# Patient Record
Sex: MALE | Race: WHITE | Employment: OTHER | URBAN - METROPOLITAN AREA
[De-identification: names, ages, dates, MRNs, and addresses within clinical notes are randomized per-mention and may not be internally consistent; named-entity substitution may affect disease eponyms.]

---

## 2019-03-29 ENCOUNTER — ANESTHESIA EVENT (OUTPATIENT)
Dept: OPERATING ROOM | Age: 69
DRG: 343 | End: 2019-03-29
Payer: MEDICARE

## 2019-03-29 ENCOUNTER — HOSPITAL ENCOUNTER (INPATIENT)
Age: 69
LOS: 1 days | Discharge: HOME | DRG: 343 | End: 2019-03-29
Attending: EMERGENCY MEDICINE | Admitting: SURGERY
Payer: MEDICARE

## 2019-03-29 ENCOUNTER — ANESTHESIA (OUTPATIENT)
Dept: OPERATING ROOM | Age: 69
DRG: 343 | End: 2019-03-29
Payer: MEDICARE

## 2019-03-29 ENCOUNTER — APPOINTMENT (OUTPATIENT)
Dept: CT IMAGING | Age: 69
DRG: 343 | End: 2019-03-29
Payer: MEDICARE

## 2019-03-29 VITALS — DIASTOLIC BLOOD PRESSURE: 55 MMHG | TEMPERATURE: 98.8 F | SYSTOLIC BLOOD PRESSURE: 70 MMHG | OXYGEN SATURATION: 95 %

## 2019-03-29 VITALS
DIASTOLIC BLOOD PRESSURE: 78 MMHG | WEIGHT: 251.77 LBS | RESPIRATION RATE: 20 BRPM | SYSTOLIC BLOOD PRESSURE: 133 MMHG | OXYGEN SATURATION: 92 % | TEMPERATURE: 96.2 F | HEIGHT: 69 IN | HEART RATE: 91 BPM | BODY MASS INDEX: 37.29 KG/M2

## 2019-03-29 DIAGNOSIS — G89.18 POST-OPERATIVE PAIN: ICD-10-CM

## 2019-03-29 DIAGNOSIS — K35.80 ACUTE APPENDICITIS, UNSPECIFIED ACUTE APPENDICITIS TYPE: ICD-10-CM

## 2019-03-29 DIAGNOSIS — K35.20 ACUTE APPENDICITIS WITH GENERALIZED PERITONITIS, WITHOUT GANGRENE OR ABSCESS, UNSPECIFIED WHETHER PERFORATION PRESENT: Primary | ICD-10-CM

## 2019-03-29 LAB
ABO/RH: NORMAL
ALBUMIN SERPL-MCNC: 4.2 G/DL (ref 3.4–5)
ALP BLD-CCNC: 114 U/L (ref 40–129)
ALT SERPL-CCNC: 26 U/L (ref 10–40)
ANION GAP SERPL CALCULATED.3IONS-SCNC: 13 MMOL/L (ref 3–16)
ANTIBODY SCREEN: NORMAL
AST SERPL-CCNC: 21 U/L (ref 15–37)
BASOPHILS ABSOLUTE: 0.2 K/UL (ref 0–0.2)
BASOPHILS RELATIVE PERCENT: 1.1 %
BILIRUB SERPL-MCNC: 0.9 MG/DL (ref 0–1)
BILIRUBIN DIRECT: <0.2 MG/DL (ref 0–0.3)
BILIRUBIN URINE: NEGATIVE
BILIRUBIN, INDIRECT: NORMAL MG/DL (ref 0–1)
BLOOD, URINE: NEGATIVE
BUN BLDV-MCNC: 13 MG/DL (ref 7–20)
CALCIUM SERPL-MCNC: 9.3 MG/DL (ref 8.3–10.6)
CHLORIDE BLD-SCNC: 102 MMOL/L (ref 99–110)
CLARITY: CLEAR
CO2: 21 MMOL/L (ref 21–32)
COLOR: YELLOW
CREAT SERPL-MCNC: 0.8 MG/DL (ref 0.8–1.3)
EOSINOPHILS ABSOLUTE: 0.1 K/UL (ref 0–0.6)
EOSINOPHILS RELATIVE PERCENT: 0.6 %
GFR AFRICAN AMERICAN: >60
GFR NON-AFRICAN AMERICAN: >60
GLUCOSE BLD-MCNC: 118 MG/DL (ref 70–99)
GLUCOSE URINE: NEGATIVE MG/DL
HCT VFR BLD CALC: 40.1 % (ref 40.5–52.5)
HEMOGLOBIN: 13.1 G/DL (ref 13.5–17.5)
INR BLD: 1 (ref 0.86–1.14)
KETONES, URINE: NEGATIVE MG/DL
LEUKOCYTE ESTERASE, URINE: NEGATIVE
LIPASE: 14 U/L (ref 13–60)
LYMPHOCYTES ABSOLUTE: 1.1 K/UL (ref 1–5.1)
LYMPHOCYTES RELATIVE PERCENT: 7.1 %
MCH RBC QN AUTO: 29.6 PG (ref 26–34)
MCHC RBC AUTO-ENTMCNC: 32.7 G/DL (ref 31–36)
MCV RBC AUTO: 90.5 FL (ref 80–100)
MICROSCOPIC EXAMINATION: NORMAL
MONOCYTES ABSOLUTE: 1.2 K/UL (ref 0–1.3)
MONOCYTES RELATIVE PERCENT: 8.2 %
NEUTROPHILS ABSOLUTE: 12.5 K/UL (ref 1.7–7.7)
NEUTROPHILS RELATIVE PERCENT: 83 %
NITRITE, URINE: NEGATIVE
PDW BLD-RTO: 14 % (ref 12.4–15.4)
PH UA: 6 (ref 5–8)
PLATELET # BLD: 210 K/UL (ref 135–450)
PMV BLD AUTO: 9.6 FL (ref 5–10.5)
POTASSIUM REFLEX MAGNESIUM: 4.2 MMOL/L (ref 3.5–5.1)
PROTEIN UA: NEGATIVE MG/DL
PROTHROMBIN TIME: 11.4 SEC (ref 9.8–13)
RBC # BLD: 4.43 M/UL (ref 4.2–5.9)
SODIUM BLD-SCNC: 136 MMOL/L (ref 136–145)
SPECIFIC GRAVITY UA: 1.02 (ref 1–1.03)
TOTAL PROTEIN: 6.8 G/DL (ref 6.4–8.2)
URINE REFLEX TO CULTURE: NORMAL
URINE TYPE: NORMAL
UROBILINOGEN, URINE: 0.2 E.U./DL
WBC # BLD: 15 K/UL (ref 4–11)

## 2019-03-29 PROCEDURE — 83690 ASSAY OF LIPASE: CPT

## 2019-03-29 PROCEDURE — 3700000001 HC ADD 15 MINUTES (ANESTHESIA): Performed by: SURGERY

## 2019-03-29 PROCEDURE — 2580000003 HC RX 258: Performed by: EMERGENCY MEDICINE

## 2019-03-29 PROCEDURE — 85025 COMPLETE CBC W/AUTO DIFF WBC: CPT

## 2019-03-29 PROCEDURE — 74177 CT ABD & PELVIS W/CONTRAST: CPT

## 2019-03-29 PROCEDURE — 6360000002 HC RX W HCPCS: Performed by: SURGERY

## 2019-03-29 PROCEDURE — 7100000001 HC PACU RECOVERY - ADDTL 15 MIN: Performed by: SURGERY

## 2019-03-29 PROCEDURE — 0DTJ4ZZ RESECTION OF APPENDIX, PERCUTANEOUS ENDOSCOPIC APPROACH: ICD-10-PCS | Performed by: SURGERY

## 2019-03-29 PROCEDURE — 88304 TISSUE EXAM BY PATHOLOGIST: CPT

## 2019-03-29 PROCEDURE — 6360000004 HC RX CONTRAST MEDICATION: Performed by: EMERGENCY MEDICINE

## 2019-03-29 PROCEDURE — 1200000000 HC SEMI PRIVATE

## 2019-03-29 PROCEDURE — 99285 EMERGENCY DEPT VISIT HI MDM: CPT

## 2019-03-29 PROCEDURE — 80048 BASIC METABOLIC PNL TOTAL CA: CPT

## 2019-03-29 PROCEDURE — 3600000014 HC SURGERY LEVEL 4 ADDTL 15MIN: Performed by: SURGERY

## 2019-03-29 PROCEDURE — 86900 BLOOD TYPING SEROLOGIC ABO: CPT

## 2019-03-29 PROCEDURE — 2500000003 HC RX 250 WO HCPCS: Performed by: SURGERY

## 2019-03-29 PROCEDURE — 96375 TX/PRO/DX INJ NEW DRUG ADDON: CPT

## 2019-03-29 PROCEDURE — 81003 URINALYSIS AUTO W/O SCOPE: CPT

## 2019-03-29 PROCEDURE — 99223 1ST HOSP IP/OBS HIGH 75: CPT | Performed by: SURGERY

## 2019-03-29 PROCEDURE — 2709999900 HC NON-CHARGEABLE SUPPLY: Performed by: SURGERY

## 2019-03-29 PROCEDURE — 3700000000 HC ANESTHESIA ATTENDED CARE: Performed by: SURGERY

## 2019-03-29 PROCEDURE — 2720000010 HC SURG SUPPLY STERILE: Performed by: SURGERY

## 2019-03-29 PROCEDURE — 2580000003 HC RX 258: Performed by: SURGERY

## 2019-03-29 PROCEDURE — 2580000003 HC RX 258: Performed by: STUDENT IN AN ORGANIZED HEALTH CARE EDUCATION/TRAINING PROGRAM

## 2019-03-29 PROCEDURE — 80076 HEPATIC FUNCTION PANEL: CPT

## 2019-03-29 PROCEDURE — 96374 THER/PROPH/DIAG INJ IV PUSH: CPT

## 2019-03-29 PROCEDURE — 6360000002 HC RX W HCPCS: Performed by: EMERGENCY MEDICINE

## 2019-03-29 PROCEDURE — 2500000003 HC RX 250 WO HCPCS: Performed by: NURSE ANESTHETIST, CERTIFIED REGISTERED

## 2019-03-29 PROCEDURE — 3600000004 HC SURGERY LEVEL 4 BASE: Performed by: SURGERY

## 2019-03-29 PROCEDURE — 7100000000 HC PACU RECOVERY - FIRST 15 MIN: Performed by: SURGERY

## 2019-03-29 PROCEDURE — 6360000002 HC RX W HCPCS: Performed by: STUDENT IN AN ORGANIZED HEALTH CARE EDUCATION/TRAINING PROGRAM

## 2019-03-29 PROCEDURE — G0378 HOSPITAL OBSERVATION PER HR: HCPCS

## 2019-03-29 PROCEDURE — 44970 LAPAROSCOPY APPENDECTOMY: CPT | Performed by: SURGERY

## 2019-03-29 PROCEDURE — 85610 PROTHROMBIN TIME: CPT

## 2019-03-29 PROCEDURE — 6360000002 HC RX W HCPCS: Performed by: NURSE ANESTHETIST, CERTIFIED REGISTERED

## 2019-03-29 PROCEDURE — 86901 BLOOD TYPING SEROLOGIC RH(D): CPT

## 2019-03-29 PROCEDURE — 6360000002 HC RX W HCPCS: Performed by: PHYSICIAN ASSISTANT

## 2019-03-29 PROCEDURE — 86850 RBC ANTIBODY SCREEN: CPT

## 2019-03-29 RX ORDER — ONDANSETRON 2 MG/ML
4 INJECTION INTRAMUSCULAR; INTRAVENOUS EVERY 6 HOURS PRN
Status: DISCONTINUED | OUTPATIENT
Start: 2019-03-29 | End: 2019-03-29 | Stop reason: HOSPADM

## 2019-03-29 RX ORDER — DEXAMETHASONE SODIUM PHOSPHATE 4 MG/ML
INJECTION, SOLUTION INTRA-ARTICULAR; INTRALESIONAL; INTRAMUSCULAR; INTRAVENOUS; SOFT TISSUE PRN
Status: DISCONTINUED | OUTPATIENT
Start: 2019-03-29 | End: 2019-03-29 | Stop reason: SDUPTHER

## 2019-03-29 RX ORDER — ONDANSETRON 2 MG/ML
4 INJECTION INTRAMUSCULAR; INTRAVENOUS ONCE
Status: COMPLETED | OUTPATIENT
Start: 2019-03-29 | End: 2019-03-29

## 2019-03-29 RX ORDER — OXYCODONE HYDROCHLORIDE AND ACETAMINOPHEN 5; 325 MG/1; MG/1
1 TABLET ORAL ONCE
Status: DISCONTINUED | OUTPATIENT
Start: 2019-03-29 | End: 2019-03-29 | Stop reason: HOSPADM

## 2019-03-29 RX ORDER — FENTANYL CITRATE 50 UG/ML
INJECTION, SOLUTION INTRAMUSCULAR; INTRAVENOUS PRN
Status: DISCONTINUED | OUTPATIENT
Start: 2019-03-29 | End: 2019-03-29 | Stop reason: SDUPTHER

## 2019-03-29 RX ORDER — FENTANYL CITRATE 50 UG/ML
25 INJECTION, SOLUTION INTRAMUSCULAR; INTRAVENOUS EVERY 5 MIN PRN
Status: DISCONTINUED | OUTPATIENT
Start: 2019-03-29 | End: 2019-03-29 | Stop reason: HOSPADM

## 2019-03-29 RX ORDER — HYDROMORPHONE HCL 110MG/55ML
PATIENT CONTROLLED ANALGESIA SYRINGE INTRAVENOUS PRN
Status: DISCONTINUED | OUTPATIENT
Start: 2019-03-29 | End: 2019-03-29 | Stop reason: SDUPTHER

## 2019-03-29 RX ORDER — PROMETHAZINE HYDROCHLORIDE 25 MG/ML
6.25 INJECTION, SOLUTION INTRAMUSCULAR; INTRAVENOUS
Status: DISCONTINUED | OUTPATIENT
Start: 2019-03-29 | End: 2019-03-29 | Stop reason: HOSPADM

## 2019-03-29 RX ORDER — DOCUSATE SODIUM 100 MG/1
100 CAPSULE, LIQUID FILLED ORAL 2 TIMES DAILY
Qty: 30 CAPSULE | Refills: 0 | Status: SHIPPED | OUTPATIENT
Start: 2019-03-29 | End: 2019-04-12

## 2019-03-29 RX ORDER — LABETALOL HYDROCHLORIDE 5 MG/ML
5 INJECTION, SOLUTION INTRAVENOUS EVERY 10 MIN PRN
Status: DISCONTINUED | OUTPATIENT
Start: 2019-03-29 | End: 2019-03-29 | Stop reason: HOSPADM

## 2019-03-29 RX ORDER — MORPHINE SULFATE 4 MG/ML
4 INJECTION, SOLUTION INTRAMUSCULAR; INTRAVENOUS ONCE
Status: COMPLETED | OUTPATIENT
Start: 2019-03-29 | End: 2019-03-29

## 2019-03-29 RX ORDER — PROPOFOL 10 MG/ML
INJECTION, EMULSION INTRAVENOUS PRN
Status: DISCONTINUED | OUTPATIENT
Start: 2019-03-29 | End: 2019-03-29 | Stop reason: SDUPTHER

## 2019-03-29 RX ORDER — ONDANSETRON 2 MG/ML
INJECTION INTRAMUSCULAR; INTRAVENOUS PRN
Status: DISCONTINUED | OUTPATIENT
Start: 2019-03-29 | End: 2019-03-29 | Stop reason: SDUPTHER

## 2019-03-29 RX ORDER — SODIUM CHLORIDE, SODIUM LACTATE, POTASSIUM CHLORIDE, AND CALCIUM CHLORIDE .6; .31; .03; .02 G/100ML; G/100ML; G/100ML; G/100ML
IRRIGANT IRRIGATION PRN
Status: DISCONTINUED | OUTPATIENT
Start: 2019-03-29 | End: 2019-03-29 | Stop reason: ALTCHOICE

## 2019-03-29 RX ORDER — SODIUM CHLORIDE, SODIUM LACTATE, POTASSIUM CHLORIDE, CALCIUM CHLORIDE 600; 310; 30; 20 MG/100ML; MG/100ML; MG/100ML; MG/100ML
INJECTION, SOLUTION INTRAVENOUS CONTINUOUS
Status: DISCONTINUED | OUTPATIENT
Start: 2019-03-29 | End: 2019-03-29 | Stop reason: HOSPADM

## 2019-03-29 RX ORDER — KETOROLAC TROMETHAMINE 15 MG/ML
INJECTION, SOLUTION INTRAMUSCULAR; INTRAVENOUS PRN
Status: DISCONTINUED | OUTPATIENT
Start: 2019-03-29 | End: 2019-03-29 | Stop reason: SDUPTHER

## 2019-03-29 RX ORDER — MAGNESIUM HYDROXIDE 1200 MG/15ML
LIQUID ORAL CONTINUOUS PRN
Status: COMPLETED | OUTPATIENT
Start: 2019-03-29 | End: 2019-03-29

## 2019-03-29 RX ORDER — MIDAZOLAM HYDROCHLORIDE 1 MG/ML
INJECTION INTRAMUSCULAR; INTRAVENOUS PRN
Status: DISCONTINUED | OUTPATIENT
Start: 2019-03-29 | End: 2019-03-29 | Stop reason: SDUPTHER

## 2019-03-29 RX ORDER — DIPHENHYDRAMINE HYDROCHLORIDE 50 MG/ML
12.5 INJECTION INTRAMUSCULAR; INTRAVENOUS
Status: DISCONTINUED | OUTPATIENT
Start: 2019-03-29 | End: 2019-03-29 | Stop reason: HOSPADM

## 2019-03-29 RX ORDER — ONDANSETRON 2 MG/ML
4 INJECTION INTRAMUSCULAR; INTRAVENOUS
Status: DISCONTINUED | OUTPATIENT
Start: 2019-03-29 | End: 2019-03-29 | Stop reason: HOSPADM

## 2019-03-29 RX ORDER — SODIUM CHLORIDE 0.9 % (FLUSH) 0.9 %
10 SYRINGE (ML) INJECTION PRN
Status: DISCONTINUED | OUTPATIENT
Start: 2019-03-29 | End: 2019-03-29 | Stop reason: HOSPADM

## 2019-03-29 RX ORDER — SODIUM CHLORIDE 0.9 % (FLUSH) 0.9 %
10 SYRINGE (ML) INJECTION EVERY 12 HOURS SCHEDULED
Status: DISCONTINUED | OUTPATIENT
Start: 2019-03-29 | End: 2019-03-29 | Stop reason: HOSPADM

## 2019-03-29 RX ORDER — MEPERIDINE HYDROCHLORIDE 25 MG/ML
12.5 INJECTION INTRAMUSCULAR; INTRAVENOUS; SUBCUTANEOUS EVERY 5 MIN PRN
Status: DISCONTINUED | OUTPATIENT
Start: 2019-03-29 | End: 2019-03-29 | Stop reason: HOSPADM

## 2019-03-29 RX ORDER — ROCURONIUM BROMIDE 10 MG/ML
INJECTION, SOLUTION INTRAVENOUS PRN
Status: DISCONTINUED | OUTPATIENT
Start: 2019-03-29 | End: 2019-03-29 | Stop reason: SDUPTHER

## 2019-03-29 RX ORDER — OXYCODONE HYDROCHLORIDE AND ACETAMINOPHEN 5; 325 MG/1; MG/1
1 TABLET ORAL EVERY 6 HOURS PRN
Qty: 20 TABLET | Refills: 0 | Status: SHIPPED | OUTPATIENT
Start: 2019-03-29 | End: 2019-04-05

## 2019-03-29 RX ORDER — HYDRALAZINE HYDROCHLORIDE 20 MG/ML
5 INJECTION INTRAMUSCULAR; INTRAVENOUS EVERY 10 MIN PRN
Status: DISCONTINUED | OUTPATIENT
Start: 2019-03-29 | End: 2019-03-29 | Stop reason: HOSPADM

## 2019-03-29 RX ADMIN — ONDANSETRON 4 MG: 2 INJECTION INTRAMUSCULAR; INTRAVENOUS at 12:27

## 2019-03-29 RX ADMIN — KETOROLAC TROMETHAMINE 15 MG: 15 INJECTION, SOLUTION INTRAMUSCULAR; INTRAVENOUS at 12:38

## 2019-03-29 RX ADMIN — HYDROMORPHONE HYDROCHLORIDE 1 MG: 2 INJECTION, SOLUTION INTRAMUSCULAR; INTRAVENOUS; SUBCUTANEOUS at 12:27

## 2019-03-29 RX ADMIN — ROCURONIUM BROMIDE 50 MG: 10 INJECTION, SOLUTION INTRAVENOUS at 11:45

## 2019-03-29 RX ADMIN — ONDANSETRON 4 MG: 2 INJECTION INTRAMUSCULAR; INTRAVENOUS at 02:13

## 2019-03-29 RX ADMIN — IOPAMIDOL 80 ML: 755 INJECTION, SOLUTION INTRAVENOUS at 02:24

## 2019-03-29 RX ADMIN — DEXTROSE MONOHYDRATE 3.38 G: 5 INJECTION INTRAVENOUS at 04:54

## 2019-03-29 RX ADMIN — HYDROMORPHONE HYDROCHLORIDE 0.5 MG: 1 INJECTION, SOLUTION INTRAMUSCULAR; INTRAVENOUS; SUBCUTANEOUS at 06:44

## 2019-03-29 RX ADMIN — FENTANYL CITRATE 100 MCG: 50 INJECTION INTRAMUSCULAR; INTRAVENOUS at 11:45

## 2019-03-29 RX ADMIN — SODIUM CHLORIDE, SODIUM LACTATE, POTASSIUM CHLORIDE, AND CALCIUM CHLORIDE: 600; 310; 30; 20 INJECTION, SOLUTION INTRAVENOUS at 05:02

## 2019-03-29 RX ADMIN — CEFOXITIN SODIUM 2 G: 2 POWDER, FOR SOLUTION INTRAVENOUS at 11:53

## 2019-03-29 RX ADMIN — SODIUM CHLORIDE, SODIUM LACTATE, POTASSIUM CHLORIDE, AND CALCIUM CHLORIDE: 600; 310; 30; 20 INJECTION, SOLUTION INTRAVENOUS at 11:37

## 2019-03-29 RX ADMIN — HYDROMORPHONE HYDROCHLORIDE 0.5 MG: 1 INJECTION, SOLUTION INTRAMUSCULAR; INTRAVENOUS; SUBCUTANEOUS at 04:58

## 2019-03-29 RX ADMIN — LIDOCAINE HYDROCHLORIDE 100 MG: 20 INJECTION, SOLUTION INTRAVENOUS at 11:45

## 2019-03-29 RX ADMIN — MORPHINE SULFATE 4 MG: 4 INJECTION, SOLUTION INTRAMUSCULAR; INTRAVENOUS at 02:13

## 2019-03-29 RX ADMIN — PROPOFOL 150 MG: 10 INJECTION, EMULSION INTRAVENOUS at 11:45

## 2019-03-29 RX ADMIN — DEXAMETHASONE SODIUM PHOSPHATE 4 MG: 4 INJECTION, SOLUTION INTRAMUSCULAR; INTRAVENOUS at 12:27

## 2019-03-29 RX ADMIN — HYDROMORPHONE HYDROCHLORIDE 0.5 MG: 1 INJECTION, SOLUTION INTRAMUSCULAR; INTRAVENOUS; SUBCUTANEOUS at 09:48

## 2019-03-29 RX ADMIN — ENOXAPARIN SODIUM 40 MG: 40 INJECTION SUBCUTANEOUS at 05:03

## 2019-03-29 RX ADMIN — MIDAZOLAM HYDROCHLORIDE 2 MG: 2 INJECTION, SOLUTION INTRAMUSCULAR; INTRAVENOUS at 11:33

## 2019-03-29 RX ADMIN — HYDROMORPHONE HYDROCHLORIDE 1 MG: 2 INJECTION, SOLUTION INTRAMUSCULAR; INTRAVENOUS; SUBCUTANEOUS at 13:02

## 2019-03-29 ASSESSMENT — PAIN DESCRIPTION - ORIENTATION
ORIENTATION: RIGHT;LEFT;LOWER
ORIENTATION: LOWER
ORIENTATION: LOWER;LEFT;RIGHT
ORIENTATION: RIGHT;LEFT;LOWER
ORIENTATION: RIGHT;LEFT;LOWER

## 2019-03-29 ASSESSMENT — PAIN DESCRIPTION - FREQUENCY
FREQUENCY: CONTINUOUS
FREQUENCY: INTERMITTENT
FREQUENCY: CONTINUOUS

## 2019-03-29 ASSESSMENT — PAIN - FUNCTIONAL ASSESSMENT
PAIN_FUNCTIONAL_ASSESSMENT: PREVENTS OR INTERFERES SOME ACTIVE ACTIVITIES AND ADLS
PAIN_FUNCTIONAL_ASSESSMENT: PREVENTS OR INTERFERES WITH ALL ACTIVE AND SOME PASSIVE ACTIVITIES
PAIN_FUNCTIONAL_ASSESSMENT: PREVENTS OR INTERFERES SOME ACTIVE ACTIVITIES AND ADLS
PAIN_FUNCTIONAL_ASSESSMENT: PREVENTS OR INTERFERES SOME ACTIVE ACTIVITIES AND ADLS

## 2019-03-29 ASSESSMENT — PULMONARY FUNCTION TESTS
PIF_VALUE: 2
PIF_VALUE: 35
PIF_VALUE: 35
PIF_VALUE: 23
PIF_VALUE: 17
PIF_VALUE: 23
PIF_VALUE: 23
PIF_VALUE: 35
PIF_VALUE: 26
PIF_VALUE: 25
PIF_VALUE: 35
PIF_VALUE: 23
PIF_VALUE: 36
PIF_VALUE: 32
PIF_VALUE: 26
PIF_VALUE: 29
PIF_VALUE: 0
PIF_VALUE: 31
PIF_VALUE: 13
PIF_VALUE: 33
PIF_VALUE: 33
PIF_VALUE: 35
PIF_VALUE: 35
PIF_VALUE: 26
PIF_VALUE: 24
PIF_VALUE: 23
PIF_VALUE: 26
PIF_VALUE: 22
PIF_VALUE: 23
PIF_VALUE: 32
PIF_VALUE: 36
PIF_VALUE: 23
PIF_VALUE: 1
PIF_VALUE: 27
PIF_VALUE: 29
PIF_VALUE: 35
PIF_VALUE: 23
PIF_VALUE: 1
PIF_VALUE: 13
PIF_VALUE: 35
PIF_VALUE: 2
PIF_VALUE: 1
PIF_VALUE: 4
PIF_VALUE: 22
PIF_VALUE: 22
PIF_VALUE: 15
PIF_VALUE: 28
PIF_VALUE: 1
PIF_VALUE: 33
PIF_VALUE: 23
PIF_VALUE: 27
PIF_VALUE: 14
PIF_VALUE: 34
PIF_VALUE: 35
PIF_VALUE: 34
PIF_VALUE: 23
PIF_VALUE: 36
PIF_VALUE: 35
PIF_VALUE: 35
PIF_VALUE: 22
PIF_VALUE: 22
PIF_VALUE: 34
PIF_VALUE: 24
PIF_VALUE: 33
PIF_VALUE: 0
PIF_VALUE: 23
PIF_VALUE: 0
PIF_VALUE: 36
PIF_VALUE: 35
PIF_VALUE: 35
PIF_VALUE: 27
PIF_VALUE: 31
PIF_VALUE: 32
PIF_VALUE: 3
PIF_VALUE: 34
PIF_VALUE: 1
PIF_VALUE: 26

## 2019-03-29 ASSESSMENT — PAIN DESCRIPTION - LOCATION
LOCATION: ABDOMEN;BACK;FLANK
LOCATION: ABDOMEN;BACK
LOCATION: ABDOMEN
LOCATION: ABDOMEN;BACK

## 2019-03-29 ASSESSMENT — PAIN SCALES - GENERAL
PAINLEVEL_OUTOF10: 3
PAINLEVEL_OUTOF10: 7
PAINLEVEL_OUTOF10: 5
PAINLEVEL_OUTOF10: 10
PAINLEVEL_OUTOF10: 10
PAINLEVEL_OUTOF10: 0
PAINLEVEL_OUTOF10: 8
PAINLEVEL_OUTOF10: 5
PAINLEVEL_OUTOF10: 10

## 2019-03-29 ASSESSMENT — PAIN DESCRIPTION - ONSET
ONSET: ON-GOING
ONSET: SUDDEN
ONSET: ON-GOING
ONSET: ON-GOING
ONSET: SUDDEN
ONSET: ON-GOING

## 2019-03-29 ASSESSMENT — PAIN DESCRIPTION - DESCRIPTORS
DESCRIPTORS: SHARP
DESCRIPTORS: STABBING

## 2019-03-29 ASSESSMENT — PAIN DESCRIPTION - PROGRESSION
CLINICAL_PROGRESSION: NOT CHANGED

## 2019-03-29 ASSESSMENT — PAIN DESCRIPTION - PAIN TYPE
TYPE: ACUTE PAIN

## 2019-03-29 ASSESSMENT — PAIN SCALES - WONG BAKER
WONGBAKER_NUMERICALRESPONSE: 8
WONGBAKER_NUMERICALRESPONSE: 0

## 2019-03-29 NOTE — H&P
General Surgery   Resident History and Physical       Chief Complaint: abdominal pain     History of Present Illness:    Joleen Gottron is a 76 y.o. male with Hx of HTN, HLD, arthritis, no previous abdominal surgeries, presents to the ED with increasing abdominal pain since 430 pm yesterday evening. At first onset of the pain he localized it to the umbilical region, but now reports that it is suprapubic and wrapping around his flanks. He denies any previous hx of similar pain. He reports that he initially thought he was constipated, but was only able to have a small BM after the onset of the pain with minimal relief. He reports some chills today, but denies fevers, nausea, emesis, decreased appetite or PO intake, denies dysuria, hematuria, hematochezia, melena. He reports urinating appropriately, and last regular BM was wends. He had chicken soup for dinner around 8pm yesterday evening, nothing since then. Past Medical History:        Diagnosis Date    Arthritis     left knee    Hyperlipidemia     Hypertension        Past Surgical History:           Procedure Laterality Date    ANKLE SURGERY Left     COLONOSCOPY      OTHER SURGICAL HISTORY  1/27/15    LEFT TOTAL KNEE ARTHROPLASTY              QUADRACEPS TENDON REPAIR Left 02/20/2015    LEFT KNEE QUADRICEPS TENDON REPAIR     SHOULDER SURGERY Right        Allergies:  Shellfish-derived products    Medications:   Home Meds  No current facility-administered medications on file prior to encounter. Current Outpatient Medications on File Prior to Encounter   Medication Sig Dispense Refill    Calcium Carbonate-Vitamin D (CALCIUM + D PO) Take  by mouth daily. 600 mg/800 IU      vitamin B-12 (CYANOCOBALAMIN) 1000 MCG tablet Take 1,000 mcg by mouth daily.  atorvastatin (LIPITOR) 40 MG tablet Take 40 mg by mouth Daily with supper.  lisinopril (PRINIVIL;ZESTRIL) 10 MG tablet Take 10 mg by mouth daily.       gemfibrozil (LOPID) 600 MG tablet Take 600

## 2019-03-29 NOTE — OP NOTE
confirmed to be given. A mix of 1% lidocaine and 0.5% bupivacaine with epinephrine was used to anesthetize the skin at Sharpe's point in the left upper quadrant, 2 fingerbreadth below the costal margin. An 11-blade scalpel was used to make a 5 mm incision. The 0 degree 5 mm laparoscope was used to enter the abdominal cavity using Optiview technique. No complications with abdominal entry. The abdomen was insufflated to 15 mm Hg. The patient was placed in slight Trendelenburg and right sided up positioning. Other ports were then placed under visualization, including a 12 mm port in the left lower quadrant, and a 5mm port in the suprapubic region. Using atraumatic graspers we did follow the taeniae coli of the cecum proximally to the appendix, which was then grasped and elevated. It appeared to be thickened and inflamed, consistent with acute appendicitis. There was no evidence of rupture. The Ligature device was then used to take down some of the peritoneal attachments of the appendix as well as the cecum. The ligature device was then used to dissect into the mesoappendix. A Irina  grasper was then used to create a window between the mesoappendix and the base  of the appendix, and the ligature was used to incise the remainder of the mesoappendix, which was hemostatic. The [place] stapler, 45mm blue load was then used to staple across the base of the appendix flush with the cecum. We did confirm that there were no other structures near the staple line. A stapler was then fired in the  usual manner. The stapler was then removed and the Endocatch retrieval bag was placed into the abdominal cavity. The appendix was placed into the retrieval bag and brought out through the 12 mm left lower quadrant incision. This was passed off the table and will be sent down to pathology for permanent examination. We then inspected all 4 quadrants. There were no other gross abnormalities noted.  The staple line

## 2019-03-29 NOTE — ED PROVIDER NOTES
ED Attending Attestation Note     Date of evaluation: 3/29/2019    This patient was seen by the advance practice provider. I have seen and examined the patient, agree with the workup, evaluation, management and diagnosis. The care plan has been discussed. My assessment reveals complaints of abdominal pain that started earlier this evening. Patient does have tenderness to palpation throughout the abdomen most prominently in the right lower quadrant. White blood cell count is elevated at 15,000. CT scan of the abdomen and pelvis shows evidence of acute appendicitis. Patient was started on Zosyn and will be admitted to the surgical service with plans for appendectomy.       Melinda Byrd MD  03/29/19 3030

## 2019-03-29 NOTE — ED TRIAGE NOTES
Patient reports lower abdominal pain that radiates around both sides to his lower back. Patient reports that this pain came on suddenly, and this was about 4:30 yesterday afternoon. Denies N,V,D, chest pain, SOB, blurry vision, burning with urination. Denies taking blood thinners. States that he had a new onset headache with this pain.

## 2019-03-29 NOTE — PROGRESS NOTES
Pt arrived form OR, s/p LAPAROSCOPIC APPENDECTOMY (N/A ), report received from CRNA. Pt very sleepy on arrival, opens eyes and goes back to sleep. On 100 % NRB, lips dusky, BS sonorous.   Pt has a history of sleep apnea, unable to tolerate cpap

## 2019-04-01 ENCOUNTER — TELEPHONE (OUTPATIENT)
Dept: SURGERY | Age: 69
End: 2019-04-01

## 2019-04-01 NOTE — TELEPHONE ENCOUNTER
Patient's wife, [facility] [phone]  is requesting a return call to schedule a post op appointment. Patient had an appendectomy 3/29. Please advise. Thank you!

## 2019-04-02 ASSESSMENT — ENCOUNTER SYMPTOMS
VOMITING: 0
NAUSEA: 0
COLOR CHANGE: 0
SORE THROAT: 0
DIARRHEA: 0
EYE PAIN: 0
RHINORRHEA: 0
COUGH: 0
WHEEZING: 0
ABDOMINAL PAIN: 1
ABDOMINAL DISTENTION: 0
SHORTNESS OF BREATH: 0
TROUBLE SWALLOWING: 0
CHEST TIGHTNESS: 0

## 2019-04-03 NOTE — ED PROVIDER NOTES
[address] [facility] ENC[facility]          PHYSICIAN ASSISTANT NOTE       Date of evaluation: 3/29/2019    Chief Complaint     Abdominal Pain      History of Present Illness     Abida Mullen is a 71 y.o. male with a past medical history as noted below who presents to the Emergency Department with a complaint of acute onset abdominal pain. The patient reports an acute onset of bilateral lower quadrant and periumbilical abdominal pain that started about 14:30 hours today. No exertion or trauma noted. He denies any associated symptoms, including no nausea, vomiting, fever or chills. No similar, prior episodes. No urinary symptoms or bowel habit changes. Review of Systems     Review of Systems   Constitutional: Negative for chills, diaphoresis, fatigue and fever. HENT: Negative for congestion, postnasal drip, rhinorrhea, sore throat and trouble swallowing. Eyes: Negative for pain and visual disturbance. Respiratory: Negative for cough, chest tightness, shortness of breath and wheezing. Cardiovascular: Negative for chest pain, palpitations and leg swelling. Gastrointestinal: Positive for abdominal pain. Negative for abdominal distention, diarrhea, nausea and vomiting. Endocrine: Negative for polydipsia and polyuria. Genitourinary: Negative for dysuria. Musculoskeletal: Negative for myalgias, neck pain and neck stiffness. Skin: Negative for color change, pallor and rash. Neurological: Negative for dizziness, weakness, light-headedness and headaches. Hematological: Does not bruise/bleed easily. Psychiatric/Behavioral: Negative for confusion, hallucinations, self-injury and suicidal ideas. All other systems reviewed and are negative. Past Medical, Surgical, Family, and Social History     He has a past medical history of Arthritis, Hyperlipidemia, Hypertension, and Sleep apnea.   He has a past surgical history that includes Ankle surgery (Left); shoulder surgery <0.2 0.0 - 0.3 mg/dL    Bilirubin, Indirect see below 0.0 - 1.0 mg/dL   Lipase   Result Value Ref Range    Lipase 14.0 13.0 - 60.0 U/L   Urinalysis Reflex to Culture   Result Value Ref Range    Color, UA Yellow Straw/Yellow    Clarity, UA Clear Clear    Glucose, Ur Negative Negative mg/dL    Bilirubin Urine Negative Negative    Ketones, Urine Negative Negative mg/dL    Specific Gravity, UA 1.025 1.005 - 1.030    Blood, Urine Negative Negative    pH, UA 6.0 5.0 - 8.0    Protein, UA Negative Negative mg/dL    Urobilinogen, Urine 0.2 <2.0 E.U./dL    Nitrite, Urine Negative Negative    Leukocyte Esterase, Urine Negative Negative    Microscopic Examination Not Indicated     Urine Reflex to Culture Not Indicated     Urine Type Not Specified    Protime-INR   Result Value Ref Range    Protime 11.4 9.8 - 13.0 sec    INR 1.00 0.86 - 1.14   ABO/RH   Result Value Ref Range    ABO/Rh A POS    ANTIBODY SCREEN   Result Value Ref Range    Antibody Screen NEG        ED BEDSIDE ULTRASOUND:  N/A    RECENT VITALS:  BP: 133/78, Temp: 96.2 °F (35.7 °C), Pulse: 91, Resp: 20, SpO2: 92 %     Procedures     N/A    ED Course     Nursing Notes, Past Medical Hx,Past Surgical Hx, Social Hx, Allergies, and Family Hx were reviewed. The patient was given the following medications:  Orders Placed This Encounter   Medications    morphine injection 4 mg    ondansetron (ZOFRAN) injection 4 mg    iopamidol (ISOVUE-370) 76 % injection 80 mL    piperacillin-tazobactam (ZOSYN) 3.375 g in dextrose 5 % 100 mL IVPB (mini-bag)       CONSULTS:  IP CONSULT TO [facility] / ASSESSMENT / Sally Mathew is admitted to the Emergency Department for evaluation of his chief complaint as described in the history of present illness. Complete history and physical was performed by me and my attending.  Nursing notes, past medical history, surgical history, family history and social history were reviewed and addressed in the

## 2019-04-16 ENCOUNTER — OFFICE VISIT (OUTPATIENT)
Dept: SURGERY | Age: 69
End: 2019-04-16

## 2019-04-16 VITALS
BODY MASS INDEX: 36.92 KG/M2 | TEMPERATURE: 97.7 F | WEIGHT: 250 LBS | SYSTOLIC BLOOD PRESSURE: 138 MMHG | DIASTOLIC BLOOD PRESSURE: 80 MMHG

## 2019-04-16 DIAGNOSIS — K42.9 UMBILICAL HERNIA WITHOUT OBSTRUCTION AND WITHOUT GANGRENE: ICD-10-CM

## 2019-04-16 DIAGNOSIS — K35.30 ACUTE APPENDICITIS WITH LOCALIZED PERITONITIS, WITHOUT PERFORATION, ABSCESS, OR GANGRENE: Primary | ICD-10-CM

## 2019-04-16 PROCEDURE — 99024 POSTOP FOLLOW-UP VISIT: CPT | Performed by: SURGERY

## 2019-04-16 NOTE — PROGRESS NOTES
[address] [address] [address] [place] [address] [address] [address]  Dept: [phone]  Dept Fax: [phone]  Loc: [phone]    Visit Date: 4/16/2019    Meena Ventura is a 71 y.o. male who presents today for: Post-Op Check (lap appy 3/29/19)      Subjective:     Meena Ventura is a 71 y.o. male here for postoperative visit after lap appy 2 wks ago. Overall doing very well. Bowels working regularly. Tolerating regular diet. Still a bit sore on left lower quadrant incision    Patient's problem list, medications, past medical, surgical, family, and social histories were reviewed and updated in the chart as indicated today. Objective:     /80 (Site: Left Upper Arm, Position: Sitting, Cuff Size: Large Adult)   Temp 97.7 °F (36.5 °C) (Oral)   Wt 250 lb (113.4 kg)   BMI 36.92 kg/m²     Abdominal/wound: Wounds healing very well. Reducible umbilical hernia noted. Assessment/Plan:       ASSESSMENT/PLAN:    2 weeks status post lap appendectomy. Pathology appendicitis. Does have a colonoscopy scheduled for later this year. I did discuss with him the natural history of umbilical hernias. Currently it is asymptomatic and fairly small. It is up to him if he would like to have this repaired at some point in the future. DISPOSITION:  F/u as needed    Note completed using dictation software, please excuse any errors. Referring/primary care physician updated through [employer] note if PCP was listed.     Electronically signed by Princess Jules MD on 4/16/2019 at 1:41 PM

## 2024-09-18 ENCOUNTER — APPOINTMENT (OUTPATIENT)
Dept: CT IMAGING | Age: 74
DRG: 809 | End: 2024-09-18
Payer: MEDICARE

## 2024-09-18 ENCOUNTER — HOSPITAL ENCOUNTER (INPATIENT)
Age: 74
LOS: 2 days | Discharge: HOME | DRG: 809 | End: 2024-09-20
Attending: EMERGENCY MEDICINE | Admitting: FAMILY MEDICINE
Payer: MEDICARE

## 2024-09-18 DIAGNOSIS — D72.819 LEUKOPENIA, UNSPECIFIED TYPE: ICD-10-CM

## 2024-09-18 DIAGNOSIS — D64.9 ANEMIA, UNSPECIFIED TYPE: Primary | ICD-10-CM

## 2024-09-18 PROBLEM — D61.818 PANCYTOPENIA (HCC): Status: ACTIVE | Noted: 2024-09-18

## 2024-09-18 LAB
ABO + RH BLD: NORMAL
ALBUMIN SERPL-MCNC: 3.7 G/DL (ref 3.4–5)
ALP SERPL-CCNC: 281 U/L (ref 40–129)
ALT SERPL-CCNC: 274 U/L (ref 10–40)
ANION GAP SERPL CALCULATED.3IONS-SCNC: 11 MMOL/L (ref 3–16)
ANISOCYTOSIS BLD QL SMEAR: ABNORMAL
AST SERPL-CCNC: 183 U/L (ref 15–37)
BASOPHILS # BLD: 0 K/UL (ref 0–0.2)
BASOPHILS NFR BLD: 1 %
BILIRUB DIRECT SERPL-MCNC: 0.3 MG/DL (ref 0–0.3)
BILIRUB INDIRECT SERPL-MCNC: 0.5 MG/DL (ref 0–1)
BILIRUB SERPL-MCNC: 0.8 MG/DL (ref 0–1)
BLASTS NFR BLD MANUAL: 3 %
BLD GP AB SCN SERPL QL: NORMAL
BLOOD BANK DISPENSE STATUS: NORMAL
BLOOD BANK PRODUCT CODE: NORMAL
BPU ID: NORMAL
BUN SERPL-MCNC: 30 MG/DL (ref 7–20)
CALCIUM SERPL-MCNC: 9.1 MG/DL (ref 8.3–10.6)
CHLORIDE SERPL-SCNC: 106 MMOL/L (ref 99–110)
CO2 SERPL-SCNC: 24 MMOL/L (ref 21–32)
CREAT SERPL-MCNC: 1.2 MG/DL (ref 0.8–1.3)
DEPRECATED RDW RBC AUTO: 19.4 % (ref 12.4–15.4)
DESCRIPTION BLOOD BANK: NORMAL
DOHLE BOD BLD QL SMEAR: PRESENT
EKG ATRIAL RATE: 81 BPM
EKG DIAGNOSIS: NORMAL
EKG P AXIS: 35 DEGREES
EKG P-R INTERVAL: 230 MS
EKG Q-T INTERVAL: 384 MS
EKG QRS DURATION: 92 MS
EKG QTC CALCULATION (BAZETT): 446 MS
EKG R AXIS: 12 DEGREES
EKG T AXIS: 41 DEGREES
EKG VENTRICULAR RATE: 81 BPM
EOSINOPHIL # BLD: 0 K/UL (ref 0–0.6)
EOSINOPHIL NFR BLD: 1 %
GFR SERPLBLD CREATININE-BSD FMLA CKD-EPI: 63 ML/MIN/{1.73_M2}
GLUCOSE SERPL-MCNC: 127 MG/DL (ref 70–99)
HCT VFR BLD AUTO: 19.2 % (ref 40.5–52.5)
HCT VFR BLD AUTO: 21.7 % (ref 40.5–52.5)
HGB BLD-MCNC: 6.5 G/DL (ref 13.5–17.5)
HGB BLD-MCNC: 7.3 G/DL (ref 13.5–17.5)
LACTATE BLDV-SCNC: 0.8 MMOL/L (ref 0.4–2)
LACTATE BLDV-SCNC: 0.9 MMOL/L (ref 0.4–1.9)
LYMPHOCYTES # BLD: 0.4 K/UL (ref 1–5.1)
LYMPHOCYTES NFR BLD: 50 %
MACROCYTES BLD QL SMEAR: ABNORMAL
MCH RBC QN AUTO: 35.6 PG (ref 26–34)
MCHC RBC AUTO-ENTMCNC: 33.8 G/DL (ref 31–36)
MCV RBC AUTO: 105.6 FL (ref 80–100)
MICROCYTES BLD QL SMEAR: ABNORMAL
MONOCYTES # BLD: 0.1 K/UL (ref 0–1.3)
MONOCYTES NFR BLD: 10 %
NEUTROPHILS # BLD: 0.3 K/UL (ref 1.7–7.7)
NEUTROPHILS NFR BLD: 35 %
PATH INTERP BLD-IMP: YES
PLATELET # BLD AUTO: 187 K/UL (ref 135–450)
PLATELET BLD QL SMEAR: ADEQUATE
PMV BLD AUTO: 6.7 FL (ref 5–10.5)
POTASSIUM SERPL-SCNC: 4.2 MMOL/L (ref 3.5–5.1)
PROT SERPL-MCNC: 6.3 G/DL (ref 6.4–8.2)
RBC # BLD AUTO: 1.82 M/UL (ref 4.2–5.9)
SCHISTOCYTES BLD QL SMEAR: ABNORMAL
SLIDE REVIEW: ABNORMAL
SODIUM SERPL-SCNC: 141 MMOL/L (ref 136–145)
TROPONIN, HIGH SENSITIVITY: 13 NG/L (ref 0–22)
WBC # BLD AUTO: 0.8 K/UL (ref 4–11)

## 2024-09-18 PROCEDURE — 2580000003 HC RX 258: Performed by: FAMILY MEDICINE

## 2024-09-18 PROCEDURE — 85018 HEMOGLOBIN: CPT

## 2024-09-18 PROCEDURE — 85025 COMPLETE CBC W/AUTO DIFF WBC: CPT

## 2024-09-18 PROCEDURE — 74177 CT ABD & PELVIS W/CONTRAST: CPT

## 2024-09-18 PROCEDURE — 70450 CT HEAD/BRAIN W/O DYE: CPT

## 2024-09-18 PROCEDURE — 84145 PROCALCITONIN (PCT): CPT

## 2024-09-18 PROCEDURE — 85014 HEMATOCRIT: CPT

## 2024-09-18 PROCEDURE — 2060000000 HC ICU INTERMEDIATE R&B

## 2024-09-18 PROCEDURE — 6360000002 HC RX W HCPCS: Performed by: FAMILY MEDICINE

## 2024-09-18 PROCEDURE — 87040 BLOOD CULTURE FOR BACTERIA: CPT

## 2024-09-18 PROCEDURE — 86923 COMPATIBILITY TEST ELECTRIC: CPT

## 2024-09-18 PROCEDURE — 83605 ASSAY OF LACTIC ACID: CPT

## 2024-09-18 PROCEDURE — 71250 CT THORAX DX C-: CPT

## 2024-09-18 PROCEDURE — 36415 COLL VENOUS BLD VENIPUNCTURE: CPT

## 2024-09-18 PROCEDURE — 86850 RBC ANTIBODY SCREEN: CPT

## 2024-09-18 PROCEDURE — P9016 RBC LEUKOCYTES REDUCED: HCPCS

## 2024-09-18 PROCEDURE — 80048 BASIC METABOLIC PNL TOTAL CA: CPT

## 2024-09-18 PROCEDURE — 80074 ACUTE HEPATITIS PANEL: CPT

## 2024-09-18 PROCEDURE — 86901 BLOOD TYPING SEROLOGIC RH(D): CPT

## 2024-09-18 PROCEDURE — 99285 EMERGENCY DEPT VISIT HI MDM: CPT

## 2024-09-18 PROCEDURE — 6360000004 HC RX CONTRAST MEDICATION: Performed by: FAMILY MEDICINE

## 2024-09-18 PROCEDURE — 80076 HEPATIC FUNCTION PANEL: CPT

## 2024-09-18 PROCEDURE — 2580000003 HC RX 258: Performed by: PHYSICIAN ASSISTANT

## 2024-09-18 PROCEDURE — 84484 ASSAY OF TROPONIN QUANT: CPT

## 2024-09-18 PROCEDURE — 93005 ELECTROCARDIOGRAM TRACING: CPT | Performed by: PHYSICIAN ASSISTANT

## 2024-09-18 PROCEDURE — 86900 BLOOD TYPING SEROLOGIC ABO: CPT

## 2024-09-18 PROCEDURE — 30233N1 TRANSFUSION OF NONAUTOLOGOUS RED BLOOD CELLS INTO PERIPHERAL VEIN, PERCUTANEOUS APPROACH: ICD-10-PCS | Performed by: FAMILY MEDICINE

## 2024-09-18 RX ORDER — ACETAMINOPHEN 650 MG/1
650 SUPPOSITORY RECTAL EVERY 6 HOURS PRN
Status: DISCONTINUED | OUTPATIENT
Start: 2024-09-18 | End: 2024-09-20 | Stop reason: HOSPADM

## 2024-09-18 RX ORDER — SODIUM CHLORIDE 9 MG/ML
INJECTION, SOLUTION INTRAVENOUS PRN
Status: DISCONTINUED | OUTPATIENT
Start: 2024-09-18 | End: 2024-09-18

## 2024-09-18 RX ORDER — POTASSIUM CHLORIDE 7.45 MG/ML
10 INJECTION INTRAVENOUS PRN
Status: DISCONTINUED | OUTPATIENT
Start: 2024-09-18 | End: 2024-09-20 | Stop reason: HOSPADM

## 2024-09-18 RX ORDER — MAGNESIUM SULFATE IN WATER 40 MG/ML
2000 INJECTION, SOLUTION INTRAVENOUS PRN
Status: DISCONTINUED | OUTPATIENT
Start: 2024-09-18 | End: 2024-09-20 | Stop reason: HOSPADM

## 2024-09-18 RX ORDER — POTASSIUM CHLORIDE 1500 MG/1
40 TABLET, EXTENDED RELEASE ORAL PRN
Status: DISCONTINUED | OUTPATIENT
Start: 2024-09-18 | End: 2024-09-20 | Stop reason: HOSPADM

## 2024-09-18 RX ORDER — IOPAMIDOL 755 MG/ML
75 INJECTION, SOLUTION INTRAVASCULAR
Status: COMPLETED | OUTPATIENT
Start: 2024-09-18 | End: 2024-09-18

## 2024-09-18 RX ORDER — POLYETHYLENE GLYCOL 3350 17 G/17G
17 POWDER, FOR SOLUTION ORAL DAILY PRN
Status: DISCONTINUED | OUTPATIENT
Start: 2024-09-18 | End: 2024-09-20 | Stop reason: HOSPADM

## 2024-09-18 RX ORDER — ACETAMINOPHEN 325 MG/1
650 TABLET ORAL EVERY 6 HOURS PRN
Status: DISCONTINUED | OUTPATIENT
Start: 2024-09-18 | End: 2024-09-20 | Stop reason: HOSPADM

## 2024-09-18 RX ORDER — SODIUM CHLORIDE 9 MG/ML
INJECTION, SOLUTION INTRAVENOUS
Status: COMPLETED | OUTPATIENT
Start: 2024-09-18 | End: 2024-09-18

## 2024-09-18 RX ORDER — SODIUM CHLORIDE 9 MG/ML
INJECTION, SOLUTION INTRAVENOUS CONTINUOUS
Status: DISCONTINUED | OUTPATIENT
Start: 2024-09-18 | End: 2024-09-20 | Stop reason: HOSPADM

## 2024-09-18 RX ADMIN — SODIUM CHLORIDE, PRESERVATIVE FREE 40 MG: 5 INJECTION INTRAVENOUS at 19:43

## 2024-09-18 RX ADMIN — SODIUM CHLORIDE: 9 INJECTION, SOLUTION INTRAVENOUS at 17:09

## 2024-09-18 RX ADMIN — IOPAMIDOL 75 ML: 755 INJECTION, SOLUTION INTRAVENOUS at 21:13

## 2024-09-18 RX ADMIN — CEFEPIME 2000 MG: 2 INJECTION, POWDER, FOR SOLUTION INTRAVENOUS at 22:58

## 2024-09-18 RX ADMIN — SODIUM CHLORIDE: 900 INJECTION, SOLUTION INTRAVENOUS at 19:43

## 2024-09-18 ASSESSMENT — ENCOUNTER SYMPTOMS
VOMITING: 0
NAUSEA: 0
ABDOMINAL PAIN: 0
SHORTNESS OF BREATH: 1
CHEST TIGHTNESS: 0

## 2024-09-18 ASSESSMENT — LIFESTYLE VARIABLES
HOW OFTEN DO YOU HAVE A DRINK CONTAINING ALCOHOL: NEVER
HOW MANY STANDARD DRINKS CONTAINING ALCOHOL DO YOU HAVE ON A TYPICAL DAY: PATIENT DOES NOT DRINK

## 2024-09-18 ASSESSMENT — PAIN - FUNCTIONAL ASSESSMENT: PAIN_FUNCTIONAL_ASSESSMENT: NONE - DENIES PAIN

## 2024-09-19 ENCOUNTER — APPOINTMENT (OUTPATIENT)
Dept: ULTRASOUND IMAGING | Age: 74
DRG: 809 | End: 2024-09-19
Payer: MEDICARE

## 2024-09-19 LAB
ALBUMIN SERPL-MCNC: 3.6 G/DL (ref 3.4–5)
ALBUMIN/GLOB SERPL: 1.4 {RATIO} (ref 1.1–2.2)
ALP SERPL-CCNC: 307 U/L (ref 40–129)
ALT SERPL-CCNC: 333 U/L (ref 10–40)
ANION GAP SERPL CALCULATED.3IONS-SCNC: 12 MMOL/L (ref 3–16)
APTT BLD: 36 SEC (ref 22.1–36.4)
AST SERPL-CCNC: 228 U/L (ref 15–37)
BASOPHILS # BLD: 0 K/UL (ref 0–0.2)
BASOPHILS NFR BLD: 0 %
BILIRUB SERPL-MCNC: 0.8 MG/DL (ref 0–1)
BLOOD BANK DISPENSE STATUS: NORMAL
BLOOD BANK PRODUCT CODE: NORMAL
BPU ID: NORMAL
BUN SERPL-MCNC: 24 MG/DL (ref 7–20)
CALCIUM SERPL-MCNC: 8.9 MG/DL (ref 8.3–10.6)
CHLORIDE SERPL-SCNC: 107 MMOL/L (ref 99–110)
CK SERPL-CCNC: 59 U/L (ref 39–308)
CK SERPL-CCNC: 76 U/L (ref 39–308)
CO2 SERPL-SCNC: 20 MMOL/L (ref 21–32)
CREAT SERPL-MCNC: 0.9 MG/DL (ref 0.8–1.3)
DACRYOCYTES BLD QL SMEAR: ABNORMAL
DEPRECATED RDW RBC AUTO: 20.5 % (ref 12.4–15.4)
DESCRIPTION BLOOD BANK: NORMAL
EOSINOPHIL # BLD: 0 K/UL (ref 0–0.6)
EOSINOPHIL NFR BLD: 0 %
FERRITIN SERPL IA-MCNC: 1131 NG/ML (ref 30–400)
FOLATE SERPL-MCNC: 12.9 NG/ML (ref 4.78–24.2)
GFR SERPLBLD CREATININE-BSD FMLA CKD-EPI: 89 ML/MIN/{1.73_M2}
GLUCOSE SERPL-MCNC: 107 MG/DL (ref 70–99)
HAPTOGLOB SERPL-MCNC: 239 MG/DL (ref 30–200)
HAV IGM SERPL QL IA: NORMAL
HBV CORE IGM SERPL QL IA: NORMAL
HBV SURFACE AG SERPL QL IA: NORMAL
HCT VFR BLD AUTO: 19.8 % (ref 40.5–52.5)
HCT VFR BLD AUTO: 20.5 % (ref 40.5–52.5)
HCT VFR BLD AUTO: 21 % (ref 40.5–52.5)
HCT VFR BLD AUTO: 21.1 % (ref 40.5–52.5)
HCV AB SERPL QL IA: NORMAL
HGB BLD-MCNC: 6.8 G/DL (ref 13.5–17.5)
HGB BLD-MCNC: 6.8 G/DL (ref 13.5–17.5)
HGB BLD-MCNC: 7 G/DL (ref 13.5–17.5)
HGB BLD-MCNC: 7.1 G/DL (ref 13.5–17.5)
IMMATURE RETIC FRACT: 0.54 (ref 0.21–0.37)
INR PPP: 1.18 (ref 0.85–1.15)
IRON SATN MFR SERPL: 50 % (ref 20–50)
IRON SERPL-MCNC: 100 UG/DL (ref 59–158)
LDH SERPL L TO P-CCNC: 270 U/L (ref 100–190)
LYMPHOCYTES # BLD: 0.8 K/UL (ref 1–5.1)
LYMPHOCYTES NFR BLD: 72 %
MACROCYTES BLD QL SMEAR: ABNORMAL
MCH RBC QN AUTO: 35.3 PG (ref 26–34)
MCHC RBC AUTO-ENTMCNC: 33.6 G/DL (ref 31–36)
MCV RBC AUTO: 105 FL (ref 80–100)
MICROCYTES BLD QL SMEAR: ABNORMAL
MONOCYTES # BLD: 0.1 K/UL (ref 0–1.3)
MONOCYTES NFR BLD: 8 %
NEUTROPHILS # BLD: 0.2 K/UL (ref 1.7–7.7)
NEUTROPHILS NFR BLD: 20 %
OVALOCYTES BLD QL SMEAR: ABNORMAL
PATH INTERP BLD-IMP: NORMAL
PATH INTERP BLD-IMP: NORMAL
PLATELET # BLD AUTO: 159 K/UL (ref 135–450)
PMV BLD AUTO: 6.5 FL (ref 5–10.5)
POIKILOCYTOSIS BLD QL SMEAR: ABNORMAL
POTASSIUM SERPL-SCNC: 4.2 MMOL/L (ref 3.5–5.1)
PROCALCITONIN SERPL IA-MCNC: 0.2 NG/ML (ref 0–0.15)
PROT SERPL-MCNC: 6.2 G/DL (ref 6.4–8.2)
PROTHROMBIN TIME: 15.2 SEC (ref 11.9–14.9)
PSA SERPL DL<=0.01 NG/ML-MCNC: 5.47 NG/ML (ref 0–4)
RBC # BLD AUTO: 2 M/UL (ref 4.2–5.9)
RETICS # AUTO: 0.02 M/UL
RETICS/RBC NFR AUTO: 0.82 % (ref 0.5–2.18)
SCHISTOCYTES BLD QL SMEAR: ABNORMAL
SODIUM SERPL-SCNC: 139 MMOL/L (ref 136–145)
TIBC SERPL-MCNC: 201 UG/DL (ref 260–445)
TSH SERPL DL<=0.005 MIU/L-ACNC: 0.66 UIU/ML (ref 0.27–4.2)
VIT B12 SERPL-MCNC: 824 PG/ML (ref 211–911)
WBC # BLD AUTO: 1.1 K/UL (ref 4–11)

## 2024-09-19 PROCEDURE — 2500000003 HC RX 250 WO HCPCS: Performed by: SURGERY

## 2024-09-19 PROCEDURE — 36430 TRANSFUSION BLD/BLD COMPNT: CPT

## 2024-09-19 PROCEDURE — 85610 PROTHROMBIN TIME: CPT

## 2024-09-19 PROCEDURE — 82525 ASSAY OF COPPER: CPT

## 2024-09-19 PROCEDURE — 86038 ANTINUCLEAR ANTIBODIES: CPT

## 2024-09-19 PROCEDURE — 76705 ECHO EXAM OF ABDOMEN: CPT

## 2024-09-19 PROCEDURE — 0J9C0ZZ DRAINAGE OF PELVIC REGION SUBCUTANEOUS TISSUE AND FASCIA, OPEN APPROACH: ICD-10-PCS | Performed by: SURGERY

## 2024-09-19 PROCEDURE — 85014 HEMATOCRIT: CPT

## 2024-09-19 PROCEDURE — 82607 VITAMIN B-12: CPT

## 2024-09-19 PROCEDURE — 84153 ASSAY OF PSA TOTAL: CPT

## 2024-09-19 PROCEDURE — 85018 HEMOGLOBIN: CPT

## 2024-09-19 PROCEDURE — 86701 HIV-1ANTIBODY: CPT

## 2024-09-19 PROCEDURE — 86757 RICKETTSIA ANTIBODY: CPT

## 2024-09-19 PROCEDURE — 6360000002 HC RX W HCPCS: Performed by: FAMILY MEDICINE

## 2024-09-19 PROCEDURE — 85045 AUTOMATED RETICULOCYTE COUNT: CPT

## 2024-09-19 PROCEDURE — 99221 1ST HOSP IP/OBS SF/LOW 40: CPT | Performed by: SURGERY

## 2024-09-19 PROCEDURE — 2060000000 HC ICU INTERMEDIATE R&B

## 2024-09-19 PROCEDURE — 86702 HIV-2 ANTIBODY: CPT

## 2024-09-19 PROCEDURE — 86015 ACTIN ANTIBODY EACH: CPT

## 2024-09-19 PROCEDURE — 83540 ASSAY OF IRON: CPT

## 2024-09-19 PROCEDURE — 87205 SMEAR GRAM STAIN: CPT

## 2024-09-19 PROCEDURE — 82550 ASSAY OF CK (CPK): CPT

## 2024-09-19 PROCEDURE — 83010 ASSAY OF HAPTOGLOBIN QUANT: CPT

## 2024-09-19 PROCEDURE — 84443 ASSAY THYROID STIM HORMONE: CPT

## 2024-09-19 PROCEDURE — 82728 ASSAY OF FERRITIN: CPT

## 2024-09-19 PROCEDURE — 99223 1ST HOSP IP/OBS HIGH 75: CPT | Performed by: INTERNAL MEDICINE

## 2024-09-19 PROCEDURE — 83615 LACTATE (LD) (LDH) ENZYME: CPT

## 2024-09-19 PROCEDURE — 82668 ASSAY OF ERYTHROPOIETIN: CPT

## 2024-09-19 PROCEDURE — 87075 CULTR BACTERIA EXCEPT BLOOD: CPT

## 2024-09-19 PROCEDURE — 36415 COLL VENOUS BLD VENIPUNCTURE: CPT

## 2024-09-19 PROCEDURE — 80053 COMPREHEN METABOLIC PANEL: CPT

## 2024-09-19 PROCEDURE — 82746 ASSAY OF FOLIC ACID SERUM: CPT

## 2024-09-19 PROCEDURE — 87077 CULTURE AEROBIC IDENTIFY: CPT

## 2024-09-19 PROCEDURE — 85025 COMPLETE CBC W/AUTO DIFF WBC: CPT

## 2024-09-19 PROCEDURE — 87070 CULTURE OTHR SPECIMN AEROBIC: CPT

## 2024-09-19 PROCEDURE — 83550 IRON BINDING TEST: CPT

## 2024-09-19 PROCEDURE — 85730 THROMBOPLASTIN TIME PARTIAL: CPT

## 2024-09-19 PROCEDURE — 86747 PARVOVIRUS ANTIBODY: CPT

## 2024-09-19 PROCEDURE — 87390 HIV-1 AG IA: CPT

## 2024-09-19 PROCEDURE — 2580000003 HC RX 258: Performed by: FAMILY MEDICINE

## 2024-09-19 PROCEDURE — 87186 SC STD MICRODIL/AGAR DIL: CPT

## 2024-09-19 RX ORDER — OMEGA-3-ACID ETHYL ESTERS 1 G/1
1 CAPSULE, LIQUID FILLED ORAL 2 TIMES DAILY WITH MEALS
Status: ON HOLD | COMMUNITY
Start: 2024-08-03

## 2024-09-19 RX ORDER — LISINOPRIL 30 MG/1
30 TABLET ORAL DAILY
Status: ON HOLD | COMMUNITY
Start: 2024-07-15

## 2024-09-19 RX ORDER — ATORVASTATIN CALCIUM 80 MG/1
80 TABLET, FILM COATED ORAL DAILY
Status: ON HOLD | COMMUNITY
Start: 2024-09-03 | End: 2024-09-20 | Stop reason: HOSPADM

## 2024-09-19 RX ORDER — LIDOCAINE HYDROCHLORIDE 10 MG/ML
5 INJECTION, SOLUTION EPIDURAL; INFILTRATION; INTRACAUDAL; PERINEURAL ONCE
Status: COMPLETED | OUTPATIENT
Start: 2024-09-19 | End: 2024-09-19

## 2024-09-19 RX ORDER — FLUTICASONE PROPIONATE 50 MCG
1 SPRAY, SUSPENSION (ML) NASAL DAILY
Status: DISCONTINUED | OUTPATIENT
Start: 2024-09-19 | End: 2024-09-20 | Stop reason: HOSPADM

## 2024-09-19 RX ORDER — SODIUM CHLORIDE 9 MG/ML
INJECTION, SOLUTION INTRAVENOUS PRN
Status: DISCONTINUED | OUTPATIENT
Start: 2024-09-19 | End: 2024-09-20 | Stop reason: HOSPADM

## 2024-09-19 RX ADMIN — SODIUM CHLORIDE 2500 MG: 9 INJECTION, SOLUTION INTRAVENOUS at 00:02

## 2024-09-19 RX ADMIN — CEFEPIME 2000 MG: 2 INJECTION, POWDER, FOR SOLUTION INTRAVENOUS at 12:49

## 2024-09-19 RX ADMIN — SODIUM CHLORIDE, PRESERVATIVE FREE 40 MG: 5 INJECTION INTRAVENOUS at 06:20

## 2024-09-19 RX ADMIN — CEFEPIME 2000 MG: 2 INJECTION, POWDER, FOR SOLUTION INTRAVENOUS at 21:07

## 2024-09-19 RX ADMIN — SODIUM CHLORIDE: 900 INJECTION, SOLUTION INTRAVENOUS at 10:36

## 2024-09-19 RX ADMIN — VANCOMYCIN HYDROCHLORIDE 1000 MG: 1 INJECTION, POWDER, LYOPHILIZED, FOR SOLUTION INTRAVENOUS at 12:49

## 2024-09-19 RX ADMIN — CEFEPIME 2000 MG: 2 INJECTION, POWDER, FOR SOLUTION INTRAVENOUS at 03:56

## 2024-09-19 RX ADMIN — SODIUM CHLORIDE, PRESERVATIVE FREE 40 MG: 5 INJECTION INTRAVENOUS at 21:02

## 2024-09-19 RX ADMIN — LIDOCAINE HYDROCHLORIDE 5 ML: 10 INJECTION, SOLUTION EPIDURAL; INFILTRATION; INTRACAUDAL; PERINEURAL at 17:10

## 2024-09-20 ENCOUNTER — APPOINTMENT (OUTPATIENT)
Dept: CT IMAGING | Age: 74
DRG: 809 | End: 2024-09-20
Payer: MEDICARE

## 2024-09-20 VITALS
HEART RATE: 74 BPM | TEMPERATURE: 98.7 F | WEIGHT: 238.76 LBS | RESPIRATION RATE: 20 BRPM | HEIGHT: 69 IN | OXYGEN SATURATION: 98 % | SYSTOLIC BLOOD PRESSURE: 122 MMHG | DIASTOLIC BLOOD PRESSURE: 74 MMHG | BODY MASS INDEX: 35.36 KG/M2

## 2024-09-20 PROBLEM — R74.01 TRANSAMINITIS: Status: ACTIVE | Noted: 2024-09-20

## 2024-09-20 PROBLEM — L02.214 ABSCESS OF LEFT GROIN: Status: ACTIVE | Noted: 2024-09-20

## 2024-09-20 PROBLEM — D72.819 LEUKOPENIA: Status: ACTIVE | Noted: 2024-09-18

## 2024-09-20 LAB
ALBUMIN SERPL-MCNC: 3.4 G/DL (ref 3.4–5)
ALBUMIN/GLOB SERPL: 1.3 {RATIO} (ref 1.1–2.2)
ALP SERPL-CCNC: 307 U/L (ref 40–129)
ALT SERPL-CCNC: 377 U/L (ref 10–40)
ANA SER QL IA: NEGATIVE
ANION GAP SERPL CALCULATED.3IONS-SCNC: 12 MMOL/L (ref 3–16)
AST SERPL-CCNC: 227 U/L (ref 15–37)
BASOPHILS # BLD: 0 K/UL (ref 0–0.2)
BASOPHILS NFR BLD: 1.2 %
BILIRUB SERPL-MCNC: 1.1 MG/DL (ref 0–1)
BUN SERPL-MCNC: 19 MG/DL (ref 7–20)
CALCIUM SERPL-MCNC: 8.8 MG/DL (ref 8.3–10.6)
CHLORIDE SERPL-SCNC: 110 MMOL/L (ref 99–110)
CO2 SERPL-SCNC: 19 MMOL/L (ref 21–32)
CREAT SERPL-MCNC: 0.9 MG/DL (ref 0.8–1.3)
DEPRECATED RDW RBC AUTO: 20.5 % (ref 12.4–15.4)
EOSINOPHIL # BLD: 0 K/UL (ref 0–0.6)
EOSINOPHIL NFR BLD: 0.5 %
GFR SERPLBLD CREATININE-BSD FMLA CKD-EPI: 89 ML/MIN/{1.73_M2}
GLUCOSE SERPL-MCNC: 104 MG/DL (ref 70–99)
HCT VFR BLD AUTO: 21.3 % (ref 40.5–52.5)
HCT VFR BLD AUTO: 21.5 % (ref 40.5–52.5)
HCT VFR BLD AUTO: 22.2 % (ref 40.5–52.5)
HGB BLD-MCNC: 7.3 G/DL (ref 13.5–17.5)
HGB BLD-MCNC: 7.3 G/DL (ref 13.5–17.5)
HGB BLD-MCNC: 7.6 G/DL (ref 13.5–17.5)
HIV 1+2 AB+HIV1 P24 AG SERPL QL IA: NORMAL
HIV 2 AB SERPL QL IA: NORMAL
HIV1 AB SERPL QL IA: NORMAL
HIV1 P24 AG SERPL QL IA: NORMAL
LYMPHOCYTES # BLD: 0.6 K/UL (ref 1–5.1)
LYMPHOCYTES NFR BLD: 61.1 %
MCH RBC QN AUTO: 34.8 PG (ref 26–34)
MCHC RBC AUTO-ENTMCNC: 34.1 G/DL (ref 31–36)
MCV RBC AUTO: 102 FL (ref 80–100)
MONOCYTES # BLD: 0.1 K/UL (ref 0–1.3)
MONOCYTES NFR BLD: 10.5 %
NEUTROPHILS # BLD: 0.3 K/UL (ref 1.7–7.7)
NEUTROPHILS NFR BLD: 26.7 %
PLATELET # BLD AUTO: 156 K/UL (ref 135–450)
PMV BLD AUTO: 7.1 FL (ref 5–10.5)
POTASSIUM SERPL-SCNC: 4.1 MMOL/L (ref 3.5–5.1)
PROT SERPL-MCNC: 6 G/DL (ref 6.4–8.2)
RBC # BLD AUTO: 2.18 M/UL (ref 4.2–5.9)
SODIUM SERPL-SCNC: 141 MMOL/L (ref 136–145)
VANCOMYCIN SERPL-MCNC: 13.7 UG/ML
WBC # BLD AUTO: 1 K/UL (ref 4–11)

## 2024-09-20 PROCEDURE — 88305 TISSUE EXAM BY PATHOLOGIST: CPT

## 2024-09-20 PROCEDURE — 85014 HEMATOCRIT: CPT

## 2024-09-20 PROCEDURE — 2580000003 HC RX 258: Performed by: FAMILY MEDICINE

## 2024-09-20 PROCEDURE — 2709999900 CT BONE MARROW BIOPSY AND ASPIRATION

## 2024-09-20 PROCEDURE — 6370000000 HC RX 637 (ALT 250 FOR IP): Performed by: STUDENT IN AN ORGANIZED HEALTH CARE EDUCATION/TRAINING PROGRAM

## 2024-09-20 PROCEDURE — 99233 SBSQ HOSP IP/OBS HIGH 50: CPT | Performed by: INTERNAL MEDICINE

## 2024-09-20 PROCEDURE — 88185 FLOWCYTOMETRY/TC ADD-ON: CPT

## 2024-09-20 PROCEDURE — 80202 ASSAY OF VANCOMYCIN: CPT

## 2024-09-20 PROCEDURE — 88313 SPECIAL STAINS GROUP 2: CPT

## 2024-09-20 PROCEDURE — 07DR3ZX EXTRACTION OF ILIAC BONE MARROW, PERCUTANEOUS APPROACH, DIAGNOSTIC: ICD-10-PCS | Performed by: RADIOLOGY

## 2024-09-20 PROCEDURE — 88311 DECALCIFY TISSUE: CPT

## 2024-09-20 PROCEDURE — 85025 COMPLETE CBC W/AUTO DIFF WBC: CPT

## 2024-09-20 PROCEDURE — 88184 FLOWCYTOMETRY/ TC 1 MARKER: CPT

## 2024-09-20 PROCEDURE — 6360000002 HC RX W HCPCS: Performed by: RADIOLOGY

## 2024-09-20 PROCEDURE — 2500000003 HC RX 250 WO HCPCS: Performed by: RADIOLOGY

## 2024-09-20 PROCEDURE — 6370000000 HC RX 637 (ALT 250 FOR IP): Performed by: NURSE PRACTITIONER

## 2024-09-20 PROCEDURE — 85018 HEMOGLOBIN: CPT

## 2024-09-20 PROCEDURE — 6360000002 HC RX W HCPCS: Performed by: FAMILY MEDICINE

## 2024-09-20 PROCEDURE — 99231 SBSQ HOSP IP/OBS SF/LOW 25: CPT | Performed by: SURGERY

## 2024-09-20 PROCEDURE — 80053 COMPREHEN METABOLIC PANEL: CPT

## 2024-09-20 PROCEDURE — 36415 COLL VENOUS BLD VENIPUNCTURE: CPT

## 2024-09-20 RX ORDER — FENTANYL CITRATE 50 UG/ML
INJECTION, SOLUTION INTRAMUSCULAR; INTRAVENOUS PRN
Status: COMPLETED | OUTPATIENT
Start: 2024-09-20 | End: 2024-09-20

## 2024-09-20 RX ORDER — LEVOFLOXACIN 750 MG/1
750 TABLET, FILM COATED ORAL DAILY
Qty: 7 TABLET | Refills: 0 | Status: ON HOLD | OUTPATIENT
Start: 2024-09-20 | End: 2024-09-27

## 2024-09-20 RX ORDER — BUPIVACAINE HYDROCHLORIDE 5 MG/ML
INJECTION, SOLUTION EPIDURAL; INTRACAUDAL PRN
Status: COMPLETED | OUTPATIENT
Start: 2024-09-20 | End: 2024-09-20

## 2024-09-20 RX ORDER — HEPARIN 100 UNIT/ML
SYRINGE INTRAVENOUS PRN
Status: COMPLETED | OUTPATIENT
Start: 2024-09-20 | End: 2024-09-20

## 2024-09-20 RX ORDER — MIDAZOLAM HYDROCHLORIDE 1 MG/ML
INJECTION INTRAMUSCULAR; INTRAVENOUS PRN
Status: COMPLETED | OUTPATIENT
Start: 2024-09-20 | End: 2024-09-20

## 2024-09-20 RX ORDER — LIDOCAINE HYDROCHLORIDE 10 MG/ML
INJECTION, SOLUTION EPIDURAL; INFILTRATION; INTRACAUDAL; PERINEURAL PRN
Status: COMPLETED | OUTPATIENT
Start: 2024-09-20 | End: 2024-09-20

## 2024-09-20 RX ORDER — DOXYCYCLINE HYCLATE 100 MG
100 TABLET ORAL 2 TIMES DAILY
Qty: 28 TABLET | Refills: 0 | Status: ON HOLD | OUTPATIENT
Start: 2024-09-20 | End: 2024-10-04

## 2024-09-20 RX ORDER — GINSENG 100 MG
CAPSULE ORAL DAILY
Status: DISCONTINUED | OUTPATIENT
Start: 2024-09-20 | End: 2024-09-20 | Stop reason: HOSPADM

## 2024-09-20 RX ADMIN — SODIUM CHLORIDE 1250 MG: 9 INJECTION, SOLUTION INTRAVENOUS at 12:56

## 2024-09-20 RX ADMIN — SODIUM CHLORIDE, PRESERVATIVE FREE 40 MG: 5 INJECTION INTRAVENOUS at 06:28

## 2024-09-20 RX ADMIN — BUPIVACAINE HYDROCHLORIDE 15 ML: 5 INJECTION, SOLUTION EPIDURAL; INTRACAUDAL at 11:31

## 2024-09-20 RX ADMIN — SODIUM CHLORIDE: 900 INJECTION, SOLUTION INTRAVENOUS at 07:03

## 2024-09-20 RX ADMIN — FENTANYL CITRATE 50 MCG: 50 INJECTION, SOLUTION INTRAMUSCULAR; INTRAVENOUS at 11:26

## 2024-09-20 RX ADMIN — CEFEPIME 2000 MG: 2 INJECTION, POWDER, FOR SOLUTION INTRAVENOUS at 04:33

## 2024-09-20 RX ADMIN — VANCOMYCIN HYDROCHLORIDE 1000 MG: 1 INJECTION, POWDER, LYOPHILIZED, FOR SOLUTION INTRAVENOUS at 00:15

## 2024-09-20 RX ADMIN — FLUTICASONE PROPIONATE 1 SPRAY: 50 SPRAY, METERED NASAL at 09:20

## 2024-09-20 RX ADMIN — CEFEPIME 2000 MG: 2 INJECTION, POWDER, FOR SOLUTION INTRAVENOUS at 12:56

## 2024-09-20 RX ADMIN — MIDAZOLAM HYDROCHLORIDE 1 MG: 2 INJECTION, SOLUTION INTRAMUSCULAR; INTRAVENOUS at 11:26

## 2024-09-20 RX ADMIN — BACITRACIN: 500 OINTMENT TOPICAL at 12:58

## 2024-09-20 RX ADMIN — Medication 100 UNITS: at 11:34

## 2024-09-20 RX ADMIN — LIDOCAINE HYDROCHLORIDE 15 ML: 10 INJECTION, SOLUTION EPIDURAL; INFILTRATION; INTRACAUDAL; PERINEURAL at 11:30

## 2024-09-21 LAB — EPO SERPL-ACNC: 286 MU/ML (ref 4–27)

## 2024-09-22 ENCOUNTER — HOSPITAL ENCOUNTER (INPATIENT)
Age: 74
LOS: 8 days | Discharge: HOME | DRG: 837 | End: 2024-09-30
Attending: INTERNAL MEDICINE | Admitting: INTERNAL MEDICINE
Payer: MEDICARE

## 2024-09-22 ENCOUNTER — APPOINTMENT (OUTPATIENT)
Dept: GENERAL RADIOLOGY | Age: 74
DRG: 837 | End: 2024-09-22
Attending: INTERNAL MEDICINE
Payer: MEDICARE

## 2024-09-22 DIAGNOSIS — C95.00 ACUTE LEUKEMIA OF UNSPECIFIED CELL TYPE NOT HAVING ACHIEVED REMISSION (HCC): Primary | ICD-10-CM

## 2024-09-22 LAB
ABO + RH BLD: NORMAL
ALBUMIN SERPL-MCNC: 3.7 G/DL (ref 3.4–5)
ALP SERPL-CCNC: 404 U/L (ref 40–129)
ALT SERPL-CCNC: 421 U/L (ref 10–40)
ANION GAP SERPL CALCULATED.3IONS-SCNC: 11 MMOL/L (ref 3–16)
ANISOCYTOSIS BLD QL SMEAR: ABNORMAL
APTT BLD: 29.5 SEC (ref 22.1–36.4)
AST SERPL-CCNC: 227 U/L (ref 15–37)
BACTERIA BLD CULT ORG #2: NORMAL
BACTERIA BLD CULT: NORMAL
BACTERIA SPEC AEROBE CULT: ABNORMAL
BACTERIA SPEC AEROBE CULT: ABNORMAL
BACTERIA SPEC ANAEROBE CULT: ABNORMAL
BASOPHILS # BLD: 0 K/UL (ref 0–0.2)
BASOPHILS NFR BLD: 0 %
BILIRUB DIRECT SERPL-MCNC: 0.4 MG/DL (ref 0–0.3)
BILIRUB INDIRECT SERPL-MCNC: 0.6 MG/DL (ref 0–1)
BILIRUB SERPL-MCNC: 1 MG/DL (ref 0–1)
BILIRUB UR QL STRIP.AUTO: NEGATIVE
BLD GP AB SCN SERPL QL: NORMAL
BUN SERPL-MCNC: 18 MG/DL (ref 7–20)
CALCIUM SERPL-MCNC: 8.5 MG/DL (ref 8.3–10.6)
CHLORIDE SERPL-SCNC: 107 MMOL/L (ref 99–110)
CLARITY UR: CLEAR
CO2 SERPL-SCNC: 22 MMOL/L (ref 21–32)
COLOR UR: YELLOW
COPPER SERPL-MCNC: 169.1 UG/DL (ref 70–140)
CREAT SERPL-MCNC: 0.7 MG/DL (ref 0.8–1.3)
D-DIMER QUANTITATIVE: 0.87 UG/ML FEU (ref 0–0.6)
DEPRECATED RDW RBC AUTO: 20.1 % (ref 12.4–15.4)
EOSINOPHIL # BLD: 0 K/UL (ref 0–0.6)
EOSINOPHIL NFR BLD: 2 %
GFR SERPLBLD CREATININE-BSD FMLA CKD-EPI: >90 ML/MIN/{1.73_M2}
GLUCOSE SERPL-MCNC: 122 MG/DL (ref 70–99)
GLUCOSE UR STRIP.AUTO-MCNC: NEGATIVE MG/DL
GRAM STN SPEC: ABNORMAL
HCT VFR BLD AUTO: 22.2 % (ref 40.5–52.5)
HGB BLD-MCNC: 7.6 G/DL (ref 13.5–17.5)
HGB UR QL STRIP.AUTO: NEGATIVE
INR PPP: 1.26 (ref 0.85–1.15)
KETONES UR STRIP.AUTO-MCNC: NEGATIVE MG/DL
LDH SERPL L TO P-CCNC: 281 U/L (ref 100–190)
LEUKOCYTE ESTERASE UR QL STRIP.AUTO: NEGATIVE
LYMPHOCYTES # BLD: 0.6 K/UL (ref 1–5.1)
LYMPHOCYTES NFR BLD: 57 %
MAGNESIUM SERPL-MCNC: 2 MG/DL (ref 1.8–2.4)
MCH RBC QN AUTO: 35 PG (ref 26–34)
MCHC RBC AUTO-ENTMCNC: 34.1 G/DL (ref 31–36)
MCV RBC AUTO: 102.7 FL (ref 80–100)
MONOCYTES # BLD: 0.2 K/UL (ref 0–1.3)
MONOCYTES NFR BLD: 15 %
NEUTROPHILS # BLD: 0.3 K/UL (ref 1.7–7.7)
NEUTROPHILS NFR BLD: 26 %
NITRITE UR QL STRIP.AUTO: NEGATIVE
ORGANISM: ABNORMAL
ORGANISM: ABNORMAL
OVALOCYTES BLD QL SMEAR: ABNORMAL
PH UR STRIP.AUTO: 6 [PH] (ref 5–8)
PHOSPHATE SERPL-MCNC: 3 MG/DL (ref 2.5–4.9)
PLATELET # BLD AUTO: 145 K/UL (ref 135–450)
PLATELET BLD QL SMEAR: ADEQUATE
PMV BLD AUTO: 6.8 FL (ref 5–10.5)
POTASSIUM SERPL-SCNC: 4 MMOL/L (ref 3.5–5.1)
PROT SERPL-MCNC: 6.4 G/DL (ref 6.4–8.2)
PROT UR STRIP.AUTO-MCNC: NEGATIVE MG/DL
PROTHROMBIN TIME: 15.9 SEC (ref 11.9–14.9)
RBC # BLD AUTO: 2.16 M/UL (ref 4.2–5.9)
SCHISTOCYTES BLD QL SMEAR: ABNORMAL
SMA IGG SER-ACNC: 6 UNITS (ref 0–19)
SODIUM SERPL-SCNC: 140 MMOL/L (ref 136–145)
SP GR UR STRIP.AUTO: >=1.03 (ref 1–1.03)
UA DIPSTICK W REFLEX MICRO PNL UR: NORMAL
URATE SERPL-MCNC: 4.3 MG/DL (ref 3.5–7.2)
URN SPEC COLLECT METH UR: NORMAL
UROBILINOGEN UR STRIP-ACNC: 0.2 E.U./DL
WBC # BLD AUTO: 1 K/UL (ref 4–11)

## 2024-09-22 PROCEDURE — 83615 LACTATE (LD) (LDH) ENZYME: CPT

## 2024-09-22 PROCEDURE — 83735 ASSAY OF MAGNESIUM: CPT

## 2024-09-22 PROCEDURE — 86900 BLOOD TYPING SEROLOGIC ABO: CPT

## 2024-09-22 PROCEDURE — 30233N1 TRANSFUSION OF NONAUTOLOGOUS RED BLOOD CELLS INTO PERIPHERAL VEIN, PERCUTANEOUS APPROACH: ICD-10-PCS | Performed by: INTERNAL MEDICINE

## 2024-09-22 PROCEDURE — 71046 X-RAY EXAM CHEST 2 VIEWS: CPT

## 2024-09-22 PROCEDURE — 36415 COLL VENOUS BLD VENIPUNCTURE: CPT

## 2024-09-22 PROCEDURE — 80048 BASIC METABOLIC PNL TOTAL CA: CPT

## 2024-09-22 PROCEDURE — 80076 HEPATIC FUNCTION PANEL: CPT

## 2024-09-22 PROCEDURE — 81003 URINALYSIS AUTO W/O SCOPE: CPT

## 2024-09-22 PROCEDURE — 2060000000 HC ICU INTERMEDIATE R&B

## 2024-09-22 PROCEDURE — 2580000003 HC RX 258

## 2024-09-22 PROCEDURE — 85025 COMPLETE CBC W/AUTO DIFF WBC: CPT

## 2024-09-22 PROCEDURE — 84550 ASSAY OF BLOOD/URIC ACID: CPT

## 2024-09-22 PROCEDURE — 84100 ASSAY OF PHOSPHORUS: CPT

## 2024-09-22 PROCEDURE — 85379 FIBRIN DEGRADATION QUANT: CPT

## 2024-09-22 PROCEDURE — P9040 RBC LEUKOREDUCED IRRADIATED: HCPCS

## 2024-09-22 PROCEDURE — 86850 RBC ANTIBODY SCREEN: CPT

## 2024-09-22 PROCEDURE — 86901 BLOOD TYPING SEROLOGIC RH(D): CPT

## 2024-09-22 PROCEDURE — 86923 COMPATIBILITY TEST ELECTRIC: CPT

## 2024-09-22 PROCEDURE — 85730 THROMBOPLASTIN TIME PARTIAL: CPT

## 2024-09-22 PROCEDURE — 85610 PROTHROMBIN TIME: CPT

## 2024-09-22 PROCEDURE — 6370000000 HC RX 637 (ALT 250 FOR IP)

## 2024-09-22 RX ORDER — FLUCONAZOLE 200 MG/1
200 TABLET ORAL DAILY
Status: DISCONTINUED | OUTPATIENT
Start: 2024-09-22 | End: 2024-09-23

## 2024-09-22 RX ORDER — LIDOCAINE HYDROCHLORIDE 10 MG/ML
50 INJECTION, SOLUTION EPIDURAL; INFILTRATION; INTRACAUDAL; PERINEURAL ONCE
Status: COMPLETED | OUTPATIENT
Start: 2024-09-22 | End: 2024-09-23

## 2024-09-22 RX ORDER — SODIUM CHLORIDE 9 MG/ML
INJECTION, SOLUTION INTRAVENOUS PRN
Status: DISCONTINUED | OUTPATIENT
Start: 2024-09-22 | End: 2024-09-30 | Stop reason: HOSPADM

## 2024-09-22 RX ORDER — ALLOPURINOL 300 MG/1
300 TABLET ORAL DAILY
Status: DISCONTINUED | OUTPATIENT
Start: 2024-09-22 | End: 2024-09-30 | Stop reason: HOSPADM

## 2024-09-22 RX ORDER — LEVOFLOXACIN 500 MG/1
500 TABLET, FILM COATED ORAL DAILY
Status: DISCONTINUED | OUTPATIENT
Start: 2024-09-22 | End: 2024-09-23

## 2024-09-22 RX ORDER — SODIUM CHLORIDE 0.9 % (FLUSH) 0.9 %
5-40 SYRINGE (ML) INJECTION EVERY 12 HOURS SCHEDULED
Status: DISCONTINUED | OUTPATIENT
Start: 2024-09-22 | End: 2024-09-30 | Stop reason: HOSPADM

## 2024-09-22 RX ORDER — LANOLIN ALCOHOL/MO/W.PET/CERES
1000 CREAM (GRAM) TOPICAL DAILY
Status: DISCONTINUED | OUTPATIENT
Start: 2024-09-23 | End: 2024-09-30 | Stop reason: HOSPADM

## 2024-09-22 RX ORDER — SODIUM CHLORIDE 9 MG/ML
INJECTION, SOLUTION INTRAVENOUS CONTINUOUS PRN
Status: DISCONTINUED | OUTPATIENT
Start: 2024-09-22 | End: 2024-09-30 | Stop reason: HOSPADM

## 2024-09-22 RX ORDER — SODIUM CHLORIDE 9 MG/ML
INJECTION, SOLUTION INTRAVENOUS CONTINUOUS
Status: DISCONTINUED | OUTPATIENT
Start: 2024-09-22 | End: 2024-09-24

## 2024-09-22 RX ORDER — MAGNESIUM SULFATE IN WATER 40 MG/ML
4000 INJECTION, SOLUTION INTRAVENOUS PRN
Status: DISCONTINUED | OUTPATIENT
Start: 2024-09-22 | End: 2024-09-30 | Stop reason: HOSPADM

## 2024-09-22 RX ORDER — DIPHENHYDRAMINE HCL 25 MG
25 TABLET ORAL DAILY PRN
Status: DISCONTINUED | OUTPATIENT
Start: 2024-09-22 | End: 2024-09-27

## 2024-09-22 RX ORDER — SODIUM CHLORIDE 0.9 % (FLUSH) 0.9 %
5-40 SYRINGE (ML) INJECTION PRN
Status: DISCONTINUED | OUTPATIENT
Start: 2024-09-22 | End: 2024-09-30 | Stop reason: HOSPADM

## 2024-09-22 RX ORDER — VALACYCLOVIR HYDROCHLORIDE 500 MG/1
500 TABLET, FILM COATED ORAL 2 TIMES DAILY
Status: DISCONTINUED | OUTPATIENT
Start: 2024-09-22 | End: 2024-09-30 | Stop reason: HOSPADM

## 2024-09-22 RX ADMIN — VALACYCLOVIR HYDROCHLORIDE 500 MG: 500 TABLET, FILM COATED ORAL at 20:21

## 2024-09-22 RX ADMIN — FLUCONAZOLE 200 MG: 200 TABLET ORAL at 11:12

## 2024-09-22 RX ADMIN — SODIUM CHLORIDE: 9 INJECTION, SOLUTION INTRAVENOUS at 12:31

## 2024-09-22 RX ADMIN — SODIUM CHLORIDE, PRESERVATIVE FREE 10 ML: 5 INJECTION INTRAVENOUS at 12:32

## 2024-09-22 RX ADMIN — SODIUM CHLORIDE: 9 INJECTION, SOLUTION INTRAVENOUS at 21:58

## 2024-09-22 RX ADMIN — LEVOFLOXACIN 500 MG: 500 TABLET, FILM COATED ORAL at 11:13

## 2024-09-22 RX ADMIN — DIPHENHYDRAMINE HYDROCHLORIDE 25 MG: 25 TABLET ORAL at 21:57

## 2024-09-22 RX ADMIN — SODIUM CHLORIDE, PRESERVATIVE FREE 10 ML: 5 INJECTION INTRAVENOUS at 20:22

## 2024-09-22 RX ADMIN — VALACYCLOVIR HYDROCHLORIDE 500 MG: 500 TABLET, FILM COATED ORAL at 11:12

## 2024-09-22 NOTE — CONSENT
Informed Consent for Blood Component Transfusion Note    I have discussed with the patient the rationale for blood component transfusion; its benefits in treating or preventing fatigue, organ damage, or death; and its risk which includes mild transfusion reactions, rare risk of blood borne infection, or more serious but rare reactions. I have discussed the alternatives to transfusion, including the risk and consequences of not receiving transfusion. The patient had an opportunity to ask questions and had agreed to proceed with transfusion of blood components.    Electronically signed by JAY Kumar CNP on 9/22/24 at 9:18 AM EDT

## 2024-09-22 NOTE — H&P
[facility] History and Physical       Attending Physician: Audi Robin MD    Primary Care: Magaly Womack MD       Referring MD: Audi Robin MD  [address] [place] [address] [place] [address]    Name: Myles Meehan :  1950  MRN:  [id number]    Admission: 2024      Date: 2024    Reason for Admission: Newly diagnosed acute leukemia    History of Present Illness: Myles Meehan is a 74 year old male with a past medical history significant for HTN. He was recently admitted 24 to [facility] with pancytopenia. He had a bone marrow biopsy on 24 and was discharged to home while awaiting results. Unfortunately, flow cytometry came back with 59.3% blasts in the bone marrow. Given this new diagnosis of acute myeloid leukemia, he was directly admitted from [facility] today for initiation of treatment with Dacogen and Venetoclax.       Past Surgical History:   Procedure Laterality Date    ANKLE SURGERY Left     COLONOSCOPY      LAPAROSCOPIC APPENDECTOMY N/A 3/29/2019    LAPAROSCOPIC APPENDECTOMY performed by Scout Zuñiga MD at [facility] OR    OTHER SURGICAL HISTORY  1/27/15    LEFT TOTAL KNEE ARTHROPLASTY              QUADRACEPS TENDON REPAIR Left 2015    LEFT KNEE QUADRICEPS TENDON REPAIR     SHOULDER SURGERY Right        Past Medical History:   Diagnosis Date    Arthritis     left knee    Hyperlipidemia     Hypertension     Sleep apnea     was unable to wear cpap       Prior to Admission medications    Medication Sig Start Date End Date Taking? Authorizing Provider   levoFLOXacin (LEVAQUIN) 750 MG tablet Take 1 tablet by mouth daily for 7 days Daily for 7 days to start within 1 hour if he develops fever. 24  Declan Manley,    doxycycline hyclate (VIBRA-TABS) 100 MG tablet Take 1 tablet by mouth 2 times daily for 14 days 9/20/24 10/4/24  Declan Manley DO   omega-3 acid ethyl esters (LOVAZA) 1 g capsule Take 1 capsule by mouth 2 times daily (with meals) 8/3/24

## 2024-09-23 ENCOUNTER — APPOINTMENT (OUTPATIENT)
Age: 74
DRG: 837 | End: 2024-09-23
Attending: INTERNAL MEDICINE
Payer: MEDICARE

## 2024-09-23 ENCOUNTER — APPOINTMENT (OUTPATIENT)
Dept: GENERAL RADIOLOGY | Age: 74
DRG: 837 | End: 2024-09-23
Attending: INTERNAL MEDICINE
Payer: MEDICARE

## 2024-09-23 LAB
ALBUMIN SERPL-MCNC: 3.2 G/DL (ref 3.4–5)
ALP SERPL-CCNC: 360 U/L (ref 40–129)
ALT SERPL-CCNC: 412 U/L (ref 10–40)
ANION GAP SERPL CALCULATED.3IONS-SCNC: 12 MMOL/L (ref 3–16)
ANISOCYTOSIS BLD QL SMEAR: ABNORMAL
APTT BLD: 32 SEC (ref 22.1–36.4)
AST SERPL-CCNC: 241 U/L (ref 15–37)
B19V IGG SER IA-ACNC: 0.51 IV
B19V IGM SER IA-ACNC: 0.23 IV
BASOPHILS # BLD: 0 K/UL (ref 0–0.2)
BASOPHILS NFR BLD: 0 %
BILIRUB DIRECT SERPL-MCNC: 0.4 MG/DL (ref 0–0.3)
BILIRUB INDIRECT SERPL-MCNC: 0.6 MG/DL (ref 0–1)
BILIRUB SERPL-MCNC: 1 MG/DL (ref 0–1)
BILIRUB UR QL STRIP.AUTO: NEGATIVE
BUN SERPL-MCNC: 14 MG/DL (ref 7–20)
CALCIUM SERPL-MCNC: 8.6 MG/DL (ref 8.3–10.6)
CHLORIDE SERPL-SCNC: 104 MMOL/L (ref 99–110)
CLARITY UR: CLEAR
CO2 SERPL-SCNC: 20 MMOL/L (ref 21–32)
COLOR UR: YELLOW
CREAT SERPL-MCNC: 0.7 MG/DL (ref 0.8–1.3)
DEPRECATED RDW RBC AUTO: 20.2 % (ref 12.4–15.4)
EOSINOPHIL # BLD: 0 K/UL (ref 0–0.6)
EOSINOPHIL NFR BLD: 0 %
FIBRINOGEN PPP-MCNC: 526 MG/DL (ref 227–534)
GFR SERPLBLD CREATININE-BSD FMLA CKD-EPI: >90 ML/MIN/{1.73_M2}
GLUCOSE SERPL-MCNC: 105 MG/DL (ref 70–99)
GLUCOSE UR STRIP.AUTO-MCNC: NEGATIVE MG/DL
HCT VFR BLD AUTO: 19.6 % (ref 40.5–52.5)
HGB BLD-MCNC: 6.7 G/DL (ref 13.5–17.5)
HGB UR QL STRIP.AUTO: NEGATIVE
INR PPP: 1.28 (ref 0.85–1.15)
KETONES UR STRIP.AUTO-MCNC: NEGATIVE MG/DL
LDH SERPL L TO P-CCNC: 299 U/L (ref 100–190)
LEUKOCYTE ESTERASE UR QL STRIP.AUTO: NEGATIVE
LYMPHOCYTES # BLD: 0.7 K/UL (ref 1–5.1)
LYMPHOCYTES NFR BLD: 70 %
MACROCYTES BLD QL SMEAR: ABNORMAL
MAGNESIUM SERPL-MCNC: 2 MG/DL (ref 1.8–2.4)
MCH RBC QN AUTO: 34.8 PG (ref 26–34)
MCHC RBC AUTO-ENTMCNC: 33.9 G/DL (ref 31–36)
MCV RBC AUTO: 102.4 FL (ref 80–100)
MICROCYTES BLD QL SMEAR: ABNORMAL
MONOCYTES # BLD: 0.1 K/UL (ref 0–1.3)
MONOCYTES NFR BLD: 10 %
NEUTROPHILS # BLD: 0.2 K/UL (ref 1.7–7.7)
NEUTROPHILS NFR BLD: 20 %
NITRITE UR QL STRIP.AUTO: NEGATIVE
PH UR STRIP.AUTO: 6 [PH] (ref 5–8)
PHOSPHATE SERPL-MCNC: 3.1 MG/DL (ref 2.5–4.9)
PLATELET # BLD AUTO: 135 K/UL (ref 135–450)
PLATELET BLD QL SMEAR: ADEQUATE
PMV BLD AUTO: 7.2 FL (ref 5–10.5)
POTASSIUM SERPL-SCNC: 3.5 MMOL/L (ref 3.5–5.1)
PROT SERPL-MCNC: 5.7 G/DL (ref 6.4–8.2)
PROT UR STRIP.AUTO-MCNC: NEGATIVE MG/DL
PROTHROMBIN TIME: 16.1 SEC (ref 11.9–14.9)
RBC # BLD AUTO: 1.92 M/UL (ref 4.2–5.9)
SCHISTOCYTES BLD QL SMEAR: ABNORMAL
SLIDE REVIEW: ABNORMAL
SODIUM SERPL-SCNC: 136 MMOL/L (ref 136–145)
SP GR UR STRIP.AUTO: >=1.03 (ref 1–1.03)
UA DIPSTICK W REFLEX MICRO PNL UR: NORMAL
URATE SERPL-MCNC: 4 MG/DL (ref 3.5–7.2)
URN SPEC COLLECT METH UR: NORMAL
UROBILINOGEN UR STRIP-ACNC: 1 E.U./DL
WBC # BLD AUTO: 1 K/UL (ref 4–11)

## 2024-09-23 PROCEDURE — 71045 X-RAY EXAM CHEST 1 VIEW: CPT

## 2024-09-23 PROCEDURE — 81003 URINALYSIS AUTO W/O SCOPE: CPT

## 2024-09-23 PROCEDURE — 6370000000 HC RX 637 (ALT 250 FOR IP)

## 2024-09-23 PROCEDURE — 84100 ASSAY OF PHOSPHORUS: CPT

## 2024-09-23 PROCEDURE — 84550 ASSAY OF BLOOD/URIC ACID: CPT

## 2024-09-23 PROCEDURE — 85384 FIBRINOGEN ACTIVITY: CPT

## 2024-09-23 PROCEDURE — 3E03305 INTRODUCTION OF OTHER ANTINEOPLASTIC INTO PERIPHERAL VEIN, PERCUTANEOUS APPROACH: ICD-10-PCS | Performed by: INTERNAL MEDICINE

## 2024-09-23 PROCEDURE — 02HV33Z INSERTION OF INFUSION DEVICE INTO SUPERIOR VENA CAVA, PERCUTANEOUS APPROACH: ICD-10-PCS | Performed by: INTERNAL MEDICINE

## 2024-09-23 PROCEDURE — 2060000000 HC ICU INTERMEDIATE R&B

## 2024-09-23 PROCEDURE — 2500000003 HC RX 250 WO HCPCS

## 2024-09-23 PROCEDURE — 83735 ASSAY OF MAGNESIUM: CPT

## 2024-09-23 PROCEDURE — 36430 TRANSFUSION BLD/BLD COMPNT: CPT

## 2024-09-23 PROCEDURE — 6370000000 HC RX 637 (ALT 250 FOR IP): Performed by: INTERNAL MEDICINE

## 2024-09-23 PROCEDURE — 6360000002 HC RX W HCPCS: Performed by: INTERNAL MEDICINE

## 2024-09-23 PROCEDURE — A4217 STERILE WATER/SALINE, 500 ML: HCPCS

## 2024-09-23 PROCEDURE — 85610 PROTHROMBIN TIME: CPT

## 2024-09-23 PROCEDURE — 6360000002 HC RX W HCPCS: Performed by: NURSE PRACTITIONER

## 2024-09-23 PROCEDURE — 93306 TTE W/DOPPLER COMPLETE: CPT

## 2024-09-23 PROCEDURE — 36569 INSJ PICC 5 YR+ W/O IMAGING: CPT

## 2024-09-23 PROCEDURE — C1751 CATH, INF, PER/CENT/MIDLINE: HCPCS

## 2024-09-23 PROCEDURE — XW0DXR5 INTRODUCTION OF VENETOCLAX ANTINEOPLASTIC INTO MOUTH AND PHARYNX, EXTERNAL APPROACH, NEW TECHNOLOGY GROUP 5: ICD-10-PCS | Performed by: INTERNAL MEDICINE

## 2024-09-23 PROCEDURE — 6370000000 HC RX 637 (ALT 250 FOR IP): Performed by: NURSE PRACTITIONER

## 2024-09-23 PROCEDURE — 85730 THROMBOPLASTIN TIME PARTIAL: CPT

## 2024-09-23 PROCEDURE — 36415 COLL VENOUS BLD VENIPUNCTURE: CPT

## 2024-09-23 PROCEDURE — 2580000003 HC RX 258: Performed by: INTERNAL MEDICINE

## 2024-09-23 PROCEDURE — 94150 VITAL CAPACITY TEST: CPT

## 2024-09-23 PROCEDURE — 2580000003 HC RX 258

## 2024-09-23 PROCEDURE — 80048 BASIC METABOLIC PNL TOTAL CA: CPT

## 2024-09-23 PROCEDURE — 83615 LACTATE (LD) (LDH) ENZYME: CPT

## 2024-09-23 PROCEDURE — 80076 HEPATIC FUNCTION PANEL: CPT

## 2024-09-23 PROCEDURE — 85025 COMPLETE CBC W/AUTO DIFF WBC: CPT

## 2024-09-23 RX ORDER — TRIAMCINOLONE ACETONIDE 1 MG/G
CREAM TOPICAL 2 TIMES DAILY PRN
Status: DISCONTINUED | OUTPATIENT
Start: 2024-09-23 | End: 2024-09-30 | Stop reason: HOSPADM

## 2024-09-23 RX ORDER — ENOXAPARIN SODIUM 100 MG/ML
30 INJECTION SUBCUTANEOUS 2 TIMES DAILY
Status: DISCONTINUED | OUTPATIENT
Start: 2024-09-23 | End: 2024-09-30 | Stop reason: HOSPADM

## 2024-09-23 RX ORDER — FUROSEMIDE 10 MG/ML
40 INJECTION INTRAMUSCULAR; INTRAVENOUS EVERY 12 HOURS
Status: DISCONTINUED | OUTPATIENT
Start: 2024-09-24 | End: 2024-09-30

## 2024-09-23 RX ORDER — LORAZEPAM 0.5 MG/1
0.5 TABLET ORAL EVERY 4 HOURS PRN
Status: DISCONTINUED | OUTPATIENT
Start: 2024-09-23 | End: 2024-09-30 | Stop reason: HOSPADM

## 2024-09-23 RX ORDER — PROCHLORPERAZINE MALEATE 10 MG
5 TABLET ORAL EVERY 4 HOURS PRN
Status: DISCONTINUED | OUTPATIENT
Start: 2024-09-23 | End: 2024-09-30 | Stop reason: HOSPADM

## 2024-09-23 RX ORDER — PROCHLORPERAZINE EDISYLATE 5 MG/ML
5 INJECTION INTRAMUSCULAR; INTRAVENOUS EVERY 4 HOURS PRN
Status: DISCONTINUED | OUTPATIENT
Start: 2024-09-23 | End: 2024-09-30 | Stop reason: HOSPADM

## 2024-09-23 RX ORDER — DEXAMETHASONE SODIUM PHOSPHATE 4 MG/ML
4 INJECTION, SOLUTION INTRA-ARTICULAR; INTRALESIONAL; INTRAMUSCULAR; INTRAVENOUS; SOFT TISSUE ONCE
Status: COMPLETED | OUTPATIENT
Start: 2024-09-23 | End: 2024-09-23

## 2024-09-23 RX ORDER — ONDANSETRON 8 MG/1
8 TABLET, FILM COATED ORAL EVERY 24 HOURS
Status: COMPLETED | OUTPATIENT
Start: 2024-09-23 | End: 2024-09-27

## 2024-09-23 RX ORDER — LEVOFLOXACIN 750 MG/1
750 TABLET, FILM COATED ORAL DAILY
Status: COMPLETED | OUTPATIENT
Start: 2024-09-24 | End: 2024-09-30

## 2024-09-23 RX ORDER — LORAZEPAM 2 MG/ML
0.5 INJECTION INTRAMUSCULAR EVERY 4 HOURS PRN
Status: DISCONTINUED | OUTPATIENT
Start: 2024-09-23 | End: 2024-09-30 | Stop reason: HOSPADM

## 2024-09-23 RX ADMIN — SODIUM CHLORIDE, PRESERVATIVE FREE 10 ML: 5 INJECTION INTRAVENOUS at 20:12

## 2024-09-23 RX ADMIN — DIPHENHYDRAMINE HYDROCHLORIDE 25 MG: 25 TABLET ORAL at 21:28

## 2024-09-23 RX ADMIN — ENOXAPARIN SODIUM 30 MG: 100 INJECTION SUBCUTANEOUS at 20:12

## 2024-09-23 RX ADMIN — SODIUM CHLORIDE 15 ML: 900 IRRIGANT IRRIGATION at 20:14

## 2024-09-23 RX ADMIN — SODIUM CHLORIDE, PRESERVATIVE FREE 10 ML: 5 INJECTION INTRAVENOUS at 20:18

## 2024-09-23 RX ADMIN — CYANOCOBALAMIN TAB 1000 MCG 1000 MCG: 1000 TAB at 08:12

## 2024-09-23 RX ADMIN — FLUCONAZOLE 200 MG: 200 TABLET ORAL at 08:12

## 2024-09-23 RX ADMIN — ENOXAPARIN SODIUM 30 MG: 100 INJECTION SUBCUTANEOUS at 11:21

## 2024-09-23 RX ADMIN — LEVOFLOXACIN 500 MG: 500 TABLET, FILM COATED ORAL at 08:12

## 2024-09-23 RX ADMIN — ALLOPURINOL 300 MG: 300 TABLET ORAL at 08:12

## 2024-09-23 RX ADMIN — TRIAMCINOLONE ACETONIDE: 1 CREAM TOPICAL at 20:16

## 2024-09-23 RX ADMIN — DECITABINE 45 MG: 50 INJECTION, POWDER, LYOPHILIZED, FOR SOLUTION INTRAVENOUS at 16:35

## 2024-09-23 RX ADMIN — SODIUM CHLORIDE, PRESERVATIVE FREE 10 ML: 5 INJECTION INTRAVENOUS at 08:12

## 2024-09-23 RX ADMIN — ONDANSETRON HYDROCHLORIDE 8 MG: 8 TABLET, FILM COATED ORAL at 15:30

## 2024-09-23 RX ADMIN — VALACYCLOVIR HYDROCHLORIDE 500 MG: 500 TABLET, FILM COATED ORAL at 20:12

## 2024-09-23 RX ADMIN — SODIUM CHLORIDE 15 ML: 900 IRRIGANT IRRIGATION at 11:21

## 2024-09-23 RX ADMIN — VALACYCLOVIR HYDROCHLORIDE 500 MG: 500 TABLET, FILM COATED ORAL at 08:12

## 2024-09-23 RX ADMIN — DEXAMETHASONE SODIUM PHOSPHATE 4 MG: 4 INJECTION INTRA-ARTICULAR; INTRALESIONAL; INTRAMUSCULAR; INTRAVENOUS; SOFT TISSUE at 22:27

## 2024-09-23 RX ADMIN — LIDOCAINE HYDROCHLORIDE ANHYDROUS 50 MG: 10 INJECTION, SOLUTION INFILTRATION at 09:49

## 2024-09-23 RX ADMIN — LISINOPRIL 30 MG: 20 TABLET ORAL at 08:12

## 2024-09-23 NOTE — PROCEDURES
PROCEDURE NOTE  Date: 2024   Name: Myles Meehan  YOB: 1950    Procedures                                                                         DOUBLE PICC PROCEDURE NOTE  Chart reviewed for allergies, diagnosis, labs, known contraindications, reason for line placement and planned length of treatment.  Informed consent noted to be signed and on chart.  Insertion procedure discussed with patient/family member.  Three patient identifiers - Patient name,   and MRN -  completed &  confirmed verbally.         Time out performed.  Hat, mask and eye shield donned.  PICC site cleaned with chlorhexidine wipes then scrubbed with Chloraprep one-step applicator for 30 seconds x 1.   Hand Hygiene  performed with 3% Chlorhexidine surgical scrub x1 min prior to  sterile gloves, sterile gown being donned.  Patient draped using maximal sterile barrier technique ( head to toe ).  PICC site scrubbed a 2nd time with Chloraprep One-Step applicator x 30 sec. Modified Seldinger technique/ultrasound assisted and tip locating system utilized for insertion and 1% Lidocaine 5 ml injected intradermal pre-insertion.  PICC tip location in the SVC confirmed by ECG technology.   Positive brisk blood return obtained from all lumens.  Valves placed to all lumens and  flushed with 10 mls 0.9% Sterile Sodium Chloride.  All lumens flush easily with no resistance.  Skin prep applied to site.   Catheter secured with non-sutured locking device per hospital protocol. Bio-patch/CHG impregnated sterile tegaderm dressing applied.  Alcohol Swab Caps applied to each valve.  Sterile field maintained during procedure.  PICC insertion, rhythm and positioning wire (utilized prn) accounted  for post procedure and disposed of in sharps.  Appearance of site is Clean dry and intact without bleeding or edema. All edges of Tegaderm occlusive.   Site marked with date and initials of RN placing line. Teaching performed to pt/family and noted in

## 2024-09-23 NOTE — ACP (ADVANCE CARE PLANNING)
Advance Care Planning     General Advance Care Planning (ACP) Conversation    Date of Conversation: 9/23/2024  Conducted with: Patient with Decision Making Capacity  Other persons present: None    Healthcare Decision Maker:   Primary Decision Maker: Chani Meehan - Spouse - [phone]     Today we documented Decision Maker(s) consistent with Legal Next of Kin hierarchy.    Length of Voluntary ACP Conversation in minutes:  <16 minutes (Non-Billable)    DARSHAN Delgadillo   for [facility] [place] ([facility])  [place] Mobile: [phone]

## 2024-09-24 LAB
ALBUMIN SERPL-MCNC: 3 G/DL (ref 3.4–5)
ALP SERPL-CCNC: 356 U/L (ref 40–129)
ALT SERPL-CCNC: 468 U/L (ref 10–40)
ANION GAP SERPL CALCULATED.3IONS-SCNC: 9 MMOL/L (ref 3–16)
AST SERPL-CCNC: 284 U/L (ref 15–37)
BACTERIA SPEC AEROBE CULT: ABNORMAL
BACTERIA SPEC AEROBE CULT: ABNORMAL
BACTERIA SPEC ANAEROBE CULT: ABNORMAL
BILIRUB DIRECT SERPL-MCNC: 0.6 MG/DL (ref 0–0.3)
BILIRUB INDIRECT SERPL-MCNC: 0.5 MG/DL (ref 0–1)
BILIRUB SERPL-MCNC: 1.1 MG/DL (ref 0–1)
BLOOD BANK DISPENSE STATUS: NORMAL
BLOOD BANK DISPENSE STATUS: NORMAL
BLOOD BANK PRODUCT CODE: NORMAL
BLOOD BANK PRODUCT CODE: NORMAL
BPU ID: NORMAL
BPU ID: NORMAL
BUN SERPL-MCNC: 13 MG/DL (ref 7–20)
CALCIUM SERPL-MCNC: 7.9 MG/DL (ref 8.3–10.6)
CHLORIDE SERPL-SCNC: 112 MMOL/L (ref 99–110)
CO2 SERPL-SCNC: 20 MMOL/L (ref 21–32)
CREAT SERPL-MCNC: 0.6 MG/DL (ref 0.8–1.3)
DEPRECATED RDW RBC AUTO: 20.7 % (ref 12.4–15.4)
DESCRIPTION BLOOD BANK: NORMAL
DESCRIPTION BLOOD BANK: NORMAL
ECHO AO ROOT DIAM: 2.9 CM
ECHO AO ROOT INDEX: 1.3 CM/M2
ECHO AV AREA PEAK VELOCITY: 2.3 CM2
ECHO AV AREA VTI: 2.4 CM2
ECHO AV AREA/BSA PEAK VELOCITY: 1 CM2/M2
ECHO AV AREA/BSA VTI: 1.1 CM2/M2
ECHO AV MEAN GRADIENT: 8 MMHG
ECHO AV MEAN VELOCITY: 1.3 M/S
ECHO AV PEAK GRADIENT: 14 MMHG
ECHO AV PEAK VELOCITY: 1.9 M/S
ECHO AV VELOCITY RATIO: 0.63
ECHO AV VTI: 38.9 CM
ECHO BSA: 2.3 M2
ECHO IVC EXP: 2.4 CM
ECHO IVC INSP: 0.9 CM
ECHO LA AREA 2C: 22.5 CM2
ECHO LA AREA 4C: 21.8 CM2
ECHO LA MAJOR AXIS: 5.6 CM
ECHO LA MINOR AXIS: 5.9 CM
ECHO LA VOL BP: 72 ML (ref 18–58)
ECHO LA VOL MOD A2C: 70 ML (ref 18–58)
ECHO LA VOL MOD A4C: 71 ML (ref 18–58)
ECHO LA VOL/BSA BIPLANE: 32 ML/M2 (ref 16–34)
ECHO LA VOLUME INDEX MOD A2C: 31 ML/M2 (ref 16–34)
ECHO LA VOLUME INDEX MOD A4C: 32 ML/M2 (ref 16–34)
ECHO LV E' LATERAL VELOCITY: 7.8 CM/S
ECHO LV E' SEPTAL VELOCITY: 6.9 CM/S
ECHO LV EDV 3D: 189 ML
ECHO LV EDV INDEX 3D: 85 ML/M2
ECHO LV EJECTION FRACTION 3D: 60 %
ECHO LV ESV 3D: 75 ML
ECHO LV ESV INDEX 3D: 34 ML/M2
ECHO LV FRACTIONAL SHORTENING: 21 % (ref 28–44)
ECHO LV GLOBAL LONGITUDINAL STRAIN (GLS): -19 %
ECHO LV INTERNAL DIMENSION DIASTOLE INDEX: 2.11 CM/M2
ECHO LV INTERNAL DIMENSION DIASTOLIC: 4.7 CM (ref 4.2–5.9)
ECHO LV INTERNAL DIMENSION SYSTOLIC INDEX: 1.66 CM/M2
ECHO LV INTERNAL DIMENSION SYSTOLIC: 3.7 CM
ECHO LV IVSD: 1 CM (ref 0.6–1)
ECHO LV MASS 2D: 187.5 G (ref 88–224)
ECHO LV MASS 3D INDEX: 97.8 G/M2
ECHO LV MASS 3D: 218 G
ECHO LV MASS INDEX 2D: 84.1 G/M2 (ref 49–115)
ECHO LV POSTERIOR WALL DIASTOLIC: 1.2 CM (ref 0.6–1)
ECHO LV RELATIVE WALL THICKNESS RATIO: 0.51
ECHO LVOT AREA: 3.5 CM2
ECHO LVOT AV VTI INDEX: 0.68
ECHO LVOT DIAM: 2.1 CM
ECHO LVOT MEAN GRADIENT: 3 MMHG
ECHO LVOT PEAK GRADIENT: 6 MMHG
ECHO LVOT PEAK VELOCITY: 1.2 M/S
ECHO LVOT STROKE VOLUME INDEX: 41 ML/M2
ECHO LVOT SV: 91.4 ML
ECHO LVOT VTI: 26.4 CM
ECHO MV A VELOCITY: 1.17 M/S
ECHO MV AREA VTI: 2.3 CM2
ECHO MV E DECELERATION TIME (DT): 214 MS
ECHO MV E VELOCITY: 0.96 M/S
ECHO MV E/A RATIO: 0.82
ECHO MV E/E' LATERAL: 12.31
ECHO MV E/E' RATIO (AVERAGED): 13.11
ECHO MV E/E' SEPTAL: 13.91
ECHO MV LVOT VTI INDEX: 1.49
ECHO MV MAX VELOCITY: 1.2 M/S
ECHO MV MEAN GRADIENT: 2 MMHG
ECHO MV MEAN VELOCITY: 0.6 M/S
ECHO MV PEAK GRADIENT: 5 MMHG
ECHO MV VTI: 39.4 CM
ECHO PV MAX VELOCITY: 1.1 M/S
ECHO PV PEAK GRADIENT: 5 MMHG
ECHO RV FREE WALL PEAK S': 15.7 CM/S
ECHO RV TAPSE: 3 CM (ref 1.7–?)
ECHO TV REGURGITANT MAX VELOCITY: 1.1 M/S
ECHO TV REGURGITANT PEAK GRADIENT: 5 MMHG
FIBRINOGEN PPP-MCNC: 448 MG/DL (ref 227–534)
GFR SERPLBLD CREATININE-BSD FMLA CKD-EPI: >90 ML/MIN/{1.73_M2}
GLUCOSE SERPL-MCNC: 122 MG/DL (ref 70–99)
GRAM STN SPEC: ABNORMAL
HCT VFR BLD AUTO: 19.9 % (ref 40.5–52.5)
HGB BLD-MCNC: 6.8 G/DL (ref 13.5–17.5)
LDH SERPL L TO P-CCNC: 262 U/L (ref 100–190)
MAGNESIUM SERPL-MCNC: 1.9 MG/DL (ref 1.8–2.4)
MCH RBC QN AUTO: 33.8 PG (ref 26–34)
MCHC RBC AUTO-ENTMCNC: 34.2 G/DL (ref 31–36)
MCV RBC AUTO: 98.8 FL (ref 80–100)
ORGANISM: ABNORMAL
ORGANISM: ABNORMAL
PHOSPHATE SERPL-MCNC: 3.4 MG/DL (ref 2.5–4.9)
PLATELET # BLD AUTO: 112 K/UL (ref 135–450)
PMV BLD AUTO: 7.5 FL (ref 5–10.5)
POTASSIUM SERPL-SCNC: 3.8 MMOL/L (ref 3.5–5.1)
PROT SERPL-MCNC: 5.4 G/DL (ref 6.4–8.2)
RBC # BLD AUTO: 2.01 M/UL (ref 4.2–5.9)
SODIUM SERPL-SCNC: 141 MMOL/L (ref 136–145)
URATE SERPL-MCNC: 3.3 MG/DL (ref 3.5–7.2)
WBC # BLD AUTO: 0.5 K/UL (ref 4–11)

## 2024-09-24 PROCEDURE — 6360000002 HC RX W HCPCS: Performed by: INTERNAL MEDICINE

## 2024-09-24 PROCEDURE — 6370000000 HC RX 637 (ALT 250 FOR IP): Performed by: NURSE PRACTITIONER

## 2024-09-24 PROCEDURE — 76376 3D RENDER W/INTRP POSTPROCES: CPT | Performed by: INTERNAL MEDICINE

## 2024-09-24 PROCEDURE — 84100 ASSAY OF PHOSPHORUS: CPT

## 2024-09-24 PROCEDURE — 2580000003 HC RX 258: Performed by: INTERNAL MEDICINE

## 2024-09-24 PROCEDURE — 84550 ASSAY OF BLOOD/URIC ACID: CPT

## 2024-09-24 PROCEDURE — 6360000002 HC RX W HCPCS: Performed by: NURSE PRACTITIONER

## 2024-09-24 PROCEDURE — 6370000000 HC RX 637 (ALT 250 FOR IP): Performed by: INTERNAL MEDICINE

## 2024-09-24 PROCEDURE — 93356 MYOCRD STRAIN IMG SPCKL TRCK: CPT | Performed by: INTERNAL MEDICINE

## 2024-09-24 PROCEDURE — 85384 FIBRINOGEN ACTIVITY: CPT

## 2024-09-24 PROCEDURE — 2060000000 HC ICU INTERMEDIATE R&B

## 2024-09-24 PROCEDURE — 2580000003 HC RX 258

## 2024-09-24 PROCEDURE — 80048 BASIC METABOLIC PNL TOTAL CA: CPT

## 2024-09-24 PROCEDURE — 6370000000 HC RX 637 (ALT 250 FOR IP)

## 2024-09-24 PROCEDURE — 93306 TTE W/DOPPLER COMPLETE: CPT | Performed by: INTERNAL MEDICINE

## 2024-09-24 PROCEDURE — 83735 ASSAY OF MAGNESIUM: CPT

## 2024-09-24 PROCEDURE — 83615 LACTATE (LD) (LDH) ENZYME: CPT

## 2024-09-24 PROCEDURE — 85027 COMPLETE CBC AUTOMATED: CPT

## 2024-09-24 PROCEDURE — 36592 COLLECT BLOOD FROM PICC: CPT

## 2024-09-24 PROCEDURE — 80076 HEPATIC FUNCTION PANEL: CPT

## 2024-09-24 PROCEDURE — 36430 TRANSFUSION BLD/BLD COMPNT: CPT

## 2024-09-24 RX ORDER — URSODIOL 250 MG/1
250 TABLET, FILM COATED ORAL 2 TIMES DAILY
Status: DISCONTINUED | OUTPATIENT
Start: 2024-09-24 | End: 2024-09-30 | Stop reason: HOSPADM

## 2024-09-24 RX ADMIN — SODIUM CHLORIDE 15 ML: 900 IRRIGANT IRRIGATION at 20:40

## 2024-09-24 RX ADMIN — DECITABINE 45 MG: 50 INJECTION, POWDER, LYOPHILIZED, FOR SOLUTION INTRAVENOUS at 16:47

## 2024-09-24 RX ADMIN — VALACYCLOVIR HYDROCHLORIDE 500 MG: 500 TABLET, FILM COATED ORAL at 08:34

## 2024-09-24 RX ADMIN — FUROSEMIDE 40 MG: 10 INJECTION, SOLUTION INTRAMUSCULAR; INTRAVENOUS at 06:55

## 2024-09-24 RX ADMIN — URSODIOL 250 MG: 250 TABLET ORAL at 11:23

## 2024-09-24 RX ADMIN — ENOXAPARIN SODIUM 30 MG: 100 INJECTION SUBCUTANEOUS at 08:35

## 2024-09-24 RX ADMIN — LEVOFLOXACIN 750 MG: 750 TABLET, FILM COATED ORAL at 08:34

## 2024-09-24 RX ADMIN — CYANOCOBALAMIN TAB 1000 MCG 1000 MCG: 1000 TAB at 08:34

## 2024-09-24 RX ADMIN — ENOXAPARIN SODIUM 30 MG: 100 INJECTION SUBCUTANEOUS at 20:37

## 2024-09-24 RX ADMIN — SODIUM CHLORIDE, PRESERVATIVE FREE 10 ML: 5 INJECTION INTRAVENOUS at 08:35

## 2024-09-24 RX ADMIN — ONDANSETRON HYDROCHLORIDE 8 MG: 8 TABLET, FILM COATED ORAL at 16:06

## 2024-09-24 RX ADMIN — LISINOPRIL 30 MG: 20 TABLET ORAL at 08:34

## 2024-09-24 RX ADMIN — MICAFUNGIN SODIUM 50 MG: 100 INJECTION, POWDER, LYOPHILIZED, FOR SOLUTION INTRAVENOUS at 08:42

## 2024-09-24 RX ADMIN — SODIUM CHLORIDE, PRESERVATIVE FREE 10 ML: 5 INJECTION INTRAVENOUS at 08:42

## 2024-09-24 RX ADMIN — VALACYCLOVIR HYDROCHLORIDE 500 MG: 500 TABLET, FILM COATED ORAL at 20:37

## 2024-09-24 RX ADMIN — SODIUM CHLORIDE, PRESERVATIVE FREE 10 ML: 5 INJECTION INTRAVENOUS at 20:39

## 2024-09-24 RX ADMIN — ALLOPURINOL 300 MG: 300 TABLET ORAL at 08:34

## 2024-09-24 RX ADMIN — URSODIOL 250 MG: 250 TABLET ORAL at 20:37

## 2024-09-25 LAB
ALBUMIN SERPL-MCNC: 3.2 G/DL (ref 3.4–5)
ALP SERPL-CCNC: 377 U/L (ref 40–129)
ALT SERPL-CCNC: 509 U/L (ref 10–40)
ANION GAP SERPL CALCULATED.3IONS-SCNC: 11 MMOL/L (ref 3–16)
ANISOCYTOSIS BLD QL SMEAR: ABNORMAL
AST SERPL-CCNC: 264 U/L (ref 15–37)
BASOPHILS # BLD: 0 K/UL (ref 0–0.2)
BASOPHILS NFR BLD: 0 %
BILIRUB DIRECT SERPL-MCNC: 0.5 MG/DL (ref 0–0.3)
BILIRUB INDIRECT SERPL-MCNC: 0.5 MG/DL (ref 0–1)
BILIRUB SERPL-MCNC: 1 MG/DL (ref 0–1)
BLASTS NFR BLD MANUAL: 2 %
BUN SERPL-MCNC: 19 MG/DL (ref 7–20)
BURR CELLS BLD QL SMEAR: ABNORMAL
CALCIUM SERPL-MCNC: 8.8 MG/DL (ref 8.3–10.6)
CHLORIDE SERPL-SCNC: 103 MMOL/L (ref 99–110)
CO2 SERPL-SCNC: 23 MMOL/L (ref 21–32)
CREAT SERPL-MCNC: 0.8 MG/DL (ref 0.8–1.3)
DACRYOCYTES BLD QL SMEAR: ABNORMAL
DEPRECATED RDW RBC AUTO: 23.3 % (ref 12.4–15.4)
EOSINOPHIL # BLD: 0 K/UL (ref 0–0.6)
EOSINOPHIL NFR BLD: 0 %
FIBRINOGEN PPP-MCNC: 424 MG/DL (ref 227–534)
GFR SERPLBLD CREATININE-BSD FMLA CKD-EPI: >90 ML/MIN/{1.73_M2}
GLUCOSE SERPL-MCNC: 112 MG/DL (ref 70–99)
HCT VFR BLD AUTO: 22.5 % (ref 40.5–52.5)
HGB BLD-MCNC: 7.7 G/DL (ref 13.5–17.5)
LDH SERPL L TO P-CCNC: 266 U/L (ref 100–190)
LYMPHOCYTES # BLD: 0.9 K/UL (ref 1–5.1)
LYMPHOCYTES NFR BLD: 72 %
MACROCYTES BLD QL SMEAR: ABNORMAL
MAGNESIUM SERPL-MCNC: 2.1 MG/DL (ref 1.8–2.4)
MCH RBC QN AUTO: 32.5 PG (ref 26–34)
MCHC RBC AUTO-ENTMCNC: 34.3 G/DL (ref 31–36)
MCV RBC AUTO: 94.8 FL (ref 80–100)
MICROCYTES BLD QL SMEAR: ABNORMAL
MONOCYTES # BLD: 0.1 K/UL (ref 0–1.3)
MONOCYTES NFR BLD: 4 %
NEUTROPHILS # BLD: 0.3 K/UL (ref 1.7–7.7)
NEUTROPHILS NFR BLD: 20 %
OVALOCYTES BLD QL SMEAR: ABNORMAL
PATH INTERP BLD-IMP: NO
PHOSPHATE SERPL-MCNC: 4.1 MG/DL (ref 2.5–4.9)
PLASMA CELLS NFR BLD MANUAL: 2 %
PLATELET # BLD AUTO: 120 K/UL (ref 135–450)
PMV BLD AUTO: 7 FL (ref 5–10.5)
POIKILOCYTOSIS BLD QL SMEAR: ABNORMAL
POTASSIUM SERPL-SCNC: 3.9 MMOL/L (ref 3.5–5.1)
PROT SERPL-MCNC: 5.6 G/DL (ref 6.4–8.2)
RBC # BLD AUTO: 2.38 M/UL (ref 4.2–5.9)
SCHISTOCYTES BLD QL SMEAR: ABNORMAL
SODIUM SERPL-SCNC: 137 MMOL/L (ref 136–145)
URATE SERPL-MCNC: 4.6 MG/DL (ref 3.5–7.2)
WBC # BLD AUTO: 1.3 K/UL (ref 4–11)

## 2024-09-25 PROCEDURE — 6360000002 HC RX W HCPCS: Performed by: NURSE PRACTITIONER

## 2024-09-25 PROCEDURE — 36592 COLLECT BLOOD FROM PICC: CPT

## 2024-09-25 PROCEDURE — 2580000003 HC RX 258: Performed by: INTERNAL MEDICINE

## 2024-09-25 PROCEDURE — 2060000000 HC ICU INTERMEDIATE R&B

## 2024-09-25 PROCEDURE — 6370000000 HC RX 637 (ALT 250 FOR IP): Performed by: NURSE PRACTITIONER

## 2024-09-25 PROCEDURE — 85384 FIBRINOGEN ACTIVITY: CPT

## 2024-09-25 PROCEDURE — 6360000002 HC RX W HCPCS: Performed by: INTERNAL MEDICINE

## 2024-09-25 PROCEDURE — 85025 COMPLETE CBC W/AUTO DIFF WBC: CPT

## 2024-09-25 PROCEDURE — 2580000003 HC RX 258

## 2024-09-25 PROCEDURE — 6370000000 HC RX 637 (ALT 250 FOR IP)

## 2024-09-25 PROCEDURE — 6370000000 HC RX 637 (ALT 250 FOR IP): Performed by: INTERNAL MEDICINE

## 2024-09-25 PROCEDURE — 83735 ASSAY OF MAGNESIUM: CPT

## 2024-09-25 PROCEDURE — 80048 BASIC METABOLIC PNL TOTAL CA: CPT

## 2024-09-25 PROCEDURE — 84100 ASSAY OF PHOSPHORUS: CPT

## 2024-09-25 PROCEDURE — 83615 LACTATE (LD) (LDH) ENZYME: CPT

## 2024-09-25 PROCEDURE — 80076 HEPATIC FUNCTION PANEL: CPT

## 2024-09-25 PROCEDURE — 84550 ASSAY OF BLOOD/URIC ACID: CPT

## 2024-09-25 RX ORDER — POTASSIUM CHLORIDE 29.8 MG/ML
20 INJECTION INTRAVENOUS PRN
Status: DISCONTINUED | OUTPATIENT
Start: 2024-09-25 | End: 2024-09-30 | Stop reason: HOSPADM

## 2024-09-25 RX ADMIN — ENOXAPARIN SODIUM 30 MG: 100 INJECTION SUBCUTANEOUS at 09:29

## 2024-09-25 RX ADMIN — ENOXAPARIN SODIUM 30 MG: 100 INJECTION SUBCUTANEOUS at 20:38

## 2024-09-25 RX ADMIN — VALACYCLOVIR HYDROCHLORIDE 500 MG: 500 TABLET, FILM COATED ORAL at 20:38

## 2024-09-25 RX ADMIN — TRIAMCINOLONE ACETONIDE: 1 CREAM TOPICAL at 09:24

## 2024-09-25 RX ADMIN — LEVOFLOXACIN 750 MG: 750 TABLET, FILM COATED ORAL at 09:20

## 2024-09-25 RX ADMIN — DECITABINE 45 MG: 50 INJECTION, POWDER, LYOPHILIZED, FOR SOLUTION INTRAVENOUS at 16:17

## 2024-09-25 RX ADMIN — ALLOPURINOL 300 MG: 300 TABLET ORAL at 09:23

## 2024-09-25 RX ADMIN — LISINOPRIL 30 MG: 20 TABLET ORAL at 09:23

## 2024-09-25 RX ADMIN — SODIUM CHLORIDE 15 ML: 900 IRRIGANT IRRIGATION at 09:30

## 2024-09-25 RX ADMIN — FUROSEMIDE 40 MG: 10 INJECTION, SOLUTION INTRAMUSCULAR; INTRAVENOUS at 17:36

## 2024-09-25 RX ADMIN — URSODIOL 250 MG: 250 TABLET ORAL at 09:20

## 2024-09-25 RX ADMIN — CYANOCOBALAMIN TAB 1000 MCG 1000 MCG: 1000 TAB at 09:23

## 2024-09-25 RX ADMIN — SODIUM CHLORIDE 15 ML: 900 IRRIGANT IRRIGATION at 11:32

## 2024-09-25 RX ADMIN — DIPHENHYDRAMINE HYDROCHLORIDE 25 MG: 25 TABLET ORAL at 09:08

## 2024-09-25 RX ADMIN — MICAFUNGIN SODIUM 50 MG: 100 INJECTION, POWDER, LYOPHILIZED, FOR SOLUTION INTRAVENOUS at 09:29

## 2024-09-25 RX ADMIN — SODIUM CHLORIDE, PRESERVATIVE FREE 10 ML: 5 INJECTION INTRAVENOUS at 09:31

## 2024-09-25 RX ADMIN — URSODIOL 250 MG: 250 TABLET ORAL at 20:38

## 2024-09-25 RX ADMIN — SODIUM CHLORIDE 15 ML: 900 IRRIGANT IRRIGATION at 20:38

## 2024-09-25 RX ADMIN — SODIUM CHLORIDE 15 ML: 900 IRRIGANT IRRIGATION at 17:36

## 2024-09-25 RX ADMIN — SODIUM CHLORIDE, PRESERVATIVE FREE 10 ML: 5 INJECTION INTRAVENOUS at 20:38

## 2024-09-25 RX ADMIN — VALACYCLOVIR HYDROCHLORIDE 500 MG: 500 TABLET, FILM COATED ORAL at 09:23

## 2024-09-25 RX ADMIN — ONDANSETRON HYDROCHLORIDE 8 MG: 8 TABLET, FILM COATED ORAL at 15:34

## 2024-09-25 ASSESSMENT — PAIN DESCRIPTION - ORIENTATION: ORIENTATION: ANTERIOR

## 2024-09-25 ASSESSMENT — PAIN DESCRIPTION - DESCRIPTORS: DESCRIPTORS: ACHING

## 2024-09-25 ASSESSMENT — PAIN DESCRIPTION - FREQUENCY: FREQUENCY: CONTINUOUS

## 2024-09-25 ASSESSMENT — PAIN - FUNCTIONAL ASSESSMENT: PAIN_FUNCTIONAL_ASSESSMENT: ACTIVITIES ARE NOT PREVENTED

## 2024-09-25 ASSESSMENT — PAIN SCALES - GENERAL: PAINLEVEL_OUTOF10: 4

## 2024-09-25 ASSESSMENT — PAIN DESCRIPTION - ONSET: ONSET: GRADUAL

## 2024-09-25 ASSESSMENT — PAIN DESCRIPTION - LOCATION: LOCATION: HEAD

## 2024-09-25 ASSESSMENT — PAIN DESCRIPTION - PAIN TYPE: TYPE: ACUTE PAIN

## 2024-09-25 NOTE — PSYCHOTHERAPY
apnea     was unable to wear cpap     Allergies   Allergen Reactions    Shellfish-Derived Products Itching and Rash     Current Medications Ordered:  Medications    Scheduled Meds:   ursodiol  250 mg Oral BID    Venetoclax  200 mg Oral Once    [START ON 9/26/2024] Venetoclax  400 mg Oral Q24H    micafungin (MYCAMINE) 50 mg in sodium chloride 0.9 % 100 mL IVPB  50 mg IntraVENous Daily    levoFLOXacin  750 mg Oral Daily    enoxaparin  30 mg SubCUTAneous BID    ondansetron  8 mg Oral Q24H    decitabine (DACOGEN) 45 mg in sodium chloride 0.9 % 100 mL chemo IVPB  45 mg IntraVENous Q24H    furosemide  40 mg IntraVENous Q12H    sodium chloride flush  5-40 mL IntraVENous 2 times per day    Saline Mouthwash  15 mL Swish & Spit 4x Daily AC & HS    valACYclovir  500 mg Oral BID    allopurinol  300 mg Oral Daily    sodium chloride flush  5-40 mL IntraVENous 2 times per day    lisinopril  30 mg Oral Daily    vitamin B-12  1,000 mcg Oral Daily     Continuous Infusions:   sodium chloride      sodium chloride      sodium chloride       PRN Meds:.potassium chloride, prochlorperazine **OR** prochlorperazine, LORazepam **OR** LORazepam, triamcinolone, sodium chloride, sodium chloride flush, sodium chloride, magnesium sulfate, Saline Mouthwash, ALTEplase (CATHFLO) 2 mg in sterile water 2 mL injection, sodium chloride flush, sodium chloride, diphenhydrAMINE    PE:    /66   Pulse 73   Temp 97.8 °F (36.6 °C) (Oral)   Resp 16   Ht 1.753 m (5' 9\")   Wt 111.3 kg (245 lb 6.4 oz)   SpO2 95%   BMI 36.24 kg/m²       Mental status: Appearance: good hygiene , Attitude: cooperative and friendly, Consciousness: alert, Orientation: oriented to person, place, time, general circumstance, Memory: recent and remote memory intact, Attention/Concentration: intact during session, Psychomotor Activity:normal, Eye Contact: normal, Speech: normal rate and volume, well-articulated, Mood: good, Affect: full range, Perception: within normal limits,

## 2024-09-26 LAB
ALBUMIN SERPL-MCNC: 3.4 G/DL (ref 3.4–5)
ALP SERPL-CCNC: 384 U/L (ref 40–129)
ALT SERPL-CCNC: 604 U/L (ref 10–40)
ANION GAP SERPL CALCULATED.3IONS-SCNC: 12 MMOL/L (ref 3–16)
APTT BLD: 31.4 SEC (ref 22.1–36.4)
AST SERPL-CCNC: 329 U/L (ref 15–37)
BASOPHILS # BLD: 0 K/UL (ref 0–0.2)
BASOPHILS NFR BLD: 0.4 %
BILIRUB DIRECT SERPL-MCNC: 0.8 MG/DL (ref 0–0.3)
BILIRUB INDIRECT SERPL-MCNC: 0.7 MG/DL (ref 0–1)
BILIRUB SERPL-MCNC: 1.5 MG/DL (ref 0–1)
BUN SERPL-MCNC: 20 MG/DL (ref 7–20)
CALCIUM SERPL-MCNC: 8.8 MG/DL (ref 8.3–10.6)
CHLORIDE SERPL-SCNC: 103 MMOL/L (ref 99–110)
CO2 SERPL-SCNC: 25 MMOL/L (ref 21–32)
CREAT SERPL-MCNC: 0.9 MG/DL (ref 0.8–1.3)
DEPRECATED RDW RBC AUTO: 23.1 % (ref 12.4–15.4)
EOSINOPHIL # BLD: 0 K/UL (ref 0–0.6)
EOSINOPHIL NFR BLD: 0.6 %
FIBRINOGEN PPP-MCNC: 412 MG/DL (ref 227–534)
GFR SERPLBLD CREATININE-BSD FMLA CKD-EPI: 89 ML/MIN/{1.73_M2}
GGT SERPL-CCNC: 227 U/L (ref 8–61)
GLUCOSE SERPL-MCNC: 98 MG/DL (ref 70–99)
HCT VFR BLD AUTO: 22.9 % (ref 40.5–52.5)
HGB BLD-MCNC: 7.9 G/DL (ref 13.5–17.5)
INR PPP: 1.14 (ref 0.85–1.15)
LDH SERPL L TO P-CCNC: 278 U/L (ref 100–190)
LYMPHOCYTES # BLD: 0.5 K/UL (ref 1–5.1)
LYMPHOCYTES NFR BLD: 57.3 %
MAGNESIUM SERPL-MCNC: 2 MG/DL (ref 1.8–2.4)
MCH RBC QN AUTO: 32.5 PG (ref 26–34)
MCHC RBC AUTO-ENTMCNC: 34.3 G/DL (ref 31–36)
MCV RBC AUTO: 94.8 FL (ref 80–100)
MONOCYTES # BLD: 0.1 K/UL (ref 0–1.3)
MONOCYTES NFR BLD: 14.1 %
NEUTROPHILS # BLD: 0.3 K/UL (ref 1.7–7.7)
NEUTROPHILS NFR BLD: 27.6 %
PHOSPHATE SERPL-MCNC: 4.5 MG/DL (ref 2.5–4.9)
PLATELET # BLD AUTO: 105 K/UL (ref 135–450)
PMV BLD AUTO: 6.9 FL (ref 5–10.5)
POTASSIUM SERPL-SCNC: 3.6 MMOL/L (ref 3.5–5.1)
PROT SERPL-MCNC: 5.8 G/DL (ref 6.4–8.2)
PROTHROMBIN TIME: 14.8 SEC (ref 11.9–14.9)
RBC # BLD AUTO: 2.42 M/UL (ref 4.2–5.9)
SODIUM SERPL-SCNC: 140 MMOL/L (ref 136–145)
URATE SERPL-MCNC: 5.5 MG/DL (ref 3.5–7.2)
WBC # BLD AUTO: 0.9 K/UL (ref 4–11)

## 2024-09-26 PROCEDURE — 6360000002 HC RX W HCPCS: Performed by: INTERNAL MEDICINE

## 2024-09-26 PROCEDURE — 84550 ASSAY OF BLOOD/URIC ACID: CPT

## 2024-09-26 PROCEDURE — 82977 ASSAY OF GGT: CPT

## 2024-09-26 PROCEDURE — 83615 LACTATE (LD) (LDH) ENZYME: CPT

## 2024-09-26 PROCEDURE — 85730 THROMBOPLASTIN TIME PARTIAL: CPT

## 2024-09-26 PROCEDURE — 85610 PROTHROMBIN TIME: CPT

## 2024-09-26 PROCEDURE — 84075 ASSAY ALKALINE PHOSPHATASE: CPT

## 2024-09-26 PROCEDURE — 80076 HEPATIC FUNCTION PANEL: CPT

## 2024-09-26 PROCEDURE — 83735 ASSAY OF MAGNESIUM: CPT

## 2024-09-26 PROCEDURE — 2060000000 HC ICU INTERMEDIATE R&B

## 2024-09-26 PROCEDURE — 84100 ASSAY OF PHOSPHORUS: CPT

## 2024-09-26 PROCEDURE — 6370000000 HC RX 637 (ALT 250 FOR IP)

## 2024-09-26 PROCEDURE — 2580000003 HC RX 258: Performed by: INTERNAL MEDICINE

## 2024-09-26 PROCEDURE — 36592 COLLECT BLOOD FROM PICC: CPT

## 2024-09-26 PROCEDURE — 6370000000 HC RX 637 (ALT 250 FOR IP): Performed by: NURSE PRACTITIONER

## 2024-09-26 PROCEDURE — 80048 BASIC METABOLIC PNL TOTAL CA: CPT

## 2024-09-26 PROCEDURE — 6370000000 HC RX 637 (ALT 250 FOR IP): Performed by: INTERNAL MEDICINE

## 2024-09-26 PROCEDURE — 85384 FIBRINOGEN ACTIVITY: CPT

## 2024-09-26 PROCEDURE — 84080 ASSAY ALKALINE PHOSPHATASES: CPT

## 2024-09-26 PROCEDURE — 2580000003 HC RX 258

## 2024-09-26 PROCEDURE — 85025 COMPLETE CBC W/AUTO DIFF WBC: CPT

## 2024-09-26 PROCEDURE — 6360000002 HC RX W HCPCS: Performed by: NURSE PRACTITIONER

## 2024-09-26 RX ADMIN — URSODIOL 250 MG: 250 TABLET ORAL at 08:50

## 2024-09-26 RX ADMIN — TRIAMCINOLONE ACETONIDE: 1 CREAM TOPICAL at 11:40

## 2024-09-26 RX ADMIN — SODIUM CHLORIDE 15 ML: 900 IRRIGANT IRRIGATION at 20:07

## 2024-09-26 RX ADMIN — DECITABINE 45 MG: 50 INJECTION, POWDER, LYOPHILIZED, FOR SOLUTION INTRAVENOUS at 16:34

## 2024-09-26 RX ADMIN — LISINOPRIL 30 MG: 20 TABLET ORAL at 08:50

## 2024-09-26 RX ADMIN — VALACYCLOVIR HYDROCHLORIDE 500 MG: 500 TABLET, FILM COATED ORAL at 08:50

## 2024-09-26 RX ADMIN — SODIUM CHLORIDE, PRESERVATIVE FREE 10 ML: 5 INJECTION INTRAVENOUS at 20:07

## 2024-09-26 RX ADMIN — MICAFUNGIN SODIUM 50 MG: 100 INJECTION, POWDER, LYOPHILIZED, FOR SOLUTION INTRAVENOUS at 08:54

## 2024-09-26 RX ADMIN — VALACYCLOVIR HYDROCHLORIDE 500 MG: 500 TABLET, FILM COATED ORAL at 20:05

## 2024-09-26 RX ADMIN — LEVOFLOXACIN 750 MG: 750 TABLET, FILM COATED ORAL at 08:50

## 2024-09-26 RX ADMIN — SODIUM CHLORIDE, PRESERVATIVE FREE 10 ML: 5 INJECTION INTRAVENOUS at 08:50

## 2024-09-26 RX ADMIN — ALLOPURINOL 300 MG: 300 TABLET ORAL at 08:50

## 2024-09-26 RX ADMIN — CYANOCOBALAMIN TAB 1000 MCG 1000 MCG: 1000 TAB at 08:50

## 2024-09-26 RX ADMIN — ENOXAPARIN SODIUM 30 MG: 100 INJECTION SUBCUTANEOUS at 08:51

## 2024-09-26 RX ADMIN — URSODIOL 250 MG: 250 TABLET ORAL at 20:05

## 2024-09-26 RX ADMIN — SODIUM CHLORIDE, PRESERVATIVE FREE 10 ML: 5 INJECTION INTRAVENOUS at 08:51

## 2024-09-26 RX ADMIN — ONDANSETRON HYDROCHLORIDE 8 MG: 8 TABLET, FILM COATED ORAL at 15:49

## 2024-09-26 RX ADMIN — ENOXAPARIN SODIUM 30 MG: 100 INJECTION SUBCUTANEOUS at 20:08

## 2024-09-26 RX ADMIN — DIPHENHYDRAMINE HYDROCHLORIDE 25 MG: 25 TABLET ORAL at 15:49

## 2024-09-26 NOTE — CONSULTS
Consultation Note    Patient Name: Myles Meehan  : 1950  Age: 74 y.o.     Admitting Physician: Audi Robin MD   Date of Admission: 2024  9:09 AM   Primary Care Physician: Magaly Womack MD        Myles Meehan is being seen at the request of Audi Robin MD for elevated liver enzymes.    History of Present Illness:  Mr. Myles Meehan is a 76 geeta old male with history of alcohol use who came to the hospital due to concerning labs for pancytopenia. He has had unintentional weight loss and fatigue. Was found to have a new diagosis of acute myeloid leukemia was admitted to the [facility] and started on Decitabien/Dacogen and venetoclax day 3.    He was noted on admission to have elevated liver enzymes with Alk phos 281, alt 274,  our service was consulted on initial hospital admission before diagnosis of AML for elevated enzymes. Ultrasound of the liver at that time showed fatty liver. HIV negative and hepatitis panel was negative. As ell f-actin was 6 and ck 76. His enzymes have continued to rise during his new hospital stay at the [facility] with recent being alk phos 384, .and .  Denies any abdominal pain, N/V or change in bowel habits.    Around 35 drinks a week of combination beer and whiskey      GI History:  Patient's last colonoscopy was last year 23, hyperplastic polyp,Negative for dysplasia and malignancy.     Past Medical History:  Past Medical History:   Diagnosis Date    Arthritis     left knee    Hyperlipidemia     Hypertension     Sleep apnea     was unable to wear cpap        Past Surgical History:  Past Surgical History:   Procedure Laterality Date    ANKLE SURGERY Left     COLONOSCOPY      LAPAROSCOPIC APPENDECTOMY N/A 3/29/2019    LAPAROSCOPIC APPENDECTOMY performed by Scout Zuñiga MD at [facility] OR    OTHER SURGICAL HISTORY  1/27/15    LEFT TOTAL KNEE ARTHROPLASTY              QUADRACEPS TENDON REPAIR Left 2015    LEFT KNEE QUADRICEPS TENDON REPAIR

## 2024-09-27 ENCOUNTER — APPOINTMENT (OUTPATIENT)
Dept: ULTRASOUND IMAGING | Age: 74
DRG: 837 | End: 2024-09-27
Attending: INTERNAL MEDICINE
Payer: MEDICARE

## 2024-09-27 LAB
ABO + RH BLD: NORMAL
ALBUMIN SERPL-MCNC: 3.4 G/DL (ref 3.4–5)
ALP SERPL-CCNC: 381 U/L (ref 40–129)
ALT SERPL-CCNC: 526 U/L (ref 10–40)
ANION GAP SERPL CALCULATED.3IONS-SCNC: 8 MMOL/L (ref 3–16)
AST SERPL-CCNC: 246 U/L (ref 15–37)
BASOPHILS # BLD: 0 K/UL (ref 0–0.2)
BASOPHILS NFR BLD: 0.2 %
BILIRUB DIRECT SERPL-MCNC: 0.7 MG/DL (ref 0–0.3)
BILIRUB INDIRECT SERPL-MCNC: 0.6 MG/DL (ref 0–1)
BILIRUB SERPL-MCNC: 1.3 MG/DL (ref 0–1)
BLD GP AB SCN SERPL QL: NORMAL
BUN SERPL-MCNC: 18 MG/DL (ref 7–20)
C DIFF TOX A+B STL QL IA: NORMAL
CALCIUM SERPL-MCNC: 8.8 MG/DL (ref 8.3–10.6)
CHLORIDE SERPL-SCNC: 104 MMOL/L (ref 99–110)
CO2 SERPL-SCNC: 27 MMOL/L (ref 21–32)
CREAT SERPL-MCNC: 0.8 MG/DL (ref 0.8–1.3)
DEPRECATED RDW RBC AUTO: 22.6 % (ref 12.4–15.4)
EOSINOPHIL # BLD: 0 K/UL (ref 0–0.6)
EOSINOPHIL NFR BLD: 0.4 %
FIBRINOGEN PPP-MCNC: 406 MG/DL (ref 227–534)
GFR SERPLBLD CREATININE-BSD FMLA CKD-EPI: >90 ML/MIN/{1.73_M2}
GLUCOSE SERPL-MCNC: 108 MG/DL (ref 70–99)
HCT VFR BLD AUTO: 22 % (ref 40.5–52.5)
HGB BLD-MCNC: 7.5 G/DL (ref 13.5–17.5)
LDH SERPL L TO P-CCNC: 249 U/L (ref 100–190)
LYMPHOCYTES # BLD: 0.6 K/UL (ref 1–5.1)
LYMPHOCYTES NFR BLD: 61.5 %
MAGNESIUM SERPL-MCNC: 2.3 MG/DL (ref 1.8–2.4)
MCH RBC QN AUTO: 32.2 PG (ref 26–34)
MCHC RBC AUTO-ENTMCNC: 34 G/DL (ref 31–36)
MCV RBC AUTO: 94.6 FL (ref 80–100)
MONOCYTES # BLD: 0.1 K/UL (ref 0–1.3)
MONOCYTES NFR BLD: 15.6 %
NEUTROPHILS # BLD: 0.2 K/UL (ref 1.7–7.7)
NEUTROPHILS NFR BLD: 22.3 %
PHOSPHATE SERPL-MCNC: 4 MG/DL (ref 2.5–4.9)
PLATELET # BLD AUTO: 95 K/UL (ref 135–450)
PMV BLD AUTO: 7 FL (ref 5–10.5)
POTASSIUM SERPL-SCNC: 3.7 MMOL/L (ref 3.5–5.1)
PROT SERPL-MCNC: 5.8 G/DL (ref 6.4–8.2)
RBC # BLD AUTO: 2.32 M/UL (ref 4.2–5.9)
SODIUM SERPL-SCNC: 139 MMOL/L (ref 136–145)
URATE SERPL-MCNC: 4.7 MG/DL (ref 3.5–7.2)
WBC # BLD AUTO: 1 K/UL (ref 4–11)

## 2024-09-27 PROCEDURE — P9040 RBC LEUKOREDUCED IRRADIATED: HCPCS

## 2024-09-27 PROCEDURE — 83615 LACTATE (LD) (LDH) ENZYME: CPT

## 2024-09-27 PROCEDURE — 93975 VASCULAR STUDY: CPT

## 2024-09-27 PROCEDURE — 2060000000 HC ICU INTERMEDIATE R&B

## 2024-09-27 PROCEDURE — 6370000000 HC RX 637 (ALT 250 FOR IP): Performed by: INTERNAL MEDICINE

## 2024-09-27 PROCEDURE — 6360000002 HC RX W HCPCS: Performed by: INTERNAL MEDICINE

## 2024-09-27 PROCEDURE — 84100 ASSAY OF PHOSPHORUS: CPT

## 2024-09-27 PROCEDURE — 86850 RBC ANTIBODY SCREEN: CPT

## 2024-09-27 PROCEDURE — 76705 ECHO EXAM OF ABDOMEN: CPT

## 2024-09-27 PROCEDURE — 6370000000 HC RX 637 (ALT 250 FOR IP): Performed by: NURSE PRACTITIONER

## 2024-09-27 PROCEDURE — 2580000003 HC RX 258: Performed by: INTERNAL MEDICINE

## 2024-09-27 PROCEDURE — 85025 COMPLETE CBC W/AUTO DIFF WBC: CPT

## 2024-09-27 PROCEDURE — 86901 BLOOD TYPING SEROLOGIC RH(D): CPT

## 2024-09-27 PROCEDURE — 86900 BLOOD TYPING SEROLOGIC ABO: CPT

## 2024-09-27 PROCEDURE — 86923 COMPATIBILITY TEST ELECTRIC: CPT

## 2024-09-27 PROCEDURE — 6370000000 HC RX 637 (ALT 250 FOR IP)

## 2024-09-27 PROCEDURE — 85384 FIBRINOGEN ACTIVITY: CPT

## 2024-09-27 PROCEDURE — 80048 BASIC METABOLIC PNL TOTAL CA: CPT

## 2024-09-27 PROCEDURE — 84550 ASSAY OF BLOOD/URIC ACID: CPT

## 2024-09-27 PROCEDURE — 87324 CLOSTRIDIUM AG IA: CPT

## 2024-09-27 PROCEDURE — 87449 NOS EACH ORGANISM AG IA: CPT

## 2024-09-27 PROCEDURE — 80076 HEPATIC FUNCTION PANEL: CPT

## 2024-09-27 PROCEDURE — 36592 COLLECT BLOOD FROM PICC: CPT

## 2024-09-27 PROCEDURE — 2580000003 HC RX 258

## 2024-09-27 PROCEDURE — 6360000002 HC RX W HCPCS: Performed by: NURSE PRACTITIONER

## 2024-09-27 PROCEDURE — 83735 ASSAY OF MAGNESIUM: CPT

## 2024-09-27 RX ORDER — DIPHENHYDRAMINE HCL 25 MG
25 TABLET ORAL EVERY 4 HOURS PRN
Status: DISCONTINUED | OUTPATIENT
Start: 2024-09-27 | End: 2024-09-30 | Stop reason: HOSPADM

## 2024-09-27 RX ORDER — LOPERAMIDE HCL 2 MG
2 CAPSULE ORAL PRN
Status: DISCONTINUED | OUTPATIENT
Start: 2024-09-27 | End: 2024-09-30 | Stop reason: HOSPADM

## 2024-09-27 RX ORDER — LEVOFLOXACIN 500 MG/1
500 TABLET, FILM COATED ORAL DAILY
Status: DISCONTINUED | OUTPATIENT
Start: 2024-10-01 | End: 2024-09-30 | Stop reason: HOSPADM

## 2024-09-27 RX ORDER — LOPERAMIDE HCL 2 MG
4 CAPSULE ORAL ONCE
Status: COMPLETED | OUTPATIENT
Start: 2024-09-27 | End: 2024-09-27

## 2024-09-27 RX ADMIN — LISINOPRIL 30 MG: 20 TABLET ORAL at 08:36

## 2024-09-27 RX ADMIN — DIPHENHYDRAMINE HYDROCHLORIDE 25 MG: 25 TABLET ORAL at 15:44

## 2024-09-27 RX ADMIN — URSODIOL 250 MG: 250 TABLET ORAL at 20:43

## 2024-09-27 RX ADMIN — ALLOPURINOL 300 MG: 300 TABLET ORAL at 08:36

## 2024-09-27 RX ADMIN — MICAFUNGIN SODIUM 50 MG: 100 INJECTION, POWDER, LYOPHILIZED, FOR SOLUTION INTRAVENOUS at 08:40

## 2024-09-27 RX ADMIN — ONDANSETRON HYDROCHLORIDE 8 MG: 8 TABLET, FILM COATED ORAL at 15:42

## 2024-09-27 RX ADMIN — DIPHENHYDRAMINE HYDROCHLORIDE 25 MG: 25 TABLET ORAL at 11:26

## 2024-09-27 RX ADMIN — ENOXAPARIN SODIUM 30 MG: 100 INJECTION SUBCUTANEOUS at 21:43

## 2024-09-27 RX ADMIN — LEVOFLOXACIN 750 MG: 750 TABLET, FILM COATED ORAL at 08:36

## 2024-09-27 RX ADMIN — SODIUM CHLORIDE, PRESERVATIVE FREE 10 ML: 5 INJECTION INTRAVENOUS at 08:37

## 2024-09-27 RX ADMIN — DIPHENHYDRAMINE HYDROCHLORIDE 25 MG: 25 TABLET ORAL at 00:54

## 2024-09-27 RX ADMIN — URSODIOL 250 MG: 250 TABLET ORAL at 08:36

## 2024-09-27 RX ADMIN — VALACYCLOVIR HYDROCHLORIDE 500 MG: 500 TABLET, FILM COATED ORAL at 08:36

## 2024-09-27 RX ADMIN — DIPHENHYDRAMINE HYDROCHLORIDE 25 MG: 25 TABLET ORAL at 20:44

## 2024-09-27 RX ADMIN — SODIUM CHLORIDE, PRESERVATIVE FREE 10 ML: 5 INJECTION INTRAVENOUS at 08:41

## 2024-09-27 RX ADMIN — LOPERAMIDE HYDROCHLORIDE 4 MG: 2 CAPSULE ORAL at 15:42

## 2024-09-27 RX ADMIN — VALACYCLOVIR HYDROCHLORIDE 500 MG: 500 TABLET, FILM COATED ORAL at 20:43

## 2024-09-27 RX ADMIN — DECITABINE 45 MG: 50 INJECTION, POWDER, LYOPHILIZED, FOR SOLUTION INTRAVENOUS at 16:20

## 2024-09-27 RX ADMIN — ENOXAPARIN SODIUM 30 MG: 100 INJECTION SUBCUTANEOUS at 08:36

## 2024-09-27 RX ADMIN — CYANOCOBALAMIN TAB 1000 MCG 1000 MCG: 1000 TAB at 08:36

## 2024-09-27 ASSESSMENT — PAIN SCALES - GENERAL: PAINLEVEL_OUTOF10: 0

## 2024-09-28 LAB
ALBUMIN SERPL-MCNC: 3.2 G/DL (ref 3.4–5)
ALP SERPL-CCNC: 349 U/L (ref 40–129)
ALT SERPL-CCNC: 424 U/L (ref 10–40)
ANION GAP SERPL CALCULATED.3IONS-SCNC: 11 MMOL/L (ref 3–16)
ANISOCYTOSIS BLD QL SMEAR: ABNORMAL
AST SERPL-CCNC: 160 U/L (ref 15–37)
BASOPHILS # BLD: 0 K/UL (ref 0–0.2)
BASOPHILS NFR BLD: 0 %
BILIRUB DIRECT SERPL-MCNC: 0.5 MG/DL (ref 0–0.3)
BILIRUB INDIRECT SERPL-MCNC: 0.7 MG/DL (ref 0–1)
BILIRUB SERPL-MCNC: 1.2 MG/DL (ref 0–1)
BUN SERPL-MCNC: 16 MG/DL (ref 7–20)
CALCIUM SERPL-MCNC: 8.7 MG/DL (ref 8.3–10.6)
CHLORIDE SERPL-SCNC: 104 MMOL/L (ref 99–110)
CO2 SERPL-SCNC: 25 MMOL/L (ref 21–32)
CREAT SERPL-MCNC: 0.7 MG/DL (ref 0.8–1.3)
DEPRECATED RDW RBC AUTO: 22.5 % (ref 12.4–15.4)
EOSINOPHIL # BLD: 0 K/UL (ref 0–0.6)
EOSINOPHIL NFR BLD: 0 %
FIBRINOGEN PPP-MCNC: 413 MG/DL (ref 227–534)
GFR SERPLBLD CREATININE-BSD FMLA CKD-EPI: >90 ML/MIN/{1.73_M2}
GLUCOSE SERPL-MCNC: 104 MG/DL (ref 70–99)
HCT VFR BLD AUTO: 21.7 % (ref 40.5–52.5)
HGB BLD-MCNC: 7.3 G/DL (ref 13.5–17.5)
LDH SERPL L TO P-CCNC: 204 U/L (ref 100–190)
LYMPHOCYTES # BLD: 0.6 K/UL (ref 1–5.1)
LYMPHOCYTES NFR BLD: 58 %
MAGNESIUM SERPL-MCNC: 2.3 MG/DL (ref 1.8–2.4)
MCH RBC QN AUTO: 32.1 PG (ref 26–34)
MCHC RBC AUTO-ENTMCNC: 33.8 G/DL (ref 31–36)
MCV RBC AUTO: 95.1 FL (ref 80–100)
MICROCYTES BLD QL SMEAR: ABNORMAL
MONOCYTES # BLD: 0.2 K/UL (ref 0–1.3)
MONOCYTES NFR BLD: 15 %
MYELOCYTES NFR BLD MANUAL: 2 %
NEUTROPHILS # BLD: 0.3 K/UL (ref 1.7–7.7)
NEUTROPHILS NFR BLD: 25 %
PATH INTERP BLD-IMP: NO
PHOSPHATE SERPL-MCNC: 3.8 MG/DL (ref 2.5–4.9)
PLATELET # BLD AUTO: 85 K/UL (ref 135–450)
PLATELET BLD QL SMEAR: ABNORMAL
PMV BLD AUTO: 7.1 FL (ref 5–10.5)
POTASSIUM SERPL-SCNC: 3.8 MMOL/L (ref 3.5–5.1)
PROT SERPL-MCNC: 5.6 G/DL (ref 6.4–8.2)
RBC # BLD AUTO: 2.28 M/UL (ref 4.2–5.9)
SLIDE REVIEW: ABNORMAL
SODIUM SERPL-SCNC: 140 MMOL/L (ref 136–145)
URATE SERPL-MCNC: 4.7 MG/DL (ref 3.5–7.2)
WBC # BLD AUTO: 1 K/UL (ref 4–11)

## 2024-09-28 PROCEDURE — 6360000002 HC RX W HCPCS: Performed by: NURSE PRACTITIONER

## 2024-09-28 PROCEDURE — 84550 ASSAY OF BLOOD/URIC ACID: CPT

## 2024-09-28 PROCEDURE — 6360000002 HC RX W HCPCS: Performed by: INTERNAL MEDICINE

## 2024-09-28 PROCEDURE — 83615 LACTATE (LD) (LDH) ENZYME: CPT

## 2024-09-28 PROCEDURE — 6370000000 HC RX 637 (ALT 250 FOR IP)

## 2024-09-28 PROCEDURE — 80048 BASIC METABOLIC PNL TOTAL CA: CPT

## 2024-09-28 PROCEDURE — 83735 ASSAY OF MAGNESIUM: CPT

## 2024-09-28 PROCEDURE — 2580000003 HC RX 258

## 2024-09-28 PROCEDURE — 2060000000 HC ICU INTERMEDIATE R&B

## 2024-09-28 PROCEDURE — 6370000000 HC RX 637 (ALT 250 FOR IP): Performed by: NURSE PRACTITIONER

## 2024-09-28 PROCEDURE — 2580000003 HC RX 258: Performed by: INTERNAL MEDICINE

## 2024-09-28 PROCEDURE — 80076 HEPATIC FUNCTION PANEL: CPT

## 2024-09-28 PROCEDURE — 85384 FIBRINOGEN ACTIVITY: CPT

## 2024-09-28 PROCEDURE — 85025 COMPLETE CBC W/AUTO DIFF WBC: CPT

## 2024-09-28 PROCEDURE — 84100 ASSAY OF PHOSPHORUS: CPT

## 2024-09-28 RX ADMIN — CYANOCOBALAMIN TAB 1000 MCG 1000 MCG: 1000 TAB at 10:03

## 2024-09-28 RX ADMIN — ENOXAPARIN SODIUM 30 MG: 100 INJECTION SUBCUTANEOUS at 20:26

## 2024-09-28 RX ADMIN — SODIUM CHLORIDE 15 ML: 900 IRRIGANT IRRIGATION at 11:49

## 2024-09-28 RX ADMIN — MICAFUNGIN SODIUM 50 MG: 100 INJECTION, POWDER, LYOPHILIZED, FOR SOLUTION INTRAVENOUS at 10:02

## 2024-09-28 RX ADMIN — FUROSEMIDE 40 MG: 10 INJECTION, SOLUTION INTRAMUSCULAR; INTRAVENOUS at 17:33

## 2024-09-28 RX ADMIN — ENOXAPARIN SODIUM 30 MG: 100 INJECTION SUBCUTANEOUS at 10:05

## 2024-09-28 RX ADMIN — SODIUM CHLORIDE 15 ML: 900 IRRIGANT IRRIGATION at 17:42

## 2024-09-28 RX ADMIN — SODIUM CHLORIDE, PRESERVATIVE FREE 10 ML: 5 INJECTION INTRAVENOUS at 10:53

## 2024-09-28 RX ADMIN — VALACYCLOVIR HYDROCHLORIDE 500 MG: 500 TABLET, FILM COATED ORAL at 20:26

## 2024-09-28 RX ADMIN — ALLOPURINOL 300 MG: 300 TABLET ORAL at 10:03

## 2024-09-28 RX ADMIN — LEVOFLOXACIN 750 MG: 750 TABLET, FILM COATED ORAL at 10:03

## 2024-09-28 RX ADMIN — DIPHENHYDRAMINE HYDROCHLORIDE 25 MG: 25 TABLET ORAL at 10:50

## 2024-09-28 RX ADMIN — LISINOPRIL 30 MG: 20 TABLET ORAL at 10:02

## 2024-09-28 RX ADMIN — VALACYCLOVIR HYDROCHLORIDE 500 MG: 500 TABLET, FILM COATED ORAL at 10:03

## 2024-09-28 RX ADMIN — SODIUM CHLORIDE, PRESERVATIVE FREE 10 ML: 5 INJECTION INTRAVENOUS at 10:50

## 2024-09-28 RX ADMIN — DIPHENHYDRAMINE HYDROCHLORIDE 25 MG: 25 TABLET ORAL at 20:26

## 2024-09-28 RX ADMIN — URSODIOL 250 MG: 250 TABLET ORAL at 20:26

## 2024-09-28 RX ADMIN — DIPHENHYDRAMINE HYDROCHLORIDE 25 MG: 25 TABLET ORAL at 06:43

## 2024-09-28 RX ADMIN — URSODIOL 250 MG: 250 TABLET ORAL at 10:03

## 2024-09-29 LAB
ALBUMIN SERPL-MCNC: 3.2 G/DL (ref 3.4–5)
ALP SERPL-CCNC: 337 U/L (ref 40–129)
ALT SERPL-CCNC: 392 U/L (ref 10–40)
ANION GAP SERPL CALCULATED.3IONS-SCNC: 11 MMOL/L (ref 3–16)
ANISOCYTOSIS BLD QL SMEAR: ABNORMAL
AST SERPL-CCNC: 145 U/L (ref 15–37)
BASOPHILS # BLD: 0 K/UL (ref 0–0.2)
BASOPHILS NFR BLD: 0 %
BILIRUB DIRECT SERPL-MCNC: 0.4 MG/DL (ref 0–0.3)
BILIRUB INDIRECT SERPL-MCNC: 0.9 MG/DL (ref 0–1)
BILIRUB SERPL-MCNC: 1.3 MG/DL (ref 0–1)
BUN SERPL-MCNC: 21 MG/DL (ref 7–20)
CALCIUM SERPL-MCNC: 8.7 MG/DL (ref 8.3–10.6)
CHLORIDE SERPL-SCNC: 102 MMOL/L (ref 99–110)
CO2 SERPL-SCNC: 26 MMOL/L (ref 21–32)
CREAT SERPL-MCNC: 0.7 MG/DL (ref 0.8–1.3)
DACRYOCYTES BLD QL SMEAR: ABNORMAL
DEPRECATED RDW RBC AUTO: 22.4 % (ref 12.4–15.4)
EOSINOPHIL # BLD: 0 K/UL (ref 0–0.6)
EOSINOPHIL NFR BLD: 0 %
FIBRINOGEN PPP-MCNC: 435 MG/DL (ref 227–534)
GFR SERPLBLD CREATININE-BSD FMLA CKD-EPI: >90 ML/MIN/{1.73_M2}
GLUCOSE SERPL-MCNC: 99 MG/DL (ref 70–99)
HCT VFR BLD AUTO: 21.5 % (ref 40.5–52.5)
HGB BLD-MCNC: 7.3 G/DL (ref 13.5–17.5)
LDH SERPL L TO P-CCNC: 210 U/L (ref 100–190)
LYMPHOCYTES # BLD: 0.8 K/UL (ref 1–5.1)
LYMPHOCYTES NFR BLD: 76 %
MACROCYTES BLD QL SMEAR: ABNORMAL
MAGNESIUM SERPL-MCNC: 2.3 MG/DL (ref 1.8–2.4)
MCH RBC QN AUTO: 32.4 PG (ref 26–34)
MCHC RBC AUTO-ENTMCNC: 34.1 G/DL (ref 31–36)
MCV RBC AUTO: 95 FL (ref 80–100)
MICROCYTES BLD QL SMEAR: ABNORMAL
MONOCYTES # BLD: 0.1 K/UL (ref 0–1.3)
MONOCYTES NFR BLD: 8 %
NEUTROPHILS # BLD: 0.2 K/UL (ref 1.7–7.7)
NEUTROPHILS NFR BLD: 16 %
OVALOCYTES BLD QL SMEAR: ABNORMAL
PHOSPHATE SERPL-MCNC: 3.9 MG/DL (ref 2.5–4.9)
PLATELET # BLD AUTO: 77 K/UL (ref 135–450)
PMV BLD AUTO: 7.5 FL (ref 5–10.5)
POIKILOCYTOSIS BLD QL SMEAR: ABNORMAL
POTASSIUM SERPL-SCNC: 3.6 MMOL/L (ref 3.5–5.1)
PROT SERPL-MCNC: 5.8 G/DL (ref 6.4–8.2)
RBC # BLD AUTO: 2.26 M/UL (ref 4.2–5.9)
SODIUM SERPL-SCNC: 139 MMOL/L (ref 136–145)
URATE SERPL-MCNC: 5 MG/DL (ref 3.5–7.2)
WBC # BLD AUTO: 1 K/UL (ref 4–11)

## 2024-09-29 PROCEDURE — 6360000002 HC RX W HCPCS: Performed by: NURSE PRACTITIONER

## 2024-09-29 PROCEDURE — 80048 BASIC METABOLIC PNL TOTAL CA: CPT

## 2024-09-29 PROCEDURE — 2060000000 HC ICU INTERMEDIATE R&B

## 2024-09-29 PROCEDURE — 85384 FIBRINOGEN ACTIVITY: CPT

## 2024-09-29 PROCEDURE — 6370000000 HC RX 637 (ALT 250 FOR IP)

## 2024-09-29 PROCEDURE — 85025 COMPLETE CBC W/AUTO DIFF WBC: CPT

## 2024-09-29 PROCEDURE — 6370000000 HC RX 637 (ALT 250 FOR IP): Performed by: NURSE PRACTITIONER

## 2024-09-29 PROCEDURE — 84550 ASSAY OF BLOOD/URIC ACID: CPT

## 2024-09-29 PROCEDURE — 6360000002 HC RX W HCPCS: Performed by: INTERNAL MEDICINE

## 2024-09-29 PROCEDURE — 2580000003 HC RX 258

## 2024-09-29 PROCEDURE — 84100 ASSAY OF PHOSPHORUS: CPT

## 2024-09-29 PROCEDURE — 2580000003 HC RX 258: Performed by: INTERNAL MEDICINE

## 2024-09-29 PROCEDURE — 83615 LACTATE (LD) (LDH) ENZYME: CPT

## 2024-09-29 PROCEDURE — 80076 HEPATIC FUNCTION PANEL: CPT

## 2024-09-29 PROCEDURE — 83735 ASSAY OF MAGNESIUM: CPT

## 2024-09-29 RX ADMIN — ENOXAPARIN SODIUM 30 MG: 100 INJECTION SUBCUTANEOUS at 08:33

## 2024-09-29 RX ADMIN — SODIUM CHLORIDE, PRESERVATIVE FREE 10 ML: 5 INJECTION INTRAVENOUS at 20:39

## 2024-09-29 RX ADMIN — SODIUM CHLORIDE 15 ML: 900 IRRIGANT IRRIGATION at 11:38

## 2024-09-29 RX ADMIN — DIPHENHYDRAMINE HYDROCHLORIDE 25 MG: 25 TABLET ORAL at 13:54

## 2024-09-29 RX ADMIN — VALACYCLOVIR HYDROCHLORIDE 500 MG: 500 TABLET, FILM COATED ORAL at 20:34

## 2024-09-29 RX ADMIN — ALLOPURINOL 300 MG: 300 TABLET ORAL at 08:23

## 2024-09-29 RX ADMIN — LEVOFLOXACIN 750 MG: 750 TABLET, FILM COATED ORAL at 08:33

## 2024-09-29 RX ADMIN — VALACYCLOVIR HYDROCHLORIDE 500 MG: 500 TABLET, FILM COATED ORAL at 08:34

## 2024-09-29 RX ADMIN — SODIUM CHLORIDE 15 ML: 900 IRRIGANT IRRIGATION at 20:39

## 2024-09-29 RX ADMIN — DIPHENHYDRAMINE HYDROCHLORIDE 25 MG: 25 TABLET ORAL at 08:33

## 2024-09-29 RX ADMIN — SODIUM CHLORIDE, PRESERVATIVE FREE 10 ML: 5 INJECTION INTRAVENOUS at 20:35

## 2024-09-29 RX ADMIN — URSODIOL 250 MG: 250 TABLET ORAL at 08:22

## 2024-09-29 RX ADMIN — SODIUM CHLORIDE 15 ML: 900 IRRIGANT IRRIGATION at 17:33

## 2024-09-29 RX ADMIN — DIPHENHYDRAMINE HYDROCHLORIDE 25 MG: 25 TABLET ORAL at 16:34

## 2024-09-29 RX ADMIN — CYANOCOBALAMIN TAB 1000 MCG 1000 MCG: 1000 TAB at 08:23

## 2024-09-29 RX ADMIN — DIPHENHYDRAMINE HYDROCHLORIDE 25 MG: 25 TABLET ORAL at 23:59

## 2024-09-29 RX ADMIN — LISINOPRIL 30 MG: 20 TABLET ORAL at 08:22

## 2024-09-29 RX ADMIN — SODIUM CHLORIDE, PRESERVATIVE FREE 10 ML: 5 INJECTION INTRAVENOUS at 08:23

## 2024-09-29 RX ADMIN — ENOXAPARIN SODIUM 30 MG: 100 INJECTION SUBCUTANEOUS at 20:34

## 2024-09-29 RX ADMIN — DIPHENHYDRAMINE HYDROCHLORIDE 25 MG: 25 TABLET ORAL at 20:34

## 2024-09-29 RX ADMIN — URSODIOL 250 MG: 250 TABLET ORAL at 20:34

## 2024-09-29 RX ADMIN — MICAFUNGIN SODIUM 50 MG: 100 INJECTION, POWDER, LYOPHILIZED, FOR SOLUTION INTRAVENOUS at 08:33

## 2024-09-30 ENCOUNTER — APPOINTMENT (OUTPATIENT)
Dept: GENERAL RADIOLOGY | Age: 74
DRG: 837 | End: 2024-09-30
Attending: INTERNAL MEDICINE
Payer: MEDICARE

## 2024-09-30 VITALS
SYSTOLIC BLOOD PRESSURE: 135 MMHG | HEIGHT: 69 IN | OXYGEN SATURATION: 98 % | DIASTOLIC BLOOD PRESSURE: 61 MMHG | RESPIRATION RATE: 16 BRPM | BODY MASS INDEX: 34.33 KG/M2 | WEIGHT: 231.8 LBS | TEMPERATURE: 97.7 F | HEART RATE: 71 BPM

## 2024-09-30 LAB
ALBUMIN SERPL-MCNC: 3.2 G/DL (ref 3.4–5)
ALP BONE SERPL-CCNC: 33 U/L (ref 0–55)
ALP ISOS SERPL HS-CCNC: 0 U/L
ALP LIVER SERPL-CCNC: 378 U/L (ref 0–94)
ALP SERPL-CCNC: 304 U/L (ref 40–129)
ALP SERPL-CCNC: 411 U/L (ref 40–120)
ALT SERPL-CCNC: 315 U/L (ref 10–40)
ANION GAP SERPL CALCULATED.3IONS-SCNC: 10 MMOL/L (ref 3–16)
APTT BLD: 30.9 SEC (ref 22.1–36.4)
AST SERPL-CCNC: 109 U/L (ref 15–37)
BASOPHILS # BLD: 0 K/UL (ref 0–0.2)
BASOPHILS NFR BLD: 0 %
BILIRUB DIRECT SERPL-MCNC: 0.3 MG/DL (ref 0–0.3)
BILIRUB INDIRECT SERPL-MCNC: 0.7 MG/DL (ref 0–1)
BILIRUB SERPL-MCNC: 1 MG/DL (ref 0–1)
BILIRUB UR QL STRIP.AUTO: NEGATIVE
BLOOD BANK DISPENSE STATUS: NORMAL
BLOOD BANK PRODUCT CODE: NORMAL
BPU ID: NORMAL
BUN SERPL-MCNC: 21 MG/DL (ref 7–20)
BURR CELLS BLD QL SMEAR: ABNORMAL
CALCIUM SERPL-MCNC: 9 MG/DL (ref 8.3–10.6)
CHLORIDE SERPL-SCNC: 106 MMOL/L (ref 99–110)
CLARITY UR: CLEAR
CO2 SERPL-SCNC: 25 MMOL/L (ref 21–32)
COLOR UR: YELLOW
CREAT SERPL-MCNC: 0.7 MG/DL (ref 0.8–1.3)
DEPRECATED RDW RBC AUTO: 22.1 % (ref 12.4–15.4)
DESCRIPTION BLOOD BANK: NORMAL
EOSINOPHIL # BLD: 0 K/UL (ref 0–0.6)
EOSINOPHIL NFR BLD: 0 %
FIBRINOGEN PPP-MCNC: 410 MG/DL (ref 227–534)
GFR SERPLBLD CREATININE-BSD FMLA CKD-EPI: >90 ML/MIN/{1.73_M2}
GLUCOSE SERPL-MCNC: 103 MG/DL (ref 70–99)
GLUCOSE UR STRIP.AUTO-MCNC: NEGATIVE MG/DL
HCT VFR BLD AUTO: 20.7 % (ref 40.5–52.5)
HGB BLD-MCNC: 7 G/DL (ref 13.5–17.5)
HGB UR QL STRIP.AUTO: NEGATIVE
INR PPP: 1.18 (ref 0.85–1.15)
KETONES UR STRIP.AUTO-MCNC: NEGATIVE MG/DL
LDH SERPL L TO P-CCNC: 193 U/L (ref 100–190)
LEUKOCYTE ESTERASE UR QL STRIP.AUTO: NEGATIVE
LYMPHOCYTES # BLD: 0.7 K/UL (ref 1–5.1)
LYMPHOCYTES NFR BLD: 64 %
MAGNESIUM SERPL-MCNC: 2.3 MG/DL (ref 1.8–2.4)
MCH RBC QN AUTO: 32 PG (ref 26–34)
MCHC RBC AUTO-ENTMCNC: 33.6 G/DL (ref 31–36)
MCV RBC AUTO: 95.1 FL (ref 80–100)
MONOCYTES # BLD: 0.1 K/UL (ref 0–1.3)
MONOCYTES NFR BLD: 8 %
NEUTROPHILS # BLD: 0.3 K/UL (ref 1.7–7.7)
NEUTROPHILS NFR BLD: 28 %
NITRITE UR QL STRIP.AUTO: NEGATIVE
OVALOCYTES BLD QL SMEAR: ABNORMAL
PH UR STRIP.AUTO: 6 [PH] (ref 5–8)
PHOSPHATE SERPL-MCNC: 3.9 MG/DL (ref 2.5–4.9)
PLATELET # BLD AUTO: 70 K/UL (ref 135–450)
PMV BLD AUTO: 7.6 FL (ref 5–10.5)
POIKILOCYTOSIS BLD QL SMEAR: ABNORMAL
POTASSIUM SERPL-SCNC: 3.8 MMOL/L (ref 3.5–5.1)
PROT SERPL-MCNC: 5.6 G/DL (ref 6.4–8.2)
PROT UR STRIP.AUTO-MCNC: NEGATIVE MG/DL
PROTHROMBIN TIME: 15.2 SEC (ref 11.9–14.9)
RBC # BLD AUTO: 2.18 M/UL (ref 4.2–5.9)
SCHISTOCYTES BLD QL SMEAR: ABNORMAL
SODIUM SERPL-SCNC: 141 MMOL/L (ref 136–145)
SP GR UR STRIP.AUTO: >=1.03 (ref 1–1.03)
UA DIPSTICK W REFLEX MICRO PNL UR: ABNORMAL
URATE SERPL-MCNC: 4.6 MG/DL (ref 3.5–7.2)
URN SPEC COLLECT METH UR: ABNORMAL
UROBILINOGEN UR STRIP-ACNC: 2 E.U./DL
WBC # BLD AUTO: 1.1 K/UL (ref 4–11)

## 2024-09-30 PROCEDURE — 81003 URINALYSIS AUTO W/O SCOPE: CPT

## 2024-09-30 PROCEDURE — 6360000002 HC RX W HCPCS: Performed by: NURSE PRACTITIONER

## 2024-09-30 PROCEDURE — 85384 FIBRINOGEN ACTIVITY: CPT

## 2024-09-30 PROCEDURE — 80076 HEPATIC FUNCTION PANEL: CPT

## 2024-09-30 PROCEDURE — 85730 THROMBOPLASTIN TIME PARTIAL: CPT

## 2024-09-30 PROCEDURE — 83615 LACTATE (LD) (LDH) ENZYME: CPT

## 2024-09-30 PROCEDURE — 84550 ASSAY OF BLOOD/URIC ACID: CPT

## 2024-09-30 PROCEDURE — 36430 TRANSFUSION BLD/BLD COMPNT: CPT

## 2024-09-30 PROCEDURE — 36592 COLLECT BLOOD FROM PICC: CPT

## 2024-09-30 PROCEDURE — 6370000000 HC RX 637 (ALT 250 FOR IP): Performed by: NURSE PRACTITIONER

## 2024-09-30 PROCEDURE — 6370000000 HC RX 637 (ALT 250 FOR IP)

## 2024-09-30 PROCEDURE — 2580000003 HC RX 258: Performed by: INTERNAL MEDICINE

## 2024-09-30 PROCEDURE — 84100 ASSAY OF PHOSPHORUS: CPT

## 2024-09-30 PROCEDURE — 80048 BASIC METABOLIC PNL TOTAL CA: CPT

## 2024-09-30 PROCEDURE — 6360000002 HC RX W HCPCS: Performed by: INTERNAL MEDICINE

## 2024-09-30 PROCEDURE — 85025 COMPLETE CBC W/AUTO DIFF WBC: CPT

## 2024-09-30 PROCEDURE — 2580000003 HC RX 258

## 2024-09-30 PROCEDURE — 83735 ASSAY OF MAGNESIUM: CPT

## 2024-09-30 PROCEDURE — 85610 PROTHROMBIN TIME: CPT

## 2024-09-30 RX ORDER — VALACYCLOVIR HYDROCHLORIDE 500 MG/1
500 TABLET, FILM COATED ORAL 2 TIMES DAILY
Qty: 60 TABLET | Refills: 0 | Status: SHIPPED | OUTPATIENT
Start: 2024-09-30 | End: 2024-10-30

## 2024-09-30 RX ORDER — LEVOFLOXACIN 500 MG/1
500 TABLET, FILM COATED ORAL DAILY
Qty: 30 TABLET | Refills: 2 | Status: SHIPPED | OUTPATIENT
Start: 2024-10-01 | End: 2024-09-30

## 2024-09-30 RX ORDER — URSODIOL 250 MG/1
250 TABLET, FILM COATED ORAL 2 TIMES DAILY
Qty: 90 TABLET | Refills: 3 | Status: SHIPPED | OUTPATIENT
Start: 2024-09-30

## 2024-09-30 RX ORDER — LEVOFLOXACIN 500 MG/1
500 TABLET, FILM COATED ORAL DAILY
Qty: 30 TABLET | Refills: 2 | Status: SHIPPED | OUTPATIENT
Start: 2024-10-01 | End: 2024-12-30

## 2024-09-30 RX ORDER — LEVOFLOXACIN 750 MG/1
750 TABLET, FILM COATED ORAL DAILY
Qty: 2 TABLET | Refills: 0 | Status: SHIPPED | OUTPATIENT
Start: 2024-09-30 | End: 2024-09-30 | Stop reason: HOSPADM

## 2024-09-30 RX ADMIN — CYANOCOBALAMIN TAB 1000 MCG 1000 MCG: 1000 TAB at 09:07

## 2024-09-30 RX ADMIN — VALACYCLOVIR HYDROCHLORIDE 500 MG: 500 TABLET, FILM COATED ORAL at 09:07

## 2024-09-30 RX ADMIN — ENOXAPARIN SODIUM 30 MG: 100 INJECTION SUBCUTANEOUS at 09:08

## 2024-09-30 RX ADMIN — DIPHENHYDRAMINE HYDROCHLORIDE 25 MG: 25 TABLET ORAL at 10:40

## 2024-09-30 RX ADMIN — LISINOPRIL 30 MG: 20 TABLET ORAL at 09:07

## 2024-09-30 RX ADMIN — MICAFUNGIN SODIUM 50 MG: 100 INJECTION, POWDER, LYOPHILIZED, FOR SOLUTION INTRAVENOUS at 09:10

## 2024-09-30 RX ADMIN — LEVOFLOXACIN 750 MG: 750 TABLET, FILM COATED ORAL at 09:06

## 2024-09-30 RX ADMIN — ALLOPURINOL 300 MG: 300 TABLET ORAL at 09:07

## 2024-09-30 RX ADMIN — URSODIOL 250 MG: 250 TABLET ORAL at 09:07

## 2024-09-30 RX ADMIN — DIPHENHYDRAMINE HYDROCHLORIDE 25 MG: 25 TABLET ORAL at 05:48

## 2024-09-30 RX ADMIN — SODIUM CHLORIDE, PRESERVATIVE FREE 10 ML: 5 INJECTION INTRAVENOUS at 09:13

## 2024-09-30 NOTE — PROGRESS NOTES
BCC Progress Note    2024     Myles Meehan    MRN: [id number]    : 1950    Referring MD: Audi Robin MD  [facility] [place] [address]      SUBJECTIVE:  Patient is doing welI.  Bonilla chemotx well.  PICC placed RUE.  He is without complaints.        ECOG PS:  (1) Restricted in physically strenuous activity, ambulatory and able to do work of light nature    KPS: 90% Able to carry on normal activity; minor signs or symptoms of disease    Isolation: None    Medications    Scheduled Meds:   ursodiol  250 mg Oral BID    Venetoclax  200 mg Oral Once    [START ON 2024] Venetoclax  400 mg Oral Q24H    micafungin (MYCAMINE) 50 mg in sodium chloride 0.9 % 100 mL IVPB  50 mg IntraVENous Daily    levoFLOXacin  750 mg Oral Daily    enoxaparin  30 mg SubCUTAneous BID    ondansetron  8 mg Oral Q24H    decitabine (DACOGEN) 45 mg in sodium chloride 0.9 % 100 mL chemo IVPB  45 mg IntraVENous Q24H    furosemide  40 mg IntraVENous Q12H    sodium chloride flush  5-40 mL IntraVENous 2 times per day    Saline Mouthwash  15 mL Swish & Spit 4x Daily AC & HS    valACYclovir  500 mg Oral BID    allopurinol  300 mg Oral Daily    sodium chloride flush  5-40 mL IntraVENous 2 times per day    lisinopril  30 mg Oral Daily    vitamin B-12  1,000 mcg Oral Daily     Continuous Infusions:   sodium chloride      sodium chloride      sodium chloride       PRN Meds:.prochlorperazine **OR** prochlorperazine, LORazepam **OR** LORazepam, triamcinolone, sodium chloride, sodium chloride flush, sodium chloride, magnesium sulfate, Saline Mouthwash, ALTEplase (CATHFLO) 2 mg in sterile water 2 mL injection, sodium chloride flush, sodium chloride, diphenhydrAMINE    ROS:  As noted above, otherwise remainder of 10-point ROS negative    Physical Exam:     I&O:    Intake/Output Summary (Last 24 hours) at 2024 0726  Last data filed at 2024 0620  Gross per 24 hour   Intake 2153 ml   Output 3225 ml   Net -1072 
    [facility] Progress Note    2024     Myles Meehan    MRN: [id number]    : 1950    Referring MD: Audi Robin MD  [address] [place] [address]      SUBJECTIVE:  Patient is doing welI.  Bonilla chemotx well.  PICC placed RUE.  He is without complaints.  Received blood this morning without problem.    ECOG PS:  (1) Restricted in physically strenuous activity, ambulatory and able to do work of light nature    KPS: 90% Able to carry on normal activity; minor signs or symptoms of disease    Isolation: None    Medications    Scheduled Meds:   micafungin (MYCAMINE) 50 mg in sodium chloride 0.9 % 100 mL IVPB  50 mg IntraVENous Daily    levoFLOXacin  750 mg Oral Daily    enoxaparin  30 mg SubCUTAneous BID    ondansetron  8 mg Oral Q24H    decitabine (DACOGEN) 45 mg in sodium chloride 0.9 % 100 mL chemo IVPB  45 mg IntraVENous Q24H    furosemide  40 mg IntraVENous Q12H    sodium chloride flush  5-40 mL IntraVENous 2 times per day    Saline Mouthwash  15 mL Swish & Spit 4x Daily AC & HS    valACYclovir  500 mg Oral BID    allopurinol  300 mg Oral Daily    sodium chloride flush  5-40 mL IntraVENous 2 times per day    lisinopril  30 mg Oral Daily    vitamin B-12  1,000 mcg Oral Daily     Continuous Infusions:   sodium chloride      sodium chloride      sodium chloride 100 mL/hr at 24 2158    sodium chloride       PRN Meds:.prochlorperazine **OR** prochlorperazine, LORazepam **OR** LORazepam, triamcinolone, sodium chloride, sodium chloride flush, sodium chloride, magnesium sulfate, Saline Mouthwash, ALTEplase (CATHFLO) 2 mg in sterile water 2 mL injection, sodium chloride flush, sodium chloride, diphenhydrAMINE    ROS:  As noted above, otherwise remainder of 10-point ROS negative    Physical Exam:     I&O:    Intake/Output Summary (Last 24 hours) at 2024 0848  Last data filed at 2024 0842  Gross per 24 hour   Intake 3190 ml   Output 2475 ml   Net 715 ml       Vital Signs:  BP 
    [facility] Progress Note    2024     Myles Meehan    MRN: [id number]    : 1950    Referring MD: Audi Robin MD  [facility] [place] [address]      SUBJECTIVE:  Patient is doing welI.  Bonilla chemotx well.  PICC placed RUE.  He is without complaints.  Liver enzymes rising, repeat liver U/S with dopplers today per GI.  Can likely go home over weekend.       ECOG PS:  (1) Restricted in physically strenuous activity, ambulatory and able to do work of light nature    KPS: 90% Able to carry on normal activity; minor signs or symptoms of disease    Isolation: None    Medications    Scheduled Meds:   ursodiol  250 mg Oral BID    Venetoclax  400 mg Oral Q24H    micafungin (MYCAMINE) 50 mg in sodium chloride 0.9 % 100 mL IVPB  50 mg IntraVENous Daily    levoFLOXacin  750 mg Oral Daily    enoxaparin  30 mg SubCUTAneous BID    ondansetron  8 mg Oral Q24H    decitabine (DACOGEN) 45 mg in sodium chloride 0.9 % 100 mL chemo IVPB  45 mg IntraVENous Q24H    furosemide  40 mg IntraVENous Q12H    sodium chloride flush  5-40 mL IntraVENous 2 times per day    Saline Mouthwash  15 mL Swish & Spit 4x Daily AC & HS    valACYclovir  500 mg Oral BID    allopurinol  300 mg Oral Daily    sodium chloride flush  5-40 mL IntraVENous 2 times per day    lisinopril  30 mg Oral Daily    vitamin B-12  1,000 mcg Oral Daily     Continuous Infusions:   sodium chloride      sodium chloride      sodium chloride       PRN Meds:.potassium chloride, prochlorperazine **OR** prochlorperazine, LORazepam **OR** LORazepam, triamcinolone, sodium chloride, sodium chloride flush, sodium chloride, magnesium sulfate, Saline Mouthwash, ALTEplase (CATHFLO) 2 mg in sterile water 2 mL injection, sodium chloride flush, sodium chloride, diphenhydrAMINE    ROS:  As noted above, otherwise remainder of 10-point ROS negative    Physical Exam:     I&O:    Intake/Output Summary (Last 24 hours) at 2024 0906  Last data filed at 2024 
    [facility] Progress Note    2024     Myles Meehan    MRN: [id number]    : 1950    Referring MD: Audi Robin MD  [facility] [place] [address]      SUBJECTIVE:  he is doing well. He is asking to go home. Discussed plan for cresemba and changed dose of venetoclax.       ECOG PS:  (1) Restricted in physically strenuous activity, ambulatory and able to do work of light nature    KPS: 90% Able to carry on normal activity; minor signs or symptoms of disease    Isolation: None    Medications    Scheduled Meds:   [START ON 10/1/2024] levoFLOXacin  500 mg Oral Daily    ursodiol  250 mg Oral BID    Venetoclax  400 mg Oral Q24H    micafungin (MYCAMINE) 50 mg in sodium chloride 0.9 % 100 mL IVPB  50 mg IntraVENous Daily    levoFLOXacin  750 mg Oral Daily    enoxaparin  30 mg SubCUTAneous BID    furosemide  40 mg IntraVENous Q12H    sodium chloride flush  5-40 mL IntraVENous 2 times per day    Saline Mouthwash  15 mL Swish & Spit 4x Daily AC & HS    valACYclovir  500 mg Oral BID    allopurinol  300 mg Oral Daily    sodium chloride flush  5-40 mL IntraVENous 2 times per day    lisinopril  30 mg Oral Daily    vitamin B-12  1,000 mcg Oral Daily     Continuous Infusions:   sodium chloride      sodium chloride      sodium chloride       PRN Meds:.diphenhydrAMINE, [COMPLETED] loperamide **FOLLOWED BY** loperamide, potassium chloride, prochlorperazine **OR** prochlorperazine, LORazepam **OR** LORazepam, triamcinolone, sodium chloride, sodium chloride flush, sodium chloride, magnesium sulfate, Saline Mouthwash, ALTEplase (CATHFLO) 2 mg in sterile water 2 mL injection, sodium chloride flush, sodium chloride    ROS:  As noted above, otherwise remainder of 10-point ROS negative    Physical Exam:     I&O:    Intake/Output Summary (Last 24 hours) at 2024 0913  Last data filed at 2024 0840  Gross per 24 hour   Intake 2282 ml   Output 2925 ml   Net -643 ml       Vital Signs:  /65   
  Physician Progress Note      PATIENT:               HERBERTH GARCIA  CSN #:                  [id number]  :                       1950  ADMIT DATE:       2024 9:09 AM  DISCH DATE:  RESPONDING  PROVIDER #:        JAYNE THOMPSON - CNP          QUERY TEXT:    Patient admitted for chemotherapy for AML and  noted to have a white count of   0.5, hgb 6.8 and plates of 112 . If possible, please document in progress   notes and discharge summary if you are evaluating and/or treating any of the   following:  The medical record reflects the following:    Risk Factors: newly diagnosis of AML, chemotherapy  Clinical Indicators: wbc 0.5, hgb-6.8, plates 112.  H&P--\" Given this new diagnosis of acute myeloid leukemia, he was directly   admitted from [facility] today for initiation of treatment with Dacogen and   Venetoclax.\"  Treatment: Dacogen, venetoclax, labs, essential home meds, blood transfusion,   supportive care    Thank you,  Linden Burris RN,BSB  Options provided:  -- Antineoplastic (chemotherapy) induced pancytopenia  -- Pancytopenia due to AML  -- Pancytopenia due to both chemotherapy and AML  -- Other - I will add my own diagnosis  -- Disagree - Not applicable / Not valid  -- Disagree - Clinically unable to determine / Unknown  -- Refer to Clinical Documentation Reviewer    PROVIDER RESPONSE TEXT:    Pancytopenia due to both chemotherapy and AML    Query created by: Linden Burris on 2024 10:25 AM      Electronically signed by:  JANYE THOMPSON - CNP 2024 11:58 AM          
4 Eyes Skin Assessment     NAME:  Myles Meehan  YOB: 1950  MEDICAL RECORD NUMBER:  [id number]    The patient is being assessed for  Admission    I agree that at least one RN has performed a thorough Head to Toe Skin Assessment on the patient. ALL assessment sites listed below have been assessed.      Areas assessed by both nurses:    Head, Face, Ears, Shoulders, Back, Chest, Arms, Elbows, Hands, Sacrum. Buttock, Coccyx, Ischium, and Legs. Feet and Heels        Does the Patient have a Wound? No noted wound(s)       Tommy Prevention initiated by RN: No  Wound Care Orders initiated by RN: No    Pressure Injury (Stage 3,4, Unstageable, DTI, NWPT, and Complex wounds) if present, place Wound referral order by RN under : No    New Ostomies, if present place, Ostomy referral order under : No     Nurse 1 eSignature: Electronically signed by Loren Trivedi RN on 9/22/24 at 10:55 AM EDT    **SHARE this note so that the co-signing nurse can place an eSignature**    Nurse 2 eSignature: Electronically signed by Emmy Mclaughlin RN on 9/22/24 at 11:10 AM EDT  
Administration: Chemotherapy drug Dacogen independently verified with Selvin Taylor RN prior to administration.  Original order, appropriateness of regimen, drug supplied, height, weight, BSA, dose calculations, expiration dates/times, drug appearance, and two patient identifiers were verified by both RNs.  Drug checked for vesicant/irritant status and for risk of hypersensitivity.  Most recent laboratory values and allergies, were reviewed.  Appropriate IV access available: Positive, brisk blood return via CVC was confirmed prior to administration. Chest x-ray for correct line placement reviewed  Rosetta Seals RN and Selvin Taylor RN verified correct rate of chemotherapy and maintenance IV fluids.  Patient was educated on chemotherapy regimen prior to administration including indication for treatment related to disease & side effects of chemotherapy drug.  Patient verbalizes understanding of all instructions.    Completion of Chemotherapy: Monitoring during infusion done per policy, see Flowsheets.  Blood return verified before, during, and after infusion per policy; no signs of extravasation.  Pt tolerating chemotherapy well and without incident.  Chemotherapy infusion end time on the MAR.  Will continue to monitor.   
Administration: Chemotherapy drug dacogen independently verified with Angeles Terrell RN prior to administration.  Acknowledgement of informed consent for chemotherapy administration verified.  Original order, appropriateness of regimen, drug supplied, height, weight, BSA, dose calculations, expiration dates/times, drug appearance, and two patient identifiers were verified by both RNs.  Drug checked for vesicant/irritant status and for risk of hypersensitivity.  Most recent laboratory values and allergies, were reviewed.  Positive, brisk blood return via CVC was confirmed prior to administration. Chest x-ray for correct line placement reviewed. Elizabeth Martines RN and Angeles Terrell RN verified correct rate of chemotherapy and maintenance IV fluids.  Patient was educated on chemotherapy regimen prior to administration including indication for treatment related to disease & side effects of chemotherapy drug.  Patient verbalizes understanding of all instructions.    Completion of Chemotherapy: Monitoring during infusion done per policy, see Flowsheets.  Blood return verified before, during, and after infusion per policy; no signs of extravasation.  Pt tolerating chemotherapy well and without incident.  Chemotherapy infusion end time on the MAR.  Will continue to monitor.    
Administration: Chemotherapy drug dacogen independently verified with Selvni Taylor RN prior to administration.  Acknowledgement of informed consent for chemotherapy administration verified.  Original order, appropriateness of regimen, drug supplied, height, weight, BSA, dose calculations, expiration dates/times, drug appearance, and two patient identifiers were verified by both RNs.  Drug checked for vesicant/irritant status and for risk of hypersensitivity.  Most recent laboratory values and allergies, were reviewed.  Positive, brisk blood return via CVC was confirmed prior to administration. Chest x-ray for correct line placement reviewed. Elizabeth Martines RN and Selvin Taylor RN verified correct rate of chemotherapy and maintenance IV fluids.  Patient was educated on chemotherapy regimen prior to administration including indication for treatment related to disease & side effects of chemotherapy drug.  Patient verbalizes understanding of all instructions.    Completion of Chemotherapy: Monitoring during infusion done per policy, see Flowsheets.  Blood return verified before, during, and after infusion per policy; no signs of extravasation.  Pt tolerating chemotherapy well and without incident.  Chemotherapy infusion end time on the MAR.  Will continue to monitor.    
Central line dressing changed using sterile technique following hospital policy. (Candy Bashir, RN, observed with procedure to ensure proper technique.   
Central line dressing changed using sterile technique following hospital policy. (Flor Frederick), PHOEBE, observed with procedure to ensure proper technique.  
Completion of Chemotherapy: Monitoring during infusion done per policy, see Flowsheets.  Blood return verified before, during, and after infusion per policy; no signs of extravasation.  Pt tolerated chemotherapy well and without incident.  Chemotherapy infusion end time on the MAR.  Will continue to monitor.   
Completion of Chemotherapy: Monitoring during infusion done per policy, see Flowsheets.  Blood return verified before, during, and after infusion per policy; no signs of extravasation.  Pt tolerated chemotherapy well and without incident.  Chemotherapy infusion end time on the MAR.  Will continue to monitor.   
LFTs again decreased today.  Will follow peripherally only.  Please call with new issues or questions.  
Notified [employer] BLAIR that pts venetoclax had been picked up and brought to unit for pt and that medication just needs to be ordered. See new orders.   
Occupational Therapy/Physical Therapy  Orders received, chart reviewed. Per RN report, pt is up ad kishan. Spoke briefly with pt and wife and they deny any concerns or equipment needs. Pt has no acute skilled OT needs, will sign off. Please reorder if pt has a change in status.    Britni David, OTR/L  Alexandria Johnson, PT, DPT  [phone]   
Original chemotherapy orders reviewed and acknowledged. Appropriateness of chemotherapy treatment regimen Dacogen and Venetoclax for diagnosis of AML was verified.  Patient educated on chemotherapy regimen.  Acknowledgement of informed consent for chemotherapy obtained.  Pt height, weight, and BSA verified.  Appropriate dosing calculations of chemotherapy based on height, weight, and BSA verified.      Administration: Chemotherapy drug Dacogen independently verified with Angeles Terrell RN prior to administration.  Original order, appropriateness of regimen, drug supplied, height, weight, BSA, dose calculations, expiration dates/times, drug appearance, and two patient identifiers were verified by both RNs.  Drug checked for vesicant/irritant status and for risk of hypersensitivity.  Most recent laboratory values and allergies, were reviewed.  Appropriate IV access available: Positive, brisk blood return via CVC was confirmed prior to administration. Chest x-ray for correct line placement reviewed  Rosetta Seals RN and Angeles Terrell RN verified correct rate of chemotherapy and maintenance IV fluids.  Patient was educated on chemotherapy regimen prior to administration including indication for treatment related to disease & side effects of chemotherapy drug.  Patient verbalizes understanding of all instructions.    Completion of Chemotherapy: Monitoring during infusion done per policy, see Flowsheets.  Blood return verified before, during, and after infusion per policy; no signs of extravasation.  Pt tolerating chemotherapy well and without incident.  Chemotherapy infusion end time on the MAR.  Will continue to monitor.   
Original chemotherapy orders reviewed and acknowledged. Appropriateness of chemotherapy treatment regimen dacogen and venetoclax for diagnosis of AML was verified.  Patient educated on chemotherapy regimen.  Acknowledgement of informed consent for chemotherapy verified.  Pt height, weight, and BSA verified.  Appropriate dosing calculations of chemotherapy based on height, weight, and BSA verified.      Administration: Chemotherapy drug dacogen independently verified with PHOEBE Reynoso prior to administration.  Original order, appropriateness of regimen, drug supplied, height, weight, BSA, dose calculations, expiration dates/times, drug appearance, and two patient identifiers were verified by both RNs.  Drug checked for vesicant/irritant status and for risk of hypersensitivity.  Most recent laboratory values and allergies, were reviewed.  Appropriate IV access available: Positive, brisk blood return via CVC was confirmed prior to administration. Chest x-ray for correct line placement reviewed  Leola Wyman RN and Angeles Terrell RN verified correct rate of chemotherapy and maintenance IV fluids.  Patient was educated on chemotherapy regimen prior to administration including indication for treatment related to disease & side effects of chemotherapy drug.  Patient verbalizes understanding of all instructions.    Completion of Chemotherapy: Monitoring during infusion done per policy, see Flowsheets.  Blood return verified before, during, and after infusion per policy; no signs of extravasation.  Pt tolerated chemotherapy well and without incident.  Chemotherapy infusion end time on the MAR.  Will continue to monitor.   
Patient currently on admission day  6  and presenting with  3  loose stools in the past 24 hours.  Currently  no  laxatives or stool softeners used in the last 24 hours.  Cdiff sample collected and walked to lab at this time.     
Pt's hands and feet itchy. Given PRN benedryl, lotion, kenalog cream, and cold packs to soothe. Resolved by vitals at 1130.       
RN notified Dr. Barbosa of BLE edema. Since pt is not on continuous IV fluids, no new orders given at this time. Pt educated to elevate feet when sitting and ambulate as much as able.   
Spiritual Health History and Assessment/Progress Note  [facility]    (P) Attempted Encounter,  ,  ,      Name: Myles Meehan MRN: [id number]    Age: 74 y.o.     Sex: male   Language: English   Rastafarian: None   Acute leukemia of unspecified cell type not having achieved remission (HCC)     Date: 9/30/2024            Total Time Calculated: (P) 2 min              Spiritual Assessment began in [address]            Encounter Overview/Reason: (P) Attempted Encounter  Service Provided For: (P) Patient, Family    Avril, Belief, Meaning:   Patient unable to assess at this time  Family/Friends       Importance and Influence:  Patient unable to assess at this time  Family/Friends     Community:  Patient   Family/Friends     Assessment and Plan of Care:     Patient Interventions include:   Family/Friends Interventions include:     Patient Plan of Care: Spiritual Care available upon further referral  Family/Friends Plan of Care: Spiritual Care available upon further referral    Electronically signed by Chaplain Trudy on 9/30/2024 at 10:34 AM    
Yary León reviewed discharge instructions, future appointments, medications, and AVS with patient. Pt was discharged with R double lumen PICC line to home with spouse. Ambulatory and stable at discharge. Medications sent home from lockbox. All questions answered at time of discharge.     
      Vital Signs:  /71   Pulse 66   Temp 97.9 °F (36.6 °C) (Oral)   Resp 18   Ht 1.753 m (5' 9\")   Wt 111.3 kg (245 lb 6.4 oz)   SpO2 93%   BMI 36.24 kg/m²     Weight:    Wt Readings from Last 3 Encounters:   09/25/24 111.3 kg (245 lb 6.4 oz)   09/18/24 108.3 kg (238 lb 12.1 oz)   04/16/19 113.4 kg (250 lb)         General: Awake, alert and oriented.  HEENT: normocephalic, PERRL, no scleral erythema or icterus, Oral mucosa moist and intact, throat clear  NECK: supple  BACK: Straight   SKIN: warm dry and intact without lesions rashes or masses  CHEST: CTA bilaterally without use of accessory muscles  CV: Normal S1 S2, RRR, no MRG  ABD: NT ND normoactive BS  EXTREMITIES: without edema, denies calf tenderness  NEURO: CN II - XII grossly intact  CATHETER: PICC Upper RUE    Data    CBC:   Recent Labs     09/24/24  0410 09/25/24  0350 09/26/24  0406   WBC 0.5* 1.3* 0.9*   HGB 6.8* 7.7* 7.9*   HCT 19.9* 22.5* 22.9*   MCV 98.8 94.8 94.8   * 120* 105*     BMP/Mag:  Recent Labs     09/24/24  0410 09/25/24  0350 09/26/24  0406    137 140   K 3.8 3.9 3.6   * 103 103   CO2 20* 23 25   PHOS 3.4 4.1 4.5   BUN 13 19 20   CREATININE 0.6* 0.8 0.9   MG 1.90 2.10 2.00     LIVP:   Recent Labs     09/24/24  0410 09/25/24  0350 09/26/24  0406   * 264* 329*   * 509* 604*   BILIDIR 0.6* 0.5* 0.8*   BILITOT 1.1* 1.0 1.5*   ALKPHOS 356* 377* 384*     Coags:   Recent Labs     09/26/24  0406   PROTIME 14.8   INR 1.14   APTT 31.4     Uric Acid No results for input(s): \"LABURIC\" in the last 72 hours.    PROBLEM LIST:            Acute Myeloid Leukemia (Dx 9/20/24)  HTN  HLD      TREATMENT:              AML:  Dacogen and Venetoclax (C1D1 9/23/24)      ASSESSMENT AND PLAN:           1. Acute Myeloid Leukemia:   Bmbx and Aspiration 9/20/24: 59.3% blast on flow cytometry with immunophenotyping positive for CD 34, HLA-DR, , CD11, CD13, CD 14.   FISH: Pending  CG: Pending  NGS: Pending      PLAN:  
%   PO Supplement Intake:None Ordered  Additional Sources of Calories/IVF:NA     COMPARATIVE STANDARDS  Energy (kcal):  0599-4656 (16-18 kcal/kg CBW)     Protein (g):   (1.2-1.4 g/kg CBW)       Fluid (ml/day):  1 ml/kcal    ANTHROPOMETRICS  Current Height: 175.3 cm (5' 9\")  Current Weight - Scale: 111.3 kg (245 lb 6 oz)    Admission weight: 108 kg (238 lb)    The patient will be monitored per nutrition standards of care. Consult dietitian if additional nutrition interventions are needed prior to RD reassessment.     Eunice Penny RD  [place]:  [phone]      
(250 lb)         General: Awake, alert and oriented.  HEENT: normocephalic, PERRL, no scleral erythema or icterus, Oral mucosa moist and intact, throat clear  NECK: supple  BACK: Straight   SKIN: warm dry and intact without lesions rashes or masses  CHEST: CTA bilaterally without use of accessory muscles  CV: Normal S1 S2, RRR, no MRG  ABD: NT ND normoactive BS  EXTREMITIES: without edema, denies calf tenderness  NEURO: CN II - XII grossly intact  CATHETER: PIV    Data    CBC:   Recent Labs     09/20/24  1222 09/22/24  1605 09/23/24  0334   WBC  --  1.0* 1.0*   HGB 7.3* 7.6* 6.7*   HCT 21.3* 22.2* 19.6*   MCV  --  102.7* 102.4*   PLT  --  145 135     BMP/Mag:  Recent Labs     09/22/24  1605 09/23/24  0334    136   K 4.0 3.5    104   CO2 22 20*   PHOS 3.0 3.1   BUN 18 14   CREATININE 0.7* 0.7*   MG 2.00 2.00     LIVP:   Recent Labs     09/22/24  1605 09/23/24  0334   * 241*   * 412*   BILIDIR 0.4* 0.4*   BILITOT 1.0 1.0   ALKPHOS 404* 360*     Coags:   Recent Labs     09/22/24  1605 09/23/24  0334   PROTIME 15.9* 16.1*   INR 1.26* 1.28*   APTT 29.5 32.0     Uric Acid No results for input(s): \"LABURIC\" in the last 72 hours.    PROBLEM LIST:            Acute Myeloid Leukemia (Dx 9/20/24)  HTN  HLD      TREATMENT:              AML:  Dacogen and Venetoclax (C1D1 9/23/24)      ASSESSMENT AND PLAN:           1. Acute Myeloid Leukemia:   Bmbx and Aspiration 9/20/24: 59.3% blast on flow cytometry with immunophenotyping positive for CD 34, HLA-DR, , CD11, CD13, CD 14.   FISH: Pending  CG: Pending  NGS: Pending      PLAN:       Dacogen 20 mg/m2 IV for 5 days and Venetoclax for 21 days with post induction BMBx around D24-D28. Plan to start induction therapy on 9/23/24. Risks and benefits of treatment were explained to pt and he agreed to proceed with it.   (Prescription for Venetoclax sent 9/22/24 to [facility] pharmacy)     2. ID:   Patient is currently afebrile without evidence of infection.   -  
(9/23/24) DAY 6  Dacogen 20 mg/m2 IV for 5 days and Venetoclax for 21 days with post induction BMBx around D24-D28. Started induction therapy on 9/23/24.   -Risks and benefits of treatment were explained to pt and he agreed to proceed with it.   (Prescription for Venetoclax sent 9/22/24 to [facility] pharmacy - started 9/24/24)     2. ID:   - Continue Levaquin 750 mgx 7 days for wound cx on leg + Pseudomonas  -  Diflucan, Shelly ppx      3. Heme: Pancytopenia r/t AML  - Transfuse for Hgb < 7 and Platelets < 10 K.   - No transfusion today      4. Metabolic:  Electrolytes are WNL and renal fxn stable.    TLS: at risk  - allopurinol ppx    - Follow TLS labs daily   - Replace K & Mg per PRN orders     5. GI / Nutrition:    Elevated ALT/AST  - Liver US 9/19/24 and 9/27/24:  Cholelithiasis without evidence for cholecystitis  - Cont ursodiol  -H/O ETOH  - Normal LFTs in May 2024 per GI   -Re-consulted GI 9/26/24:   Suspect drug induced injury on top of previous ETOH use   Nutrition:    - Start low microbial diet  - Dietician to follow closely      6. Cardiac:   -ECHO 9/23/24:  EF 55-60%  HTN:  - Continue lisinopril 30 mg daily     7. Pulmonary: No active issues  - Encourage IS & ambulation  - Cont supplemental O2 to maintain SpO2 >92% - currently on room air     8. MSK: Acute debilitation 2/2 acute leukemia  - Consult PT/OT  - OOB for meals     9. IV Access:  - PICC placed 9/23/24        - DVT Prophylaxis: Platelets >50,000 cells/dL - OK for prophylactic lovenox SCDs while in bed in place.  Contraindications to pharmacologic prophylaxis: none   Contraindications to mechanical prophylaxis: None        - Disposition: Upon completion of chemotherapy.      The patient was seen and examined by Dr. Jonathon Del Angel, APRN - CNP           
some errors in translation may have occurred.

## 2024-09-30 NOTE — DISCHARGE INSTRUCTIONS
[facility] Discharge Instructions    Call for Questions/Concerns:  [phone] [facility] office  The phone number listed above is available 24 hrs/7 days per week  [facility] is open M-F 8am-4:30pm; Sat-Sun/Holidays 8am-1pm    Symptoms to Report Immediately:    Fever of 100.5 or greater  Vomiting without relief after use of anti-nausea medication  Severe abdominal cramping  Diarrhea: More than 3 loose, watery bowel movements in a 24 hour period  Unusual or excessive bleeding from your mouth, nose, rectum, bladder or vagina  Sudden onset of shortness of breath or chest pain  Signs/symptoms of infection: redness, warmth, swelling-particularly to central line site    Report to Physician's office within 24 hours:    Pain not relieved by pain medication  Change in urination-odor, cloudiness, frequency, or pain with urination  Flu-like symptoms  Skin changes-rash, hives, redness or peeling of skin    Additional Instructions:    Avoid people with colds, flu-like symptoms, or any sign of infection  Drink plenty of fluids-attempt to consume 2-3 liters ( ounces) of fluids/24 hour period  Continue low microbial diet until instructed by physician to resume normal diet  Bring all of your medications with you to your doctor's appointments  Bring your current medicine list to each hospital and office visit    You are being discharged with IV access due to need for ongoing therapy.  Below is pertinent information regarding your IV that your next provider may need to know:  Type:  Right PICC                           Plan:continue   Next dressing change due on: 10/7  Cap changes due on: 10/4  CVC care and maintenance was reviewed with patient and spouse.  Pt verbalizes understanding of line care and maintenance.      9/30/2024 10:16 AM  Yary León            My Discharge Checklist    Here at the [facility], we want to make sure you have the help you will need once you leave the hospital.

## 2024-09-30 NOTE — DISCHARGE SUMMARY
[facility] Discharge Summary             Attending Physician: No att. providers found    Referring MD: Audi Robin MD  [address] [place] [address]    Name: Myles Meehan :  1950  MRN:  [id number]    Admission: 2024   Discharge: 2024      Date: 2024    Diagnosis on admit: AML    Consultations:   GI    Medications:      Medication List        START taking these medications      isavuconazonium Sulfate 186 MG capsule  Commonly known as: Cresemba  Take 2 capsules by mouth daily  Notes to patient: Helps prevent fungal infections.     ursodiol 250 MG tablet  Commonly known as: ACTIGALL  Take 1 tablet by mouth 2 times daily  Notes to patient: Bile Agent:  This medication is used during treatment to help the liver.     valACYclovir 500 MG tablet  Commonly known as: VALTREX  Take 1 tablet by mouth 2 times daily  Notes to patient: This medication is an anti-viral drug.  Used to help prevent viral infections when your going through treatment or a stem cell transplant    Side Effects;  headache, nausea, vomiting, dizziness             CHANGE how you take these medications      levoFLOXacin 500 MG tablet  Commonly known as: LEVAQUIN  Take 1 tablet by mouth daily  Start taking on: 2024  What changed:   medication strength  how much to take  additional instructions  Notes to patient: Levofloxacin  (Levaquin)  USE--  Treat bacterial infection  SIDE EFFECTS--  Upset stomach, diarrhea     Venetoclax 100 MG Tabs  Take 4 tablets (400 mg) by mouth daily until fungal coverage (Cresemba) is started, then decrease dose to 2 tablets (200 mg) by mouth daily  What changed:   how much to take  how to take this  when to take this  additional instructions  Another medication with the same name was removed. Continue taking this medication, and follow the directions you see here.  Notes to patient: Part of your chemotherapy regimen for AML.  Take this medication as directed by your

## 2024-09-30 NOTE — CARE COORDINATION
Case Management Assessment            Discharge Note                    Date / Time of Note: 9/30/2024 12:12 PM                  Discharge Note Completed by: MARC Saldaña, LSW    Patient Name: Myles Meehan   YOB: 1950  Diagnosis: Acute leukemia of unspecified cell type not having achieved remission (HCC) [C95.00]  Acute leukemia not having achieved remission (HCC) [C95.00]   Date / Time: 9/22/2024  9:09 AM    Current PCP: Magaly Womack MD  Clinic patient: No    Hospitalization in the last 30 days: Yes  Readmission Assessment  Number of Days since last admission?: 1-7 days  Previous Disposition: Home with Family  Who is being Interviewed: Patient  What was the patient's/caregiver's perception as to why they think they needed to return back to the hospital?: Other (Comment) ([facility] directly admitted him for new cancer diagnosis)  Did you visit your Primary Care Physician after you left the hospital, before you returned this time?: Yes  Did you see a specialist, such as Cardiac, Pulmonary, Orthopedic Physician, etc. after you left the hospital?: Yes  Who advised the patient to return to the hospital?: Physician  Does the patient report anything that got in the way of taking their medications?: No  In our efforts to provide the best possible care to you and others like you, can you think of anything that we could have done to help you after you left the hospital the first time, so that you might not have needed to return so soon?: Other (Comment) (nothing additional was needed per pt)    Advance Directives:  Code Status: Full Code  [place] DNR form completed and on chart: No    Financial:  Payor: [employer]VANTAGE / Plan: [employer] PREFERRED PPO / Product Type: *No Product type* /      Pharmacy:    [id number] - [place], [place] - [address] - P [phone] - F [phone]  [address]  [facility] [address]  Phone: 
Attended MD rounds.     Pt will return home with his wife when able and no anticipated SW needs.     SW will follow    DARSHAN Delgadillo   for [facility] ([facility])  [employer]: [phone]      
Reviewed discharge instructions with patient and  spouse.  Provided to pateint in writing. Reviewed discharge medications including dosing, schedule, indication, and adverse reactions.  Reviewed which medications were already taken today and next dosage due for each medication.      Reviewed signs and symptoms that prompt a call to the physician and appropriate phone numbers. Purple ER card given to the patient with explanations of its use.  Reviewed follow up appointments that have been made in [facility] and Outpatient Oncology.  Low microbial diet, activity restrictions, and increased risk of infection were reviewed.     Patient is being discharged with IV access d/t need for ongoing therapy:      Type:  Right PICC                          Plan:continue   Next dressing change due on: 10/7  Cap changes due on: 10/4  CVC care and maintenance was reviewed with patient and spouse.  Pt verbalizes understanding of line care and maintenance.      Patient verbalized understanding of all instructions and questions were answered to his. satisfaction.  Signed discharge instructions were given to the patient and a copy placed in the paper-lite chart.  Patient discharged to home per self with spouse.    Venetoclax returned to patient upon discharge.     Zachary HUFFMAN in Process, this has been denied will file and appeal.  This has a high copay as well $350 provider will be update.         Yary León  
Spoke with pt and his wife at bedside this AM. They stated he may leave this weekend. Both denied needs upon discharge.     IMM was given.     Pt will return home with his wife when able with no anticipated SW needs.     SW will follow    DARSHAN Delgadillo   for [facility] ([facility])  [email or web address]: [phone]      
[C95.00]    Tessy TRAN, DARSHAN   for [facility] ([facility])  [employer]: [phone]

## 2024-09-30 NOTE — PLAN OF CARE
Problem: ABCDS Injury Assessment  Goal: Absence of physical injury  9/23/2024 0856 by Rosetat Seals RN  Outcome: Progressing     Problem: Infection - Adult  Goal: Absence of infection at discharge  9/23/2024 0856 by Rosetta Seals RN  Outcome: Progressing     Problem: Infection - Adult  Goal: Absence of infection during hospitalization  9/23/2024 0856 by Rosetta Seals RN  Outcome: Progressing     Problem: Infection - Adult  Goal: Absence of fever/infection during anticipated neutropenic period  9/23/2024 0856 by Rosetat Seals RN  Outcome: Progressing     
  Problem: ABCDS Injury Assessment  Goal: Absence of physical injury  9/24/2024 1947 by Elizabeth Martines RN  Outcome: Progressing  Flowsheets (Taken 9/24/2024 1947)  Absence of Physical Injury: Implement safety measures based on patient assessment     Problem: Infection - Adult  Goal: Absence of infection at discharge  9/24/2024 1947 by Eilzabeth Martines RN  Outcome: Progressing  Flowsheets (Taken 9/24/2024 1947)  Absence of infection at discharge: Assess and monitor for signs and symptoms of infection     Problem: Metabolic/Fluid and Electrolytes - Adult  Goal: Electrolytes maintained within normal limits  9/24/2024 1947 by Elizabeth Martines RN  Outcome: Progressing  Flowsheets (Taken 9/24/2024 1947)  Electrolytes maintained within normal limits: Monitor labs and assess patient for signs and symptoms of electrolyte imbalances     Problem: Hematologic - Adult  Goal: Maintains hematologic stability  9/24/2024 1947 by Elizabeth Martines RN  Outcome: Progressing  Flowsheets (Taken 9/24/2024 1947)  Maintains hematologic stability: Assess for signs and symptoms of bleeding or hemorrhage     
  Problem: ABCDS Injury Assessment  Goal: Absence of physical injury  9/26/2024 2020 by Elizabeth Martines RN  Outcome: Progressing  Flowsheets (Taken 9/26/2024 2020)  Absence of Physical Injury: Implement safety measures based on patient assessment     Problem: Infection - Adult  Goal: Absence of infection at discharge  9/26/2024 2020 by Elizabeth Martines RN  Outcome: Progressing  Flowsheets (Taken 9/26/2024 2020)  Absence of infection at discharge: Assess and monitor for signs and symptoms of infection     Problem: Infection - Adult  Goal: Absence of infection during hospitalization  Outcome: Progressing     Problem: Infection - Adult  Goal: Absence of fever/infection during anticipated neutropenic period  Outcome: Progressing     Problem: Metabolic/Fluid and Electrolytes - Adult  Goal: Electrolytes maintained within normal limits  9/26/2024 2020 by Elizabeth Martines RN  Outcome: Progressing     Problem: Hematologic - Adult  Goal: Maintains hematologic stability  9/26/2024 2020 by Elizabeth Martines RN  Outcome: Progressing     Problem: Nutrition Deficit:  Goal: Optimize nutritional status  Outcome: Progressing  Flowsheets (Taken 9/26/2024 2020)  Nutrient intake appropriate for improving, restoring, or maintaining nutritional needs: Assess nutritional status and recommend course of action     Problem: Pain  Goal: Verbalizes/displays adequate comfort level or baseline comfort level  9/26/2024 2020 by Elizabeth Martines RN  Outcome: Progressing  Flowsheets (Taken 9/26/2024 2020)  Verbalizes/displays adequate comfort level or baseline comfort level: Encourage patient to monitor pain and request assistance     
  Problem: ABCDS Injury Assessment  Goal: Absence of physical injury  9/27/2024 0903 by Elizabeth Martines RN  Outcome: Progressing  Flowsheets (Taken 9/27/2024 0903)  Absence of Physical Injury: Implement safety measures based on patient assessment     Problem: Infection - Adult  Goal: Absence of infection at discharge  9/27/2024 0903 by Elizabeth Martines RN  Outcome: Progressing  Flowsheets (Taken 9/27/2024 0903)  Absence of infection at discharge: Assess and monitor for signs and symptoms of infection     Problem: Infection - Adult  Goal: Absence of infection during hospitalization  9/27/2024 0903 by Elizabeth Martines RN  Outcome: Progressing     Problem: Infection - Adult  Goal: Absence of fever/infection during anticipated neutropenic period  9/27/2024 0903 by Elizabeth Martines RN  Outcome: Progressing     Problem: Metabolic/Fluid and Electrolytes - Adult  Goal: Electrolytes maintained within normal limits  9/27/2024 0903 by Elizabeth Martines RN  Outcome: Progressing  Flowsheets (Taken 9/27/2024 0903)  Electrolytes maintained within normal limits: Monitor labs and assess patient for signs and symptoms of electrolyte imbalances     Problem: Hematologic - Adult  Goal: Maintains hematologic stability  9/27/2024 0903 by Elizabeth Martines RN  Outcome: Progressing  Flowsheets (Taken 9/27/2024 0903)  Maintains hematologic stability: Assess for signs and symptoms of bleeding or hemorrhage     Problem: Nutrition Deficit:  Goal: Optimize nutritional status  9/27/2024 0903 by Elizabeth Martines RN  Outcome: Progressing  Flowsheets (Taken 9/27/2024 0903)  Nutrient intake appropriate for improving, restoring, or maintaining nutritional needs: Assess nutritional status and recommend course of action     Problem: Pain  Goal: Verbalizes/displays adequate comfort level or baseline comfort level  9/27/2024 0903 by Elizabeth Martines RN  Outcome: Progressing  Flowsheets (Taken 9/27/2024 0903)  Verbalizes/displays adequate comfort level or 
  Problem: ABCDS Injury Assessment  Goal: Absence of physical injury  9/27/2024 1626 by Rosetta Seals RN  Outcome: Progressing     Problem: Infection - Adult  Goal: Absence of infection at discharge  9/27/2024 1626 by Rosetta Seals RN  Outcome: Progressing     Problem: Infection - Adult  Goal: Absence of infection during hospitalization  9/27/2024 1626 by Rosetta Seals RN  Outcome: Progressing     Problem: Metabolic/Fluid and Electrolytes - Adult  Goal: Electrolytes maintained within normal limits  9/27/2024 1626 by Rosetta Seals RN  Outcome: Progressing     
  Problem: ABCDS Injury Assessment  Goal: Absence of physical injury  9/29/2024 0338 by Jaqueline Arreguin, RN  Outcome: Progressing  Flowsheets (Taken 9/29/2024 0338)  Absence of Physical Injury: Implement safety measures based on patient assessment  Note: Orthostatic vital signs obtained at start of shift - see flowsheet for details.  Pt does not meet criteria for orthostasis.  Pt is a Low fall risk. See Baker Fall Score and ABCDS Injury Risk assessments.   Pt bed is in low position, side rails up, call light and belongings are in reach.  Fall risk light is on outside pts room.  Pt encouraged to call for assistance as needed. Will continue with hourly rounds for PO intake, pain needs, toileting and repositioning as needed.        Problem: Nutrition Deficit:  Goal: Optimize nutritional status  Flowsheets (Taken 9/29/2024 0338)  Nutrient intake appropriate for improving, restoring, or maintaining nutritional needs:   Assess nutritional status and recommend course of action   Monitor oral intake, labs, and treatment plans   Recommend appropriate diets, oral nutritional supplements, and vitamin/mineral supplements   Order, calculate, and assess calorie counts as needed   Recommend, monitor, and adjust tube feedings and TPN/PPN based on assessed needs   Provide specific nutrition education to patient or family as appropriate  Note: Patient Myles will maintain or improve their current nutritional habits as evidenced by maintaining intake within their regular pattern during the inpatient stay.     
  Problem: ABCDS Injury Assessment  Goal: Absence of physical injury  9/29/2024 1455 by Leola Wyman, RN  Outcome: Progressing  Note: No new observed or reported physical injuries this shift.      Problem: Infection - Adult  Goal: Absence of infection during hospitalization  Outcome: Progressing  Note: CVC site remains free of signs/symptoms of infection. No drainage, edema, erythema, pain, or warmth noted at site. Dressing changes continue per protocol and on an as needed basis - see flowsheet.     Compliant with [facility] Bath Protocol:  Performed CHG bath today per [facility] protocol utilizing CHG solution in the shower.  CVC site cleansed with CHG wipe over dressing, skin surrounding dressing, and first 6\" of IV tubing.  Pt tolerated well.  Continued to encourage daily CHG bathing per [facility] protocol.     Problem: Metabolic/Fluid and Electrolytes - Adult  Goal: Electrolytes maintained within normal limits  Outcome: Progressing  Note: No electrolyte replacements needed this shift.      Problem: Hematologic - Adult  Goal: Maintains hematologic stability  Outcome: Progressing  Note: Patient's hemoglobin this AM:   Recent Labs     09/29/24  0402   HGB 7.3*     Patient's platelet count this AM:   Recent Labs     09/29/24  0402   PLT 77*    Thrombocytopenia Precautions in place.  Patient showing no signs or symptoms of active bleeding.  Transfusion not indicated at this time.  Patient verbalizes understanding of all instructions. Will continue to assess and implement POC. Call light within reach and hourly rounding in place.      
  Problem: ABCDS Injury Assessment  Goal: Absence of physical injury  Outcome: Progressing  Flowsheets (Taken 9/24/2024 0616)  Absence of Physical Injury: Implement safety measures based on patient assessment     Problem: Infection - Adult  Goal: Absence of infection at discharge  Outcome: Progressing  Flowsheets (Taken 9/24/2024 0616)  Absence of infection at discharge:   Assess and monitor for signs and symptoms of infection   Monitor lab/diagnostic results   Monitor all insertion sites i.e., indwelling lines, tubes and drains   Administer medications as ordered   Instruct and encourage patient and family to use good hand hygiene technique     Problem: Infection - Adult  Goal: Absence of infection during hospitalization  Outcome: Progressing  Flowsheets (Taken 9/24/2024 0616)  Absence of infection during hospitalization:   Assess and monitor for signs and symptoms of infection   Monitor lab/diagnostic results   Monitor all insertion sites i.e., indwelling lines, tubes and drains   Administer medications as ordered   Instruct and encourage patient and family to use good hand hygiene technique     Problem: Infection - Adult  Goal: Absence of fever/infection during anticipated neutropenic period  Outcome: Progressing  Flowsheets (Taken 9/24/2024 0616)  Absence of fever/infection during anticipated neutropenic period:   Monitor white blood cell count   Administer growth factors as ordered   Implement neutropenic guidelines     Problem: Metabolic/Fluid and Electrolytes - Adult  Goal: Electrolytes maintained within normal limits  Outcome: Progressing  Flowsheets (Taken 9/24/2024 0616)  Electrolytes maintained within normal limits:   Monitor labs and assess patient for signs and symptoms of electrolyte imbalances   Administer electrolyte replacement as ordered   Monitor response to electrolyte replacements, including repeat lab results as appropriate     Problem: Hematologic - Adult  Goal: Maintains hematologic 
  Problem: ABCDS Injury Assessment  Goal: Absence of physical injury  Outcome: Progressing  Note: No new observed or reported physical injuries this shift.      Problem: Infection - Adult  Goal: Absence of infection during hospitalization  Outcome: Progressing  Note: CVC site remains free of signs/symptoms of infection. No drainage, edema, erythema, pain, or warmth noted at site. Dressing changes continue per protocol and on an as needed basis - see flowsheet.     Compliant with [facility] Bath Protocol:  Performed CHG bath today per [facility] protocol utilizing CHG solution in the shower.  CVC site cleansed with CHG wipe over dressing, skin surrounding dressing, and first 6\" of IV tubing.  Pt tolerated well.  Continued to encourage daily CHG bathing per [facility] protocol.    Problem: Hematologic - Adult  Goal: Maintains hematologic stability  Outcome: Progressing  Note: Patient's hemoglobin this AM:   Recent Labs     09/28/24  0338   HGB 7.3*     Patient's platelet count this AM:   Recent Labs     09/28/24  0338   PLT 85*    Thrombocytopenia Precautions in place.  Patient showing no signs or symptoms of active bleeding.  Transfusion not indicated at this time.  Patient verbalizes understanding of all instructions. Will continue to assess and implement POC. Call light within reach and hourly rounding in place.      Problem: Pain  Goal: Verbalizes/displays adequate comfort level or baseline comfort level  Outcome: Progressing  Note: Pt did not report pain this shift. Pt educated on importance of calling for pain meds when in pain. Pt verbalized understanding.     
  Problem: ABCDS Injury Assessment  Goal: Absence of physical injury  Outcome: Progressing  Note: No new or observed physical injuries this shift.      Problem: Metabolic/Fluid and Electrolytes - Adult  Goal: Electrolytes maintained within normal limits  Outcome: Progressing  Note: No electrolyte replacements necessary this shift.      Problem: Hematologic - Adult  Goal: Maintains hematologic stability  Outcome: Progressing  Note: Patient's hemoglobin this AM:   Recent Labs     09/25/24  0350   HGB 7.7*     Patient's platelet count this AM:   Recent Labs     09/25/24  0350   *    Thrombocytopenia Precautions in place.  Patient showing no signs or symptoms of active bleeding.  Transfusion not indicated at this time.  Patient verbalizes understanding of all instructions. Will continue to assess and implement POC. Call light within reach and hourly rounding in place.      Problem: Nutrition Deficit:  Goal: Optimize nutritional status  Outcome: Progressing  Note: Pt eating  percent of meals.      Problem: Pain  Goal: Verbalizes/displays adequate comfort level or baseline comfort level  Outcome: Progressing  Note: Pt reported headache of 4/10 this morning, resolved on own by 1130.      
  Problem: Infection - Adult  Goal: Absence of infection during hospitalization  9/26/2024 2130 by Pat Pfeiffer, RN  Outcome: Progressing  Pt afebrile throughout night      Problem: Hematologic - Adult  Goal: Maintains hematologic stability  9/26/2024 2130 by Pat Pfeiffer, RN  Outcome: Progressing  Patient's hemoglobin this AM:   Recent Labs     09/27/24  0331   HGB 7.5*     Patient's platelet count this AM:   Recent Labs     09/27/24  0331   PLT 95*    Thrombocytopenia Precautions in place.  Patient showing no signs or symptoms of active bleeding.  Transfusion not indicated at this time.  Patient verbalizes understanding of all instructions. Will continue to assess and implement POC. Call light within reach and hourly rounding in place.      Problem: Pain  Goal: Verbalizes/displays adequate comfort level or baseline comfort level  9/26/2024 2130 by Pat Pfeiffer, RN  Outcome: Progressing  Pt had no complaints of pain throughout the shift      
Problem: ABCDS Injury Assessment  Goal: Absence of physical injury  Outcome: Progressing  Absence of Physical Injury: Implement safety measures based on patient assessment  Note: - Screening for Orthostasis AND not a [place] Risk per ESTEVES/ABCDS: Pt bed is in low position, side rails up, call light and belongings are in reach.  Fall risk light is on outside pts room.  Pt encouraged to call for assistance as needed. Will continue with hourly rounds for PO intake, pain needs, toileting and repositioning as needed.        Problem: Infection - Adult  Goal: Absence of infection at discharge  Outcome: Progressing  Absence of infection at discharge:   Assess and monitor for signs and symptoms of infection   Monitor all insertion sites i.e., indwelling lines, tubes and drains   [place] appropriate cooling/warming therapies per order   Instruct and encourage patient and family to use good hand hygiene technique   Monitor lab/diagnostic results   Monitor endotracheal (as able) and nasal secretions for changes in amount and color   Administer medications as ordered   Identify and instruct in appropriate isolation precautions for identified infection/condition      Problem: Metabolic/Fluid and Electrolytes - Adult  Goal: Electrolytes maintained within normal limits  Outcome: Progressing  Electrolytes maintained within normal limits:   Monitor labs and assess patient for signs and symptoms of electrolyte imbalances   Monitor response to electrolyte replacements, including repeat lab results as appropriate   Instruct patient on fluid and nutrition restrictions as appropriate   Administer electrolyte replacement as ordered   Fluid restriction as ordered      Problem: Hematologic - Adult  Goal: Maintains hematologic stability  Outcome: Progressing  Maintains hematologic stability:   Assess for signs and symptoms of bleeding or hemorrhage   Administer blood products/factors as ordered   Monitor labs for bleeding or clotting 
Problem: ABCDS Injury Assessment  Goal: Absence of physical injury  Outcome: Progressing  Pt up ad kishan, ortho negative, call light within reach     Problem: Infection - Adult  Goal: Absence of infection during hospitalization  Outcome: Progressing  Pt remained afebrile this shift.     Problem: Metabolic/Fluid and Electrolytes - Adult  Goal: Electrolytes maintained within normal limits  Outcome: Progressing  No replacements needed this shift.     Problem: Hematologic - Adult  Goal: Maintains hematologic stability  Outcome: Progressing  Patient's hemoglobin this AM:   Recent Labs     09/22/24  1605   HGB 7.6*     Patient's platelet count this AM:   Recent Labs     09/22/24  1605       Thrombocytopenia Precautions in place.  Patient showing no signs or symptoms of active bleeding.  Transfusion not indicated at this time.  Patient verbalizes understanding of all instructions. Will continue to assess and implement POC. Call light within reach and hourly rounding in place.   
improving, restoring, or maintaining nutritional needs:   Assess nutritional status and recommend course of action   Monitor oral intake, labs, and treatment plans   Recommend appropriate diets, oral nutritional supplements, and vitamin/mineral supplements   Order, calculate, and assess calorie counts as needed   Recommend, monitor, and adjust tube feedings and TPN/PPN based on assessed needs   Provide specific nutrition education to patient or family as appropriate  Note:   Encouraging PO intake at each encounter, continue with toxicity assessments, fluids provided as bedside within reach.       Problem: Pain  Goal: Verbalizes/displays adequate comfort level or baseline comfort level  9/30/2024 0549 by Lynn Cartwright, RN  Outcome: Progressing  Flowsheets (Taken 9/27/2024 0903 by Elizabeth Martines, RN)  Verbalizes/displays adequate comfort level or baseline comfort level: Encourage patient to monitor pain and request assistance  Note:   Pt not complaining of any pain this PM. Pt instructed to call for new/increasing pain levels. Pt verbalized understanding. Will continue to monitor.        
replacement as ordered     Problem: Hematologic - Adult  Goal: Maintains hematologic stability  9/23/2024 0304 by Kaiden Hays, RN  Outcome: Progressing  Flowsheets (Taken 9/23/2024 0304)  Maintains hematologic stability:   Assess for signs and symptoms of bleeding or hemorrhage   Administer blood products/factors as ordered   Monitor labs for bleeding or clotting disorders

## 2024-10-04 LAB
Lab: NORMAL
REPORT: NORMAL
THIS TEST SENT TO: NORMAL

## 2024-10-07 ENCOUNTER — HOSPITAL ENCOUNTER (OUTPATIENT)
Dept: VASCULAR LAB | Age: 74
Discharge: HOME | End: 2024-10-09
Payer: MEDICARE

## 2024-10-07 ENCOUNTER — HOSPITAL ENCOUNTER (OUTPATIENT)
Dept: ONCOLOGY | Age: 74
Setting detail: INFUSION SERIES
Discharge: HOME | End: 2024-10-07
Payer: MEDICARE

## 2024-10-07 VITALS
DIASTOLIC BLOOD PRESSURE: 74 MMHG | OXYGEN SATURATION: 100 % | HEART RATE: 68 BPM | RESPIRATION RATE: 16 BRPM | SYSTOLIC BLOOD PRESSURE: 126 MMHG | TEMPERATURE: 97.5 F

## 2024-10-07 DIAGNOSIS — R60.0 EDEMA OF RIGHT UPPER EXTREMITY: ICD-10-CM

## 2024-10-07 DIAGNOSIS — C95.00 ACUTE LEUKEMIA NOT HAVING ACHIEVED REMISSION (HCC): Primary | ICD-10-CM

## 2024-10-07 LAB
ABO + RH BLD: NORMAL
BLD GP AB SCN SERPL QL: NORMAL
BLOOD BANK DISPENSE STATUS: NORMAL
BLOOD BANK PRODUCT CODE: NORMAL
BPU ID: NORMAL
DESCRIPTION BLOOD BANK: NORMAL

## 2024-10-07 PROCEDURE — 93971 EXTREMITY STUDY: CPT

## 2024-10-07 PROCEDURE — 86923 COMPATIBILITY TEST ELECTRIC: CPT

## 2024-10-07 PROCEDURE — 86900 BLOOD TYPING SEROLOGIC ABO: CPT

## 2024-10-07 PROCEDURE — 36430 TRANSFUSION BLD/BLD COMPNT: CPT

## 2024-10-07 PROCEDURE — 86901 BLOOD TYPING SEROLOGIC RH(D): CPT

## 2024-10-07 PROCEDURE — P9040 RBC LEUKOREDUCED IRRADIATED: HCPCS

## 2024-10-07 PROCEDURE — 86850 RBC ANTIBODY SCREEN: CPT

## 2024-10-07 RX ORDER — ONDANSETRON 2 MG/ML
8 INJECTION INTRAMUSCULAR; INTRAVENOUS
OUTPATIENT
Start: 2024-10-07

## 2024-10-07 RX ORDER — SODIUM CHLORIDE 9 MG/ML
25 INJECTION, SOLUTION INTRAVENOUS PRN
OUTPATIENT
Start: 2024-10-07

## 2024-10-07 RX ORDER — SODIUM CHLORIDE 0.9 % (FLUSH) 0.9 %
5-40 SYRINGE (ML) INJECTION PRN
Status: CANCELLED | OUTPATIENT
Start: 2024-10-07

## 2024-10-07 RX ORDER — SODIUM CHLORIDE 9 MG/ML
INJECTION, SOLUTION INTRAVENOUS CONTINUOUS
OUTPATIENT
Start: 2024-10-07

## 2024-10-07 RX ORDER — ALBUTEROL SULFATE 90 UG/1
4 INHALANT RESPIRATORY (INHALATION) PRN
OUTPATIENT
Start: 2024-10-07

## 2024-10-07 RX ORDER — DIPHENHYDRAMINE HYDROCHLORIDE 50 MG/ML
50 INJECTION INTRAMUSCULAR; INTRAVENOUS
OUTPATIENT
Start: 2024-10-07

## 2024-10-07 RX ORDER — ACETAMINOPHEN 325 MG/1
650 TABLET ORAL
Status: CANCELLED | OUTPATIENT
Start: 2024-10-07

## 2024-10-07 RX ORDER — SODIUM CHLORIDE 9 MG/ML
20 INJECTION, SOLUTION INTRAVENOUS CONTINUOUS
Status: CANCELLED | OUTPATIENT
Start: 2024-10-07

## 2024-10-07 RX ORDER — EPINEPHRINE 1 MG/ML
0.3 INJECTION, SOLUTION INTRAMUSCULAR; SUBCUTANEOUS PRN
Status: CANCELLED | OUTPATIENT
Start: 2024-10-07

## 2024-10-07 RX ORDER — ONDANSETRON 2 MG/ML
8 INJECTION INTRAMUSCULAR; INTRAVENOUS
Status: CANCELLED | OUTPATIENT
Start: 2024-10-07

## 2024-10-07 RX ORDER — SODIUM CHLORIDE 9 MG/ML
INJECTION, SOLUTION INTRAVENOUS CONTINUOUS
Status: CANCELLED | OUTPATIENT
Start: 2024-10-07

## 2024-10-07 RX ORDER — EPINEPHRINE 1 MG/ML
0.3 INJECTION, SOLUTION INTRAMUSCULAR; SUBCUTANEOUS PRN
OUTPATIENT
Start: 2024-10-07

## 2024-10-07 RX ORDER — ACETAMINOPHEN 325 MG/1
650 TABLET ORAL
OUTPATIENT
Start: 2024-10-07

## 2024-10-07 RX ORDER — SODIUM CHLORIDE 9 MG/ML
20 INJECTION, SOLUTION INTRAVENOUS CONTINUOUS
OUTPATIENT
Start: 2024-10-07

## 2024-10-07 RX ORDER — SODIUM CHLORIDE 9 MG/ML
25 INJECTION, SOLUTION INTRAVENOUS PRN
Status: CANCELLED | OUTPATIENT
Start: 2024-10-07

## 2024-10-07 RX ORDER — ALBUTEROL SULFATE 90 UG/1
4 INHALANT RESPIRATORY (INHALATION) PRN
Status: CANCELLED | OUTPATIENT
Start: 2024-10-07

## 2024-10-07 RX ORDER — DIPHENHYDRAMINE HYDROCHLORIDE 50 MG/ML
50 INJECTION INTRAMUSCULAR; INTRAVENOUS
Status: CANCELLED | OUTPATIENT
Start: 2024-10-07

## 2024-10-07 RX ORDER — SODIUM CHLORIDE 0.9 % (FLUSH) 0.9 %
5-40 SYRINGE (ML) INJECTION PRN
OUTPATIENT
Start: 2024-10-07

## 2024-10-07 NOTE — PROGRESS NOTES
Patient's hemoglobin this AM: 7.0  Patient's platelet count this AM: 27.0  Pt seen and assessed at [facility].  Seen at [facility] OPO today for PRBCs per standing orders for above lab values.  Blood products transfused per [facility] policy.  Pt tolerated transfusion well and without incident.  Pt verbalizes understanding of discharge instructions.  Discharged ambulatory to [facility] with wife.

## 2024-10-08 PROCEDURE — 93971 EXTREMITY STUDY: CPT | Performed by: SURGERY

## 2024-10-11 ENCOUNTER — HOSPITAL ENCOUNTER (OUTPATIENT)
Dept: ONCOLOGY | Age: 74
Setting detail: INFUSION SERIES
Discharge: HOME | End: 2024-10-11
Payer: MEDICARE

## 2024-10-11 VITALS
HEART RATE: 71 BPM | DIASTOLIC BLOOD PRESSURE: 75 MMHG | TEMPERATURE: 97.9 F | SYSTOLIC BLOOD PRESSURE: 125 MMHG | OXYGEN SATURATION: 100 % | RESPIRATION RATE: 16 BRPM

## 2024-10-11 DIAGNOSIS — C95.00 ACUTE LEUKEMIA NOT HAVING ACHIEVED REMISSION (HCC): Primary | ICD-10-CM

## 2024-10-11 PROCEDURE — 86850 RBC ANTIBODY SCREEN: CPT

## 2024-10-11 PROCEDURE — 36592 COLLECT BLOOD FROM PICC: CPT

## 2024-10-11 PROCEDURE — P9040 RBC LEUKOREDUCED IRRADIATED: HCPCS

## 2024-10-11 PROCEDURE — 86901 BLOOD TYPING SEROLOGIC RH(D): CPT

## 2024-10-11 PROCEDURE — 86900 BLOOD TYPING SEROLOGIC ABO: CPT

## 2024-10-11 PROCEDURE — 36430 TRANSFUSION BLD/BLD COMPNT: CPT

## 2024-10-11 PROCEDURE — 86923 COMPATIBILITY TEST ELECTRIC: CPT

## 2024-10-11 RX ORDER — SODIUM CHLORIDE 0.9 % (FLUSH) 0.9 %
5-40 SYRINGE (ML) INJECTION PRN
OUTPATIENT
Start: 2024-10-11

## 2024-10-11 RX ORDER — DIPHENHYDRAMINE HYDROCHLORIDE 50 MG/ML
50 INJECTION INTRAMUSCULAR; INTRAVENOUS
OUTPATIENT
Start: 2024-10-11

## 2024-10-11 RX ORDER — SODIUM CHLORIDE 9 MG/ML
20 INJECTION, SOLUTION INTRAVENOUS CONTINUOUS
OUTPATIENT
Start: 2024-10-11

## 2024-10-11 RX ORDER — SODIUM CHLORIDE 9 MG/ML
INJECTION, SOLUTION INTRAVENOUS CONTINUOUS
OUTPATIENT
Start: 2024-10-11

## 2024-10-11 RX ORDER — ACETAMINOPHEN 325 MG/1
650 TABLET ORAL
OUTPATIENT
Start: 2024-10-11

## 2024-10-11 RX ORDER — EPINEPHRINE 1 MG/ML
0.3 INJECTION, SOLUTION INTRAMUSCULAR; SUBCUTANEOUS PRN
OUTPATIENT
Start: 2024-10-11

## 2024-10-11 RX ORDER — ONDANSETRON 2 MG/ML
8 INJECTION INTRAMUSCULAR; INTRAVENOUS
OUTPATIENT
Start: 2024-10-11

## 2024-10-11 RX ORDER — SODIUM CHLORIDE 9 MG/ML
25 INJECTION, SOLUTION INTRAVENOUS PRN
OUTPATIENT
Start: 2024-10-11

## 2024-10-11 RX ORDER — ALBUTEROL SULFATE 90 UG/1
4 INHALANT RESPIRATORY (INHALATION) PRN
OUTPATIENT
Start: 2024-10-11

## 2024-10-11 NOTE — PROGRESS NOTES
Patient's hemoglobin this AM: 7.1  Patient's platelet count this AM: 163  Pt seen and assessed at [facility].  Seen at [facility] today for PRBCs per standing orders for above lab values.  Blood products transfused per [facility] policy. No premedications ordered. Pt tolerated transfusion well and without incident. Pt verbalizes understanding of discharge instructions.  Discharged ambulatory to [facility] with wife.

## 2024-10-14 ENCOUNTER — TRANSCRIBE ORDERS (OUTPATIENT)
Dept: ADMINISTRATIVE | Age: 74
End: 2024-10-14

## 2024-10-14 DIAGNOSIS — C92.00 ACUTE MYELOID LEUKEMIA NOT HAVING ACHIEVED REMISSION (HCC): Primary | ICD-10-CM

## 2024-10-17 ENCOUNTER — HOSPITAL ENCOUNTER (OUTPATIENT)
Dept: CT IMAGING | Age: 74
Discharge: HOME | End: 2024-10-17
Attending: INTERNAL MEDICINE
Payer: MEDICARE

## 2024-10-17 VITALS
DIASTOLIC BLOOD PRESSURE: 53 MMHG | OXYGEN SATURATION: 97 % | SYSTOLIC BLOOD PRESSURE: 99 MMHG | RESPIRATION RATE: 16 BRPM | TEMPERATURE: 98.9 F | HEART RATE: 70 BPM

## 2024-10-17 DIAGNOSIS — C92.00 ACUTE MYELOID LEUKEMIA NOT HAVING ACHIEVED REMISSION (HCC): ICD-10-CM

## 2024-10-17 LAB
ANISOCYTOSIS BLD QL SMEAR: ABNORMAL
APTT BLD: 40.9 SEC (ref 22.1–36.4)
DEPRECATED RDW RBC AUTO: 19.7 % (ref 12.4–15.4)
HCT VFR BLD AUTO: 18.9 % (ref 40.5–52.5)
HGB BLD-MCNC: 6.5 G/DL (ref 13.5–17.5)
INR PPP: 1.49 (ref 0.85–1.15)
MACROCYTES BLD QL SMEAR: ABNORMAL
MCH RBC QN AUTO: 32 PG (ref 26–34)
MCHC RBC AUTO-ENTMCNC: 34.5 G/DL (ref 31–36)
MCV RBC AUTO: 92.8 FL (ref 80–100)
MICROCYTES BLD QL SMEAR: ABNORMAL
PATH INTERP BLD-IMP: NO
PLATELET # BLD AUTO: 389 K/UL (ref 135–450)
PLATELET BLD QL SMEAR: ADEQUATE
PMV BLD AUTO: 6.9 FL (ref 5–10.5)
PROTHROMBIN TIME: 18.2 SEC (ref 11.9–14.9)
RBC # BLD AUTO: 2.03 M/UL (ref 4.2–5.9)
SLIDE REVIEW: ABNORMAL
WBC # BLD AUTO: 0.3 K/UL (ref 4–11)

## 2024-10-17 PROCEDURE — 85027 COMPLETE CBC AUTOMATED: CPT

## 2024-10-17 PROCEDURE — 85610 PROTHROMBIN TIME: CPT

## 2024-10-17 PROCEDURE — 2500000003 HC RX 250 WO HCPCS: Performed by: RADIOLOGY

## 2024-10-17 PROCEDURE — 88305 TISSUE EXAM BY PATHOLOGIST: CPT

## 2024-10-17 PROCEDURE — 6360000002 HC RX W HCPCS: Performed by: RADIOLOGY

## 2024-10-17 PROCEDURE — 88313 SPECIAL STAINS GROUP 2: CPT

## 2024-10-17 PROCEDURE — 88342 IMHCHEM/IMCYTCHM 1ST ANTB: CPT

## 2024-10-17 PROCEDURE — 88311 DECALCIFY TISSUE: CPT

## 2024-10-17 PROCEDURE — 85730 THROMBOPLASTIN TIME PARTIAL: CPT

## 2024-10-17 PROCEDURE — C1830 POWER BONE MARROW BX NEEDLE: HCPCS

## 2024-10-17 RX ORDER — LIDOCAINE HYDROCHLORIDE 10 MG/ML
INJECTION, SOLUTION EPIDURAL; INFILTRATION; INTRACAUDAL; PERINEURAL PRN
Status: COMPLETED | OUTPATIENT
Start: 2024-10-17 | End: 2024-10-17

## 2024-10-17 RX ORDER — BUPIVACAINE HYDROCHLORIDE 5 MG/ML
INJECTION, SOLUTION EPIDURAL; INTRACAUDAL PRN
Status: COMPLETED | OUTPATIENT
Start: 2024-10-17 | End: 2024-10-17

## 2024-10-17 RX ORDER — FENTANYL CITRATE 50 UG/ML
INJECTION, SOLUTION INTRAMUSCULAR; INTRAVENOUS PRN
Status: COMPLETED | OUTPATIENT
Start: 2024-10-17 | End: 2024-10-17

## 2024-10-17 RX ORDER — HEPARIN 100 UNIT/ML
SYRINGE INTRAVENOUS PRN
Status: COMPLETED | OUTPATIENT
Start: 2024-10-17 | End: 2024-10-17

## 2024-10-17 RX ORDER — MIDAZOLAM HYDROCHLORIDE 1 MG/ML
INJECTION INTRAMUSCULAR; INTRAVENOUS PRN
Status: COMPLETED | OUTPATIENT
Start: 2024-10-17 | End: 2024-10-17

## 2024-10-17 RX ADMIN — FENTANYL CITRATE 50 MCG: 50 INJECTION, SOLUTION INTRAMUSCULAR; INTRAVENOUS at 11:08

## 2024-10-17 RX ADMIN — LIDOCAINE HYDROCHLORIDE 15 ML: 10 INJECTION, SOLUTION EPIDURAL; INFILTRATION; INTRACAUDAL; PERINEURAL at 11:14

## 2024-10-17 RX ADMIN — BUPIVACAINE HYDROCHLORIDE 15 ML: 5 INJECTION, SOLUTION EPIDURAL; INTRACAUDAL at 11:14

## 2024-10-17 RX ADMIN — MIDAZOLAM HYDROCHLORIDE 1 MG: 2 INJECTION, SOLUTION INTRAMUSCULAR; INTRAVENOUS at 11:08

## 2024-10-17 RX ADMIN — Medication 100 UNITS: at 11:16

## 2024-10-17 ASSESSMENT — PAIN - FUNCTIONAL ASSESSMENT
PAIN_FUNCTIONAL_ASSESSMENT: NONE - DENIES PAIN
PAIN_FUNCTIONAL_ASSESSMENT: NONE - DENIES PAIN

## 2024-10-17 NOTE — PROGRESS NOTES
IMAGING SERVICES NURSING PROGRESS NOTE    Procedure:  BMBX  October 17, 2024  Myles Meehan      Allergies:    Allergies   Allergen Reactions    Shellfish-Derived Products Itching and Rash       Vitals:    10/17/24 0935   BP: (!) 110/53   Pulse:    Resp:    Temp:    SpO2:        Recent lab work reviewed with MD: yes    Procedure explained to patient by MD: yes   Informed consent obtained:yes  Family with patient:yes    Mental Status:  Normal  Readiness to learn:  Yes  Barriers to learning: No    Pain Assessment Pre-Procedure:  Pain Present:  no  Pain Score:  0  Pain Quality/Description:  na    Time out Procedure Verification with:  [x] RN  [x] Physician  [x] Patient  [x] Other: CT Technologist  Procedure site marked, if applicable:  Yes    Note: Patient arrived A & O x 4, breathing easily on room air, Spoke Peg Reynolds NP prior to procedure.  Procedural sedation:  Fentanyl:  50mcg  Versed:    1mg  Post Procedureal Note:  Patient tolerated procedure well.  Breathing easily on room air.  Report given to [facility] RN.  Patient transported in stable conditon to room 17.    Pain Assessment Post-Procedure:  Pain Present:  no  Pain Score:  0  Pain Quality/Description:  na    Plan of Care Goals:  Safety measures met:  Yes  Patient understands explanation of procedure:  Yes    Time in:  1100  Time out:  1120  Selma De La Torre RN.  R.N. 10/17/2024

## 2024-10-17 NOTE — DISCHARGE INSTRUCTIONS
was reviewed with the patient/significant other:       I have read and understand the instructions given to me:         ____________________________________________   (Patient/S.O. Signature)           Nurse Partica Gonzalez RN ,R.N.   Date/time 10/17/2024 11:48 AM         Radiology Department  [phone]           [facility]:  [phone] M-F 7AM-6PM     If you smoke STOP. We care about your health!

## 2024-10-17 NOTE — BRIEF OP NOTE
Brief Postoperative Note    Myles Meehan  YOB: 1950  [id number]    Pre-operative Diagnosis: leukemia     Post-operative Diagnosis: Same    Procedure: CT guided bone marrow biopsy     Anesthesia: moderate sedation     Surgeons/Assistants: Peg Reynolds CNP; Jerald Herring MD    Estimated Blood Loss: Minimal    Complications: none    Specimens: were obtained      JAY Ibarra CNP   10/17/2024

## 2024-10-17 NOTE — PROGRESS NOTES
Ambulatory Surgery/Procedure Discharge Note    Vitals:    10/17/24 1139   BP: (!) 95/57   Pulse: 80   Resp: 16   Temp:    SpO2: 93%     Pt meets discharge criteria per Karen score.   No intake/output data recorded.    Restroom use offered before discharge.  Yes    Pain assessment:  none  Pain Level: 0    Pt and S.O./family states \"ready to go home\". Pt alert and oriented x4. IV removed. Denies N/V or pain.  Voided prior to discharge. Pt tolerating po intake. Discharge instructions given to pt and wife with pt permission. Pt and wife verbalized understanding of all instructions. Left with all belongings and discharge instructions.         Patient discharged to home/self care. Patient discharged via wheel chair by transporter to waiting family/S.O.       10/17/2024 11:41 AM

## 2024-10-17 NOTE — PRE SEDATION
times daily (with meals)      lisinopril (PRINIVIL;ZESTRIL) 30 MG tablet Take 1 tablet by mouth daily      Bioflavonoid Products (BIOFLEX PO) Take 2 tablets by mouth daily      allopurinol (ZYLOPRIM) 300 MG tablet Take 1 tablet by mouth daily      vitamin B-12 (CYANOCOBALAMIN) 1000 MCG tablet Take 1 tablet by mouth daily      diphenhydrAMINE (BENADRYL) 25 MG tablet Take 1 tablet by mouth daily as needed for Allergies       No current facility-administered medications on file prior to encounter.       Current Meds  No current facility-administered medications for this encounter.        ASA 2 - Patient with mild systemic disease with no functional limitations    II (soft palate, uvula, fauces visible)    Activity:  2 - Able to move 4 extremities voluntarily on command  Respiration:  2 - Able to breathe deeply and cough freely  Circulation:  2 - BP+/- 20mmHg of normal  Consciousness:  2 - Fully awake  Oxygen Saturation (color):  2 - Able to maintain oxygen saturation >92% on room air    Sedation : Moderate sedation planned    HPI / Treatment plan : CT guided bone marrow biopsy       Electronically signed by JAY Ibarra CNP on 10/17/24 at 10:59 AM EDT

## 2024-10-18 ENCOUNTER — HOSPITAL ENCOUNTER (OUTPATIENT)
Dept: ONCOLOGY | Age: 74
Setting detail: INFUSION SERIES
Discharge: HOME | End: 2024-10-18
Payer: MEDICARE

## 2024-10-18 VITALS
SYSTOLIC BLOOD PRESSURE: 115 MMHG | RESPIRATION RATE: 18 BRPM | HEART RATE: 81 BPM | TEMPERATURE: 99.1 F | OXYGEN SATURATION: 98 % | DIASTOLIC BLOOD PRESSURE: 70 MMHG

## 2024-10-18 DIAGNOSIS — C95.00 ACUTE LEUKEMIA NOT HAVING ACHIEVED REMISSION (HCC): Primary | ICD-10-CM

## 2024-10-18 PROCEDURE — 86850 RBC ANTIBODY SCREEN: CPT

## 2024-10-18 PROCEDURE — 36430 TRANSFUSION BLD/BLD COMPNT: CPT

## 2024-10-18 PROCEDURE — P9040 RBC LEUKOREDUCED IRRADIATED: HCPCS

## 2024-10-18 PROCEDURE — 86900 BLOOD TYPING SEROLOGIC ABO: CPT

## 2024-10-18 PROCEDURE — 86923 COMPATIBILITY TEST ELECTRIC: CPT

## 2024-10-18 PROCEDURE — 86901 BLOOD TYPING SEROLOGIC RH(D): CPT

## 2024-10-18 RX ORDER — EPINEPHRINE 1 MG/ML
0.3 INJECTION, SOLUTION INTRAMUSCULAR; SUBCUTANEOUS PRN
OUTPATIENT
Start: 2024-10-18

## 2024-10-18 RX ORDER — SODIUM CHLORIDE 9 MG/ML
INJECTION, SOLUTION INTRAVENOUS CONTINUOUS
OUTPATIENT
Start: 2024-10-18

## 2024-10-18 RX ORDER — SODIUM CHLORIDE 0.9 % (FLUSH) 0.9 %
5-40 SYRINGE (ML) INJECTION PRN
OUTPATIENT
Start: 2024-10-18

## 2024-10-18 RX ORDER — ONDANSETRON 2 MG/ML
8 INJECTION INTRAMUSCULAR; INTRAVENOUS
OUTPATIENT
Start: 2024-10-18

## 2024-10-18 RX ORDER — ACETAMINOPHEN 325 MG/1
650 TABLET ORAL
OUTPATIENT
Start: 2024-10-18

## 2024-10-18 RX ORDER — SODIUM CHLORIDE 9 MG/ML
20 INJECTION, SOLUTION INTRAVENOUS CONTINUOUS
OUTPATIENT
Start: 2024-10-18

## 2024-10-18 RX ORDER — SODIUM CHLORIDE 9 MG/ML
25 INJECTION, SOLUTION INTRAVENOUS PRN
OUTPATIENT
Start: 2024-10-18

## 2024-10-18 RX ORDER — ALBUTEROL SULFATE 90 UG/1
4 INHALANT RESPIRATORY (INHALATION) PRN
OUTPATIENT
Start: 2024-10-18

## 2024-10-18 RX ORDER — DIPHENHYDRAMINE HYDROCHLORIDE 50 MG/ML
50 INJECTION INTRAMUSCULAR; INTRAVENOUS
OUTPATIENT
Start: 2024-10-18

## 2024-10-18 NOTE — PROGRESS NOTES
Patient's hemoglobin this AM: 6.0  Patient's platelet count this AM: 326  Pt seen and assessed at [facility].  Seen at [facility] today for PRBCs per standing orders for above lab values.  Blood products transfused per [facility] policy.  Pt tolerated transfusion well and without incident.  Pt verbalizes understanding of discharge instructions.  Discharged ambulatory to home with wife.

## 2024-10-21 ENCOUNTER — HOSPITAL ENCOUNTER (OUTPATIENT)
Dept: ONCOLOGY | Age: 74
Setting detail: INFUSION SERIES
Discharge: HOME | End: 2024-10-21
Payer: MEDICARE

## 2024-10-21 VITALS
HEART RATE: 72 BPM | RESPIRATION RATE: 18 BRPM | DIASTOLIC BLOOD PRESSURE: 56 MMHG | TEMPERATURE: 98.2 F | SYSTOLIC BLOOD PRESSURE: 110 MMHG | OXYGEN SATURATION: 98 %

## 2024-10-21 DIAGNOSIS — C95.00 ACUTE LEUKEMIA NOT HAVING ACHIEVED REMISSION (HCC): Primary | ICD-10-CM

## 2024-10-21 LAB
ABO + RH BLD: NORMAL
BLD GP AB SCN SERPL QL: NORMAL
BLOOD BANK DISPENSE STATUS: NORMAL
BLOOD BANK DISPENSE STATUS: NORMAL
BLOOD BANK PRODUCT CODE: NORMAL
BLOOD BANK PRODUCT CODE: NORMAL
BPU ID: NORMAL
BPU ID: NORMAL
DESCRIPTION BLOOD BANK: NORMAL
DESCRIPTION BLOOD BANK: NORMAL

## 2024-10-21 PROCEDURE — 36430 TRANSFUSION BLD/BLD COMPNT: CPT

## 2024-10-21 PROCEDURE — 86900 BLOOD TYPING SEROLOGIC ABO: CPT

## 2024-10-21 PROCEDURE — P9040 RBC LEUKOREDUCED IRRADIATED: HCPCS

## 2024-10-21 PROCEDURE — 86901 BLOOD TYPING SEROLOGIC RH(D): CPT

## 2024-10-21 PROCEDURE — 86850 RBC ANTIBODY SCREEN: CPT

## 2024-10-21 PROCEDURE — 86923 COMPATIBILITY TEST ELECTRIC: CPT

## 2024-10-21 RX ORDER — SODIUM CHLORIDE 9 MG/ML
20 INJECTION, SOLUTION INTRAVENOUS CONTINUOUS
OUTPATIENT
Start: 2024-10-21

## 2024-10-21 RX ORDER — DIPHENHYDRAMINE HYDROCHLORIDE 50 MG/ML
50 INJECTION INTRAMUSCULAR; INTRAVENOUS
OUTPATIENT
Start: 2024-10-21

## 2024-10-21 RX ORDER — ACETAMINOPHEN 325 MG/1
650 TABLET ORAL
OUTPATIENT
Start: 2024-10-21

## 2024-10-21 RX ORDER — ALBUTEROL SULFATE 90 UG/1
4 INHALANT RESPIRATORY (INHALATION) PRN
OUTPATIENT
Start: 2024-10-21

## 2024-10-21 RX ORDER — SODIUM CHLORIDE 0.9 % (FLUSH) 0.9 %
5-40 SYRINGE (ML) INJECTION PRN
OUTPATIENT
Start: 2024-10-21

## 2024-10-21 RX ORDER — SODIUM CHLORIDE 9 MG/ML
INJECTION, SOLUTION INTRAVENOUS CONTINUOUS
OUTPATIENT
Start: 2024-10-21

## 2024-10-21 RX ORDER — SODIUM CHLORIDE 9 MG/ML
25 INJECTION, SOLUTION INTRAVENOUS PRN
OUTPATIENT
Start: 2024-10-21

## 2024-10-21 RX ORDER — EPINEPHRINE 1 MG/ML
0.3 INJECTION, SOLUTION INTRAMUSCULAR; SUBCUTANEOUS PRN
OUTPATIENT
Start: 2024-10-21

## 2024-10-21 RX ORDER — ONDANSETRON 2 MG/ML
8 INJECTION INTRAMUSCULAR; INTRAVENOUS
OUTPATIENT
Start: 2024-10-21

## 2024-10-21 NOTE — PROGRESS NOTES
Patient's hemoglobin this AM: 6.3  Patient's platelet count this AM: 240    Pt seen and assessed at [facility].  Seen at [facility] today for 1 unit PRBCs per standing orders for above lab values from Jaqueline Doran NP.  Blood products transfused per [facility] policy. No premedications ordered. Pt tolerated transfusion well and without incident. Pt verbalizes understanding of discharge instructions.  Discharged ambulatory to home with wife.    Loren Barry RN

## 2024-10-23 ENCOUNTER — TELEPHONE (OUTPATIENT)
Dept: SURGERY | Age: 74
End: 2024-10-23

## 2024-10-25 ENCOUNTER — HOSPITAL ENCOUNTER (OUTPATIENT)
Age: 74
Setting detail: SPECIMEN
Discharge: HOME | End: 2024-10-25

## 2024-10-25 LAB
Lab: NORMAL
REPORT: NORMAL
THIS TEST SENT TO: NORMAL

## 2024-10-28 ENCOUNTER — TRANSCRIBE ORDERS (OUTPATIENT)
Dept: ADMINISTRATIVE | Age: 74
End: 2024-10-28

## 2024-10-28 ENCOUNTER — HOSPITAL ENCOUNTER (OUTPATIENT)
Dept: VASCULAR LAB | Age: 74
Discharge: HOME | End: 2024-10-30
Payer: COMMERCIAL

## 2024-10-28 DIAGNOSIS — R60.0 ARM EDEMA: Primary | ICD-10-CM

## 2024-10-28 DIAGNOSIS — R60.0 ARM EDEMA: ICD-10-CM

## 2024-10-28 PROCEDURE — 93971 EXTREMITY STUDY: CPT

## 2024-10-29 LAB
Lab: NORMAL
REPORT: NORMAL
THIS TEST SENT TO: NORMAL

## 2024-11-04 NOTE — PROGRESS NOTES
Called patient about procedure.Told to be here at 0730 for procedure at 0900. NPO after midnight, but can take morning medication with sips of water, patient stated they stopped blood thinners. To have a responsible adult be with patient take them home and stay with them afterwards, if they do not get admitted to hosptial. And if available bring current list of medications. No other questions or concerns.

## 2024-11-05 ENCOUNTER — HOSPITAL ENCOUNTER (OUTPATIENT)
Dept: INTERVENTIONAL RADIOLOGY/VASCULAR | Age: 74
Discharge: HOME | End: 2024-11-07
Payer: COMMERCIAL

## 2024-11-05 VITALS
TEMPERATURE: 98.7 F | SYSTOLIC BLOOD PRESSURE: 118 MMHG | RESPIRATION RATE: 16 BRPM | OXYGEN SATURATION: 95 % | BODY MASS INDEX: 32.5 KG/M2 | HEART RATE: 74 BPM | DIASTOLIC BLOOD PRESSURE: 69 MMHG | WEIGHT: 219.4 LBS | HEIGHT: 69 IN

## 2024-11-05 DIAGNOSIS — C92.00 AML (ACUTE MYELOID LEUKEMIA) WITH FAILED REMISSION (HCC): ICD-10-CM

## 2024-11-05 LAB
DEPRECATED RDW RBC AUTO: 20.8 % (ref 12.4–15.4)
ECHO BSA: 2.2 M2
HCT VFR BLD AUTO: 25.7 % (ref 40.5–52.5)
HGB BLD-MCNC: 8.6 G/DL (ref 13.5–17.5)
INR PPP: 1.19 (ref 0.85–1.15)
MCH RBC QN AUTO: 31.3 PG (ref 26–34)
MCHC RBC AUTO-ENTMCNC: 33.4 G/DL (ref 31–36)
MCV RBC AUTO: 93.5 FL (ref 80–100)
PLATELET # BLD AUTO: 620 K/UL (ref 135–450)
PMV BLD AUTO: 7.2 FL (ref 5–10.5)
PROTHROMBIN TIME: 15.3 SEC (ref 11.9–14.9)
RBC # BLD AUTO: 2.75 M/UL (ref 4.2–5.9)
WBC # BLD AUTO: 3.8 K/UL (ref 4–11)

## 2024-11-05 PROCEDURE — 77001 FLUOROGUIDE FOR VEIN DEVICE: CPT

## 2024-11-05 PROCEDURE — 2580000003 HC RX 258: Performed by: RADIOLOGY

## 2024-11-05 PROCEDURE — C1769 GUIDE WIRE: HCPCS

## 2024-11-05 PROCEDURE — 99152 MOD SED SAME PHYS/QHP 5/>YRS: CPT

## 2024-11-05 PROCEDURE — 99153 MOD SED SAME PHYS/QHP EA: CPT

## 2024-11-05 PROCEDURE — 85027 COMPLETE CBC AUTOMATED: CPT

## 2024-11-05 PROCEDURE — 7100000011 HC PHASE II RECOVERY - ADDTL 15 MIN

## 2024-11-05 PROCEDURE — 36561 INSERT TUNNELED CV CATH: CPT

## 2024-11-05 PROCEDURE — 7100000010 HC PHASE II RECOVERY - FIRST 15 MIN

## 2024-11-05 PROCEDURE — 76937 US GUIDE VASCULAR ACCESS: CPT

## 2024-11-05 PROCEDURE — 85610 PROTHROMBIN TIME: CPT

## 2024-11-05 PROCEDURE — 6360000002 HC RX W HCPCS: Performed by: RADIOLOGY

## 2024-11-05 PROCEDURE — C1788 PORT, INDWELLING, IMP: HCPCS

## 2024-11-05 RX ORDER — MIDAZOLAM HYDROCHLORIDE 1 MG/ML
INJECTION, SOLUTION INTRAMUSCULAR; INTRAVENOUS PRN
Status: COMPLETED | OUTPATIENT
Start: 2024-11-05 | End: 2024-11-05

## 2024-11-05 RX ORDER — DIPHENHYDRAMINE HYDROCHLORIDE 50 MG/ML
INJECTION INTRAMUSCULAR; INTRAVENOUS PRN
Status: COMPLETED | OUTPATIENT
Start: 2024-11-05 | End: 2024-11-05

## 2024-11-05 RX ADMIN — FENTANYL CITRATE 25 MCG: 50 INJECTION, SOLUTION INTRAMUSCULAR; INTRAVENOUS at 09:30

## 2024-11-05 RX ADMIN — MIDAZOLAM HYDROCHLORIDE 0.5 MG: 2 INJECTION, SOLUTION INTRAMUSCULAR; INTRAVENOUS at 09:30

## 2024-11-05 RX ADMIN — FENTANYL CITRATE 50 MCG: 50 INJECTION, SOLUTION INTRAMUSCULAR; INTRAVENOUS at 09:24

## 2024-11-05 RX ADMIN — MIDAZOLAM HYDROCHLORIDE 1 MG: 2 INJECTION, SOLUTION INTRAMUSCULAR; INTRAVENOUS at 09:24

## 2024-11-05 RX ADMIN — DIPHENHYDRAMINE HYDROCHLORIDE 12.5 MG: 50 INJECTION, SOLUTION INTRAMUSCULAR; INTRAVENOUS at 09:24

## 2024-11-05 RX ADMIN — CEFAZOLIN SODIUM 1000 MG: 1 POWDER, FOR SOLUTION INTRAMUSCULAR; INTRAVENOUS at 09:19

## 2024-11-05 NOTE — PRE SEDATION
tablet Take 1 tablet by mouth daily      vitamin B-12 (CYANOCOBALAMIN) 1000 MCG tablet Take 1 tablet by mouth daily      diphenhydrAMINE (BENADRYL) 25 MG tablet Take 1 tablet by mouth daily as needed for Allergies      ursodiol (ACTIGALL) 250 MG tablet Take 1 tablet by mouth 2 times daily (Patient not taking: Reported on 11/5/2024) 90 tablet 3    isavuconazonium Sulfate (CRESEMBA) 186 MG capsule Take 2 capsules by mouth daily (Patient not taking: Reported on 11/5/2024) 60 capsule 2     No current facility-administered medications on file prior to encounter.       Current Meds  ceFAZolin (ANCEF) 1,000 mg in sodium chloride 0.9 % 50 mL IVPB (mini-bag), Once          ASA 2 - Patient with mild systemic disease with no functional limitations    III (soft palate, base of uvula visible)    Activity:  2 - Able to move 4 extremities voluntarily on command  Respiration:  2 - Able to breathe deeply and cough freely  Circulation:  2 - BP+/- 20mmHg of normal  Consciousness:  2 - Fully awake  Oxygen Saturation (color):  2 - Able to maintain oxygen saturation >92% on room air    Sedation : Moderate sedation planned    HPI / Treatment plan : Chest port placement

## 2024-11-05 NOTE — PROGRESS NOTES
Patient/family given discharge instructions. Patient/family verbalize understanding of discharge instructions, all questions addressed, copy given to patient/family. Pt transferred to [facility] via wheelchair with family.

## 2024-11-05 NOTE — DISCHARGE INSTRUCTIONS
during the hours of 9am-4pm Monday-Friday, or the hospital  after hours at [phone], to have the interventional radiologist on call paged.      [facility]  Cardiovascular Special Procedures  General Discharge Instructions    _x___ You may be drowsy or lightheaded after receiving sedation. DO NOT operate a vehicle (automobile, bicycle, motorcycle, machinery, or power tools), make any important decisions or sign any important/legal documents, or drink alcoholic beverages for the next 24 hours  _x___ We strongly suggest that a responsible adult be with you for the next 24 hours for your protection and safety  _x___ If the intravenous catheter site is painful, apply warm wet compresses on the site until the soreness is relieved and elevate the arm above the heart.  Call your physician if no improvement in 2 to 3 days    DIETARY INSTRUCTIONS:    ____ Drink extra fluids over the next 24 hours (If not contraindicated by illness or by physician order)  ____ Start with clear liquids and progress to normal diet as you feel like eating.  If you experience nausea or repeated episodes of vomiting, which persist beyond 12-24 hours, notify your doctor        _x___ Resume your previous diet    ACTIVITY INSTRUCTIONS:    ____ See other instructions  ____ No special instructions  ____ Rest for 24 hours    ____ Up as tolerated  ____ Increase activity as tolerated    Wound/Dressing Instructions:  __x__ See other instructions  ____ May shower, tomorrow  ____ Remove bandage within 24 hours    MEDICATION INSTRUCTIONS:    ____ See Medication Reconciliation Sheet      FOLLOW-UP APPOINTMENT    Follow up with MD as directed.    Belongings returned to patient and/or family: Yes.    The Discharge Instructions have been explained to me.  I understand and can verbalize these instructions.

## 2024-12-23 DIAGNOSIS — C95.00 ACUTE LEUKEMIA NOT HAVING ACHIEVED REMISSION (HCC): ICD-10-CM

## 2024-12-23 DIAGNOSIS — D84.822 IMMUNOCOMPROMISED STATE ASSOCIATED WITH STEM CELL TRANSPLANT (HCC): ICD-10-CM

## 2024-12-23 DIAGNOSIS — Z01.818 PRE-TRANSPLANT EVALUATION FOR STEM CELL TRANSPLANT: Primary | ICD-10-CM

## 2024-12-23 DIAGNOSIS — Z94.84 IMMUNOCOMPROMISED STATE ASSOCIATED WITH STEM CELL TRANSPLANT (HCC): ICD-10-CM

## 2025-01-03 ENCOUNTER — HOSPITAL ENCOUNTER (OUTPATIENT)
Dept: CT IMAGING | Age: 75
Discharge: HOME | End: 2025-01-03
Attending: INTERNAL MEDICINE
Payer: MEDICARE

## 2025-01-03 ENCOUNTER — HOSPITAL ENCOUNTER (OUTPATIENT)
Dept: ONCOLOGY | Age: 75
Setting detail: INFUSION SERIES
Discharge: HOME | End: 2025-01-03
Payer: MEDICARE

## 2025-01-03 ENCOUNTER — HOSPITAL ENCOUNTER (OUTPATIENT)
Dept: PULMONOLOGY | Age: 75
Discharge: HOME | End: 2025-01-03
Attending: INTERNAL MEDICINE
Payer: MEDICARE

## 2025-01-03 VITALS
DIASTOLIC BLOOD PRESSURE: 78 MMHG | BODY MASS INDEX: 32.33 KG/M2 | SYSTOLIC BLOOD PRESSURE: 131 MMHG | RESPIRATION RATE: 18 BRPM | HEIGHT: 69 IN | HEART RATE: 83 BPM | OXYGEN SATURATION: 98 % | TEMPERATURE: 97.8 F | WEIGHT: 218.26 LBS

## 2025-01-03 DIAGNOSIS — Z94.84 IMMUNOCOMPROMISED STATE ASSOCIATED WITH STEM CELL TRANSPLANT (HCC): ICD-10-CM

## 2025-01-03 DIAGNOSIS — C95.00 ACUTE LEUKEMIA NOT HAVING ACHIEVED REMISSION (HCC): ICD-10-CM

## 2025-01-03 DIAGNOSIS — Z01.818 PRE-TRANSPLANT EVALUATION FOR STEM CELL TRANSPLANT: ICD-10-CM

## 2025-01-03 DIAGNOSIS — D84.822 IMMUNOCOMPROMISED STATE ASSOCIATED WITH STEM CELL TRANSPLANT (HCC): ICD-10-CM

## 2025-01-03 LAB
ABO + RH BLD: NORMAL
ALBUMIN SERPL-MCNC: 3.9 G/DL (ref 3.4–5)
ALBUMIN/GLOB SERPL: 1.8 {RATIO} (ref 1.1–2.2)
ALP SERPL-CCNC: 73 U/L (ref 40–129)
ALT SERPL-CCNC: 12 U/L (ref 10–40)
AMPHETAMINES UR QL SCN>1000 NG/ML: NORMAL
ANION GAP SERPL CALCULATED.3IONS-SCNC: 11 MMOL/L (ref 3–16)
ANISOCYTOSIS BLD QL SMEAR: ABNORMAL
APTT BLD: 34.1 SEC (ref 22.1–36.4)
AST SERPL-CCNC: 9 U/L (ref 15–37)
BARBITURATES UR QL SCN>200 NG/ML: NORMAL
BASOPHILS # BLD: 0 K/UL (ref 0–0.2)
BASOPHILS NFR BLD: 1 %
BENZODIAZ UR QL SCN>200 NG/ML: NORMAL
BILIRUB DIRECT SERPL-MCNC: 0.2 MG/DL (ref 0–0.3)
BILIRUB INDIRECT SERPL-MCNC: 0.4 MG/DL (ref 0–1)
BILIRUB SERPL-MCNC: 0.6 MG/DL (ref 0–1)
BILIRUB UR QL STRIP.AUTO: NEGATIVE
BLD GP AB SCN SERPL QL: NORMAL
BUN SERPL-MCNC: 12 MG/DL (ref 7–20)
CALCIUM SERPL-MCNC: 9.3 MG/DL (ref 8.3–10.6)
CANNABINOIDS UR QL SCN>50 NG/ML: NORMAL
CHLORIDE SERPL-SCNC: 105 MMOL/L (ref 99–110)
CLARITY UR: CLEAR
CO2 SERPL-SCNC: 23 MMOL/L (ref 21–32)
COCAINE UR QL SCN: NORMAL
COLOR UR: YELLOW
CREAT SERPL-MCNC: 0.8 MG/DL (ref 0.8–1.3)
DEPRECATED RDW RBC AUTO: 20.4 % (ref 12.4–15.4)
DRUG SCREEN COMMENT UR-IMP: NORMAL
EOSINOPHIL # BLD: 0 K/UL (ref 0–0.6)
EOSINOPHIL NFR BLD: 0 %
FENTANYL SCREEN, URINE: NORMAL
FERRITIN SERPL IA-MCNC: 467 NG/ML (ref 30–400)
GFR SERPLBLD CREATININE-BSD FMLA CKD-EPI: >90 ML/MIN/{1.73_M2}
GLUCOSE SERPL-MCNC: 125 MG/DL (ref 70–99)
GLUCOSE UR STRIP.AUTO-MCNC: NEGATIVE MG/DL
HCT VFR BLD AUTO: 30.8 % (ref 40.5–52.5)
HGB BLD-MCNC: 10.3 G/DL (ref 13.5–17.5)
HGB UR QL STRIP.AUTO: NEGATIVE
INR PPP: 1.2 (ref 0.85–1.15)
KETONES UR STRIP.AUTO-MCNC: NEGATIVE MG/DL
LDH SERPL L TO P-CCNC: 117 U/L (ref 100–190)
LEUKOCYTE ESTERASE UR QL STRIP.AUTO: NEGATIVE
LYMPHOCYTES # BLD: 0.7 K/UL (ref 1–5.1)
LYMPHOCYTES NFR BLD: 68 %
MAGNESIUM SERPL-MCNC: 1.86 MG/DL (ref 1.8–2.4)
MCH RBC QN AUTO: 33.4 PG (ref 26–34)
MCHC RBC AUTO-ENTMCNC: 33.3 G/DL (ref 31–36)
MCV RBC AUTO: 100.3 FL (ref 80–100)
METHADONE UR QL SCN>300 NG/ML: NORMAL
MONOCYTES # BLD: 0 K/UL (ref 0–1.3)
MONOCYTES NFR BLD: 4 %
NEUTROPHILS # BLD: 0.3 K/UL (ref 1.7–7.7)
NEUTROPHILS NFR BLD: 27 %
NITRITE UR QL STRIP.AUTO: NEGATIVE
OPIATES UR QL SCN>300 NG/ML: NORMAL
OXYCODONE UR QL SCN: NORMAL
PCP UR QL SCN>25 NG/ML: NORMAL
PH UR STRIP.AUTO: 6 [PH] (ref 5–8)
PH UR STRIP: 6 [PH]
PHOSPHATE SERPL-MCNC: 3.8 MG/DL (ref 2.5–4.9)
PLATELET # BLD AUTO: 214 K/UL (ref 135–450)
PMV BLD AUTO: 8.3 FL (ref 5–10.5)
POTASSIUM SERPL-SCNC: 3.6 MMOL/L (ref 3.5–5.1)
PROT SERPL-MCNC: 6.1 G/DL (ref 6.4–8.2)
PROT UR STRIP.AUTO-MCNC: NEGATIVE MG/DL
PROTHROMBIN TIME: 15.4 SEC (ref 11.9–14.9)
RBC # BLD AUTO: 3.08 M/UL (ref 4.2–5.9)
SODIUM SERPL-SCNC: 139 MMOL/L (ref 136–145)
SP GR UR STRIP.AUTO: 1.02 (ref 1–1.03)
UA COMPLETE W REFLEX CULTURE PNL UR: NORMAL
UA DIPSTICK W REFLEX MICRO PNL UR: NORMAL
URATE SERPL-MCNC: 5 MG/DL (ref 3.5–7.2)
URN SPEC COLLECT METH UR: NORMAL
UROBILINOGEN UR STRIP-ACNC: 0.2 E.U./DL
WBC # BLD AUTO: 1 K/UL (ref 4–11)

## 2025-01-03 PROCEDURE — 86787 VARICELLA-ZOSTER ANTIBODY: CPT

## 2025-01-03 PROCEDURE — 83615 LACTATE (LD) (LDH) ENZYME: CPT

## 2025-01-03 PROCEDURE — 80307 DRUG TEST PRSMV CHEM ANLYZR: CPT

## 2025-01-03 PROCEDURE — 94729 DIFFUSING CAPACITY: CPT

## 2025-01-03 PROCEDURE — 84550 ASSAY OF BLOOD/URIC ACID: CPT

## 2025-01-03 PROCEDURE — 82248 BILIRUBIN DIRECT: CPT

## 2025-01-03 PROCEDURE — 86665 EPSTEIN-BARR CAPSID VCA: CPT

## 2025-01-03 PROCEDURE — 86850 RBC ANTIBODY SCREEN: CPT

## 2025-01-03 PROCEDURE — 86695 HERPES SIMPLEX TYPE 1 TEST: CPT

## 2025-01-03 PROCEDURE — 86901 BLOOD TYPING SEROLOGIC RH(D): CPT

## 2025-01-03 PROCEDURE — 82728 ASSAY OF FERRITIN: CPT

## 2025-01-03 PROCEDURE — 80053 COMPREHEN METABOLIC PANEL: CPT

## 2025-01-03 PROCEDURE — G0480 DRUG TEST DEF 1-7 CLASSES: HCPCS

## 2025-01-03 PROCEDURE — 86778 TOXOPLASMA ANTIBODY IGM: CPT

## 2025-01-03 PROCEDURE — 86900 BLOOD TYPING SEROLOGIC ABO: CPT

## 2025-01-03 PROCEDURE — 82955 ASSAY OF G6PD ENZYME: CPT

## 2025-01-03 PROCEDURE — 94664 DEMO&/EVAL PT USE INHALER: CPT

## 2025-01-03 PROCEDURE — 86707 HEPATITIS BE ANTIBODY: CPT

## 2025-01-03 PROCEDURE — 86777 TOXOPLASMA ANTIBODY: CPT

## 2025-01-03 PROCEDURE — 85730 THROMBOPLASTIN TIME PARTIAL: CPT

## 2025-01-03 PROCEDURE — 94010 BREATHING CAPACITY TEST: CPT

## 2025-01-03 PROCEDURE — 84100 ASSAY OF PHOSPHORUS: CPT

## 2025-01-03 PROCEDURE — 86706 HEP B SURFACE ANTIBODY: CPT

## 2025-01-03 PROCEDURE — 94726 PLETHYSMOGRAPHY LUNG VOLUMES: CPT

## 2025-01-03 PROCEDURE — 81003 URINALYSIS AUTO W/O SCOPE: CPT

## 2025-01-03 PROCEDURE — 86709 HEPATITIS A IGM ANTIBODY: CPT

## 2025-01-03 PROCEDURE — 85610 PROTHROMBIN TIME: CPT

## 2025-01-03 PROCEDURE — 94760 N-INVAS EAR/PLS OXIMETRY 1: CPT

## 2025-01-03 PROCEDURE — 36591 DRAW BLOOD OFF VENOUS DEVICE: CPT

## 2025-01-03 PROCEDURE — 85025 COMPLETE CBC W/AUTO DIFF WBC: CPT

## 2025-01-03 PROCEDURE — 87529 HSV DNA AMP PROBE: CPT

## 2025-01-03 PROCEDURE — 83735 ASSAY OF MAGNESIUM: CPT

## 2025-01-03 PROCEDURE — 86645 CMV ANTIBODY IGM: CPT

## 2025-01-03 PROCEDURE — 86696 HERPES SIMPLEX TYPE 2 TEST: CPT

## 2025-01-03 PROCEDURE — 70486 CT MAXILLOFACIAL W/O DYE: CPT

## 2025-01-03 NOTE — CARE COORDINATION
I met with patient and wife to provide education regarding potential allo stem cell transplant. Discussed evaluation process, donor search, chemo regimen, cell infusion, and recovery along with side effects and complications. Risk for infection was emphasized with strategies to prevent infection. Also discussed risk for GVH and failure to engraft along with treatment options. Patient was informed that continuous caregiver will be required with no driving until cleared by physician. Provided patient with tentative calendar and discussed timeline of how care may be provided through outpatient [facility]fusion [facility], inpatient [facility], and scheduled [facility] appointments. Also provided with our Transplant Patient handbook and business card.  Answered questions appropriately and instructed to call with further questions. Will follow up during next appointment.

## 2025-01-03 NOTE — PROGRESS NOTES
Patient seen today in [facility] to draw labs for transplant workup from [facility].

## 2025-01-04 LAB
HAV IGM SERPL QL IA: NORMAL
HBV SURFACE AB SERPL IA-ACNC: 6.53 MIU/ML
HERPES SIMPLEX VIRUS 1 IGG: POSITIVE
HERPES SIMPLEX VIRUS 2 IGG: NEGATIVE
SPECIMEN SOURCE: NORMAL

## 2025-01-05 LAB
CMV IGM SERPL IA-ACNC: <8 AU/ML
G6PD RBC-CCNC: 15.8 U/G HB (ref 9.9–16.6)
HBV E AB SERPL QL IA: NEGATIVE

## 2025-01-06 LAB
EBV VCA IGG SER IA-ACNC: 532 U/ML (ref 0–21.9)
EBV VCA IGM SER IA-ACNC: <10 U/ML (ref 0–43.9)
HSV1 DNA CSF QL NAA+PROBE: NOT DETECTED
HSV2 DNA CSF QL NAA+PROBE: NOT DETECTED
SPECIMEN SOURCE: NORMAL
T GONDII IGG SER-ACNC: 64.5 IU/ML
T GONDII IGM SER-ACNC: 5.3 AU/ML

## 2025-01-07 LAB
ANABASINE UR-MCNC: <5 NG/ML
COTININE UR-MCNC: <15 NG/ML
NICOTINE UR-MCNC: <15 NG/ML
TRANS-3-OH-COTININE UR-MCNC: <50 NG/ML
VZV IGG SER IA-ACNC: 22.8 S/CO
VZV IGM SER IA-ACNC: 0.15 ISR

## 2025-01-09 NOTE — PSYCHOTHERAPY
compliant and is an effective partner with medical staff.  Lifestyle Factors: 0) Able to modify & sustain needed changes- self initiated.  Patient's Readiness Level Total Score: 6    Social Support System  Availability of Social Support System: 2) Good: Various individuals (e.g., minimum of two people) have been identified and are actively engaged in the patient's care. A back-up system, albeit limited, seems feasible.  Functionality of Social Support System: 2) Good: A limited support system has already committed to and has had effective engagement in the patient's care. Or, they are not involved yet, but appear ready to help.  Appropriateness of Physical Living Space and Environment: 0) Excellent: Patient has excellent, long-term, permanent and adequate housing.   Social Support System Total Score: 4    Psychological Stability and Psychopathology   Presence of Psychopathology: 0) None: No history of psychiatric problems.  Assessment of Depression: 0) No Clinical Depression  Assessment of Anxiety: 0) No Clinical Anxiety  History of Organic Psychopathology or Neurocognitive Impairment: 0) None: No history of disease or treatment induced psychiatric problem.  Assessment of Current Cognitive Functionin) Borderline Level of Cognitive Functioning; or MoCA / MMSE = 22 - 25.  Influence of Personality Traits vs. Disorder: 0) None: No history of significant personality disorder or psychopathology/traits.  Effect of Truthfulness vs. Deceptive Behavior in Presentation: 0) No evidence of deceptive behavior in history or at present.  Overall Risk for Psychopathology: 0) None: No history of personal or familial psychiatric problems; no psychiatric complications in response to illness, medical treatment or psychosocial stressors.   Psychological Stability and Psychopathology Total Score: 1    Lifestyle and Effect of Substance Use  Alcohol Use/Abuse/Dependence: 4) MODERATE ALCOHOL ABUSE: History of moderate alcohol abuse  normal

## 2025-01-13 ENCOUNTER — HOSPITAL ENCOUNTER (OUTPATIENT)
Dept: ONCOLOGY | Age: 75
Setting detail: INFUSION SERIES
Discharge: HOME | End: 2025-01-13

## 2025-01-13 VITALS — BODY MASS INDEX: 32.7 KG/M2 | HEIGHT: 69 IN

## 2025-01-13 NOTE — CONSULTS
Oncology Nutrition Note    RECOMMENDATIONS:   PO Diet: Focus on 3 meals daily w/ 1-2 snacks; Include protein source w/ all meals/snacks  Emphasis on Food Safety for Immunocompromised pt when ANC < 1.5  Current ANC: 0.5  Fluid: Continue 64-100oz daily  Nutrition Supplemental Drink: Trial low kcal/high pro supplemental drink  Start ONS once daily upon adm for BMT  Nutrition Education:   Food Safety booklet w/verbal review provided.   Nutrition follow-up with RD in D+30/60/90, 6 months, 1 year    MALNUTRITION ASSESSMENT  Context of Malnutrition: Chronic Illness   Malnutrition Status: At risk for malnutrition  Findings of the 6 clinical characteristics of malnutrition (Minimum of 2 out of 6 clinical characteristics is required to make the diagnosis of moderate or severe Protein Calorie Malnutrition based on AND/ASPEN Guidelines):  Energy Intake:  No decrease in energy intake  Weight Loss:  Greater than 7.5% over 3 months (-9% x 4 months)     Body Fat Loss:  No body fat loss     Muscle Mass Loss:  No muscle mass loss      NUTRITION ASSESSMENT:   Nutritional summary & status: Consult for pre-evaluation for upcoming ALLO w/ tee/flu/cytoxan pre-regimen.  RD discussed increased kcal/pro needs through transplant.  Pt likely meeting current kcal/pro needs per diet recall.  RD encouraged increase in current kcal/pro leading up to and through transplant.  Pt and wife expressed understanding.  RD encouraged focus on protein intake w/ breakfast as current recall shows pt typically having simple CHO only.  Pt endorses no current c/o physical barriers to nutrition.  Pt states following inital dx in Sept 2024, pt experience unintentional wt loss and loss of appetite following induction ctx.  Pt w/ significant wt loss (-9% x4m).  Pt has since been wt stable x2 months.  No muscle/fat wasting evident upon NFPE.  Pt at risk for malnutrition based on previous wt loss.   PG-SGA: Score 0-1; No intervention required at this time. 
  Patient does not have dry mouth/thick saliva/mouth-sores/mucositis/esophagitis (dry mouth- 1, mouth sores- 2)  Patient does not wear full/partial dentures.   Dentition: no issues  Patient does not have nausea/vomiting/abdominal cramping/pain before, during or after eating (nausea- 1, abd pain- 3, vomiting- 3)  Patient does not have irregular bowel regimen (constipation/diarrhea) (diarrhea- 3, constipation- 1,)  Patient does not have early satiety or fullness after consuming < 50% of adequate portioned foods. (1)  Patient does not have a loss of appetite. (3)    Activities and Function:  Over the past month, I would generally rate my activity as:  normal with no limitations    Access:  Patient does not have food insecurity?    Is patient concerned about possible food insecurity following transplant? no  which is defined as “physical and economic access to sufficient, safe, and nutritious food that meets dietary needs and food preferences for an active and healthy life.”  This could relate to the access or availability to food,   High Food Security: No problems or limitations related to food access or availability  Marginal Food Security: Concern that food will run out before being able to afford more.   Low Food Security: Reduced quality, variety, or desirability of food intake. A household at this level is also classified as “food insecure.\"  Very Low Food Security Disrupted eating patterns resulting in reduced food intake.  A household at this level is also classified as “food insecure.\"  Patient does not currently receive food assistance/ SNAP Benefits   Patient does have adequate access to safe food storage and preparation   Patient does not have cistern/well-water supply as primary source of water at primary residence  Patient does not require discharge to local lodging/accommodations per BMT requirements prior to returning home.     \"Is there anything else I should know about your eating and drinking?\" no.

## 2025-01-14 ENCOUNTER — HOSPITAL ENCOUNTER (OUTPATIENT)
Dept: PHYSICAL THERAPY | Age: 75
Setting detail: THERAPIES SERIES
Discharge: HOME | End: 2025-01-14
Attending: INTERNAL MEDICINE
Payer: MEDICARE

## 2025-01-14 PROCEDURE — 97530 THERAPEUTIC ACTIVITIES: CPT

## 2025-01-14 PROCEDURE — 97161 PT EVAL LOW COMPLEX 20 MIN: CPT

## 2025-01-14 ASSESSMENT — PATIENT HEALTH QUESTIONNAIRE - PHQ9
3. TROUBLE FALLING OR STAYING ASLEEP: NOT AT ALL
5. POOR APPETITE OR OVEREATING: NOT AT ALL
SUM OF ALL RESPONSES TO PHQ QUESTIONS 1-9: 0
SUM OF ALL RESPONSES TO PHQ9 QUESTIONS 1 & 2: 0
SUM OF ALL RESPONSES TO PHQ QUESTIONS 1-9: 0
10. IF YOU CHECKED OFF ANY PROBLEMS, HOW DIFFICULT HAVE THESE PROBLEMS MADE IT FOR YOU TO DO YOUR WORK, TAKE CARE OF THINGS AT HOME, OR GET ALONG WITH OTHER PEOPLE: NOT DIFFICULT AT ALL
SUM OF ALL RESPONSES TO PHQ QUESTIONS 1-9: 0
4. FEELING TIRED OR HAVING LITTLE ENERGY: NOT AT ALL
6. FEELING BAD ABOUT YOURSELF - OR THAT YOU ARE A FAILURE OR HAVE LET YOURSELF OR YOUR FAMILY DOWN: NOT AT ALL
2. FEELING DOWN, DEPRESSED OR HOPELESS: NOT AT ALL
9. THOUGHTS THAT YOU WOULD BE BETTER OFF DEAD, OR OF HURTING YOURSELF: NOT AT ALL
1. LITTLE INTEREST OR PLEASURE IN DOING THINGS: NOT AT ALL
SUM OF ALL RESPONSES TO PHQ QUESTIONS 1-9: 0
8. MOVING OR SPEAKING SO SLOWLY THAT OTHER PEOPLE COULD HAVE NOTICED. OR THE OPPOSITE, BEING SO FIGETY OR RESTLESS THAT YOU HAVE BEEN MOVING AROUND A LOT MORE THAN USUAL: NOT AT ALL
7. TROUBLE CONCENTRATING ON THINGS, SUCH AS READING THE NEWSPAPER OR WATCHING TELEVISION: NOT AT ALL

## 2025-01-14 ASSESSMENT — ANXIETY QUESTIONNAIRES
1. FEELING NERVOUS, ANXIOUS, OR ON EDGE: NOT AT ALL
2. NOT BEING ABLE TO STOP OR CONTROL WORRYING: NOT AT ALL
7. FEELING AFRAID AS IF SOMETHING AWFUL MIGHT HAPPEN: NOT AT ALL
GAD7 TOTAL SCORE: 0
6. BECOMING EASILY ANNOYED OR IRRITABLE: NOT AT ALL
IF YOU CHECKED OFF ANY PROBLEMS ON THIS QUESTIONNAIRE, HOW DIFFICULT HAVE THESE PROBLEMS MADE IT FOR YOU TO DO YOUR WORK, TAKE CARE OF THINGS AT HOME, OR GET ALONG WITH OTHER PEOPLE: NOT DIFFICULT AT ALL
5. BEING SO RESTLESS THAT IT IS HARD TO SIT STILL: NOT AT ALL
4. TROUBLE RELAXING: NOT AT ALL
3. WORRYING TOO MUCH ABOUT DIFFERENT THINGS: NOT AT ALL

## 2025-01-14 NOTE — PLAN OF CARE
(Yes to any = 1 point)       Weight Loss > 10 lbs in past year  Yes  Unintentional Loss >5% body weight Yes  BMI < 18.5     No    Physical Endurance/Energy    []        (\"NO\" & \"Every Day\" or \"Every Week = 1 point)   Do you feel full of energy?  Yes  How often resting in bed during          the day last 4 weeks?   Never    Physical Activity     []        (\"Hardly Ever/Never\" for both Moderate and Very Energetic = 1 point)  Mildly Energetic Activity   > 3 times per week  Moderately Energetic Activity  > 3 times per week  Very Energetic Activity   Hardly ever/never    UE Weakness     []        (Score in bottom 20% for Gender & BMI = 1 point)  Avg 3  Strength   32 kg on left hand  Goal based on Gender & BMI  Male, BMI > 28 = 32 kg or higher    26, 25, 29 on R  30, 32, 32, 34 on L    Slow Walking Speed    []        (TUG >19 sec, or bottom 20% walking speed for Gender & Height = 1 point)  Timed Up & Go  12 sec    Score:  1/5, placing the patient in the intermediate/pre-frail category.    Falls Risk Assessment (30 days):   History of Falls?  No  Falls Risk assessed and no intervention required.  Time Up and Go (TUG): Not Assessed  12 seconds or faster (0% functional limitation)     Functional Tests:  TU sec    Balance:  Static Sitting: Normal   Dynamic Sitting: Normal    Static Stance: Good      ROM/Strength: (Blank cells denote NT)   WNL    Exercises/Interventions     Observation: see above    Therapeutic Ex (69985)  Resistance Sets/time Reps Notes/Cues/Progressions   See HEP                                   NMR re-education (75025)                            Therapeutic Activity (99908)                            Manual Intervention (35273) Time:               Modalities:    No modalities applied this session    Education/Home Exercise Program: Patient HEP program created electronically.  Refer to Dining Secretary access code: Access Code: [email or web address]    Access Code: [email or web address]  URL: [email or web address]  Date:

## 2025-01-20 ENCOUNTER — TELEPHONE (OUTPATIENT)
Dept: SURGERY | Age: 75
End: 2025-01-20

## 2025-01-20 ENCOUNTER — PREP FOR PROCEDURE (OUTPATIENT)
Dept: SURGERY | Age: 75
End: 2025-01-20

## 2025-01-20 PROBLEM — C92.00 ACUTE MYELOID LEUKEMIA (HCC): Status: ACTIVE | Noted: 2025-01-20

## 2025-01-20 NOTE — TELEPHONE ENCOUNTER
Patient has been scheduled for:    Procedure: TL Plascencia Central Line Insertion  Date: 2/14/25  Time: 8:00  Arrival: 6:30  Hospital:  [facility]    ASA?:  Prep?    Pre-op?    Post-op Appt?     Patient advised they will need a .    Case request sent and prep for proc orders done     Medication sent to Pharmacy:     Stents or ostomy marking?    Instructions have been mailed/emailed to:   My Chart    Added to outlook calendar    Admit to [facility]

## 2025-01-22 ENCOUNTER — TRANSCRIBE ORDERS (OUTPATIENT)
Dept: ADMINISTRATIVE | Age: 75
End: 2025-01-22

## 2025-01-22 DIAGNOSIS — C92.00 ACUTE MYELOID LEUKEMIA NOT HAVING ACHIEVED REMISSION (HCC): Primary | ICD-10-CM

## 2025-02-03 ENCOUNTER — HOSPITAL ENCOUNTER (OUTPATIENT)
Dept: CT IMAGING | Age: 75
Discharge: HOME | End: 2025-02-03
Attending: INTERNAL MEDICINE
Payer: COMMERCIAL

## 2025-02-03 VITALS
SYSTOLIC BLOOD PRESSURE: 137 MMHG | TEMPERATURE: 98.2 F | WEIGHT: 217 LBS | BODY MASS INDEX: 32.14 KG/M2 | OXYGEN SATURATION: 98 % | HEIGHT: 69 IN | HEART RATE: 72 BPM | DIASTOLIC BLOOD PRESSURE: 70 MMHG | RESPIRATION RATE: 15 BRPM

## 2025-02-03 DIAGNOSIS — C92.00 ACUTE MYELOID LEUKEMIA NOT HAVING ACHIEVED REMISSION (HCC): ICD-10-CM

## 2025-02-03 LAB
ANISOCYTOSIS BLD QL SMEAR: ABNORMAL
APTT BLD: 27.3 SEC (ref 22.1–36.4)
BASOPHILS # BLD: 0 K/UL (ref 0–0.2)
BASOPHILS NFR BLD: 0 %
DEPRECATED RDW RBC AUTO: 16.8 % (ref 12.4–15.4)
EOSINOPHIL # BLD: 0 K/UL (ref 0–0.6)
EOSINOPHIL NFR BLD: 1 %
HCT VFR BLD AUTO: 30.9 % (ref 40.5–52.5)
HGB BLD-MCNC: 10.6 G/DL (ref 13.5–17.5)
INR PPP: 1.06 (ref 0.85–1.15)
LYMPHOCYTES # BLD: 0.8 K/UL (ref 1–5.1)
LYMPHOCYTES NFR BLD: 78 %
MCH RBC QN AUTO: 33.8 PG (ref 26–34)
MCHC RBC AUTO-ENTMCNC: 34.4 G/DL (ref 31–36)
MCV RBC AUTO: 98.3 FL (ref 80–100)
MONOCYTES # BLD: 0 K/UL (ref 0–1.3)
MONOCYTES NFR BLD: 4 %
NEUTROPHILS # BLD: 0.2 K/UL (ref 1.7–7.7)
NEUTROPHILS NFR BLD: 15 %
OVALOCYTES BLD QL SMEAR: ABNORMAL
PLATELET # BLD AUTO: 110 K/UL (ref 135–450)
PMV BLD AUTO: 8 FL (ref 5–10.5)
PROTHROMBIN TIME: 14.1 SEC (ref 11.9–14.9)
RBC # BLD AUTO: 3.14 M/UL (ref 4.2–5.9)
VARIANT LYMPHS NFR BLD MANUAL: 2 % (ref 0–6)
WBC # BLD AUTO: 1 K/UL (ref 4–11)

## 2025-02-03 PROCEDURE — 88342 IMHCHEM/IMCYTCHM 1ST ANTB: CPT

## 2025-02-03 PROCEDURE — 85025 COMPLETE CBC W/AUTO DIFF WBC: CPT

## 2025-02-03 PROCEDURE — 88313 SPECIAL STAINS GROUP 2: CPT

## 2025-02-03 PROCEDURE — 88360 TUMOR IMMUNOHISTOCHEM/MANUAL: CPT

## 2025-02-03 PROCEDURE — 88305 TISSUE EXAM BY PATHOLOGIST: CPT

## 2025-02-03 PROCEDURE — 88185 FLOWCYTOMETRY/TC ADD-ON: CPT

## 2025-02-03 PROCEDURE — 85730 THROMBOPLASTIN TIME PARTIAL: CPT

## 2025-02-03 PROCEDURE — 36415 COLL VENOUS BLD VENIPUNCTURE: CPT

## 2025-02-03 PROCEDURE — 88184 FLOWCYTOMETRY/ TC 1 MARKER: CPT

## 2025-02-03 PROCEDURE — 85610 PROTHROMBIN TIME: CPT

## 2025-02-03 PROCEDURE — 6360000002 HC RX W HCPCS: Performed by: REGISTERED NURSE

## 2025-02-03 PROCEDURE — 6360000002 HC RX W HCPCS: Performed by: RADIOLOGY

## 2025-02-03 PROCEDURE — 88311 DECALCIFY TISSUE: CPT

## 2025-02-03 PROCEDURE — 77012 CT SCAN FOR NEEDLE BIOPSY: CPT

## 2025-02-03 RX ORDER — HEPARIN 100 UNIT/ML
SYRINGE INTRAVENOUS PRN
Status: COMPLETED | OUTPATIENT
Start: 2025-02-03 | End: 2025-02-03

## 2025-02-03 RX ORDER — MIDAZOLAM HYDROCHLORIDE 1 MG/ML
INJECTION, SOLUTION INTRAMUSCULAR; INTRAVENOUS PRN
Status: COMPLETED | OUTPATIENT
Start: 2025-02-03 | End: 2025-02-03

## 2025-02-03 RX ORDER — BUPIVACAINE HYDROCHLORIDE 2.5 MG/ML
INJECTION, SOLUTION INFILTRATION; PERINEURAL PRN
Status: COMPLETED | OUTPATIENT
Start: 2025-02-03 | End: 2025-02-03

## 2025-02-03 RX ORDER — LIDOCAINE HYDROCHLORIDE 10 MG/ML
INJECTION, SOLUTION EPIDURAL; INFILTRATION; INTRACAUDAL; PERINEURAL PRN
Status: COMPLETED | OUTPATIENT
Start: 2025-02-03 | End: 2025-02-03

## 2025-02-03 RX ORDER — FENTANYL CITRATE 50 UG/ML
INJECTION, SOLUTION INTRAMUSCULAR; INTRAVENOUS PRN
Status: COMPLETED | OUTPATIENT
Start: 2025-02-03 | End: 2025-02-03

## 2025-02-03 RX ADMIN — FENTANYL CITRATE 25 MCG: 50 INJECTION, SOLUTION INTRAMUSCULAR; INTRAVENOUS at 11:43

## 2025-02-03 RX ADMIN — MIDAZOLAM HYDROCHLORIDE 1 MG: 1 INJECTION, SOLUTION INTRAMUSCULAR; INTRAVENOUS at 11:35

## 2025-02-03 RX ADMIN — Medication 100 UNITS: at 11:44

## 2025-02-03 RX ADMIN — FENTANYL CITRATE 50 MCG: 50 INJECTION, SOLUTION INTRAMUSCULAR; INTRAVENOUS at 11:35

## 2025-02-03 RX ADMIN — LIDOCAINE HYDROCHLORIDE 10 ML: 10 INJECTION, SOLUTION EPIDURAL; INFILTRATION; INTRACAUDAL; PERINEURAL at 11:38

## 2025-02-03 RX ADMIN — BUPIVACAINE HYDROCHLORIDE 10 ML: 2.5 INJECTION, SOLUTION INFILTRATION; PERINEURAL at 11:40

## 2025-02-03 RX ADMIN — MIDAZOLAM HYDROCHLORIDE 0.5 MG: 1 INJECTION, SOLUTION INTRAMUSCULAR; INTRAVENOUS at 11:43

## 2025-02-03 ASSESSMENT — PAIN - FUNCTIONAL ASSESSMENT
PAIN_FUNCTIONAL_ASSESSMENT: NONE - DENIES PAIN
PAIN_FUNCTIONAL_ASSESSMENT: NONE - DENIES PAIN

## 2025-02-03 NOTE — PROGRESS NOTES
Ambulatory Surgery/Procedure Discharge Note    Vitals:    02/03/25 1301   BP: 137/70   Pulse: 72   Resp: 15   Temp:    SpO2: 98%       No intake/output data recorded.    Restroom use offered before discharge.  Yes    Pain assessment:  none       Pt and wife states \"ready to go home\". Pt alert and oriented x4. Port de-accessed- Flushed with 10ml of NS prior. Denies N/V or pain. Lower back dressing-C,D,I.  Pt tolerating po intake. Discharge instructions given to pt and wife with pt permission. Pt and wife verbalized understanding of all instructions. Left with all belongings and discharge instructions. No new prescriptions.     Patient discharged to home/self care. Patient discharged via wheel chair by transporter to waiting family.

## 2025-02-03 NOTE — SEDATION DOCUMENTATION
IMAGING SERVICES NURSING PROGRESS NOTE    Procedure:  L BM Bx  February 3, 2025  Myles Meehan      Allergies:    Allergies   Allergen Reactions    Shellfish-Derived Products Itching and Rash       Vitals:    02/03/25 1136   BP: 115/69   Pulse:    Resp:    Temp:    SpO2:        Recent lab work reviewed with MD: yes   Procedure explained to patient by MD: yes   Informed consent obtained:yes  Family with patient: Yes    Mental Status:  Normal  Readiness to learn:  Yes  Barriers to learning: No    Pain Assessment Pre-Procedure:  Pain Present:  no  Pain Score:  0  Pain Quality/Description:  none    Time out Procedure Verification with:  [x] RN  [x] Physician  [x] Patient  [x] Other: CT Technologist  Procedure site marked, if applicable:  Yes    Note: Patient arrived A & O x 4, denies pain, breathing easily on room air, Spoke to Dr. Jerald Herring & Patrica Pereyra NP prior to procedure.  Procedural sedation:  Fentanyl:  75 mcg  Versed:    1.5 mg  Post Procedureal Note:  Patient tolerated procedure well.  Breathing easily on room air.  Report given to [facility] RN.  Patient transported in stable conditon to room 4.    Pain Assessment Post-Procedure:  Pain Present:  no  Pain Score:  0  Pain Quality/Description:  none    Plan of Care Goals:  Safety measures met:  Yes  Patient understands explanation of procedure:  Yes    Time in:  1136  Time out:  1151  Mony Gil RN.  R.N. 2/3/2025

## 2025-02-03 NOTE — BRIEF OP NOTE
IR Brief Postprocedure/Postoperative Note    Myles Meehan  YOB: 1950  [id number]    Pre-operative Diagnosis: Acute Myeloid Leukemia    Post-operative Diagnosis: Same    Procedure: CT Guided Bone Marrow Biopsy    Anesthesia: Moderate sedation    Surgeons/Assistants: Patrica Pereyra CNP / Jerald Herring MD    Estimated Blood Loss: Minimal    Complications: none    Specimens: were obtained      JAY Nguyen CNP   2/3/2025

## 2025-02-03 NOTE — DISCHARGE INSTRUCTIONS
Discharge Instructions for Biopsy of  [facility] CT BONE MARROW BIOPSY AND ASPIRATION    Interventional Radiologist performing procedure: Jerald Herring MD  Home Care     Remove the bandage after a day or two.  May apply band-aid if needed.   Diet     Resume your normal diet.    Physical Activity     Rest for the first 24-48 hours.      No driving for 24 hours if you have received analgesia/sedation   May shower.  Do not submerge biopsy site in water (ie:bath, hot tub, swimming pool) for one week to prevent infection    Avoid strenuous activities for one week (excercise, heavy lifting etc.)       Call Your Doctor If Any of the Following Occurs   Signs of infection, including fever and chills     Redness, swelling, increasing pain, excessive bleeding, or any discharge from the incision site     Pain that you can't control with over the counter medications   Cough, shortness of breath, or chest pain     Pain when taking a deep breath     You feel your heart rate is fast   In case of an emergency, call 911 immediately.       [facility] AMBULATORY PROCEDURE DISCHARGE INSTRUCTIONS  A responsible person should be with you for the next 24 hours.       MEDICATION INSTRUCTIONS:  [x]Resume your prescribed medications  [x]You may take a non-prescription “headache remedy”, preferably one that does not contain aspirin.  [x] Keep your medication list updated and carry it with you in case of emergencies.    IF YOU TAKE ANTI-COAGULANTS:  You may typically re-start your blood thinning drugs the day after your procedure UNLESS directed otherwise by the doctor who prescribes the drug for you. Some common blood thinning drugs include  Anti-inflammatory drugs (motrin, aleve)     Aspirin  Anti-coagulants such as plavix (clopidogrel) or coumadin (warfarin)      FOLLOW-UP CARE:  Follow up with your doctor for results and appointment            The above instructions were reviewed with patient/significant other.  The following additional

## 2025-02-03 NOTE — PRE SEDATION
Patient:  Myles Meehan   :   1950      Relevant clinical history, particularly as it involves the pending procedure, was reviewed and discussed.    The procedure including risks, benefits, and alternatives was discussed at length with the patient (or designated family member) and all questions were answered.  Informed consent to proceed with the procedure was given.    Vital signs were monitored and documented by the Radiology nurse.    Targeted physical examination  Heart : regular rate and rhythm  Lungs : clear, breathing easily  Condition : stable    [facility] nurses notes reviewed and agreed.    Past Medical History:        Diagnosis Date    Arthritis     left knee    Hyperlipidemia     Hypertension     Sleep apnea     was unable to wear cpap       Past Surgical History:           Procedure Laterality Date    ANKLE SURGERY Left     COLONOSCOPY      IR PORT PLACEMENT > 5 YEARS  2024    IR PORT PLACEMENT EQUAL OR GREATER THAN 5 YEARS 2024 [facility] CARDIAC CATH/IR/NEURO LAB    LAPAROSCOPIC APPENDECTOMY N/A 3/29/2019    LAPAROSCOPIC APPENDECTOMY performed by Scout Zuñiga MD at [facility] OR    OTHER SURGICAL HISTORY  1/27/15    LEFT TOTAL KNEE ARTHROPLASTY              QUADRACEPS TENDON REPAIR Left 2015    LEFT KNEE QUADRICEPS TENDON REPAIR     SHOULDER SURGERY Right        Allergies:  Shellfish-derived products    Medications:   Home Meds  Current Outpatient Medications on File Prior to Encounter   Medication Sig Dispense Refill    apixaban (ELIQUIS) 5 MG TABS tablet Take by mouth 2 times daily      ursodiol (ACTIGALL) 250 MG tablet Take 1 tablet by mouth 2 times daily (Patient not taking: Reported on 2024) 90 tablet 3    Venetoclax 100 MG TABS Take 4 tablets (400 mg) by mouth daily until fungal coverage (Cresemba) is started, then decrease dose to 2 tablets (200 mg) by mouth daily 60 tablet 2    omega-3 acid ethyl esters (LOVAZA) 1 g capsule Take 1 capsule by mouth 2 times daily (with meals)

## 2025-02-05 ENCOUNTER — HOSPITAL ENCOUNTER (OUTPATIENT)
Age: 75
Setting detail: SPECIMEN
Discharge: HOME | End: 2025-02-05
Payer: COMMERCIAL

## 2025-02-05 ENCOUNTER — HOSPITAL ENCOUNTER (OUTPATIENT)
Dept: ONCOLOGY | Age: 75
Setting detail: INFUSION SERIES
Discharge: HOME | End: 2025-02-05

## 2025-02-05 PROCEDURE — 86901 BLOOD TYPING SEROLOGIC RH(D): CPT | Performed by: INTERNAL MEDICINE

## 2025-02-05 PROCEDURE — 86780 TREPONEMA PALLIDUM: CPT | Performed by: INTERNAL MEDICINE

## 2025-02-05 PROCEDURE — 87340 HEPATITIS B SURFACE AG IA: CPT | Performed by: INTERNAL MEDICINE

## 2025-02-05 PROCEDURE — 86900 BLOOD TYPING SEROLOGIC ABO: CPT | Performed by: INTERNAL MEDICINE

## 2025-02-05 PROCEDURE — 87516 HEPATITIS B DNA AMP PROBE: CPT | Performed by: INTERNAL MEDICINE

## 2025-02-05 PROCEDURE — 87798 DETECT AGENT NOS DNA AMP: CPT | Performed by: INTERNAL MEDICINE

## 2025-02-05 PROCEDURE — 86790 VIRUS ANTIBODY NOS: CPT | Performed by: INTERNAL MEDICINE

## 2025-02-05 PROCEDURE — 86803 HEPATITIS C AB TEST: CPT | Performed by: INTERNAL MEDICINE

## 2025-02-05 PROCEDURE — 86703 HIV-1/HIV-2 1 RESULT ANTBDY: CPT | Performed by: INTERNAL MEDICINE

## 2025-02-05 PROCEDURE — 86644 CMV ANTIBODY: CPT | Performed by: INTERNAL MEDICINE

## 2025-02-05 PROCEDURE — 86704 HEP B CORE ANTIBODY TOTAL: CPT | Performed by: INTERNAL MEDICINE

## 2025-02-05 PROCEDURE — 86753 PROTOZOA ANTIBODY NOS: CPT | Performed by: INTERNAL MEDICINE

## 2025-02-05 NOTE — CARE COORDINATION
Patient and caregiver have been through the educational process for allogeneic bone marrow transplantation including the allogeneic family meeting, preadmission teaching and preadmission physician office visit.      Patient and caregiver verbalize understanding of treatment regimen, possible adverse events, length of stay, and risk and benefits of transplantation and are agreeable to proceed.    Patient and caregiver have been given an opportunity to ask, and to have their questions answered to their satisfaction.    Advanced directives completed and provided to [facility].     Caregivers:      Patient spouse Chani  Patient son: Silver    Patient reports that grandson lives with patient and he has 2 snakes and a tarantula.  I will follow up with coordinator.

## 2025-02-12 ENCOUNTER — APPOINTMENT (OUTPATIENT)
Dept: CT IMAGING | Age: 75
DRG: 157 | End: 2025-02-12
Attending: INTERNAL MEDICINE
Payer: COMMERCIAL

## 2025-02-12 ENCOUNTER — APPOINTMENT (OUTPATIENT)
Dept: GENERAL RADIOLOGY | Age: 75
DRG: 157 | End: 2025-02-12
Attending: INTERNAL MEDICINE
Payer: COMMERCIAL

## 2025-02-12 ENCOUNTER — HOSPITAL ENCOUNTER (INPATIENT)
Age: 75
LOS: 5 days | Discharge: HOME | DRG: 157 | End: 2025-02-17
Attending: INTERNAL MEDICINE | Admitting: INTERNAL MEDICINE
Payer: COMMERCIAL

## 2025-02-12 PROBLEM — C92.01 AML (ACUTE MYELOID LEUKEMIA) IN REMISSION (HCC): Status: ACTIVE | Noted: 2025-02-12

## 2025-02-12 LAB
ALBUMIN SERPL-MCNC: 3.5 G/DL (ref 3.4–5)
ALP SERPL-CCNC: 85 U/L (ref 40–129)
ALT SERPL-CCNC: 10 U/L (ref 10–40)
ANION GAP SERPL CALCULATED.3IONS-SCNC: 12 MMOL/L (ref 3–16)
APTT BLD: 30.6 SEC (ref 22.1–36.4)
AST SERPL-CCNC: 13 U/L (ref 15–37)
BASOPHILS # BLD: 0 K/UL (ref 0–0.2)
BASOPHILS NFR BLD: 0 %
BILIRUB DIRECT SERPL-MCNC: 2.3 MG/DL (ref 0–0.3)
BILIRUB INDIRECT SERPL-MCNC: 1.1 MG/DL (ref 0–1)
BILIRUB SERPL-MCNC: 3.4 MG/DL (ref 0–1)
BUN SERPL-MCNC: 32 MG/DL (ref 7–20)
CALCIUM SERPL-MCNC: 9.5 MG/DL (ref 8.3–10.6)
CHLORIDE SERPL-SCNC: 99 MMOL/L (ref 99–110)
CO2 SERPL-SCNC: 26 MMOL/L (ref 21–32)
CREAT SERPL-MCNC: 1.1 MG/DL (ref 0.8–1.3)
DACRYOCYTES BLD QL SMEAR: ABNORMAL
DEPRECATED RDW RBC AUTO: 17.7 % (ref 12.4–15.4)
DOHLE BOD BLD QL SMEAR: PRESENT
EOSINOPHIL # BLD: 0 K/UL (ref 0–0.6)
EOSINOPHIL NFR BLD: 0 %
GFR SERPLBLD CREATININE-BSD FMLA CKD-EPI: 70 ML/MIN/{1.73_M2}
GGT SERPL-CCNC: 33 U/L (ref 8–61)
GLUCOSE SERPL-MCNC: 117 MG/DL (ref 70–99)
HCT VFR BLD AUTO: 32.3 % (ref 40.5–52.5)
HGB BLD-MCNC: 10.9 G/DL (ref 13.5–17.5)
INR PPP: 1.47 (ref 0.85–1.15)
LDH SERPL L TO P-CCNC: 153 U/L (ref 100–190)
LYMPHOCYTES # BLD: 0.4 K/UL (ref 1–5.1)
LYMPHOCYTES NFR BLD: 11 %
Lab: NORMAL
MAGNESIUM SERPL-MCNC: 1.96 MG/DL (ref 1.8–2.4)
MCH RBC QN AUTO: 33.4 PG (ref 26–34)
MCHC RBC AUTO-ENTMCNC: 33.6 G/DL (ref 31–36)
MCV RBC AUTO: 99.4 FL (ref 80–100)
METAMYELOCYTES NFR BLD MANUAL: 2 %
MONOCYTES # BLD: 0.3 K/UL (ref 0–1.3)
MONOCYTES NFR BLD: 9 %
NEUTROPHILS # BLD: 2.9 K/UL (ref 1.7–7.7)
NEUTROPHILS NFR BLD: 75 %
NEUTS BAND NFR BLD MANUAL: 3 % (ref 0–7)
OVALOCYTES BLD QL SMEAR: ABNORMAL
PHOSPHATE SERPL-MCNC: 3.2 MG/DL (ref 2.5–4.9)
PLATELET # BLD AUTO: 127 K/UL (ref 135–450)
PLATELET BLD QL SMEAR: ABNORMAL
PMV BLD AUTO: 8 FL (ref 5–10.5)
POIKILOCYTOSIS BLD QL SMEAR: ABNORMAL
POTASSIUM SERPL-SCNC: 3.7 MMOL/L (ref 3.5–5.1)
PROT SERPL-MCNC: 6.6 G/DL (ref 6.4–8.2)
PROTHROMBIN TIME: 18 SEC (ref 11.9–14.9)
RBC # BLD AUTO: 3.25 M/UL (ref 4.2–5.9)
REPORT: NORMAL
SLIDE REVIEW: ABNORMAL
SODIUM SERPL-SCNC: 137 MMOL/L (ref 136–145)
THIS TEST SENT TO: NORMAL
URATE SERPL-MCNC: 3.6 MG/DL (ref 3.5–7.2)
WBC # BLD AUTO: 3.6 K/UL (ref 4–11)

## 2025-02-12 PROCEDURE — 84100 ASSAY OF PHOSPHORUS: CPT

## 2025-02-12 PROCEDURE — 87305 ASPERGILLUS AG IA: CPT

## 2025-02-12 PROCEDURE — 2580000003 HC RX 258: Performed by: NURSE PRACTITIONER

## 2025-02-12 PROCEDURE — 82977 ASSAY OF GGT: CPT

## 2025-02-12 PROCEDURE — 70487 CT MAXILLOFACIAL W/DYE: CPT

## 2025-02-12 PROCEDURE — 6370000000 HC RX 637 (ALT 250 FOR IP): Performed by: NURSE PRACTITIONER

## 2025-02-12 PROCEDURE — 87449 NOS EACH ORGANISM AG IA: CPT

## 2025-02-12 PROCEDURE — 2060000000 HC ICU INTERMEDIATE R&B

## 2025-02-12 PROCEDURE — 85730 THROMBOPLASTIN TIME PARTIAL: CPT

## 2025-02-12 PROCEDURE — 85610 PROTHROMBIN TIME: CPT

## 2025-02-12 PROCEDURE — 87040 BLOOD CULTURE FOR BACTERIA: CPT

## 2025-02-12 PROCEDURE — 83735 ASSAY OF MAGNESIUM: CPT

## 2025-02-12 PROCEDURE — 2500000003 HC RX 250 WO HCPCS: Performed by: NURSE PRACTITIONER

## 2025-02-12 PROCEDURE — 87103 BLOOD FUNGUS CULTURE: CPT

## 2025-02-12 PROCEDURE — 84550 ASSAY OF BLOOD/URIC ACID: CPT

## 2025-02-12 PROCEDURE — 93005 ELECTROCARDIOGRAM TRACING: CPT | Performed by: NURSE PRACTITIONER

## 2025-02-12 PROCEDURE — 6360000004 HC RX CONTRAST MEDICATION: Performed by: INTERNAL MEDICINE

## 2025-02-12 PROCEDURE — 80076 HEPATIC FUNCTION PANEL: CPT

## 2025-02-12 PROCEDURE — 83615 LACTATE (LD) (LDH) ENZYME: CPT

## 2025-02-12 PROCEDURE — 85025 COMPLETE CBC W/AUTO DIFF WBC: CPT

## 2025-02-12 PROCEDURE — 71046 X-RAY EXAM CHEST 2 VIEWS: CPT

## 2025-02-12 PROCEDURE — 36415 COLL VENOUS BLD VENIPUNCTURE: CPT

## 2025-02-12 PROCEDURE — 80048 BASIC METABOLIC PNL TOTAL CA: CPT

## 2025-02-12 PROCEDURE — 6360000002 HC RX W HCPCS: Performed by: NURSE PRACTITIONER

## 2025-02-12 RX ORDER — OXYCODONE HYDROCHLORIDE 5 MG/1
10 TABLET ORAL EVERY 4 HOURS PRN
Status: DISCONTINUED | OUTPATIENT
Start: 2025-02-12 | End: 2025-02-17 | Stop reason: HOSPADM

## 2025-02-12 RX ORDER — DIPHENHYDRAMINE HCL 25 MG
25 TABLET ORAL EVERY 8 HOURS PRN
COMMUNITY

## 2025-02-12 RX ORDER — POTASSIUM CHLORIDE 29.8 MG/ML
20 INJECTION INTRAVENOUS PRN
Status: DISCONTINUED | OUTPATIENT
Start: 2025-02-12 | End: 2025-02-17

## 2025-02-12 RX ORDER — URSODIOL 250 MG/1
250 TABLET, FILM COATED ORAL 2 TIMES DAILY
Status: DISCONTINUED | OUTPATIENT
Start: 2025-02-12 | End: 2025-02-13

## 2025-02-12 RX ORDER — OXYCODONE HYDROCHLORIDE 5 MG/1
5 TABLET ORAL EVERY 4 HOURS PRN
Status: DISCONTINUED | OUTPATIENT
Start: 2025-02-12 | End: 2025-02-17 | Stop reason: HOSPADM

## 2025-02-12 RX ORDER — CHLORHEXIDINE GLUCONATE ORAL RINSE 1.2 MG/ML
15 SOLUTION DENTAL 2 TIMES DAILY
Status: DISCONTINUED | OUTPATIENT
Start: 2025-02-12 | End: 2025-02-17 | Stop reason: HOSPADM

## 2025-02-12 RX ORDER — DIPHENHYDRAMINE HCL 25 MG
25 TABLET ORAL EVERY 8 HOURS PRN
Status: DISCONTINUED | OUTPATIENT
Start: 2025-02-12 | End: 2025-02-16

## 2025-02-12 RX ORDER — VALACYCLOVIR HYDROCHLORIDE 500 MG/1
500 TABLET, FILM COATED ORAL 2 TIMES DAILY
Status: DISCONTINUED | OUTPATIENT
Start: 2025-02-12 | End: 2025-02-17 | Stop reason: HOSPADM

## 2025-02-12 RX ORDER — SODIUM CHLORIDE 9 MG/ML
INJECTION, SOLUTION INTRAVENOUS CONTINUOUS
Status: DISCONTINUED | OUTPATIENT
Start: 2025-02-12 | End: 2025-02-17

## 2025-02-12 RX ORDER — ONDANSETRON 4 MG/1
4 TABLET, FILM COATED ORAL EVERY 8 HOURS PRN
Status: DISCONTINUED | OUTPATIENT
Start: 2025-02-12 | End: 2025-02-17 | Stop reason: HOSPADM

## 2025-02-12 RX ORDER — SODIUM CHLORIDE 9 MG/ML
INJECTION, SOLUTION INTRAVENOUS CONTINUOUS PRN
Status: DISCONTINUED | OUTPATIENT
Start: 2025-02-12 | End: 2025-02-17 | Stop reason: HOSPADM

## 2025-02-12 RX ORDER — IOPAMIDOL 755 MG/ML
75 INJECTION, SOLUTION INTRAVASCULAR
Status: COMPLETED | OUTPATIENT
Start: 2025-02-12 | End: 2025-02-12

## 2025-02-12 RX ORDER — CYCLOBENZAPRINE HCL 5 MG
5 TABLET ORAL 3 TIMES DAILY PRN
COMMUNITY

## 2025-02-12 RX ORDER — CYCLOBENZAPRINE HCL 10 MG
5 TABLET ORAL 3 TIMES DAILY PRN
Status: DISCONTINUED | OUTPATIENT
Start: 2025-02-12 | End: 2025-02-17 | Stop reason: HOSPADM

## 2025-02-12 RX ORDER — PANTOPRAZOLE SODIUM 40 MG/1
40 TABLET, DELAYED RELEASE ORAL DAILY
Status: DISCONTINUED | OUTPATIENT
Start: 2025-02-12 | End: 2025-02-17 | Stop reason: HOSPADM

## 2025-02-12 RX ORDER — MAGNESIUM SULFATE IN WATER 40 MG/ML
4000 INJECTION, SOLUTION INTRAVENOUS PRN
Status: DISCONTINUED | OUTPATIENT
Start: 2025-02-12 | End: 2025-02-17 | Stop reason: HOSPADM

## 2025-02-12 RX ORDER — ENOXAPARIN SODIUM 100 MG/ML
40 INJECTION SUBCUTANEOUS DAILY
Status: DISCONTINUED | OUTPATIENT
Start: 2025-02-12 | End: 2025-02-12

## 2025-02-12 RX ORDER — SODIUM CHLORIDE 0.9 % (FLUSH) 0.9 %
5-40 SYRINGE (ML) INJECTION EVERY 12 HOURS SCHEDULED
Status: DISCONTINUED | OUTPATIENT
Start: 2025-02-12 | End: 2025-02-17 | Stop reason: HOSPADM

## 2025-02-12 RX ORDER — SODIUM CHLORIDE 0.9 % (FLUSH) 0.9 %
5-40 SYRINGE (ML) INJECTION PRN
Status: DISCONTINUED | OUTPATIENT
Start: 2025-02-12 | End: 2025-02-17 | Stop reason: HOSPADM

## 2025-02-12 RX ORDER — POSACONAZOLE 100 MG/1
300 TABLET, DELAYED RELEASE ORAL
Status: DISCONTINUED | OUTPATIENT
Start: 2025-02-12 | End: 2025-02-17 | Stop reason: HOSPADM

## 2025-02-12 RX ORDER — SODIUM CHLORIDE 9 MG/ML
INJECTION, SOLUTION INTRAVENOUS PRN
Status: DISCONTINUED | OUTPATIENT
Start: 2025-02-12 | End: 2025-02-17 | Stop reason: HOSPADM

## 2025-02-12 RX ORDER — ALLOPURINOL 300 MG/1
300 TABLET ORAL DAILY
Status: DISCONTINUED | OUTPATIENT
Start: 2025-02-13 | End: 2025-02-17 | Stop reason: HOSPADM

## 2025-02-12 RX ADMIN — POSACONAZOLE 300 MG: 100 TABLET, DELAYED RELEASE ORAL at 18:11

## 2025-02-12 RX ADMIN — PIPERACILLIN SODIUM AND TAZOBACTAM SODIUM 4500 MG: 4; .5 INJECTION, SOLUTION INTRAVENOUS at 18:51

## 2025-02-12 RX ADMIN — SODIUM CHLORIDE: 9 INJECTION, SOLUTION INTRAVENOUS at 18:11

## 2025-02-12 RX ADMIN — CHLORHEXIDINE GLUCONATE 15 ML: 1.2 RINSE ORAL at 20:43

## 2025-02-12 RX ADMIN — URSODIOL 250 MG: 250 TABLET, FILM COATED ORAL at 20:43

## 2025-02-12 RX ADMIN — SODIUM CHLORIDE, PRESERVATIVE FREE 10 ML: 5 INJECTION INTRAVENOUS at 20:43

## 2025-02-12 RX ADMIN — IOPAMIDOL 75 ML: 755 INJECTION, SOLUTION INTRAVENOUS at 19:18

## 2025-02-12 RX ADMIN — VALACYCLOVIR HYDROCHLORIDE 500 MG: 500 TABLET, FILM COATED ORAL at 20:43

## 2025-02-12 ASSESSMENT — PAIN DESCRIPTION - ORIENTATION: ORIENTATION: LEFT

## 2025-02-12 ASSESSMENT — PAIN - FUNCTIONAL ASSESSMENT: PAIN_FUNCTIONAL_ASSESSMENT: PREVENTS OR INTERFERES SOME ACTIVE ACTIVITIES AND ADLS

## 2025-02-12 ASSESSMENT — PAIN DESCRIPTION - ONSET: ONSET: GRADUAL

## 2025-02-12 ASSESSMENT — PAIN SCALES - GENERAL: PAINLEVEL_OUTOF10: 4

## 2025-02-12 ASSESSMENT — PAIN DESCRIPTION - DESCRIPTORS: DESCRIPTORS: SORE;DISCOMFORT

## 2025-02-12 ASSESSMENT — PAIN DESCRIPTION - LOCATION: LOCATION: MOUTH;THROAT

## 2025-02-12 ASSESSMENT — PAIN DESCRIPTION - PAIN TYPE: TYPE: ACUTE PAIN

## 2025-02-12 ASSESSMENT — PAIN DESCRIPTION - FREQUENCY: FREQUENCY: CONTINUOUS

## 2025-02-12 NOTE — H&P
[facility]  History and Physical          Attending Physician: Audi Robin MD    Primary Care: Magaly Womack MD       Referring MD: Audi Robin MD  [address] [place] [address] [place] [address]    Name: Myles Meehan :  1950  MRN:  [id number]    Admission: 2025      Date: 2025    Reason for Admission: Left Jaw swelling concern for infection verses leukemic infiltrate.       History of Present Illness: Myles Meehan is a 74 year old male with a past medical history significant for HTN, recurrent gout and GERD, who presents today for admission for Melphalan and Fludarabine preparative regimen followed by [facility] for AML in [facility].     [facility] with pancytopenia. He had a bone marrow biopsy on 24 and was discharged to home while awaiting results. Unfortunately, flow cytometry came back with 59.3% blasts in the bone marrow. At that time he was started on treatment with Dacogen and Venetoclax. He had a repeat bone marrow biopsy on 10/17/25 which was 35% cellular 3.5 % blasts. He then received 3 additional cycles of Dacogen and Venetoclax. Pre-transplant biopsy on 2/3/25 showed hypocellular sample; leukocytes are predominantly heterogeneous T-cells with no increased blast population identified.     He was planned to admit on 25  for Melphalan and Fludarabine preparative regimen followed by [facility]. However, he presented to his appointment today at [facility] [address] with new onset left jaw swelling.  He reports that the swelling has been present and worsening for the last several days. He thinks he may have poked it with a toothpick. He has not been able to eat for the last three days. He has been drinking protein shakes and broth. We are concerned he may have an infection, but he has not had a fever. Other considerations include leukemic infiltrate. We will consult ENT for a biopsy. He is

## 2025-02-13 ENCOUNTER — APPOINTMENT (OUTPATIENT)
Dept: CT IMAGING | Age: 75
DRG: 157 | End: 2025-02-13
Attending: INTERNAL MEDICINE
Payer: COMMERCIAL

## 2025-02-13 ENCOUNTER — HOSPITAL ENCOUNTER (INPATIENT)
Dept: INTERVENTIONAL RADIOLOGY/VASCULAR | Age: 75
Discharge: HOME | DRG: 157 | End: 2025-02-15
Attending: INTERNAL MEDICINE
Payer: COMMERCIAL

## 2025-02-13 LAB
ANION GAP SERPL CALCULATED.3IONS-SCNC: 13 MMOL/L (ref 3–16)
APTT BLD: 42.2 SEC (ref 22.1–36.4)
BASOPHILS # BLD: 0 K/UL (ref 0–0.2)
BASOPHILS NFR BLD: 0.6 %
BUN SERPL-MCNC: 29 MG/DL (ref 7–20)
CALCIUM SERPL-MCNC: 9.4 MG/DL (ref 8.3–10.6)
CHLORIDE SERPL-SCNC: 102 MMOL/L (ref 99–110)
CO2 SERPL-SCNC: 24 MMOL/L (ref 21–32)
CREAT SERPL-MCNC: 1.1 MG/DL (ref 0.8–1.3)
DEPRECATED RDW RBC AUTO: 17.2 % (ref 12.4–15.4)
ECHO BSA: 2.15 M2
EKG ATRIAL RATE: 93 BPM
EKG DIAGNOSIS: NORMAL
EKG P AXIS: 69 DEGREES
EKG P-R INTERVAL: 168 MS
EKG Q-T INTERVAL: 346 MS
EKG QRS DURATION: 106 MS
EKG QTC CALCULATION (BAZETT): 430 MS
EKG R AXIS: 39 DEGREES
EKG T AXIS: -4 DEGREES
EKG VENTRICULAR RATE: 93 BPM
EOSINOPHIL # BLD: 0 K/UL (ref 0–0.6)
EOSINOPHIL NFR BLD: 0 %
GFR SERPLBLD CREATININE-BSD FMLA CKD-EPI: 70 ML/MIN/{1.73_M2}
GLUCOSE SERPL-MCNC: 129 MG/DL (ref 70–99)
HCT VFR BLD AUTO: 25.8 % (ref 40.5–52.5)
HGB BLD-MCNC: 8.7 G/DL (ref 13.5–17.5)
INR PPP: 1.61 (ref 0.85–1.15)
LYMPHOCYTES # BLD: 0.4 K/UL (ref 1–5.1)
LYMPHOCYTES NFR BLD: 7.1 %
MAGNESIUM SERPL-MCNC: 1.94 MG/DL (ref 1.8–2.4)
MCH RBC QN AUTO: 34.4 PG (ref 26–34)
MCHC RBC AUTO-ENTMCNC: 33.7 G/DL (ref 31–36)
MCV RBC AUTO: 102.1 FL (ref 80–100)
MONOCYTES # BLD: 0.4 K/UL (ref 0–1.3)
MONOCYTES NFR BLD: 8.8 %
NEUTROPHILS # BLD: 4.2 K/UL (ref 1.7–7.7)
NEUTROPHILS NFR BLD: 83.5 %
PHOSPHATE SERPL-MCNC: 3.1 MG/DL (ref 2.5–4.9)
PLATELET # BLD AUTO: 150 K/UL (ref 135–450)
PMV BLD AUTO: 8.1 FL (ref 5–10.5)
POTASSIUM SERPL-SCNC: 3.7 MMOL/L (ref 3.5–5.1)
PROTHROMBIN TIME: 19.3 SEC (ref 11.9–14.9)
RBC # BLD AUTO: 2.52 M/UL (ref 4.2–5.9)
SODIUM SERPL-SCNC: 139 MMOL/L (ref 136–145)
WBC # BLD AUTO: 5.1 K/UL (ref 4–11)

## 2025-02-13 PROCEDURE — 87070 CULTURE OTHR SPECIMN AEROBIC: CPT

## 2025-02-13 PROCEDURE — 2580000003 HC RX 258: Performed by: NURSE PRACTITIONER

## 2025-02-13 PROCEDURE — 2060000000 HC ICU INTERMEDIATE R&B

## 2025-02-13 PROCEDURE — 6360000002 HC RX W HCPCS: Performed by: NURSE PRACTITIONER

## 2025-02-13 PROCEDURE — 6370000000 HC RX 637 (ALT 250 FOR IP): Performed by: NURSE PRACTITIONER

## 2025-02-13 PROCEDURE — 36589 REMOVAL TUNNELED CV CATH: CPT

## 2025-02-13 PROCEDURE — 85730 THROMBOPLASTIN TIME PARTIAL: CPT

## 2025-02-13 PROCEDURE — 99152 MOD SED SAME PHYS/QHP 5/>YRS: CPT

## 2025-02-13 PROCEDURE — 88275 CYTOGENETICS 100-300: CPT

## 2025-02-13 PROCEDURE — 88264 CHROMOSOME ANALYSIS 20-25: CPT

## 2025-02-13 PROCEDURE — 079T3ZX DRAINAGE OF BONE MARROW, PERCUTANEOUS APPROACH, DIAGNOSTIC: ICD-10-PCS | Performed by: INTERNAL MEDICINE

## 2025-02-13 PROCEDURE — 2500000003 HC RX 250 WO HCPCS: Performed by: NURSE PRACTITIONER

## 2025-02-13 PROCEDURE — 93010 ELECTROCARDIOGRAM REPORT: CPT | Performed by: INTERNAL MEDICINE

## 2025-02-13 PROCEDURE — 84100 ASSAY OF PHOSPHORUS: CPT

## 2025-02-13 PROCEDURE — 88313 SPECIAL STAINS GROUP 2: CPT

## 2025-02-13 PROCEDURE — 88185 FLOWCYTOMETRY/TC ADD-ON: CPT

## 2025-02-13 PROCEDURE — 36415 COLL VENOUS BLD VENIPUNCTURE: CPT

## 2025-02-13 PROCEDURE — 80048 BASIC METABOLIC PNL TOTAL CA: CPT

## 2025-02-13 PROCEDURE — 88305 TISSUE EXAM BY PATHOLOGIST: CPT

## 2025-02-13 PROCEDURE — 6360000002 HC RX W HCPCS: Performed by: INTERNAL MEDICINE

## 2025-02-13 PROCEDURE — 88311 DECALCIFY TISSUE: CPT

## 2025-02-13 PROCEDURE — 85610 PROTHROMBIN TIME: CPT

## 2025-02-13 PROCEDURE — 6360000002 HC RX W HCPCS: Performed by: STUDENT IN AN ORGANIZED HEALTH CARE EDUCATION/TRAINING PROGRAM

## 2025-02-13 PROCEDURE — 88184 FLOWCYTOMETRY/ TC 1 MARKER: CPT

## 2025-02-13 PROCEDURE — 88360 TUMOR IMMUNOHISTOCHEM/MANUAL: CPT

## 2025-02-13 PROCEDURE — 99222 1ST HOSP IP/OBS MODERATE 55: CPT | Performed by: OTOLARYNGOLOGY

## 2025-02-13 PROCEDURE — 70490 CT SOFT TISSUE NECK W/O DYE: CPT

## 2025-02-13 PROCEDURE — 99153 MOD SED SAME PHYS/QHP EA: CPT

## 2025-02-13 PROCEDURE — 80074 ACUTE HEPATITIS PANEL: CPT

## 2025-02-13 PROCEDURE — 6360000002 HC RX W HCPCS

## 2025-02-13 PROCEDURE — 88271 CYTOGENETICS DNA PROBE: CPT

## 2025-02-13 PROCEDURE — 87205 SMEAR GRAM STAIN: CPT

## 2025-02-13 PROCEDURE — 85025 COMPLETE CBC W/AUTO DIFF WBC: CPT

## 2025-02-13 PROCEDURE — 88237 TISSUE CULTURE BONE MARROW: CPT

## 2025-02-13 PROCEDURE — 87075 CULTR BACTERIA EXCEPT BLOOD: CPT

## 2025-02-13 PROCEDURE — 83735 ASSAY OF MAGNESIUM: CPT

## 2025-02-13 RX ORDER — MIDAZOLAM HYDROCHLORIDE 1 MG/ML
INJECTION, SOLUTION INTRAMUSCULAR; INTRAVENOUS PRN
Status: COMPLETED | OUTPATIENT
Start: 2025-02-13 | End: 2025-02-13

## 2025-02-13 RX ORDER — OXYMETAZOLINE HYDROCHLORIDE 0.05 G/100ML
2 SPRAY NASAL 2 TIMES DAILY
Status: DISPENSED | OUTPATIENT
Start: 2025-02-13 | End: 2025-02-16

## 2025-02-13 RX ORDER — LORAZEPAM 2 MG/ML
1 INJECTION INTRAMUSCULAR ONCE
Status: DISCONTINUED | OUTPATIENT
Start: 2025-02-13 | End: 2025-02-13

## 2025-02-13 RX ORDER — LIDOCAINE HYDROCHLORIDE 10 MG/ML
INJECTION, SOLUTION EPIDURAL; INFILTRATION; INTRACAUDAL; PERINEURAL PRN
Status: COMPLETED | OUTPATIENT
Start: 2025-02-13 | End: 2025-02-13

## 2025-02-13 RX ORDER — URSODIOL 250 MG/1
500 TABLET, FILM COATED ORAL 2 TIMES DAILY
Status: DISCONTINUED | OUTPATIENT
Start: 2025-02-13 | End: 2025-02-17 | Stop reason: HOSPADM

## 2025-02-13 RX ORDER — DEXAMETHASONE SODIUM PHOSPHATE 4 MG/ML
8 INJECTION, SOLUTION INTRA-ARTICULAR; INTRALESIONAL; INTRAMUSCULAR; INTRAVENOUS; SOFT TISSUE EVERY 24 HOURS
Status: COMPLETED | OUTPATIENT
Start: 2025-02-13 | End: 2025-02-15

## 2025-02-13 RX ORDER — LIDOCAINE HYDROCHLORIDE AND EPINEPHRINE 10; 10 MG/ML; UG/ML
INJECTION, SOLUTION INFILTRATION; PERINEURAL PRN
Status: COMPLETED | OUTPATIENT
Start: 2025-02-13 | End: 2025-02-13

## 2025-02-13 RX ORDER — LORAZEPAM 2 MG/ML
2 INJECTION INTRAMUSCULAR ONCE
Status: COMPLETED | OUTPATIENT
Start: 2025-02-13 | End: 2025-02-13

## 2025-02-13 RX ADMIN — POSACONAZOLE 300 MG: 100 TABLET, DELAYED RELEASE ORAL at 09:23

## 2025-02-13 RX ADMIN — MIDAZOLAM HYDROCHLORIDE 1 MG: 1 INJECTION, SOLUTION INTRAMUSCULAR; INTRAVENOUS at 08:31

## 2025-02-13 RX ADMIN — URSODIOL 250 MG: 250 TABLET, FILM COATED ORAL at 09:28

## 2025-02-13 RX ADMIN — PIPERACILLIN AND TAZOBACTAM 3375 MG: 3; .375 INJECTION, POWDER, LYOPHILIZED, FOR SOLUTION INTRAVENOUS at 00:43

## 2025-02-13 RX ADMIN — SODIUM CHLORIDE: 9 INJECTION, SOLUTION INTRAVENOUS at 18:12

## 2025-02-13 RX ADMIN — PIPERACILLIN AND TAZOBACTAM 3375 MG: 3; .375 INJECTION, POWDER, LYOPHILIZED, FOR SOLUTION INTRAVENOUS at 09:43

## 2025-02-13 RX ADMIN — DEXAMETHASONE SODIUM PHOSPHATE 8 MG: 4 INJECTION INTRA-ARTICULAR; INTRALESIONAL; INTRAMUSCULAR; INTRAVENOUS; SOFT TISSUE at 09:46

## 2025-02-13 RX ADMIN — VALACYCLOVIR HYDROCHLORIDE 500 MG: 500 TABLET, FILM COATED ORAL at 09:21

## 2025-02-13 RX ADMIN — SODIUM CHLORIDE, PRESERVATIVE FREE 10 ML: 5 INJECTION INTRAVENOUS at 20:47

## 2025-02-13 RX ADMIN — SODIUM CHLORIDE 15 ML: 900 IRRIGANT IRRIGATION at 20:47

## 2025-02-13 RX ADMIN — PANTOPRAZOLE SODIUM 40 MG: 40 TABLET, DELAYED RELEASE ORAL at 09:21

## 2025-02-13 RX ADMIN — FENTANYL CITRATE 50 MCG: 50 INJECTION, SOLUTION INTRAMUSCULAR; INTRAVENOUS at 08:31

## 2025-02-13 RX ADMIN — ALLOPURINOL 300 MG: 300 TABLET ORAL at 09:23

## 2025-02-13 RX ADMIN — VALACYCLOVIR HYDROCHLORIDE 500 MG: 500 TABLET, FILM COATED ORAL at 20:38

## 2025-02-13 RX ADMIN — LIDOCAINE HYDROCHLORIDE 10 ML: 10 INJECTION, SOLUTION EPIDURAL; INFILTRATION; INTRACAUDAL; PERINEURAL at 08:30

## 2025-02-13 RX ADMIN — Medication 2 SPRAY: at 20:39

## 2025-02-13 RX ADMIN — PIPERACILLIN AND TAZOBACTAM 3375 MG: 3; .375 INJECTION, POWDER, LYOPHILIZED, FOR SOLUTION INTRAVENOUS at 17:04

## 2025-02-13 RX ADMIN — LORAZEPAM 2 MG: 2 INJECTION INTRAMUSCULAR; INTRAVENOUS at 13:12

## 2025-02-13 RX ADMIN — URSODIOL 500 MG: 250 TABLET, FILM COATED ORAL at 20:38

## 2025-02-13 RX ADMIN — LIDOCAINE HYDROCHLORIDE,EPINEPHRINE BITARTRATE 10 ML: 10; .01 INJECTION, SOLUTION INFILTRATION; PERINEURAL at 08:33

## 2025-02-13 RX ADMIN — CHLORHEXIDINE GLUCONATE 15 ML: 1.2 RINSE ORAL at 20:38

## 2025-02-13 RX ADMIN — CHLORHEXIDINE GLUCONATE 15 ML: 1.2 RINSE ORAL at 09:23

## 2025-02-13 RX ADMIN — Medication 2 SPRAY: at 09:45

## 2025-02-13 RX ADMIN — SODIUM CHLORIDE, PRESERVATIVE FREE 10 ML: 5 INJECTION INTRAVENOUS at 09:23

## 2025-02-13 ASSESSMENT — PAIN SCALES - GENERAL
PAINLEVEL_OUTOF10: 0
PAINLEVEL_OUTOF10: 0

## 2025-02-13 NOTE — PROGRESS NOTES
Patient admitted to [facility] via self from [facility] for diagnosis of possible oral infection.    Patient is alert and oriented to room including call light and bed controls.  Admission assessment completed - see admission flowsheet documentation. Patient is a low fall risk. Safety measures instituted per policy.     Patient oriented to unit policies and procedures including: pain management practices, unit safety precautions, family rapid response, q4h vital signs and assessments, daily 4am lab draws, weekly chest x-rays, daily chlorhexidine bathing, standing transfusion orders, and routine central line care.  Also discussed use of call light and how to get in touch with nursing staff.  Stressed the importance of calling out immediately for any changes in condition including but not limited to: pain, chills, fever, nausea, vomiting, diarrhea, chest pain, sob/kemp, assistance with toileting, bleeding, or any other symptoms that are out of the ordinary for the patient.  Patient verbalizes understanding of all instructions and will call for assistance as needed.    Labs and blood cultures drawn peripherally. Fluids and zosyn administered through left peripheral IV. RN notified NP, Nimco Del Angel, that patient has difficulty swallowing. Nimco assessed patient. Pt not having difficulty breathing at this time. Will continue to monitor. IR and ENT consulted. Head CT scan ordered.

## 2025-02-13 NOTE — PROCEDURES
PROCEDURE NOTE  Date: 2/13/2025   Name: Myles Meehan  YOB: 1950    Procedures        Procedure: Bone Marrow Biopsy and Needle Aspirate   Indication: Evaluation of disease status    Anesthesia:  Lidocaine 1% 10 mL    Patient given risks and benefits of procedure. Consent signed and time out performed.  Patient placed in the right lateral decubitus position. Area cleaned and prepped in a sterile fashion with chloraprep. Sterile drape applied. Lidocaine 1% -10ml administered to the subcutaneous tissue and periosteimum of the left iliac crest. Using sterile technique and using an Jamshidi needle a bone marrow aspirate was performed. A puncture was then made with the provided scalpel and using a Jamshidi needle a biopsy was taken from the left posterior iliac crest. A sterile bandage was applied. No significant bleeding. Sample sent for histology, flow cytometry, FISH, cytogenetics and molecular studies including NGS & PCR testing. Patient tolerated procedure well.     Estimated Blood Loss: 10 mls    Stephanie Ortiz, APRN - CNP

## 2025-02-13 NOTE — CARE COORDINATION
Case Management Assessment  Initial Evaluation    Date/Time of Evaluation: 2/13/2025 10:28 AM  Assessment Completed by: DARSHAN Delgadillo   for [facility] ([facility])  [employer] Mobile: [phone]    If patient is discharged prior to next notation, then this note serves as note for discharge by case management.    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 Yes ([place])    Chart Reviewed: Yes      History Provided by: Patient  Patient Orientation: Alert and Oriented    Patient Cognition: Alert    Hospitalization in the last 30 days (Readmission):  No    If yes, Readmission Assessment in CM Navigator will be completed.    Advance Directives:      Code Status: Full Code   Patient's Primary Decision Maker is: Named in Scanned ACP Document    Primary Decision Maker: Chani Meehan - Spouse - [phone]    Discharge Planning:    Patient lives with: Spouse/Significant Other Type of Home: House  Primary Care Giver: Self  Patient Support Systems include: Spouse/Significant Other   Current Financial resources: Other (Comment) ([facility] [place] medicare)  Current community resources: None  Current services prior to admission: Other (Comment) ([place])            Current DME:              Type of Home Care services:  None    ADLS  Prior functional level: Independent in ADLs/IADLs  Current functional level: Independent in ADLs/IADLs    PT AM-PAC:   /24  OT AM-PAC:   /24    Family can provide assistance at DC: Yes  Would you like Case Management to discuss the discharge plan with any other family members/significant others, and if so, who? Yes  Plans to Return

## 2025-02-13 NOTE — PROGRESS NOTES
4 Eyes Skin Assessment     NAME:  Myles Meehan  YOB: 1950  MEDICAL RECORD NUMBER:  [id number]    The patient is being assessed for  Admission    I agree that at least one RN has performed a thorough Head to Toe Skin Assessment on the patient. ALL assessment sites listed below have been assessed.      Areas assessed by both nurses:    Head, Face, Ears, Shoulders, Back, Chest, Arms, Elbows, Hands, Sacrum. Buttock, Coccyx, Ischium, Legs. Feet and Heels, and Under Medical Devices         Does the Patient have a Wound? No noted wound(s)       Tommy Prevention initiated by RN: No  Wound Care Orders initiated by RN: No    Pressure Injury (Stage 3,4, Unstageable, DTI, NWPT, and Complex wounds) if present, place Wound referral order by RN under : No    New Ostomies, if present place, Ostomy referral order under : No     Nurse 1 eSignature: Electronically signed by Lena Moeller RN on 2/13/25 at 4:57 AM EST    **SHARE this note so that the co-signing nurse can place an eSignature**    Nurse 2 eSignature: Electronically signed by Eleni Houser RN on 2/13/25 at 4:58 AM EST

## 2025-02-13 NOTE — PLAN OF CARE
Problem: Pain  Goal: Verbalizes/displays adequate comfort level or baseline comfort level  2/13/2025 1724 by Zabrina Briseno RN  Outcome: Progressing  Flowsheets (Taken 2/13/2025 1724)  Verbalizes/displays adequate comfort level or baseline comfort level:   Encourage patient to monitor pain and request assistance   Assess pain using appropriate pain scale   Administer analgesics based on type and severity of pain and evaluate response   Implement non-pharmacological measures as appropriate and evaluate response   Notify Licensed Independent Practitioner if interventions unsuccessful or patient reports new pain     Problem: Safety - Adult  Goal: Free from fall injury  2/13/2025 1724 by Zabrina Briseno RN  Outcome: Progressing  Flowsheets (Taken 2/13/2025 1724)  Free From Fall Injury: Instruct family/caregiver on patient safety  Note: Orthostatic vital signs obtained at start of shift - see flowsheet for details.  Pt does not meet criteria for orthostasis.  Pt is a Med fall risk. See Esteves Fall Score and ABCDS Injury Risk assessments.   - Screening for Orthostasis AND not a [place] Risk per ESTEVES/ABCDS: Pt bed is in low position, side rails up, call light and belongings are in reach.  Fall risk light is on outside pts room.  Pt encouraged to call for assistance as needed. Will continue with hourly rounds for PO intake, pain needs, toileting and repositioning as needed.       Problem: ABCDS Injury Assessment  Goal: Absence of physical injury  2/13/2025 1724 by Zabrina Briseno, RN  Outcome: Progressing  Flowsheets (Taken 2/13/2025 1724)  Absence of Physical Injury: Implement safety measures based on patient assessment

## 2025-02-13 NOTE — PLAN OF CARE
Problem: Pain  Goal: Verbalizes/displays adequate comfort level or baseline comfort level  Outcome: Progressing  Flowsheets (Taken 2/13/2025 0454)  Verbalizes/displays adequate comfort level or baseline comfort level:   Encourage patient to monitor pain and request assistance   Assess pain using appropriate pain scale   Administer analgesics based on type and severity of pain and evaluate response   Implement non-pharmacological measures as appropriate and evaluate response     Problem: Safety - Adult  Goal: Free from fall injury  Outcome: Progressing  Flowsheets (Taken 2/13/2025 0454)  Free From Fall Injury: Instruct family/caregiver on patient safety      Periactin to 8 mg 6 days on 1 day off  Continue with Reglan 5 mg 3 times daily 15 minutes before meals 6 days on and 1 day off  Continue with Amitiza  Protonix 20 mg once daily   Zofran as needed for severe nausea   Follow up in 2-3 months     Office contact number: [phone]  Outpatient lab Location: 3rd floor, [address]  Same day X ray: Please go to outpatient registration in ground floor for guidance  Scheduling Image: Please call [phone] to schedule any imaging

## 2025-02-13 NOTE — PROGRESS NOTES
[facility] Progress Note    2025     Myles Meehan    MRN: [id number]    : 1950    Referring MD: Audi Robin MD  [facility] [address] [place] [address]      SUBJECTIVE:  Patient admitted wt severe throat pain and swelling.  Denies fever since admission.  Exam consistent with either infection or leukemic infiltration of oropharynx    ECOG PS:  (1) Restricted in physically strenuous activity, ambulatory and able to do work of light nature    KPS: 90% Able to carry on normal activity; minor signs or symptoms of disease    Isolation: None    Medications    Scheduled Meds:   oxymetazoline  2 spray Each Nostril BID    dexAMETHasone  8 mg IntraVENous Q24H    sodium chloride flush  5-40 mL IntraVENous 2 times per day    Saline Mouthwash  15 mL Swish & Spit 4x Daily AC & HS    posaconazole  300 mg Oral Daily with breakfast    allopurinol  300 mg Oral Daily    [Held by provider] apixaban  5 mg Oral BID    chlorhexidine  15 mL Mouth/Throat BID    pantoprazole  40 mg Oral Daily    ursodiol  250 mg Oral BID    valACYclovir  500 mg Oral BID    piperacillin-tazobactam  3,375 mg IntraVENous Q8H     Continuous Infusions:   sodium chloride      sodium chloride      sodium chloride 100 mL/hr at 25 1811     PRN Meds:.sodium chloride, sodium chloride flush, sodium chloride, potassium chloride, magnesium sulfate, Saline Mouthwash, ALTEplase (CATHFLO) 2 mg in sterile water 2 mL injection, cyclobenzaprine, diphenhydrAMINE, ondansetron, oxyCODONE **OR** oxyCODONE    ROS:  As noted above, otherwise remainder of 10-point ROS negative    Physical Exam:     I&O:    Intake/Output Summary (Last 24 hours) at 2025 0858  Last data filed at 2025 0600  Gross per 24 hour   Intake 1472 ml   Output 700 ml   Net 772 ml       Vital Signs:  BP (!) 122/95   Pulse 98   Temp 99.7 °F (37.6 °C) (Oral)   Resp 18   Ht 1.718 m (5' 7.64\") Comment: actual height, no shoes, no hat  Wt 96.4 kg (212 lb 9.6 oz)

## 2025-02-14 LAB
ALBUMIN SERPL-MCNC: 2.9 G/DL (ref 3.4–5)
ALP SERPL-CCNC: 78 U/L (ref 40–129)
ALT SERPL-CCNC: 12 U/L (ref 10–40)
ANION GAP SERPL CALCULATED.3IONS-SCNC: 9 MMOL/L (ref 3–16)
AST SERPL-CCNC: 13 U/L (ref 15–37)
BASOPHILS # BLD: 0 K/UL (ref 0–0.2)
BASOPHILS NFR BLD: 0 %
BILIRUB DIRECT SERPL-MCNC: 1.5 MG/DL (ref 0–0.3)
BILIRUB INDIRECT SERPL-MCNC: 0.4 MG/DL (ref 0–1)
BILIRUB SERPL-MCNC: 1.9 MG/DL (ref 0–1)
BUN SERPL-MCNC: 27 MG/DL (ref 7–20)
CALCIUM SERPL-MCNC: 9.1 MG/DL (ref 8.3–10.6)
CHLORIDE SERPL-SCNC: 106 MMOL/L (ref 99–110)
CO2 SERPL-SCNC: 24 MMOL/L (ref 21–32)
CREAT SERPL-MCNC: 0.8 MG/DL (ref 0.8–1.3)
DEPRECATED RDW RBC AUTO: 17.4 % (ref 12.4–15.4)
EOSINOPHIL # BLD: 0 K/UL (ref 0–0.6)
EOSINOPHIL NFR BLD: 0 %
GALACTOMANNAN AG SERPL IA-ACNC: 0.04
GALACTOMANNAN AG SERPL QL IA: NEGATIVE
GFR SERPLBLD CREATININE-BSD FMLA CKD-EPI: >90 ML/MIN/{1.73_M2}
GGT SERPL-CCNC: 34 U/L (ref 8–61)
GLUCOSE SERPL-MCNC: 180 MG/DL (ref 70–99)
HAV IGM SERPL QL IA: NORMAL
HBV CORE IGM SERPL QL IA: NORMAL
HBV SURFACE AG SERPL QL IA: NORMAL
HCT VFR BLD AUTO: 23.8 % (ref 40.5–52.5)
HCV AB SERPL QL IA: NORMAL
HGB BLD-MCNC: 8.1 G/DL (ref 13.5–17.5)
LDH SERPL L TO P-CCNC: 146 U/L (ref 100–190)
LYMPHOCYTES # BLD: 0.5 K/UL (ref 1–5.1)
LYMPHOCYTES NFR BLD: 11 %
MAGNESIUM SERPL-MCNC: 2.22 MG/DL (ref 1.8–2.4)
MCH RBC QN AUTO: 33.8 PG (ref 26–34)
MCHC RBC AUTO-ENTMCNC: 33.9 G/DL (ref 31–36)
MCV RBC AUTO: 99.8 FL (ref 80–100)
METAMYELOCYTES NFR BLD MANUAL: 1 %
MONOCYTES # BLD: 0.2 K/UL (ref 0–1.3)
MONOCYTES NFR BLD: 4 %
NEUTROPHILS # BLD: 3.5 K/UL (ref 1.7–7.7)
NEUTROPHILS NFR BLD: 81 %
NEUTS BAND NFR BLD MANUAL: 3 % (ref 0–7)
OVALOCYTES BLD QL SMEAR: ABNORMAL
PHOSPHATE SERPL-MCNC: 3.1 MG/DL (ref 2.5–4.9)
PLATELET # BLD AUTO: 149 K/UL (ref 135–450)
PLATELET BLD QL SMEAR: ADEQUATE
PMV BLD AUTO: 9.6 FL (ref 5–10.5)
POTASSIUM SERPL-SCNC: 3.6 MMOL/L (ref 3.5–5.1)
PROT SERPL-MCNC: 5.6 G/DL (ref 6.4–8.2)
RBC # BLD AUTO: 2.39 M/UL (ref 4.2–5.9)
SLIDE REVIEW: ABNORMAL
SODIUM SERPL-SCNC: 139 MMOL/L (ref 136–145)
URATE SERPL-MCNC: 2.4 MG/DL (ref 3.5–7.2)
WBC # BLD AUTO: 4.1 K/UL (ref 4–11)

## 2025-02-14 PROCEDURE — 84100 ASSAY OF PHOSPHORUS: CPT

## 2025-02-14 PROCEDURE — 80048 BASIC METABOLIC PNL TOTAL CA: CPT

## 2025-02-14 PROCEDURE — 6370000000 HC RX 637 (ALT 250 FOR IP): Performed by: NURSE PRACTITIONER

## 2025-02-14 PROCEDURE — 2580000003 HC RX 258: Performed by: NURSE PRACTITIONER

## 2025-02-14 PROCEDURE — 84550 ASSAY OF BLOOD/URIC ACID: CPT

## 2025-02-14 PROCEDURE — 6360000002 HC RX W HCPCS: Performed by: NURSE PRACTITIONER

## 2025-02-14 PROCEDURE — 80076 HEPATIC FUNCTION PANEL: CPT

## 2025-02-14 PROCEDURE — 83615 LACTATE (LD) (LDH) ENZYME: CPT

## 2025-02-14 PROCEDURE — 2500000003 HC RX 250 WO HCPCS: Performed by: NURSE PRACTITIONER

## 2025-02-14 PROCEDURE — 36592 COLLECT BLOOD FROM PICC: CPT

## 2025-02-14 PROCEDURE — 85025 COMPLETE CBC W/AUTO DIFF WBC: CPT

## 2025-02-14 PROCEDURE — 2060000000 HC ICU INTERMEDIATE R&B

## 2025-02-14 PROCEDURE — 36415 COLL VENOUS BLD VENIPUNCTURE: CPT

## 2025-02-14 PROCEDURE — 82977 ASSAY OF GGT: CPT

## 2025-02-14 PROCEDURE — 6360000002 HC RX W HCPCS: Performed by: INTERNAL MEDICINE

## 2025-02-14 PROCEDURE — 83735 ASSAY OF MAGNESIUM: CPT

## 2025-02-14 RX ORDER — FUROSEMIDE 10 MG/ML
20 INJECTION INTRAMUSCULAR; INTRAVENOUS ONCE
Status: COMPLETED | OUTPATIENT
Start: 2025-02-14 | End: 2025-02-14

## 2025-02-14 RX ADMIN — CHLORHEXIDINE GLUCONATE 15 ML: 1.2 RINSE ORAL at 22:03

## 2025-02-14 RX ADMIN — URSODIOL 500 MG: 250 TABLET, FILM COATED ORAL at 22:03

## 2025-02-14 RX ADMIN — SODIUM CHLORIDE: 9 INJECTION, SOLUTION INTRAVENOUS at 19:08

## 2025-02-14 RX ADMIN — VALACYCLOVIR HYDROCHLORIDE 500 MG: 500 TABLET, FILM COATED ORAL at 08:52

## 2025-02-14 RX ADMIN — SODIUM CHLORIDE, PRESERVATIVE FREE 10 ML: 5 INJECTION INTRAVENOUS at 08:53

## 2025-02-14 RX ADMIN — POSACONAZOLE 300 MG: 100 TABLET, DELAYED RELEASE ORAL at 08:52

## 2025-02-14 RX ADMIN — Medication 2 SPRAY: at 09:03

## 2025-02-14 RX ADMIN — ALLOPURINOL 300 MG: 300 TABLET ORAL at 08:52

## 2025-02-14 RX ADMIN — PIPERACILLIN AND TAZOBACTAM 3375 MG: 3; .375 INJECTION, POWDER, LYOPHILIZED, FOR SOLUTION INTRAVENOUS at 00:51

## 2025-02-14 RX ADMIN — SODIUM CHLORIDE, PRESERVATIVE FREE 20 ML: 5 INJECTION INTRAVENOUS at 22:03

## 2025-02-14 RX ADMIN — DEXAMETHASONE SODIUM PHOSPHATE 8 MG: 4 INJECTION INTRA-ARTICULAR; INTRALESIONAL; INTRAMUSCULAR; INTRAVENOUS; SOFT TISSUE at 08:52

## 2025-02-14 RX ADMIN — SODIUM CHLORIDE: 9 INJECTION, SOLUTION INTRAVENOUS at 09:02

## 2025-02-14 RX ADMIN — CHLORHEXIDINE GLUCONATE 15 ML: 1.2 RINSE ORAL at 08:52

## 2025-02-14 RX ADMIN — FUROSEMIDE 20 MG: 10 INJECTION, SOLUTION INTRAMUSCULAR; INTRAVENOUS at 10:00

## 2025-02-14 RX ADMIN — Medication 2 SPRAY: at 22:04

## 2025-02-14 RX ADMIN — PIPERACILLIN AND TAZOBACTAM 3375 MG: 3; .375 INJECTION, POWDER, LYOPHILIZED, FOR SOLUTION INTRAVENOUS at 17:03

## 2025-02-14 RX ADMIN — URSODIOL 500 MG: 250 TABLET, FILM COATED ORAL at 08:52

## 2025-02-14 RX ADMIN — VALACYCLOVIR HYDROCHLORIDE 500 MG: 500 TABLET, FILM COATED ORAL at 22:03

## 2025-02-14 RX ADMIN — PIPERACILLIN AND TAZOBACTAM 3375 MG: 3; .375 INJECTION, POWDER, LYOPHILIZED, FOR SOLUTION INTRAVENOUS at 09:00

## 2025-02-14 RX ADMIN — PANTOPRAZOLE SODIUM 40 MG: 40 TABLET, DELAYED RELEASE ORAL at 08:52

## 2025-02-14 ASSESSMENT — PAIN SCALES - GENERAL: PAINLEVEL_OUTOF10: 0

## 2025-02-14 NOTE — PLAN OF CARE
Problem: Pain  Goal: Verbalizes/displays adequate comfort level or baseline comfort level  2/14/2025 0509 by Annie Marcelo RN  Flowsheets (Taken 2/14/2025 0509)  Verbalizes/displays adequate comfort level or baseline comfort level:   Encourage patient to monitor pain and request assistance   Administer analgesics based on type and severity of pain and evaluate response  Note: Pt with no complaints of pain. Educated on how to communicate with nurse if pain begins.     Problem: Safety - Adult  Goal: Free from fall injury  2/14/2025 0509 by Annie Marcelo RN  Flowsheets (Taken 2/14/2025 0509)  Free From Fall Injury:   Instruct family/caregiver on patient safety   Based on caregiver fall risk screen, instruct family/caregiver to ask for assistance with transferring infant if caregiver noted to have fall risk factors  Note: Orthostatic vital signs obtained at start of shift - see flowsheet for details.  Pt meets criteria for orthostasis.  Pt is a Med fall risk. See Esteves Fall Score and ABCDS Injury Risk assessments.     - Screening for Orthostasis AND not a [place] Risk per ESTEVES/ABCDS: Pt bed is in low position, side rails up, call light and belongings are in reach.  Fall risk light is on outside pts room.  Pt encouraged to call for assistance as needed. Will continue with hourly rounds for PO intake, pain needs, toileting and repositioning as needed.

## 2025-02-14 NOTE — PLAN OF CARE
Problem: Pain  Goal: Verbalizes/displays adequate comfort level or baseline comfort level  2/14/2025 1733 by Zabrina Briseno RN  Outcome: Progressing  Flowsheets (Taken 2/14/2025 1733)  Verbalizes/displays adequate comfort level or baseline comfort level:   Encourage patient to monitor pain and request assistance   Assess pain using appropriate pain scale   Administer analgesics based on type and severity of pain and evaluate response   Implement non-pharmacological measures as appropriate and evaluate response   Notify Licensed Independent Practitioner if interventions unsuccessful or patient reports new pain     Problem: Safety - Adult  Goal: Free from fall injury  2/14/2025 1733 by Zabrina Briseno, RN  Outcome: Progressing  Flowsheets (Taken 2/14/2025 1733)  Free From Fall Injury: Instruct family/caregiver on patient safety  Note: Orthostatic vital signs obtained at start of shift - see flowsheet for details.  Pt does not meet criteria for orthostasis.  Pt is a Med fall risk. See Esteves Fall Score and ABCDS Injury Risk assessments.   - Screening for Orthostasis AND not a [place] Risk per ESTEVES/ABCDS: Pt bed is in low position, side rails up, call light and belongings are in reach.  Fall risk light is on outside pts room.  Pt encouraged to call for assistance as needed. Will continue with hourly rounds for PO intake, pain needs, toileting and repositioning as needed.       Problem: ABCDS Injury Assessment  Goal: Absence of physical injury  Outcome: Progressing  Flowsheets (Taken 2/14/2025 1733)  Absence of Physical Injury: Implement safety measures based on patient assessment

## 2025-02-14 NOTE — PROGRESS NOTES
This RN received this pt from [facility] on a bed alarm due to pt hallucinating after receiving ativan during the day.    2341: Pt denied hallucinations at this time.    0045: Pt continued to deny hallucinations. This RN performed orthos. Pt ortho negative, bed alarm turned off.

## 2025-02-14 NOTE — PROGRESS NOTES
Patient having occasional visual hallucinations including \"the flowers moving on the wall\" and \"horses on the door\". Patient received IV ativan yesterday 2/13 and the hallucinations started shortly after. Nimco NP notified. Care continues.

## 2025-02-14 NOTE — CARE COORDINATION
Patient will be admitted in the future for Allo Transplant.  Admitted for severe throat pain and swelling.  Consult made to PT OT to work with patient while admitted so that patient is fit after infections clears and is admitted for allogeneic transplant.

## 2025-02-14 NOTE — CARE COORDINATION
Discussed plan of care with treatment team. Per MD, no IV ABX needed upon discharge.     Pt will return home with his wife when discharged and no anticipated SW needs.     SW will follow    DARSHAN Delgadillo   for [facility] ([facility])  [employer] Mobile: [phone]

## 2025-02-14 NOTE — PROGRESS NOTES
[facility] Progress Note    2025     Myles Meehan    MRN: [id number]    : 1950    Referring MD: Audi Robin MD  [address] [facility]  1[address] [place] [address]      SUBJECTIVE:    -Net positive 2L/24h  , weight increase of 3 kgs--> lasix 20 mg IV   -LFTs remain elevated but improved   -BmBx yesterday, results pending   -ENT evaluated yesterday; favor an infectious etiology, plan to re-evaluate Monday   -Continue Zosyn day 3    ECOG PS:  (1) Restricted in physically strenuous activity, ambulatory and able to do work of light nature    KPS: 90% Able to carry on normal activity; minor signs or symptoms of disease    Isolation: None    Medications    Scheduled Meds:   oxymetazoline  2 spray Each Nostril BID    dexAMETHasone  8 mg IntraVENous Q24H    ursodiol  500 mg Oral BID    sodium chloride flush  5-40 mL IntraVENous 2 times per day    Saline Mouthwash  15 mL Swish & Spit 4x Daily AC & HS    posaconazole  300 mg Oral Daily with breakfast    allopurinol  300 mg Oral Daily    [Held by provider] apixaban  5 mg Oral BID    chlorhexidine  15 mL Mouth/Throat BID    pantoprazole  40 mg Oral Daily    valACYclovir  500 mg Oral BID    piperacillin-tazobactam  3,375 mg IntraVENous Q8H     Continuous Infusions:   sodium chloride      sodium chloride      sodium chloride 100 mL/hr at 25 1812     PRN Meds:.sodium chloride, sodium chloride flush, sodium chloride, potassium chloride, magnesium sulfate, Saline Mouthwash, ALTEplase (CATHFLO) 2 mg in sterile water 2 mL injection, cyclobenzaprine, diphenhydrAMINE, ondansetron, oxyCODONE **OR** oxyCODONE    ROS:  As noted above, otherwise remainder of 10-point ROS negative    Physical Exam:     I&O:    Intake/Output Summary (Last 24 hours) at 2025 0849  Last data filed at 2025 0744  Gross per 24 hour   Intake 2569 ml   Output 875 ml   Net 1694 ml       Vital Signs:  /60   Pulse 81   Temp 97.2 °F (36.2 °C) (Oral)   Resp 19   Ht 1.718 m (5'

## 2025-02-15 LAB
(1,3)-BETA-D-GLUCAN (FUNGITELL) INTERPRETATION: NEGATIVE
1,3 BETA GLUCAN SER-MCNC: 55 PG/ML
ANION GAP SERPL CALCULATED.3IONS-SCNC: 13 MMOL/L (ref 3–16)
ANISOCYTOSIS BLD QL SMEAR: ABNORMAL
BASOPHILS # BLD: 0 K/UL (ref 0–0.2)
BASOPHILS NFR BLD: 0 %
BUN SERPL-MCNC: 27 MG/DL (ref 7–20)
CALCIUM SERPL-MCNC: 9 MG/DL (ref 8.3–10.6)
CHLORIDE SERPL-SCNC: 106 MMOL/L (ref 99–110)
CO2 SERPL-SCNC: 21 MMOL/L (ref 21–32)
CREAT SERPL-MCNC: 0.8 MG/DL (ref 0.8–1.3)
DEPRECATED RDW RBC AUTO: 17.6 % (ref 12.4–15.4)
EOSINOPHIL # BLD: 0 K/UL (ref 0–0.6)
EOSINOPHIL NFR BLD: 0 %
GFR SERPLBLD CREATININE-BSD FMLA CKD-EPI: >90 ML/MIN/{1.73_M2}
GLUCOSE SERPL-MCNC: 142 MG/DL (ref 70–99)
HCT VFR BLD AUTO: 28 % (ref 40.5–52.5)
HGB BLD-MCNC: 9.5 G/DL (ref 13.5–17.5)
LYMPHOCYTES # BLD: 0.8 K/UL (ref 1–5.1)
LYMPHOCYTES NFR BLD: 10 %
MACROCYTES BLD QL SMEAR: ABNORMAL
MAGNESIUM SERPL-MCNC: 2.24 MG/DL (ref 1.8–2.4)
MCH RBC QN AUTO: 33.4 PG (ref 26–34)
MCHC RBC AUTO-ENTMCNC: 34 G/DL (ref 31–36)
MCV RBC AUTO: 98 FL (ref 80–100)
MICROCYTES BLD QL SMEAR: ABNORMAL
MONOCYTES # BLD: 0.5 K/UL (ref 0–1.3)
MONOCYTES NFR BLD: 7 %
NEUTROPHILS # BLD: 6.5 K/UL (ref 1.7–7.7)
NEUTROPHILS NFR BLD: 83 %
OVALOCYTES BLD QL SMEAR: ABNORMAL
PHOSPHATE SERPL-MCNC: 3.4 MG/DL (ref 2.5–4.9)
PLATELET # BLD AUTO: 234 K/UL (ref 135–450)
PLATELET BLD QL SMEAR: ADEQUATE
PMV BLD AUTO: 10.4 FL (ref 5–10.5)
POTASSIUM SERPL-SCNC: 3.4 MMOL/L (ref 3.5–5.1)
RBC # BLD AUTO: 2.85 M/UL (ref 4.2–5.9)
SCHISTOCYTES BLD QL SMEAR: ABNORMAL
SODIUM SERPL-SCNC: 140 MMOL/L (ref 136–145)
WBC # BLD AUTO: 7.8 K/UL (ref 4–11)

## 2025-02-15 PROCEDURE — 6360000002 HC RX W HCPCS: Performed by: NURSE PRACTITIONER

## 2025-02-15 PROCEDURE — 2580000003 HC RX 258: Performed by: NURSE PRACTITIONER

## 2025-02-15 PROCEDURE — 83735 ASSAY OF MAGNESIUM: CPT

## 2025-02-15 PROCEDURE — 85025 COMPLETE CBC W/AUTO DIFF WBC: CPT

## 2025-02-15 PROCEDURE — 80048 BASIC METABOLIC PNL TOTAL CA: CPT

## 2025-02-15 PROCEDURE — 6370000000 HC RX 637 (ALT 250 FOR IP): Performed by: NURSE PRACTITIONER

## 2025-02-15 PROCEDURE — 2060000000 HC ICU INTERMEDIATE R&B

## 2025-02-15 PROCEDURE — 6360000002 HC RX W HCPCS: Performed by: INTERNAL MEDICINE

## 2025-02-15 PROCEDURE — 84100 ASSAY OF PHOSPHORUS: CPT

## 2025-02-15 PROCEDURE — 2500000003 HC RX 250 WO HCPCS: Performed by: NURSE PRACTITIONER

## 2025-02-15 PROCEDURE — 6370000000 HC RX 637 (ALT 250 FOR IP): Performed by: INTERNAL MEDICINE

## 2025-02-15 RX ORDER — POTASSIUM CHLORIDE 1500 MG/1
40 TABLET, EXTENDED RELEASE ORAL ONCE
Status: COMPLETED | OUTPATIENT
Start: 2025-02-15 | End: 2025-02-15

## 2025-02-15 RX ORDER — ACETAMINOPHEN 325 MG/1
650 TABLET ORAL EVERY 4 HOURS PRN
Status: DISCONTINUED | OUTPATIENT
Start: 2025-02-15 | End: 2025-02-17 | Stop reason: HOSPADM

## 2025-02-15 RX ADMIN — POSACONAZOLE 300 MG: 100 TABLET, DELAYED RELEASE ORAL at 08:58

## 2025-02-15 RX ADMIN — CHLORHEXIDINE GLUCONATE 15 ML: 1.2 RINSE ORAL at 08:58

## 2025-02-15 RX ADMIN — VALACYCLOVIR HYDROCHLORIDE 500 MG: 500 TABLET, FILM COATED ORAL at 08:58

## 2025-02-15 RX ADMIN — ALLOPURINOL 300 MG: 300 TABLET ORAL at 08:58

## 2025-02-15 RX ADMIN — DEXAMETHASONE SODIUM PHOSPHATE 8 MG: 4 INJECTION INTRA-ARTICULAR; INTRALESIONAL; INTRAMUSCULAR; INTRAVENOUS; SOFT TISSUE at 08:58

## 2025-02-15 RX ADMIN — URSODIOL 500 MG: 250 TABLET, FILM COATED ORAL at 21:23

## 2025-02-15 RX ADMIN — URSODIOL 500 MG: 250 TABLET, FILM COATED ORAL at 08:58

## 2025-02-15 RX ADMIN — ACETAMINOPHEN 650 MG: 325 TABLET ORAL at 15:29

## 2025-02-15 RX ADMIN — SODIUM CHLORIDE, PRESERVATIVE FREE 10 ML: 5 INJECTION INTRAVENOUS at 21:23

## 2025-02-15 RX ADMIN — PIPERACILLIN AND TAZOBACTAM 3375 MG: 3; .375 INJECTION, POWDER, LYOPHILIZED, FOR SOLUTION INTRAVENOUS at 15:33

## 2025-02-15 RX ADMIN — POTASSIUM CHLORIDE 40 MEQ: 1500 TABLET, EXTENDED RELEASE ORAL at 08:58

## 2025-02-15 RX ADMIN — Medication 2 SPRAY: at 21:23

## 2025-02-15 RX ADMIN — CHLORHEXIDINE GLUCONATE 15 ML: 1.2 RINSE ORAL at 21:23

## 2025-02-15 RX ADMIN — PANTOPRAZOLE SODIUM 40 MG: 40 TABLET, DELAYED RELEASE ORAL at 08:58

## 2025-02-15 RX ADMIN — SODIUM CHLORIDE: 9 INJECTION, SOLUTION INTRAVENOUS at 06:50

## 2025-02-15 RX ADMIN — PIPERACILLIN AND TAZOBACTAM 3375 MG: 3; .375 INJECTION, POWDER, LYOPHILIZED, FOR SOLUTION INTRAVENOUS at 02:19

## 2025-02-15 RX ADMIN — VALACYCLOVIR HYDROCHLORIDE 500 MG: 500 TABLET, FILM COATED ORAL at 21:23

## 2025-02-15 RX ADMIN — SODIUM CHLORIDE, PRESERVATIVE FREE 10 ML: 5 INJECTION INTRAVENOUS at 09:00

## 2025-02-15 RX ADMIN — SODIUM CHLORIDE: 9 INJECTION, SOLUTION INTRAVENOUS at 17:09

## 2025-02-15 ASSESSMENT — PAIN SCALES - GENERAL
PAINLEVEL_OUTOF10: 7
PAINLEVEL_OUTOF10: 3

## 2025-02-15 ASSESSMENT — PAIN DESCRIPTION - ORIENTATION: ORIENTATION: MID

## 2025-02-15 ASSESSMENT — PAIN DESCRIPTION - FREQUENCY: FREQUENCY: INTERMITTENT

## 2025-02-15 ASSESSMENT — PAIN - FUNCTIONAL ASSESSMENT: PAIN_FUNCTIONAL_ASSESSMENT: ACTIVITIES ARE NOT PREVENTED

## 2025-02-15 ASSESSMENT — PAIN DESCRIPTION - DESCRIPTORS: DESCRIPTORS: ACHING;DISCOMFORT

## 2025-02-15 ASSESSMENT — PAIN DESCRIPTION - PAIN TYPE: TYPE: ACUTE PAIN

## 2025-02-15 ASSESSMENT — PAIN DESCRIPTION - LOCATION: LOCATION: HEAD

## 2025-02-15 ASSESSMENT — PAIN DESCRIPTION - ONSET: ONSET: GRADUAL

## 2025-02-15 NOTE — PROGRESS NOTES
[facility] Progress Note    2/15/2025     Myles Meehan    MRN: [id number]    : 1950    Referring MD: Audi Robin MD  [address] [place] [address]      SUBJECTIVE:  The patient states that the jaw pain is still there but much less than it had been.  The swelling is completely resolved.  No difficulty swallowing or articulating.. Denies fevers chills or sweats    ECOG PS:  (1) Restricted in physically strenuous activity, ambulatory and able to do work of light nature    KPS: 90% Able to carry on normal activity; minor signs or symptoms of disease    Isolation: None    Medications    Scheduled Meds:   oxymetazoline  2 spray Each Nostril BID    dexAMETHasone  8 mg IntraVENous Q24H    ursodiol  500 mg Oral BID    sodium chloride flush  5-40 mL IntraVENous 2 times per day    Saline Mouthwash  15 mL Swish & Spit 4x Daily AC & HS    posaconazole  300 mg Oral Daily with breakfast    allopurinol  300 mg Oral Daily    [Held by provider] apixaban  5 mg Oral BID    chlorhexidine  15 mL Mouth/Throat BID    pantoprazole  40 mg Oral Daily    valACYclovir  500 mg Oral BID    piperacillin-tazobactam  3,375 mg IntraVENous Q8H     Continuous Infusions:   sodium chloride      sodium chloride      sodium chloride 100 mL/hr at 02/15/25 0650     PRN Meds:.sodium chloride, sodium chloride flush, sodium chloride, potassium chloride, magnesium sulfate, Saline Mouthwash, ALTEplase (CATHFLO) 2 mg in sterile water 2 mL injection, cyclobenzaprine, diphenhydrAMINE, ondansetron, oxyCODONE **OR** oxyCODONE    ROS:  As noted above, otherwise remainder of 10-point ROS negative    Physical Exam:     I&O:    Intake/Output Summary (Last 24 hours) at 2/15/2025 0809  Last data filed at 2/15/2025 0651  Gross per 24 hour   Intake 3650 ml   Output 2125 ml   Net 1525 ml       Vital Signs:  /78   Pulse 71   Temp 97.4 °F (36.3 °C) (Oral)   Resp 17   Ht 1.718 m (5' 7.64\") Comment: actual height, no shoes, no hat

## 2025-02-15 NOTE — PLAN OF CARE
Problem: Pain  Goal: Verbalizes/displays adequate comfort level or baseline comfort level  Outcome: Progressing  Note: Patient has no complaints of pain during shift. Will continue to assess comfort and pain on 0-10 number scale and medicate per order while encouraging non pharmacological techniques as well. Call light within reach and hourly rounding in place.      Problem: Safety - Adult  Goal: Free from fall injury  Outcome: Progressing  Note: Patient remained free of falls during shift. Patient is a Baker Fall Risk: Medium (25-44); uses call light appropriately, ambulates with a steady gait and no assistive devices. Orthostatic blood pressures assessed and patient remains negative. Will continue to encourage ambulation and implement POC. Call light within reach and hourly rounding in place.      Problem: ABCDS Injury Assessment  Goal: Absence of physical injury  Outcome: Progressing

## 2025-02-15 NOTE — PLAN OF CARE
Problem: Pain  Goal: Verbalizes/displays adequate comfort level or baseline comfort level  2/15/2025 1746 by Loren Trivedi, RN  Outcome: Progressing  Pt had complaints of a headache this shift, tylenol x1     Problem: Safety - Adult  Goal: Free from fall injury  2/15/2025 1746 by Loren Trivedi, RN  Outcome: Progressing  Pt up ad kishan, ortho negative, call light within reach    Problem: ABCDS Injury Assessment  Goal: Absence of physical injury  2/15/2025 1746 by Loren Trivedi, RN  Outcome: Progressing  Pt ambulated the hallways this shift w/ wife x2, tolerated well

## 2025-02-16 LAB
ANION GAP SERPL CALCULATED.3IONS-SCNC: 9 MMOL/L (ref 3–16)
ANISOCYTOSIS BLD QL SMEAR: ABNORMAL
BACTERIA BLD CULT ORG #2: NORMAL
BACTERIA BLD CULT: NORMAL
BASOPHILS # BLD: 0 K/UL (ref 0–0.2)
BASOPHILS NFR BLD: 0 %
BUN SERPL-MCNC: 25 MG/DL (ref 7–20)
C DIFF TOX A+B STL QL IA: NEGATIVE
CALCIUM SERPL-MCNC: 8.5 MG/DL (ref 8.3–10.6)
CHLORIDE SERPL-SCNC: 109 MMOL/L (ref 99–110)
CO2 SERPL-SCNC: 22 MMOL/L (ref 21–32)
CREAT SERPL-MCNC: 0.7 MG/DL (ref 0.8–1.3)
DEPRECATED RDW RBC AUTO: 17.5 % (ref 12.4–15.4)
EOSINOPHIL # BLD: 0 K/UL (ref 0–0.6)
EOSINOPHIL NFR BLD: 0 %
GFR SERPLBLD CREATININE-BSD FMLA CKD-EPI: >90 ML/MIN/{1.73_M2}
GLUCOSE SERPL-MCNC: 135 MG/DL (ref 70–99)
HCT VFR BLD AUTO: 23.4 % (ref 40.5–52.5)
HGB BLD-MCNC: 7.9 G/DL (ref 13.5–17.5)
LYMPHOCYTES # BLD: 0.8 K/UL (ref 1–5.1)
LYMPHOCYTES NFR BLD: 13 %
MACROCYTES BLD QL SMEAR: ABNORMAL
MAGNESIUM SERPL-MCNC: 2.03 MG/DL (ref 1.8–2.4)
MCH RBC QN AUTO: 33.3 PG (ref 26–34)
MCHC RBC AUTO-ENTMCNC: 33.8 G/DL (ref 31–36)
MCV RBC AUTO: 98.6 FL (ref 80–100)
MONOCYTES # BLD: 0.3 K/UL (ref 0–1.3)
MONOCYTES NFR BLD: 5 %
NEUTROPHILS # BLD: 5.2 K/UL (ref 1.7–7.7)
NEUTROPHILS NFR BLD: 82 %
NRBC BLD-RTO: 1 /100 WBC
OVALOCYTES BLD QL SMEAR: ABNORMAL
PHOSPHATE SERPL-MCNC: 3.4 MG/DL (ref 2.5–4.9)
PLATELET # BLD AUTO: 226 K/UL (ref 135–450)
PLATELET BLD QL SMEAR: ADEQUATE
PMV BLD AUTO: 9.9 FL (ref 5–10.5)
POTASSIUM SERPL-SCNC: 3.5 MMOL/L (ref 3.5–5.1)
RBC # BLD AUTO: 2.37 M/UL (ref 4.2–5.9)
SLIDE REVIEW: ABNORMAL
SODIUM SERPL-SCNC: 140 MMOL/L (ref 136–145)
WBC # BLD AUTO: 6.3 K/UL (ref 4–11)

## 2025-02-16 PROCEDURE — 2580000003 HC RX 258: Performed by: NURSE PRACTITIONER

## 2025-02-16 PROCEDURE — 6360000002 HC RX W HCPCS: Performed by: NURSE PRACTITIONER

## 2025-02-16 PROCEDURE — 6370000000 HC RX 637 (ALT 250 FOR IP): Performed by: NURSE PRACTITIONER

## 2025-02-16 PROCEDURE — 84100 ASSAY OF PHOSPHORUS: CPT

## 2025-02-16 PROCEDURE — 87324 CLOSTRIDIUM AG IA: CPT

## 2025-02-16 PROCEDURE — 2500000003 HC RX 250 WO HCPCS: Performed by: NURSE PRACTITIONER

## 2025-02-16 PROCEDURE — 85025 COMPLETE CBC W/AUTO DIFF WBC: CPT

## 2025-02-16 PROCEDURE — 80048 BASIC METABOLIC PNL TOTAL CA: CPT

## 2025-02-16 PROCEDURE — 6370000000 HC RX 637 (ALT 250 FOR IP): Performed by: INTERNAL MEDICINE

## 2025-02-16 PROCEDURE — 2060000000 HC ICU INTERMEDIATE R&B

## 2025-02-16 PROCEDURE — 83735 ASSAY OF MAGNESIUM: CPT

## 2025-02-16 PROCEDURE — 36415 COLL VENOUS BLD VENIPUNCTURE: CPT

## 2025-02-16 RX ORDER — TETRAHYDROZOLINE HCL 0.05 %
1 DROPS OPHTHALMIC (EYE) 3 TIMES DAILY
Status: DISCONTINUED | OUTPATIENT
Start: 2025-02-16 | End: 2025-02-17 | Stop reason: HOSPADM

## 2025-02-16 RX ORDER — LOPERAMIDE HYDROCHLORIDE 2 MG/1
2 CAPSULE ORAL 4 TIMES DAILY PRN
Status: DISCONTINUED | OUTPATIENT
Start: 2025-02-16 | End: 2025-02-17 | Stop reason: HOSPADM

## 2025-02-16 RX ORDER — MINERAL OIL AND PETROLATUM 150; 830 MG/G; MG/G
OINTMENT OPHTHALMIC PRN
Status: DISCONTINUED | OUTPATIENT
Start: 2025-02-16 | End: 2025-02-17 | Stop reason: HOSPADM

## 2025-02-16 RX ORDER — DIPHENHYDRAMINE HCL 25 MG
25 TABLET ORAL EVERY 6 HOURS PRN
Status: DISCONTINUED | OUTPATIENT
Start: 2025-02-16 | End: 2025-02-17 | Stop reason: HOSPADM

## 2025-02-16 RX ORDER — FUROSEMIDE 10 MG/ML
40 INJECTION INTRAMUSCULAR; INTRAVENOUS ONCE
Status: COMPLETED | OUTPATIENT
Start: 2025-02-16 | End: 2025-02-16

## 2025-02-16 RX ADMIN — PIPERACILLIN AND TAZOBACTAM 3375 MG: 3; .375 INJECTION, POWDER, LYOPHILIZED, FOR SOLUTION INTRAVENOUS at 15:21

## 2025-02-16 RX ADMIN — URSODIOL 500 MG: 250 TABLET, FILM COATED ORAL at 20:30

## 2025-02-16 RX ADMIN — ACETAMINOPHEN 650 MG: 325 TABLET ORAL at 11:10

## 2025-02-16 RX ADMIN — SODIUM CHLORIDE: 9 INJECTION, SOLUTION INTRAVENOUS at 13:20

## 2025-02-16 RX ADMIN — OXYCODONE 5 MG: 5 TABLET ORAL at 15:22

## 2025-02-16 RX ADMIN — TETRAHYDROZOLINE HCL 1 DROP: 0.05 SOLUTION/ DROPS OPHTHALMIC at 23:33

## 2025-02-16 RX ADMIN — VALACYCLOVIR HYDROCHLORIDE 500 MG: 500 TABLET, FILM COATED ORAL at 20:30

## 2025-02-16 RX ADMIN — PIPERACILLIN AND TAZOBACTAM 3375 MG: 3; .375 INJECTION, POWDER, LYOPHILIZED, FOR SOLUTION INTRAVENOUS at 07:36

## 2025-02-16 RX ADMIN — CHLORHEXIDINE GLUCONATE 15 ML: 1.2 RINSE ORAL at 07:47

## 2025-02-16 RX ADMIN — PIPERACILLIN AND TAZOBACTAM 3375 MG: 3; .375 INJECTION, POWDER, LYOPHILIZED, FOR SOLUTION INTRAVENOUS at 00:26

## 2025-02-16 RX ADMIN — FUROSEMIDE 40 MG: 10 INJECTION, SOLUTION INTRAMUSCULAR; INTRAVENOUS at 10:05

## 2025-02-16 RX ADMIN — VALACYCLOVIR HYDROCHLORIDE 500 MG: 500 TABLET, FILM COATED ORAL at 07:47

## 2025-02-16 RX ADMIN — SODIUM CHLORIDE: 9 INJECTION, SOLUTION INTRAVENOUS at 03:39

## 2025-02-16 RX ADMIN — POSACONAZOLE 300 MG: 100 TABLET, DELAYED RELEASE ORAL at 07:47

## 2025-02-16 RX ADMIN — DIPHENHYDRAMINE HYDROCHLORIDE 25 MG: 25 TABLET ORAL at 16:50

## 2025-02-16 RX ADMIN — SODIUM CHLORIDE, PRESERVATIVE FREE 10 ML: 5 INJECTION INTRAVENOUS at 07:49

## 2025-02-16 RX ADMIN — PIPERACILLIN AND TAZOBACTAM 3375 MG: 3; .375 INJECTION, POWDER, LYOPHILIZED, FOR SOLUTION INTRAVENOUS at 23:36

## 2025-02-16 RX ADMIN — URSODIOL 500 MG: 250 TABLET, FILM COATED ORAL at 07:47

## 2025-02-16 RX ADMIN — PANTOPRAZOLE SODIUM 40 MG: 40 TABLET, DELAYED RELEASE ORAL at 07:47

## 2025-02-16 RX ADMIN — CHLORHEXIDINE GLUCONATE 15 ML: 1.2 RINSE ORAL at 20:30

## 2025-02-16 RX ADMIN — MINERAL OIL, PETROLATUM: 425; 573 OINTMENT OPHTHALMIC at 11:11

## 2025-02-16 RX ADMIN — ALLOPURINOL 300 MG: 300 TABLET ORAL at 07:47

## 2025-02-16 RX ADMIN — SODIUM CHLORIDE: 9 INJECTION, SOLUTION INTRAVENOUS at 22:28

## 2025-02-16 ASSESSMENT — PAIN DESCRIPTION - PAIN TYPE
TYPE: ACUTE PAIN
TYPE: ACUTE PAIN

## 2025-02-16 ASSESSMENT — PAIN SCALES - GENERAL
PAINLEVEL_OUTOF10: 8
PAINLEVEL_OUTOF10: 5
PAINLEVEL_OUTOF10: 8

## 2025-02-16 ASSESSMENT — PAIN DESCRIPTION - DESCRIPTORS
DESCRIPTORS: ACHING;DISCOMFORT
DESCRIPTORS: ACHING;DISCOMFORT

## 2025-02-16 ASSESSMENT — PAIN DESCRIPTION - LOCATION
LOCATION: HEAD
LOCATION: HEAD

## 2025-02-16 ASSESSMENT — PAIN - FUNCTIONAL ASSESSMENT
PAIN_FUNCTIONAL_ASSESSMENT: ACTIVITIES ARE NOT PREVENTED
PAIN_FUNCTIONAL_ASSESSMENT: ACTIVITIES ARE NOT PREVENTED

## 2025-02-16 ASSESSMENT — PAIN DESCRIPTION - FREQUENCY
FREQUENCY: INTERMITTENT
FREQUENCY: INTERMITTENT

## 2025-02-16 ASSESSMENT — PAIN DESCRIPTION - ONSET
ONSET: GRADUAL
ONSET: GRADUAL

## 2025-02-16 ASSESSMENT — PAIN DESCRIPTION - ORIENTATION
ORIENTATION: MID
ORIENTATION: MID

## 2025-02-16 NOTE — PLAN OF CARE
Problem: Pain  Goal: Verbalizes/displays adequate comfort level or baseline comfort level  2/16/2025 0527 by Brooke Tello, RN  Note:   Patient has no complaints of pain during shift. Will continue to assess comfort and pain on 0-10 number scale and medicate per order while encouraging non pharmacological techniques as well. Call light within reach and hourly rounding in place.       Problem: Safety - Adult  Goal: Free from fall injury  2/16/2025 0527 by Brooke Tello, RN  Note: Patient remained free of falls during shift. Patient is a Baker Fall Risk: Medium (25-44); uses call light appropriately, ambulates with a steady gait and no assistive devices. Orthostatic blood pressures assessed and patient remains negative. Will continue to encourage ambulation and implement POC. Call light within reach and hourly rounding in place.

## 2025-02-16 NOTE — PROGRESS NOTES
[facility] Progress Note    2025     Myles Meehan    MRN: [id number]    : 1950    Referring MD: Audi Robin MD  [address] [place] [address]      SUBJECTIVE:  Patient complains of increasing bilateral lower extremity edema and weight gain.  He has no shortness of breath PND orthopnea.  Otherwise he is feeling quite well    ECOG PS:  (1) Restricted in physically strenuous activity, ambulatory and able to do work of light nature    KPS: 90% Able to carry on normal activity; minor signs or symptoms of disease    Isolation: None    Medications    Scheduled Meds:   oxymetazoline  2 spray Each Nostril BID    ursodiol  500 mg Oral BID    sodium chloride flush  5-40 mL IntraVENous 2 times per day    Saline Mouthwash  15 mL Swish & Spit 4x Daily AC & HS    posaconazole  300 mg Oral Daily with breakfast    allopurinol  300 mg Oral Daily    [Held by provider] apixaban  5 mg Oral BID    chlorhexidine  15 mL Mouth/Throat BID    pantoprazole  40 mg Oral Daily    valACYclovir  500 mg Oral BID    piperacillin-tazobactam  3,375 mg IntraVENous Q8H     Continuous Infusions:   sodium chloride      sodium chloride      sodium chloride 100 mL/hr at 25 0339     PRN Meds:.acetaminophen, sodium chloride, sodium chloride flush, sodium chloride, potassium chloride, magnesium sulfate, Saline Mouthwash, ALTEplase (CATHFLO) 2 mg in sterile water 2 mL injection, cyclobenzaprine, diphenhydrAMINE, ondansetron, oxyCODONE **OR** oxyCODONE    ROS:  As noted above, otherwise remainder of 10-point ROS negative    Physical Exam:     I&O:    Intake/Output Summary (Last 24 hours) at 2025 0649  Last data filed at 2025 0550  Gross per 24 hour   Intake 4518 ml   Output 1575 ml   Net 2943 ml       Vital Signs:  BP (!) 129/58   Pulse 52   Temp 97.7 °F (36.5 °C) (Oral)   Resp 16   Ht 1.718 m (5' 7.64\") Comment: actual height, no shoes, no hat  Wt 102.6 kg (226 lb 3.2 oz)   SpO2 96%   BMI 34.76  Advised for dietary modification.  - Referred to rheumatology for f/u gout care and touch base with your PCP to discuss it further.       5. Pulmonary:  No active issues  - Encourage IS and ambulation   - Pre-Txp PFTs 1/3/25: FEV1 105%, DLCO cor 140%     6. Cardiac:   - Pre-Txp Echo 9/23/24: EF 55-60%  HTN:  - HOLD lisinopril 30 mg daily      7. GI / Nutrition:    Nutrition:   - Poor oral intake r/t jaw swelling   - Dietician to follow  Transaminitis  - Liver US 9/19/24 & 9/27/24: Cholelithiasis without evidence for cholecystitis  - H/O ETOH  - Re-consulted GI 9/26/24: Suspect drug induced injury on top of previous ETOH use  Nausea and vomiting:  - Compazine and zofran prn      8. MSK:  - Encourage walking and movement   - Continue flexeril 5 mg tid       9. Psychosocial:  - History of consuming 2-3 beers nightly (quit 9/2024)  - History of tobacco use (quit 1989).  - Primary caregiver: Chani Meehan (spouse)    - Back-up caregiver: Silver and Joselin Meehan (son and daughter-in-law)  - Lodging: Lives in [place], [place] (approximately [place] minutes away from [facility]) with his wife and grandson (21)    - DVT Prophylaxis: Prophylaxis on hold d/t contraindication  Contraindications to pharmacologic prophylaxis: Patient already on therapeutic anticoagulation  Contraindications to mechanical prophylaxis: None    - Disposition: Uncertain at this time.  Here for the weekend, possibly home Monday.      Tracy Schoenhoft, JAY - CNP    Doing very well.  Will give Lasix today.  Rationale for giving Lasix discussed with the patient.    David M. Waterhouse, M.D., MPH    [facility] ([facility] [place] [address]  Office [phone]  Cell [phone]  [email or web address]    \"Participating in a clinical trial is the first step to fighting cancer; not the last.\"

## 2025-02-16 NOTE — PLAN OF CARE
Problem: Pain  Goal: Verbalizes/displays adequate comfort level or baseline comfort level  2/16/2025 1659 by Loren Trivedi, RN  Pt continues to have ongoing headache this shift. Tylenol and oxy x1. Pt states headache is not getting any better but refuses additional pain medication at this time.    Problem: Safety - Adult  Goal: Free from fall injury  2/16/2025 1659 by Loren Trivedi, RN  Outcome: Progressing  Pt up ad kishan, ortho negative, call light within reach     Problem: ABCDS Injury Assessment  Goal: Absence of physical injury  Outcome: Progressing  Pts mouth looks much better this shift. Less redness and less white patches observed in mouth this shift. Pt states mouth feels better.

## 2025-02-17 ENCOUNTER — APPOINTMENT (OUTPATIENT)
Dept: GENERAL RADIOLOGY | Age: 75
DRG: 157 | End: 2025-02-17
Attending: INTERNAL MEDICINE
Payer: COMMERCIAL

## 2025-02-17 VITALS
DIASTOLIC BLOOD PRESSURE: 52 MMHG | RESPIRATION RATE: 16 BRPM | HEART RATE: 73 BPM | TEMPERATURE: 97.5 F | SYSTOLIC BLOOD PRESSURE: 135 MMHG | HEIGHT: 68 IN | OXYGEN SATURATION: 96 % | BODY MASS INDEX: 34.49 KG/M2 | WEIGHT: 227.6 LBS

## 2025-02-17 LAB
ALBUMIN SERPL-MCNC: 2.7 G/DL (ref 3.4–5)
ALP SERPL-CCNC: 61 U/L (ref 40–129)
ALT SERPL-CCNC: 11 U/L (ref 10–40)
ANION GAP SERPL CALCULATED.3IONS-SCNC: 9 MMOL/L (ref 3–16)
APTT BLD: 30.2 SEC (ref 22.1–36.4)
AST SERPL-CCNC: 8 U/L (ref 15–37)
BASOPHILS # BLD: 0 K/UL (ref 0–0.2)
BASOPHILS NFR BLD: 0 %
BILIRUB DIRECT SERPL-MCNC: 0.4 MG/DL (ref 0–0.3)
BILIRUB INDIRECT SERPL-MCNC: 0.2 MG/DL (ref 0–1)
BILIRUB SERPL-MCNC: 0.6 MG/DL (ref 0–1)
BUN SERPL-MCNC: 21 MG/DL (ref 7–20)
CALCIUM SERPL-MCNC: 8.3 MG/DL (ref 8.3–10.6)
CHLORIDE SERPL-SCNC: 107 MMOL/L (ref 99–110)
CO2 SERPL-SCNC: 24 MMOL/L (ref 21–32)
CREAT SERPL-MCNC: 0.8 MG/DL (ref 0.8–1.3)
DEPRECATED RDW RBC AUTO: 17.5 % (ref 12.4–15.4)
EOSINOPHIL # BLD: 0.1 K/UL (ref 0–0.6)
EOSINOPHIL NFR BLD: 2 %
GFR SERPLBLD CREATININE-BSD FMLA CKD-EPI: >90 ML/MIN/{1.73_M2}
GGT SERPL-CCNC: 28 U/L (ref 8–61)
GLUCOSE SERPL-MCNC: 86 MG/DL (ref 70–99)
HCT VFR BLD AUTO: 24.7 % (ref 40.5–52.5)
HGB BLD-MCNC: 8.4 G/DL (ref 13.5–17.5)
INR PPP: 1.34 (ref 0.85–1.15)
LDH SERPL L TO P-CCNC: 169 U/L (ref 100–190)
LYMPHOCYTES # BLD: 1.6 K/UL (ref 1–5.1)
LYMPHOCYTES NFR BLD: 28 %
MACROCYTES BLD QL SMEAR: ABNORMAL
MAGNESIUM SERPL-MCNC: 1.91 MG/DL (ref 1.8–2.4)
MCH RBC QN AUTO: 33.2 PG (ref 26–34)
MCHC RBC AUTO-ENTMCNC: 33.9 G/DL (ref 31–36)
MCV RBC AUTO: 98 FL (ref 80–100)
MONOCYTES # BLD: 0.7 K/UL (ref 0–1.3)
MONOCYTES NFR BLD: 12 %
NEUTROPHILS # BLD: 3.4 K/UL (ref 1.7–7.7)
NEUTROPHILS NFR BLD: 58 %
NRBC BLD-RTO: 2 /100 WBC
OVALOCYTES BLD QL SMEAR: ABNORMAL
PHOSPHATE SERPL-MCNC: 4 MG/DL (ref 2.5–4.9)
PLATELET # BLD AUTO: 251 K/UL (ref 135–450)
PLATELET BLD QL SMEAR: ADEQUATE
PMV BLD AUTO: 10.2 FL (ref 5–10.5)
POTASSIUM SERPL-SCNC: 3.1 MMOL/L (ref 3.5–5.1)
PROT SERPL-MCNC: 4.6 G/DL (ref 6.4–8.2)
PROTHROMBIN TIME: 16.7 SEC (ref 11.9–14.9)
RBC # BLD AUTO: 2.52 M/UL (ref 4.2–5.9)
SODIUM SERPL-SCNC: 140 MMOL/L (ref 136–145)
URATE SERPL-MCNC: 3 MG/DL (ref 3.5–7.2)
WBC # BLD AUTO: 5.8 K/UL (ref 4–11)

## 2025-02-17 PROCEDURE — 85610 PROTHROMBIN TIME: CPT

## 2025-02-17 PROCEDURE — 36415 COLL VENOUS BLD VENIPUNCTURE: CPT

## 2025-02-17 PROCEDURE — 2500000003 HC RX 250 WO HCPCS: Performed by: NURSE PRACTITIONER

## 2025-02-17 PROCEDURE — 6360000002 HC RX W HCPCS: Performed by: NURSE PRACTITIONER

## 2025-02-17 PROCEDURE — 84550 ASSAY OF BLOOD/URIC ACID: CPT

## 2025-02-17 PROCEDURE — 80048 BASIC METABOLIC PNL TOTAL CA: CPT

## 2025-02-17 PROCEDURE — 80076 HEPATIC FUNCTION PANEL: CPT

## 2025-02-17 PROCEDURE — 85730 THROMBOPLASTIN TIME PARTIAL: CPT

## 2025-02-17 PROCEDURE — 6370000000 HC RX 637 (ALT 250 FOR IP): Performed by: NURSE PRACTITIONER

## 2025-02-17 PROCEDURE — 84100 ASSAY OF PHOSPHORUS: CPT

## 2025-02-17 PROCEDURE — 82977 ASSAY OF GGT: CPT

## 2025-02-17 PROCEDURE — 2580000003 HC RX 258: Performed by: NURSE PRACTITIONER

## 2025-02-17 PROCEDURE — 83615 LACTATE (LD) (LDH) ENZYME: CPT

## 2025-02-17 PROCEDURE — 6370000000 HC RX 637 (ALT 250 FOR IP)

## 2025-02-17 PROCEDURE — 83735 ASSAY OF MAGNESIUM: CPT

## 2025-02-17 PROCEDURE — 85025 COMPLETE CBC W/AUTO DIFF WBC: CPT

## 2025-02-17 PROCEDURE — 99231 SBSQ HOSP IP/OBS SF/LOW 25: CPT | Performed by: OTOLARYNGOLOGY

## 2025-02-17 RX ORDER — LOPERAMIDE HYDROCHLORIDE 2 MG/1
2 CAPSULE ORAL 4 TIMES DAILY PRN
COMMUNITY
Start: 2025-02-17 | End: 2025-02-27

## 2025-02-17 RX ORDER — POTASSIUM CHLORIDE 7.45 MG/ML
10 INJECTION INTRAVENOUS PRN
Status: DISCONTINUED | OUTPATIENT
Start: 2025-02-17 | End: 2025-02-17 | Stop reason: HOSPADM

## 2025-02-17 RX ORDER — TETRAHYDROZOLINE HCL 0.05 %
1 DROPS OPHTHALMIC (EYE) 3 TIMES DAILY
COMMUNITY
Start: 2025-02-17

## 2025-02-17 RX ORDER — POTASSIUM CHLORIDE 1500 MG/1
40 TABLET, EXTENDED RELEASE ORAL PRN
Status: DISCONTINUED | OUTPATIENT
Start: 2025-02-17 | End: 2025-02-17 | Stop reason: HOSPADM

## 2025-02-17 RX ORDER — MINERAL OIL AND PETROLATUM 150; 830 MG/G; MG/G
OINTMENT OPHTHALMIC PRN
COMMUNITY
Start: 2025-02-17

## 2025-02-17 RX ORDER — URSODIOL 250 MG/1
500 TABLET, FILM COATED ORAL 2 TIMES DAILY
Qty: 120 TABLET | Refills: 3 | Status: SHIPPED | OUTPATIENT
Start: 2025-02-17

## 2025-02-17 RX ADMIN — ALLOPURINOL 300 MG: 300 TABLET ORAL at 08:13

## 2025-02-17 RX ADMIN — PIPERACILLIN AND TAZOBACTAM 3375 MG: 3; .375 INJECTION, POWDER, LYOPHILIZED, FOR SOLUTION INTRAVENOUS at 07:03

## 2025-02-17 RX ADMIN — OXYCODONE 10 MG: 5 TABLET ORAL at 08:22

## 2025-02-17 RX ADMIN — POSACONAZOLE 300 MG: 100 TABLET, DELAYED RELEASE ORAL at 08:13

## 2025-02-17 RX ADMIN — POTASSIUM CHLORIDE 40 MEQ: 1500 TABLET, EXTENDED RELEASE ORAL at 08:13

## 2025-02-17 RX ADMIN — TETRAHYDROZOLINE HCL 1 DROP: 0.05 SOLUTION/ DROPS OPHTHALMIC at 08:14

## 2025-02-17 RX ADMIN — POTASSIUM CHLORIDE 20 MEQ: 400 INJECTION, SOLUTION INTRAVENOUS at 06:12

## 2025-02-17 RX ADMIN — CHLORHEXIDINE GLUCONATE 15 ML: 1.2 RINSE ORAL at 08:14

## 2025-02-17 RX ADMIN — SODIUM CHLORIDE: 0.9 INJECTION, SOLUTION INTRAVENOUS at 06:56

## 2025-02-17 RX ADMIN — SODIUM CHLORIDE, PRESERVATIVE FREE 10 ML: 5 INJECTION INTRAVENOUS at 08:14

## 2025-02-17 RX ADMIN — SODIUM CHLORIDE: 9 INJECTION, SOLUTION INTRAVENOUS at 06:53

## 2025-02-17 RX ADMIN — PANTOPRAZOLE SODIUM 40 MG: 40 TABLET, DELAYED RELEASE ORAL at 08:13

## 2025-02-17 RX ADMIN — URSODIOL 500 MG: 250 TABLET, FILM COATED ORAL at 08:13

## 2025-02-17 RX ADMIN — VALACYCLOVIR HYDROCHLORIDE 500 MG: 500 TABLET, FILM COATED ORAL at 08:13

## 2025-02-17 ASSESSMENT — PAIN DESCRIPTION - ORIENTATION: ORIENTATION: MID

## 2025-02-17 ASSESSMENT — PAIN DESCRIPTION - DESCRIPTORS: DESCRIPTORS: ACHING;SORE

## 2025-02-17 ASSESSMENT — PAIN DESCRIPTION - PAIN TYPE: TYPE: ACUTE PAIN

## 2025-02-17 ASSESSMENT — PAIN SCALES - GENERAL
PAINLEVEL_OUTOF10: 7
PAINLEVEL_OUTOF10: 6

## 2025-02-17 ASSESSMENT — PAIN - FUNCTIONAL ASSESSMENT: PAIN_FUNCTIONAL_ASSESSMENT: ACTIVITIES ARE NOT PREVENTED

## 2025-02-17 ASSESSMENT — PAIN DESCRIPTION - FREQUENCY: FREQUENCY: INTERMITTENT

## 2025-02-17 ASSESSMENT — PAIN DESCRIPTION - ONSET: ONSET: GRADUAL

## 2025-02-17 ASSESSMENT — PAIN DESCRIPTION - LOCATION: LOCATION: HEAD

## 2025-02-17 NOTE — PLAN OF CARE
Problem: Pain  Goal: Verbalizes/displays adequate comfort level or baseline comfort level  2/17/2025 0243 by Flor Skinner, RN  Outcome: Progressing  Flowsheets (Taken 2/17/2025 0243)  Verbalizes/displays adequate comfort level or baseline comfort level: Encourage patient to monitor pain and request assistance  Note: Patient reports no pain this shift.      Problem: Safety - Adult  Goal: Free from fall injury  2/17/2025 0243 by Flor Skinner, RN  Outcome: Progressing  Flowsheets (Taken 2/17/2025 0243)  Free From Fall Injury: Instruct family/caregiver on patient safety     Problem: ABCDS Injury Assessment  Goal: Absence of physical injury  2/17/2025 0243 by Flor Skinner RN  Outcome: Progressing  Flowsheets (Taken 2/17/2025 0243)  Absence of Physical Injury: Implement safety measures based on patient assessment     Problem: Pain  Goal: Verbalizes/displays adequate comfort level or baseline comfort level  2/17/2025 0243 by Flor Skinner, RN  Outcome: Progressing  Flowsheets (Taken 2/17/2025 0243)  Verbalizes/displays adequate comfort level or baseline comfort level: Encourage patient to monitor pain and request assistance  Note: Patient reports no pain this shift.   2/16/2025 1659 by Loren Trivedi, RN  Outcome: Not Progressing

## 2025-02-17 NOTE — DISCHARGE INSTRUCTIONS
[facility] Discharge Instructions    Call for Questions/Concerns:  [phone] [facility] office  The phone number listed above is available 24 hrs/7 days per week  [facility] is open M-F 8am-4:30pm; Sat-Sun/Holidays 8am-1pm    Symptoms to Report Immediately:    Fever of 100.5 or greater  Vomiting without relief after use of anti-nausea medication  Severe abdominal cramping  Diarrhea: More than 3 loose, watery bowel movements in a 24 hour period  Unusual or excessive bleeding from your mouth, nose, rectum, bladder or vagina  Sudden onset of shortness of breath or chest pain  Signs/symptoms of infection: redness, warmth, swelling-particularly to central line site    Report to Physician's office within 24 hours:    Pain not relieved by pain medication  Change in urination-odor, cloudiness, frequency, or pain with urination  Flu-like symptoms  Skin changes-rash, hives, redness or peeling of skin    Additional Instructions:    Avoid people with colds, flu-like symptoms, or any sign of infection  Drink plenty of fluids-attempt to consume 2-3 liters ( ounces) of fluids/24 hour period  Continue low microbial diet until instructed by physician to resume normal diet  Bring all of your medications with you to your doctor's appointments  Bring your current medicine list to each hospital and office visit      2/17/2025 9:16 AM  Angeles Terrell RN            My Discharge Checklist    Here at the [facility], we want to make sure you have the help you will need once you leave the hospital.  We are going to go over your discharge instructions with you. We give these to you in writing so you will have a reference if you have questions about symptoms or problems to look for after you leave the hospital.     We know you want to feel better and get home soon. Please answer these questions so we can be sure you have what you need, your questions are answered, and you feel prepared for

## 2025-02-17 NOTE — CARE COORDINATION
Patient will discharge today.  Patient will follow up in [facility] on 2/19.  [facility] will call patient with an appointment time.

## 2025-02-17 NOTE — PROGRESS NOTES
Quit date: 1989     Years since quittin.1    Smokeless tobacco: Never   Vaping Use    Vaping status: Never Used   Substance and Sexual Activity    Alcohol use: Not Currently    Drug use: No    Sexual activity: Not on file   Other Topics Concern    Not on file   Social History Narrative    Not on file     Social Determinants of Health     Financial Resource Strain: Not on file   Food Insecurity: No Food Insecurity (2025)    Hunger Vital Sign     Worried About Running Out of Food in the Last Year: Never true     Ran Out of Food in the Last Year: Never true   Transportation Needs: No Transportation Needs (2025)    PRAPARE - Transportation     Lack of Transportation (Medical): No     Lack of Transportation (Non-Medical): No   Physical Activity: Not on file   Stress: Not on file   Social Connections: Not on file   Intimate Partner Violence: Unknown (2024)    [facility] from [facility]    Interpersonal Safety     Feel physically or emotionally unsafe where currently live: Not on file     Harm by anyone: Not on file     Emotionally Harmed: Not on file   Housing Stability: Low Risk  (2025)    Housing Stability Vital Sign     Unable to Pay for Housing in the Last Year: No     Number of Times Moved in the Last Year: 0     Homeless in the Last Year: No       DRUG/FOOD ALLERGIES: Shellfish allergy and Shellfish-derived products    CURRENT MEDICATIONS:     Medication List        START taking these medications      amoxicillin-clavulanate 875-125 MG per tablet  Commonly known as: AUGMENTIN  Take 1 tablet by mouth 2 times daily for 7 days  Notes to patient: Amoxicillin (Amoxil)  USE-- Treats bacterial infections (antibiotic)  SIDE EFFECTS-- Upset stomach, diarrhea     Artificial Tears 83-15 %  Place into both eyes as needed (dry eyes)  Notes to patient: USE -- treat dry eyes     loperamide 2 MG capsule  Commonly known as: IMODIUM  Take 1 capsule by mouth 4 times daily as  needed for Diarrhea  Notes to patient: USE -- treat diarrhea  SIDE EFFECTS -- constipation     tetrahydrozoline 0.05 % ophthalmic solution  Place 1 drop into both eyes 3 times daily  Notes to patient: Antibiotic eye drops            CHANGE how you take these medications      ursodiol 250 MG tablet  Commonly known as: ACTIGALL  Take 2 tablets by mouth 2 times daily  What changed: how much to take  Notes to patient: USE -- prevent liver dysfunction  SIDE EFFECTS -- nausea, headaches            CONTINUE taking these medications      allopurinol 300 MG tablet  Commonly known as: ZYLOPRIM     chlorhexidine 0.12 % solution  Commonly known as: PERIDEX     cyclobenzaprine 5 MG tablet  Commonly known as: FLEXERIL     diphenhydrAMINE 25 MG tablet  Commonly known as: BENADRYL     Eliquis 5 MG Tabs tablet  Generic drug: apixaban     fluconazole 200 MG tablet  Commonly known as: DIFLUCAN     omega-3 acid ethyl esters 1 g capsule  Commonly known as: LOVAZA     ondansetron 4 MG tablet  Commonly known as: ZOFRAN     pantoprazole 40 MG tablet  Commonly known as: PROTONIX     PHENERGAN PO     TYLENOL WITH CODEINE #3 PO     valACYclovir 500 MG tablet  Commonly known as: VALTREX            STOP taking these medications      BIOFLEX PO     clindamycin 300 MG capsule  Commonly known as: CLEOCIN     LEVAQUIN PO     NAPROXEN SODIUM PO     venetoclax 100 MG Tabs chemo tablet  Commonly known as: VENCLEXTA     vitamin B-12 1000 MCG tablet  Commonly known as: CYANOCOBALAMIN     VITAMIN D-3 PO               Where to Get Your Medications        These medications were sent to [id number] - [facility] [address] - P [phone] - F [phone] [address] [facility] [address]      Phone: [phone]   amoxicillin-clavulanate 875-125 MG per tablet  ursodiol 250 MG tablet       You can get these medications from any pharmacy    You don't need a prescription for these medications  Artificial Tears 83-15

## 2025-02-17 NOTE — DISCHARGE SUMMARY
[facility] Discharge Summary             Attending Physician: No att. providers found    Referring MD: Audi Robin MD  [address] [place] [address]    Name: Myles Meehan :  1950  MRN:  [id number]    Admission: 2025   Discharge: 2025      Date: 2025    Diagnosis on admit: Left Jaw swelling concern for infection verses leukemic infiltrate     Consultations:   ENT    Medications:      Medication List        START taking these medications      amoxicillin-clavulanate 875-125 MG per tablet  Commonly known as: AUGMENTIN  Take 1 tablet by mouth 2 times daily for 7 days  Notes to patient: Amoxicillin (Amoxil)  USE-- Treats bacterial infections (antibiotic)  SIDE EFFECTS-- Upset stomach, diarrhea     Artificial Tears 83-15 %  Place into both eyes as needed (dry eyes)  Notes to patient: USE -- treat dry eyes     loperamide 2 MG capsule  Commonly known as: IMODIUM  Take 1 capsule by mouth 4 times daily as needed for Diarrhea  Notes to patient: USE -- treat diarrhea  SIDE EFFECTS -- constipation     tetrahydrozoline 0.05 % ophthalmic solution  Place 1 drop into both eyes 3 times daily  Notes to patient: Antibiotic eye drops            CHANGE how you take these medications      ursodiol 250 MG tablet  Commonly known as: ACTIGALL  Take 2 tablets by mouth 2 times daily  What changed: how much to take  Notes to patient: USE -- prevent liver dysfunction  SIDE EFFECTS -- nausea, headaches            CONTINUE taking these medications      allopurinol 300 MG tablet  Commonly known as: ZYLOPRIM     chlorhexidine 0.12 % solution  Commonly known as: PERIDEX     cyclobenzaprine 5 MG tablet  Commonly known as: FLEXERIL     diphenhydrAMINE 25 MG tablet  Commonly known as: BENADRYL     Eliquis 5 MG Tabs tablet  Generic drug: apixaban     fluconazole 200 MG tablet  Commonly known as: DIFLUCAN     omega-3 acid ethyl esters 1 g capsule  Commonly known as: LOVAZA     ondansetron 4 MG

## 2025-02-17 NOTE — CARE COORDINATION
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]   Date / Time: 2/12/2025  4:26 PM    Current PCP: Magaly Womack MD  Clinic patient: No    Hospitalization in the last 30 days: No       Advance Directives:  Code Status: Full Code  [place] DNR form completed and on chart: Not Indicated    Financial:  Payor: [employer]IC[employer] ADVANTAGE / Plan: [employer] ADVANT[employer] PREFERRED PPO / Product Type: *No Product type* /      Pharmacy:    [id number] - [place], [place] - [address] [facility] P [phone] - F [phone] [address]  [facility] [address]  Phone: [phone] Fax: [phone]      Assistance purchasing medications?: Potential Assistance Purchasing Medications: No  Assistance provided by Case Management: None at this time    Does patient want to participate in local refill/ meds to beds program?: Yes    Meds To Beds General Rules:  1. Can ONLY be done Monday- Friday between 8:30am-5pm  2. Prescription(s) must be in pharmacy by 3pm to be filled same day  3.Copy of patient's insurance/ prescription drug card and patient face sheet must be sent along with the prescription(s)  4. Cost of Rx cannot be added to hospital bill. If financial assistance is needed, please contact unit  or ;  or  CANNOT provide pharmacy voucher for patients co-pays  5. Patients can then  the prescription on their way out of the hospital at discharge, or pharmacy can deliver to the bedside if staff is available. (payment due at time of pick-up or delivery - cash, check, or

## 2025-02-17 NOTE — PROGRESS NOTES
Reviewed discharge instructions with patient and  spouse.  Reviewed discharge medications including dosing, schedule, indication, and adverse reactions.  Reviewed which medications were already taken today and next dosage due for each medication.      Reviewed signs and symptoms that prompt a call to the physician and appropriate phone numbers. Reviewed follow up appointments that have been made in [facility] and Outpatient Oncology.  Low microbial diet, activity restrictions, and increased risk of infection were reviewed. Patient picked up medications prior to discharge.     Patient verbalized understanding of all instructions and questions were answered to his. satisfaction.  Signed discharge instructions were given to the patient and a copy placed in the paper-lite chart.  Patient discharged to home per wheelchair with spouse.      Angeles Terrell RN

## 2025-02-18 LAB
BACTERIA SPEC AEROBE CULT: NORMAL
BACTERIA SPEC ANAEROBE CULT: NORMAL
GRAM STN SPEC: NORMAL

## 2025-02-19 LAB
Lab: NORMAL
REPORT: NORMAL
THIS TEST SENT TO: NORMAL

## 2025-02-24 LAB — FUNGUS BLD CULT: NORMAL

## 2025-02-24 NOTE — PROGRESS NOTES
Physician Progress Note      PATIENT:               HERBERTH GARCIA  CSN #:                  [id number]  :                       1950  ADMIT DATE:       2025 4:26 PM  DISCH DATE:        2025 10:51 AM  RESPONDING  PROVIDER #:        RADHA MONTANO          QUERY TEXT:    Pt admitted with left tonsillar hypertrophy.  Noted documentation of   \"tonsillopharyngitis\" on 25 by ordered ENT consultant.  If possible,   please document in progress notes and discharge summary:    The medical record reflects the following:  Risk Factors: AML  Clinical Indicators: per ENT PN 25 \"left sided tonsillopharyngitis, now   resolved Interval improvement of symptoms over the past 72 hours with IV   antibiotics\"  Treatment: ENT consult, IV Antibiotics and cultures  Options provided:  -- Possible tonsillopharyngitis confirmed present on admission  -- Defer to ENT consultant documentation regarding possible   tonsillopharyngitis  -- Other - I will add my own diagnosis  -- Disagree - Not applicable / Not valid  -- Disagree - Clinically unable to determine / Unknown  -- Refer to Clinical Documentation Reviewer    PROVIDER RESPONSE TEXT:    The diagnosis of possible tonsillopharyngitis was confirmed as present on   admission.    Query created by: Eleni Rachel on 2025 6:23 AM      Electronically signed by:  RADHA MONTANO 2025 8:43 AM

## 2025-03-03 LAB — FUNGUS BLD CULT: NORMAL

## 2025-03-10 LAB — FUNGUS BLD CULT: NORMAL

## 2025-03-10 RX ORDER — URSODIOL 300 MG/1
300 CAPSULE ORAL
Status: ON HOLD | COMMUNITY
Start: 2025-01-21

## 2025-03-10 RX ORDER — SODIUM CHLORIDE, SODIUM LACTATE, POTASSIUM CHLORIDE, CALCIUM CHLORIDE 600; 310; 30; 20 MG/100ML; MG/100ML; MG/100ML; MG/100ML
INJECTION, SOLUTION INTRAVENOUS CONTINUOUS
Status: CANCELLED | OUTPATIENT
Start: 2025-03-10

## 2025-03-10 RX ORDER — OMEGA-3-ACID ETHYL ESTERS 1 G/1
2 CAPSULE, LIQUID FILLED ORAL 2 TIMES DAILY
Status: ON HOLD | COMMUNITY

## 2025-03-10 RX ORDER — OXYMETAZOLINE HYDROCHLORIDE 0.05 G/100ML
2 SPRAY NASAL NIGHTLY
Status: ON HOLD | COMMUNITY

## 2025-03-10 NOTE — PROGRESS NOTES
[facility] PRE-SURGICAL TESTING INSTRUCTIONS                      PRIOR TO PROCEDURE DATE:    1. PLEASE FOLLOW ANY INSTRUCTIONS GIVEN TO YOU PER YOUR SURGEON.      2. Arrange for someone to drive you home and be with you for the first 24 hours after discharge for your safety after your procedure for which you received sedation. Ensure it is someone we can share information with regarding your discharge.     NOTE: At this time ONLY 2 ADULTS may accompany you   One person ENCOURAGED to stay at hospital entire time if outpatient surgery      3. You must contact your surgeon for instructions IF:  You are taking any blood thinners, aspirin, anti-inflammatory or vitamins.  There is a change in your physical condition such as a cold, fever, rash, cuts, sores, or any other infection, especially near your surgical site.    4. Do not drink alcohol the day before or day of your procedure.  Do not use any recreational marijuana at least 24 hours or street drugs (heroin, cocaine) at minimum 5 days prior to your procedure.     5. A Pre-Surgical History and Physical MUST be completed WITHIN 30 DAYS OR LESS prior to your procedure.by your Physician or an Urgent Care        THE DAY OF YOUR PROCEDURE:  1.  Follow instructions for ARRIVAL TIME as DIRECTED BY YOUR SURGEON.     2. Enter the MAIN entrance from [facility] and follow the signs to the free Parking Garage or  Parking (offered free of charge 7 am-5pm).      3. Enter the Main Entrance of the hospital (do not enter from the lower level of the parking garage). Upon entrance, check in with the  at the surgical information desk on your LEFT.   Bring your insurance card and photo ID to register      4. DO NOT EAT ANYTHING 8 hours prior to arrival for surgery.  You may have up to 8 ounces of water 4 hours prior to your arrival for surgery.   NOTE: ALL Gastric, Bariatric & Bowel surgery patients - you MUST follow your surgeon's instructions regarding  eating/ drinking as you will have very specific instructions to follow.  If you did not receive these, call your surgeon's office immediately.     5. MEDICATIONS:  Take the following medications with a SMALL sip of water:   Use your usual dose of inhalers the morning of surgery. BRING your rescue inhaler with you to hospital.   Anesthesia does NOT want you to take insulin the morning of surgery. They will control your blood sugar while you are at the hospital. Please contact your ordering physician for instructions regarding your insulin the night before your procedure. If you have an insulin pump, please keep it set on basal rate.   Bariatric patient's call your surgeon if on diabetic medications as some may need to be stopped 1 week prior to surgery    6. Do not swallow additional water when brushing teeth. No gum, candy, mints, or ice chips. Refrain from smoking or at least decrease the amount on day of surgery.    7. Morning of surgery:   Take a shower with an antibacterial soap (i.e., Safeguard or Dial) OR your physician may have instructed you to use Hibiclens.  Dress in loose, comfortable clothing appropriate for redressing after your procedure.   Do not wear jewelry (including body piercings), make-up (especially NO eye make-up), fingernail polish (NO toenail polish if foot/leg surgery), lotion, powders, or metal hairclips.   Do not shave or wax for 72 hours prior to procedure near your operative site. Shaving with a razor can irritate your skin and make it easier to develop an infection. On the day of your procedure, any hair that needs to be removed near the surgical site will be 'clipped' by a healthcare worker using a special clipper designed to avoid skin irritation.    8. Dentures, glasses, or contacts will need to be removed before your procedure. Bring cases for your glasses, contacts, dentures, or hearing aids to protect them while you are in surgery.      9. If you use a CPAP, please bring it with

## 2025-03-12 ENCOUNTER — TELEPHONE (OUTPATIENT)
Dept: SURGERY | Age: 75
End: 2025-03-12

## 2025-03-12 NOTE — TELEPHONE ENCOUNTER
Spoke wit patient's spouse-Chani to confirm new 8:00 arrival time, NPO after midnight,  has to be 18 or over to transport home.

## 2025-03-13 NOTE — H&P
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    History of Present Illness: Myles Meehan is a 74 year old male with a past medical history significant for HTN, recurrent gout and GERD, who presents today for admission for Melphalan and Fludarabine preparative regimen followed by [facility] Allogeneic Transplant for AML in CR.      He initially presented to [facility] on 24 with pancytopenia. He had a bone marrow biopsy on 24 and was discharged to home while awaiting results. Unfortunately, flow cytometry came back with 59.3% blasts in the bone marrow. At that time he was started on treatment with Dacogen and Venetoclax. He had a repeat bone marrow biopsy on 10/17/25 which was 35% cellular 3.5 % blasts. He then received 3 additional cycles of Dacogen and Venetoclax. Pre-transplant biopsy on 2/3/25 showed hypocellular sample; leukocytes are predominantly heterogeneous T-cells with no increased blast population identified.      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    He feels well today and denies n/v/d/constipation. Denies SOB/SALOMON/Chest pain. Denies fevers/chills/night sweats.       Pre Transplant Workup:          Pre

## 2025-03-14 ENCOUNTER — APPOINTMENT (OUTPATIENT)
Dept: GENERAL RADIOLOGY | Age: 75
DRG: 014 | End: 2025-03-14
Attending: SURGERY
Payer: COMMERCIAL

## 2025-03-14 ENCOUNTER — HOSPITAL ENCOUNTER (INPATIENT)
Age: 75
LOS: 27 days | Discharge: HOME | DRG: 014 | End: 2025-04-10
Attending: STUDENT IN AN ORGANIZED HEALTH CARE EDUCATION/TRAINING PROGRAM | Admitting: STUDENT IN AN ORGANIZED HEALTH CARE EDUCATION/TRAINING PROGRAM
Payer: COMMERCIAL

## 2025-03-14 ENCOUNTER — HOSPITAL ENCOUNTER (OUTPATIENT)
Age: 75
Setting detail: OUTPATIENT SURGERY
Discharge: STILL ADMITTED OR ADMINISTRATIVE | DRG: 014 | End: 2025-03-14
Attending: SURGERY | Admitting: SURGERY
Payer: COMMERCIAL

## 2025-03-14 ENCOUNTER — ANESTHESIA EVENT (OUTPATIENT)
Dept: OPERATING ROOM | Age: 75
End: 2025-03-14
Payer: MEDICARE

## 2025-03-14 ENCOUNTER — ANESTHESIA (OUTPATIENT)
Dept: OPERATING ROOM | Age: 75
End: 2025-03-14
Payer: MEDICARE

## 2025-03-14 VITALS
HEART RATE: 78 BPM | OXYGEN SATURATION: 99 % | TEMPERATURE: 97 F | HEIGHT: 69 IN | WEIGHT: 211.8 LBS | RESPIRATION RATE: 21 BRPM | SYSTOLIC BLOOD PRESSURE: 148 MMHG | DIASTOLIC BLOOD PRESSURE: 73 MMHG | BODY MASS INDEX: 31.37 KG/M2

## 2025-03-14 DIAGNOSIS — C92.01 AML (ACUTE MYELOID LEUKEMIA) IN REMISSION (HCC): Primary | ICD-10-CM

## 2025-03-14 LAB
ALBUMIN SERPL-MCNC: 3.8 G/DL (ref 3.4–5)
ALP SERPL-CCNC: 78 U/L (ref 40–129)
ALT SERPL-CCNC: 12 U/L (ref 10–40)
ANION GAP SERPL CALCULATED.3IONS-SCNC: 10 MMOL/L (ref 3–16)
APTT BLD: 26.1 SEC (ref 22.1–36.4)
APTT BLD: 29.9 SEC (ref 22.1–36.4)
AST SERPL-CCNC: 10 U/L (ref 15–37)
BASOPHILS # BLD: 0 K/UL (ref 0–0.2)
BASOPHILS NFR BLD: 0.2 %
BILIRUB DIRECT SERPL-MCNC: 0.2 MG/DL (ref 0–0.3)
BILIRUB INDIRECT SERPL-MCNC: 0.3 MG/DL (ref 0–1)
BILIRUB SERPL-MCNC: 0.5 MG/DL (ref 0–1)
BILIRUB UR QL STRIP.AUTO: NEGATIVE
BUN SERPL-MCNC: 13 MG/DL (ref 7–20)
CALCIUM SERPL-MCNC: 8.5 MG/DL (ref 8.3–10.6)
CHLORIDE SERPL-SCNC: 101 MMOL/L (ref 99–110)
CLARITY UR: CLEAR
CO2 SERPL-SCNC: 22 MMOL/L (ref 21–32)
COLOR UR: YELLOW
CREAT SERPL-MCNC: 0.7 MG/DL (ref 0.8–1.3)
DEPRECATED RDW RBC AUTO: 18.2 % (ref 12.4–15.4)
EKG ATRIAL RATE: 71 BPM
EKG DIAGNOSIS: NORMAL
EKG P AXIS: 45 DEGREES
EKG P-R INTERVAL: 224 MS
EKG Q-T INTERVAL: 402 MS
EKG QRS DURATION: 90 MS
EKG QTC CALCULATION (BAZETT): 436 MS
EKG R AXIS: -1 DEGREES
EKG T AXIS: 11 DEGREES
EKG VENTRICULAR RATE: 71 BPM
EOSINOPHIL # BLD: 0 K/UL (ref 0–0.6)
EOSINOPHIL NFR BLD: 0.2 %
GFR SERPLBLD CREATININE-BSD FMLA CKD-EPI: >90 ML/MIN/{1.73_M2}
GLUCOSE BLD-MCNC: 110 MG/DL (ref 70–99)
GLUCOSE BLD-MCNC: 150 MG/DL (ref 70–99)
GLUCOSE BLD-MCNC: 172 MG/DL (ref 70–99)
GLUCOSE SERPL-MCNC: 480 MG/DL (ref 70–99)
GLUCOSE UR STRIP.AUTO-MCNC: NEGATIVE MG/DL
HCT VFR BLD AUTO: 29.7 % (ref 40.5–52.5)
HGB BLD-MCNC: 9.9 G/DL (ref 13.5–17.5)
HGB UR QL STRIP.AUTO: NEGATIVE
INR PPP: 1.07 (ref 0.85–1.15)
INR PPP: 1.12 (ref 0.85–1.15)
KETONES UR STRIP.AUTO-MCNC: NEGATIVE MG/DL
LDH SERPL L TO P-CCNC: 152 U/L (ref 100–190)
LEUKOCYTE ESTERASE UR QL STRIP.AUTO: NEGATIVE
LYMPHOCYTES # BLD: 0.3 K/UL (ref 1–5.1)
LYMPHOCYTES NFR BLD: 6.8 %
MAGNESIUM SERPL-MCNC: 1.67 MG/DL (ref 1.8–2.4)
MCH RBC QN AUTO: 33.3 PG (ref 26–34)
MCHC RBC AUTO-ENTMCNC: 33.2 G/DL (ref 31–36)
MCV RBC AUTO: 100.4 FL (ref 80–100)
MONOCYTES # BLD: 0 K/UL (ref 0–1.3)
MONOCYTES NFR BLD: 0.7 %
NEUTROPHILS # BLD: 3.6 K/UL (ref 1.7–7.7)
NEUTROPHILS NFR BLD: 92.1 %
NITRITE UR QL STRIP.AUTO: NEGATIVE
PERFORMED ON: ABNORMAL
PH UR STRIP.AUTO: 6 [PH] (ref 5–8)
PHOSPHATE SERPL-MCNC: 2.9 MG/DL (ref 2.5–4.9)
PLATELET # BLD AUTO: 78 K/UL (ref 135–450)
PMV BLD AUTO: 9 FL (ref 5–10.5)
POTASSIUM SERPL-SCNC: 3.7 MMOL/L (ref 3.5–5.1)
PROT SERPL-MCNC: 5.7 G/DL (ref 6.4–8.2)
PROT UR STRIP.AUTO-MCNC: NEGATIVE MG/DL
PROTHROMBIN TIME: 14.1 SEC (ref 11.9–14.9)
PROTHROMBIN TIME: 14.7 SEC (ref 11.9–14.9)
RBC # BLD AUTO: 2.96 M/UL (ref 4.2–5.9)
SODIUM SERPL-SCNC: 133 MMOL/L (ref 136–145)
SP GR UR STRIP.AUTO: 1.01 (ref 1–1.03)
UA DIPSTICK W REFLEX MICRO PNL UR: NORMAL
URATE SERPL-MCNC: 3 MG/DL (ref 3.5–7.2)
URN SPEC COLLECT METH UR: NORMAL
UROBILINOGEN UR STRIP-ACNC: 0.2 E.U./DL
WBC # BLD AUTO: 3.9 K/UL (ref 4–11)

## 2025-03-14 PROCEDURE — 81003 URINALYSIS AUTO W/O SCOPE: CPT

## 2025-03-14 PROCEDURE — 02HV33Z INSERTION OF INFUSION DEVICE INTO SUPERIOR VENA CAVA, PERCUTANEOUS APPROACH: ICD-10-PCS | Performed by: SURGERY

## 2025-03-14 PROCEDURE — 85610 PROTHROMBIN TIME: CPT

## 2025-03-14 PROCEDURE — 3600000002 HC SURGERY LEVEL 2 BASE: Performed by: SURGERY

## 2025-03-14 PROCEDURE — 94150 VITAL CAPACITY TEST: CPT

## 2025-03-14 PROCEDURE — 85730 THROMBOPLASTIN TIME PARTIAL: CPT

## 2025-03-14 PROCEDURE — 38204 BL DONOR SEARCH MANAGEMENT: CPT | Performed by: INTERNAL MEDICINE

## 2025-03-14 PROCEDURE — 84550 ASSAY OF BLOOD/URIC ACID: CPT

## 2025-03-14 PROCEDURE — 80076 HEPATIC FUNCTION PANEL: CPT

## 2025-03-14 PROCEDURE — 6360000002 HC RX W HCPCS

## 2025-03-14 PROCEDURE — 7100000000 HC PACU RECOVERY - FIRST 15 MIN: Performed by: SURGERY

## 2025-03-14 PROCEDURE — 85025 COMPLETE CBC W/AUTO DIFF WBC: CPT

## 2025-03-14 PROCEDURE — 3700000000 HC ANESTHESIA ATTENDED CARE: Performed by: SURGERY

## 2025-03-14 PROCEDURE — 2580000003 HC RX 258: Performed by: ANESTHESIOLOGY

## 2025-03-14 PROCEDURE — 2060000000 HC ICU INTERMEDIATE R&B

## 2025-03-14 PROCEDURE — 36558 INSERT TUNNELED CV CATH: CPT | Performed by: SURGERY

## 2025-03-14 PROCEDURE — 6370000000 HC RX 637 (ALT 250 FOR IP)

## 2025-03-14 PROCEDURE — 36558 INSERT TUNNELED CV CATH: CPT

## 2025-03-14 PROCEDURE — 6360000002 HC RX W HCPCS: Performed by: SURGERY

## 2025-03-14 PROCEDURE — 2500000003 HC RX 250 WO HCPCS: Performed by: SURGERY

## 2025-03-14 PROCEDURE — 83735 ASSAY OF MAGNESIUM: CPT

## 2025-03-14 PROCEDURE — 80048 BASIC METABOLIC PNL TOTAL CA: CPT

## 2025-03-14 PROCEDURE — 3700000001 HC ADD 15 MINUTES (ANESTHESIA): Performed by: SURGERY

## 2025-03-14 PROCEDURE — 93005 ELECTROCARDIOGRAM TRACING: CPT

## 2025-03-14 PROCEDURE — 2500000003 HC RX 250 WO HCPCS

## 2025-03-14 PROCEDURE — C1751 CATH, INF, PER/CENT/MIDLINE: HCPCS | Performed by: SURGERY

## 2025-03-14 PROCEDURE — 7100000001 HC PACU RECOVERY - ADDTL 15 MIN: Performed by: SURGERY

## 2025-03-14 PROCEDURE — 83615 LACTATE (LD) (LDH) ENZYME: CPT

## 2025-03-14 PROCEDURE — 3600000012 HC SURGERY LEVEL 2 ADDTL 15MIN: Performed by: SURGERY

## 2025-03-14 PROCEDURE — 84100 ASSAY OF PHOSPHORUS: CPT

## 2025-03-14 PROCEDURE — 2580000003 HC RX 258

## 2025-03-14 PROCEDURE — 2709999900 HC NON-CHARGEABLE SUPPLY: Performed by: SURGERY

## 2025-03-14 PROCEDURE — 0JH63XZ INSERTION OF TUNNELED VASCULAR ACCESS DEVICE INTO CHEST SUBCUTANEOUS TISSUE AND FASCIA, PERCUTANEOUS APPROACH: ICD-10-PCS | Performed by: SURGERY

## 2025-03-14 PROCEDURE — 71045 X-RAY EXAM CHEST 1 VIEW: CPT

## 2025-03-14 PROCEDURE — 93010 ELECTROCARDIOGRAM REPORT: CPT | Performed by: INTERNAL MEDICINE

## 2025-03-14 PROCEDURE — 77001 FLUOROGUIDE FOR VEIN DEVICE: CPT

## 2025-03-14 PROCEDURE — 2580000003 HC RX 258: Performed by: INTERNAL MEDICINE

## 2025-03-14 PROCEDURE — 36592 COLLECT BLOOD FROM PICC: CPT

## 2025-03-14 PROCEDURE — 6360000002 HC RX W HCPCS: Performed by: INTERNAL MEDICINE

## 2025-03-14 PROCEDURE — 6370000000 HC RX 637 (ALT 250 FOR IP): Performed by: INTERNAL MEDICINE

## 2025-03-14 PROCEDURE — 77001 FLUOROGUIDE FOR VEIN DEVICE: CPT | Performed by: SURGERY

## 2025-03-14 RX ORDER — MYCOPHENOLATE MOFETIL 500 MG/1
1000 TABLET ORAL 3 TIMES DAILY
Status: DISCONTINUED | OUTPATIENT
Start: 2025-03-25 | End: 2025-04-10 | Stop reason: HOSPADM

## 2025-03-14 RX ORDER — PANTOPRAZOLE SODIUM 40 MG/1
40 TABLET, DELAYED RELEASE ORAL DAILY
Status: DISCONTINUED | OUTPATIENT
Start: 2025-03-15 | End: 2025-04-10 | Stop reason: HOSPADM

## 2025-03-14 RX ORDER — URSODIOL 250 MG/1
500 TABLET, FILM COATED ORAL 2 TIMES DAILY
Status: DISCONTINUED | OUTPATIENT
Start: 2025-03-14 | End: 2025-04-10 | Stop reason: HOSPADM

## 2025-03-14 RX ORDER — LORAZEPAM 0.5 MG/1
0.5 TABLET ORAL EVERY 4 HOURS PRN
Status: DISCONTINUED | OUTPATIENT
Start: 2025-03-14 | End: 2025-03-14

## 2025-03-14 RX ORDER — PROCHLORPERAZINE MALEATE 10 MG
5 TABLET ORAL EVERY 4 HOURS PRN
Status: DISCONTINUED | OUTPATIENT
Start: 2025-03-14 | End: 2025-04-10 | Stop reason: HOSPADM

## 2025-03-14 RX ORDER — ONDANSETRON 8 MG/1
24 TABLET, FILM COATED ORAL ONCE
Status: COMPLETED | OUTPATIENT
Start: 2025-03-24 | End: 2025-03-24

## 2025-03-14 RX ORDER — SODIUM CHLORIDE 9 MG/ML
INJECTION, SOLUTION INTRAVENOUS PRN
Status: DISCONTINUED | OUTPATIENT
Start: 2025-03-14 | End: 2025-03-14 | Stop reason: HOSPADM

## 2025-03-14 RX ORDER — SODIUM CHLORIDE 9 MG/ML
500 INJECTION, SOLUTION INTRAVENOUS CONTINUOUS PRN
Status: DISCONTINUED | OUTPATIENT
Start: 2025-03-14 | End: 2025-04-10 | Stop reason: HOSPADM

## 2025-03-14 RX ORDER — OXYCODONE HYDROCHLORIDE 5 MG/1
5 TABLET ORAL
Status: DISCONTINUED | OUTPATIENT
Start: 2025-03-14 | End: 2025-03-14 | Stop reason: HOSPADM

## 2025-03-14 RX ORDER — VALACYCLOVIR HYDROCHLORIDE 500 MG/1
500 TABLET, FILM COATED ORAL 2 TIMES DAILY
Status: DISCONTINUED | OUTPATIENT
Start: 2025-03-14 | End: 2025-04-10 | Stop reason: HOSPADM

## 2025-03-14 RX ORDER — DEXAMETHASONE SODIUM PHOSPHATE 4 MG/ML
INJECTION, SOLUTION INTRA-ARTICULAR; INTRALESIONAL; INTRAMUSCULAR; INTRAVENOUS; SOFT TISSUE
Status: DISCONTINUED | OUTPATIENT
Start: 2025-03-14 | End: 2025-03-14 | Stop reason: SDUPTHER

## 2025-03-14 RX ORDER — SODIUM CHLORIDE, SODIUM LACTATE, POTASSIUM CHLORIDE, CALCIUM CHLORIDE 600; 310; 30; 20 MG/100ML; MG/100ML; MG/100ML; MG/100ML
INJECTION, SOLUTION INTRAVENOUS
Status: DISCONTINUED | OUTPATIENT
Start: 2025-03-14 | End: 2025-03-14 | Stop reason: SDUPTHER

## 2025-03-14 RX ORDER — FENTANYL CITRATE 50 UG/ML
25 INJECTION, SOLUTION INTRAMUSCULAR; INTRAVENOUS EVERY 5 MIN PRN
Status: DISCONTINUED | OUTPATIENT
Start: 2025-03-14 | End: 2025-03-14 | Stop reason: HOSPADM

## 2025-03-14 RX ORDER — SODIUM CHLORIDE 9 MG/ML
INJECTION, SOLUTION INTRAVENOUS PRN
Status: CANCELLED | OUTPATIENT
Start: 2025-03-14

## 2025-03-14 RX ORDER — SODIUM CHLORIDE 9 MG/ML
INJECTION, SOLUTION INTRAVENOUS PRN
Status: DISCONTINUED | OUTPATIENT
Start: 2025-03-14 | End: 2025-04-07

## 2025-03-14 RX ORDER — FLUCONAZOLE 200 MG/1
200 TABLET ORAL DAILY
Status: DISCONTINUED | OUTPATIENT
Start: 2025-03-15 | End: 2025-03-14

## 2025-03-14 RX ORDER — ONDANSETRON 8 MG/1
24 TABLET, FILM COATED ORAL ONCE
Status: COMPLETED | OUTPATIENT
Start: 2025-03-23 | End: 2025-03-23

## 2025-03-14 RX ORDER — PROCHLORPERAZINE EDISYLATE 5 MG/ML
5 INJECTION INTRAMUSCULAR; INTRAVENOUS
Status: DISCONTINUED | OUTPATIENT
Start: 2025-03-14 | End: 2025-03-14 | Stop reason: HOSPADM

## 2025-03-14 RX ORDER — LORAZEPAM 0.5 MG/1
0.5 TABLET ORAL EVERY 4 HOURS PRN
Status: DISCONTINUED | OUTPATIENT
Start: 2025-03-14 | End: 2025-04-08

## 2025-03-14 RX ORDER — DIPHENHYDRAMINE HYDROCHLORIDE 50 MG/ML
12.5 INJECTION INTRAMUSCULAR; INTRAVENOUS
Status: DISCONTINUED | OUTPATIENT
Start: 2025-03-14 | End: 2025-03-14 | Stop reason: HOSPADM

## 2025-03-14 RX ORDER — POTASSIUM CHLORIDE 29.8 MG/ML
20 INJECTION INTRAVENOUS CONTINUOUS PRN
Status: DISCONTINUED | OUTPATIENT
Start: 2025-03-14 | End: 2025-04-10 | Stop reason: HOSPADM

## 2025-03-14 RX ORDER — DEXAMETHASONE 4 MG/1
12 TABLET ORAL ONCE
Status: COMPLETED | OUTPATIENT
Start: 2025-03-14 | End: 2025-03-14

## 2025-03-14 RX ORDER — PROPOFOL 10 MG/ML
INJECTION, EMULSION INTRAVENOUS
Status: DISCONTINUED | OUTPATIENT
Start: 2025-03-14 | End: 2025-03-14 | Stop reason: SDUPTHER

## 2025-03-14 RX ORDER — LIDOCAINE HCL/PF 100 MG/5ML
SYRINGE (ML) INJECTION
Status: DISCONTINUED | OUTPATIENT
Start: 2025-03-14 | End: 2025-03-14 | Stop reason: SDUPTHER

## 2025-03-14 RX ORDER — LEVOFLOXACIN 500 MG/1
500 TABLET, FILM COATED ORAL EVERY 24 HOURS
Status: DISCONTINUED | OUTPATIENT
Start: 2025-03-14 | End: 2025-03-14

## 2025-03-14 RX ORDER — SODIUM CHLORIDE 9 MG/ML
INJECTION, SOLUTION INTRAVENOUS CONTINUOUS
Status: ACTIVE | OUTPATIENT
Start: 2025-03-23 | End: 2025-03-25

## 2025-03-14 RX ORDER — ENOXAPARIN SODIUM 100 MG/ML
40 INJECTION SUBCUTANEOUS EVERY EVENING
Status: DISCONTINUED | OUTPATIENT
Start: 2025-03-14 | End: 2025-03-17

## 2025-03-14 RX ORDER — ONDANSETRON 2 MG/ML
4 INJECTION INTRAMUSCULAR; INTRAVENOUS
Status: DISCONTINUED | OUTPATIENT
Start: 2025-03-14 | End: 2025-03-14 | Stop reason: HOSPADM

## 2025-03-14 RX ORDER — BUPIVACAINE HYDROCHLORIDE AND EPINEPHRINE 5; 5 MG/ML; UG/ML
INJECTION, SOLUTION EPIDURAL; INTRACAUDAL; PERINEURAL PRN
Status: DISCONTINUED | OUTPATIENT
Start: 2025-03-14 | End: 2025-03-14 | Stop reason: ALTCHOICE

## 2025-03-14 RX ORDER — ONDANSETRON 8 MG/1
24 TABLET, FILM COATED ORAL ONCE
Status: COMPLETED | OUTPATIENT
Start: 2025-03-14 | End: 2025-03-14

## 2025-03-14 RX ORDER — FUROSEMIDE 10 MG/ML
40 INJECTION INTRAMUSCULAR; INTRAVENOUS EVERY 12 HOURS
Status: DISCONTINUED | OUTPATIENT
Start: 2025-03-15 | End: 2025-03-23

## 2025-03-14 RX ORDER — SODIUM CHLORIDE 0.9 % (FLUSH) 0.9 %
5-40 SYRINGE (ML) INJECTION EVERY 12 HOURS SCHEDULED
Status: DISCONTINUED | OUTPATIENT
Start: 2025-03-14 | End: 2025-03-14 | Stop reason: HOSPADM

## 2025-03-14 RX ORDER — IPRATROPIUM BROMIDE AND ALBUTEROL SULFATE 2.5; .5 MG/3ML; MG/3ML
1 SOLUTION RESPIRATORY (INHALATION)
Status: DISCONTINUED | OUTPATIENT
Start: 2025-03-14 | End: 2025-03-14 | Stop reason: HOSPADM

## 2025-03-14 RX ORDER — ALLOPURINOL 300 MG/1
150 TABLET ORAL DAILY
Status: DISCONTINUED | OUTPATIENT
Start: 2025-03-15 | End: 2025-04-10 | Stop reason: HOSPADM

## 2025-03-14 RX ORDER — SODIUM CHLORIDE 9 MG/ML
INJECTION, SOLUTION INTRAVENOUS CONTINUOUS
Status: DISCONTINUED | OUTPATIENT
Start: 2025-03-25 | End: 2025-03-26

## 2025-03-14 RX ORDER — SODIUM CHLORIDE 0.9 % (FLUSH) 0.9 %
5-40 SYRINGE (ML) INJECTION PRN
Status: DISCONTINUED | OUTPATIENT
Start: 2025-03-14 | End: 2025-03-14 | Stop reason: HOSPADM

## 2025-03-14 RX ORDER — LORAZEPAM 2 MG/ML
0.5 INJECTION INTRAMUSCULAR EVERY 4 HOURS PRN
Status: DISCONTINUED | OUTPATIENT
Start: 2025-03-14 | End: 2025-03-14

## 2025-03-14 RX ORDER — FUROSEMIDE 10 MG/ML
20 INJECTION INTRAMUSCULAR; INTRAVENOUS PRN
Status: DISPENSED | OUTPATIENT
Start: 2025-03-23 | End: 2025-03-25

## 2025-03-14 RX ORDER — HEPARIN 100 UNIT/ML
SYRINGE INTRAVENOUS PRN
Status: DISCONTINUED | OUTPATIENT
Start: 2025-03-14 | End: 2025-03-14 | Stop reason: ALTCHOICE

## 2025-03-14 RX ORDER — PROCHLORPERAZINE EDISYLATE 5 MG/ML
5 INJECTION INTRAMUSCULAR; INTRAVENOUS EVERY 4 HOURS PRN
Status: DISCONTINUED | OUTPATIENT
Start: 2025-03-14 | End: 2025-04-10 | Stop reason: HOSPADM

## 2025-03-14 RX ORDER — SODIUM CHLORIDE 0.9 % (FLUSH) 0.9 %
5-40 SYRINGE (ML) INJECTION EVERY 12 HOURS SCHEDULED
Status: DISCONTINUED | OUTPATIENT
Start: 2025-03-14 | End: 2025-04-10 | Stop reason: HOSPADM

## 2025-03-14 RX ORDER — DEXTROSE MONOHYDRATE AND SODIUM CHLORIDE 5; .45 G/100ML; G/100ML
INJECTION, SOLUTION INTRAVENOUS CONTINUOUS
Status: ACTIVE | OUTPATIENT
Start: 2025-03-14 | End: 2025-03-23

## 2025-03-14 RX ORDER — LORAZEPAM 2 MG/ML
0.5 INJECTION INTRAMUSCULAR EVERY 4 HOURS PRN
Status: DISCONTINUED | OUTPATIENT
Start: 2025-03-14 | End: 2025-04-08

## 2025-03-14 RX ORDER — NYSTATIN 100000 [USP'U]/ML
5 SUSPENSION ORAL 4 TIMES DAILY
Status: DISCONTINUED | OUTPATIENT
Start: 2025-03-14 | End: 2025-04-10 | Stop reason: HOSPADM

## 2025-03-14 RX ORDER — MEPERIDINE HYDROCHLORIDE 25 MG/ML
12.5 INJECTION INTRAMUSCULAR; INTRAVENOUS; SUBCUTANEOUS EVERY 5 MIN PRN
Status: DISCONTINUED | OUTPATIENT
Start: 2025-03-14 | End: 2025-03-14 | Stop reason: HOSPADM

## 2025-03-14 RX ORDER — METHOTREXATE 25 MG/ML
5 INJECTION, SOLUTION INTRAMUSCULAR; INTRATHECAL; INTRAVENOUS; SUBCUTANEOUS ONCE
Status: DISCONTINUED | OUTPATIENT
Start: 2025-03-21 | End: 2025-03-14 | Stop reason: CLARIF

## 2025-03-14 RX ORDER — FLUCONAZOLE 200 MG/1
400 TABLET ORAL DAILY
Status: DISCONTINUED | OUTPATIENT
Start: 2025-03-25 | End: 2025-04-10 | Stop reason: HOSPADM

## 2025-03-14 RX ORDER — ONDANSETRON 2 MG/ML
INJECTION INTRAMUSCULAR; INTRAVENOUS
Status: DISCONTINUED | OUTPATIENT
Start: 2025-03-14 | End: 2025-03-14 | Stop reason: SDUPTHER

## 2025-03-14 RX ORDER — ALLOPURINOL 100 MG/1
150 TABLET ORAL DAILY
Status: ON HOLD | COMMUNITY

## 2025-03-14 RX ORDER — SODIUM CHLORIDE, SODIUM LACTATE, POTASSIUM CHLORIDE, CALCIUM CHLORIDE 600; 310; 30; 20 MG/100ML; MG/100ML; MG/100ML; MG/100ML
INJECTION, SOLUTION INTRAVENOUS CONTINUOUS
Status: DISCONTINUED | OUTPATIENT
Start: 2025-03-14 | End: 2025-03-14 | Stop reason: HOSPADM

## 2025-03-14 RX ORDER — FENTANYL CITRATE 50 UG/ML
INJECTION, SOLUTION INTRAMUSCULAR; INTRAVENOUS
Status: DISCONTINUED | OUTPATIENT
Start: 2025-03-14 | End: 2025-03-14 | Stop reason: SDUPTHER

## 2025-03-14 RX ORDER — NALOXONE HYDROCHLORIDE 0.4 MG/ML
INJECTION, SOLUTION INTRAMUSCULAR; INTRAVENOUS; SUBCUTANEOUS PRN
Status: DISCONTINUED | OUTPATIENT
Start: 2025-03-14 | End: 2025-03-14 | Stop reason: HOSPADM

## 2025-03-14 RX ORDER — TETRAHYDROZOLINE HCL 0.05 %
1 DROPS OPHTHALMIC (EYE) 3 TIMES DAILY PRN
Status: DISCONTINUED | OUTPATIENT
Start: 2025-03-14 | End: 2025-04-10 | Stop reason: HOSPADM

## 2025-03-14 RX ORDER — SODIUM CHLORIDE 0.9 % (FLUSH) 0.9 %
5-40 SYRINGE (ML) INJECTION PRN
Status: DISCONTINUED | OUTPATIENT
Start: 2025-03-14 | End: 2025-04-10 | Stop reason: HOSPADM

## 2025-03-14 RX ORDER — LORAZEPAM 2 MG/ML
0.5 INJECTION INTRAMUSCULAR
Status: DISCONTINUED | OUTPATIENT
Start: 2025-03-14 | End: 2025-03-14 | Stop reason: HOSPADM

## 2025-03-14 RX ORDER — HYDROMORPHONE HYDROCHLORIDE 1 MG/ML
0.5 INJECTION, SOLUTION INTRAMUSCULAR; INTRAVENOUS; SUBCUTANEOUS EVERY 5 MIN PRN
Status: DISCONTINUED | OUTPATIENT
Start: 2025-03-14 | End: 2025-03-14 | Stop reason: HOSPADM

## 2025-03-14 RX ORDER — MAGNESIUM HYDROXIDE 1200 MG/15ML
LIQUID ORAL CONTINUOUS PRN
Status: COMPLETED | OUTPATIENT
Start: 2025-03-14 | End: 2025-03-14

## 2025-03-14 RX ORDER — SODIUM CHLORIDE 0.9 % (FLUSH) 0.9 %
5-40 SYRINGE (ML) INJECTION EVERY 12 HOURS SCHEDULED
Status: CANCELLED | OUTPATIENT
Start: 2025-03-14

## 2025-03-14 RX ORDER — SODIUM CHLORIDE 0.9 % (FLUSH) 0.9 %
5-40 SYRINGE (ML) INJECTION PRN
Status: CANCELLED | OUTPATIENT
Start: 2025-03-14

## 2025-03-14 RX ORDER — DEXTROSE MONOHYDRATE AND SODIUM CHLORIDE 5; .45 G/100ML; G/100ML
INJECTION, SOLUTION INTRAVENOUS CONTINUOUS
Status: ACTIVE | OUTPATIENT
Start: 2025-03-14 | End: 2025-03-14

## 2025-03-14 RX ORDER — LABETALOL HYDROCHLORIDE 5 MG/ML
10 INJECTION, SOLUTION INTRAVENOUS
Status: DISCONTINUED | OUTPATIENT
Start: 2025-03-14 | End: 2025-03-14 | Stop reason: HOSPADM

## 2025-03-14 RX ORDER — MAGNESIUM SULFATE IN WATER 40 MG/ML
4000 INJECTION, SOLUTION INTRAVENOUS PRN
Status: DISCONTINUED | OUTPATIENT
Start: 2025-03-14 | End: 2025-04-10 | Stop reason: HOSPADM

## 2025-03-14 RX ADMIN — MELPHALAN HYDROCHLORIDE 300 MG: KIT at 17:20

## 2025-03-14 RX ADMIN — DEXTROSE AND SODIUM CHLORIDE: 5; 450 INJECTION, SOLUTION INTRAVENOUS at 23:50

## 2025-03-14 RX ADMIN — DEXAMETHASONE 12 MG: 4 TABLET ORAL at 16:34

## 2025-03-14 RX ADMIN — Medication 100 MG: at 09:52

## 2025-03-14 RX ADMIN — VALACYCLOVIR HYDROCHLORIDE 500 MG: 500 TABLET, FILM COATED ORAL at 20:09

## 2025-03-14 RX ADMIN — SODIUM CHLORIDE, PRESERVATIVE FREE 10 ML: 5 INJECTION INTRAVENOUS at 20:10

## 2025-03-14 RX ADMIN — FENTANYL CITRATE 50 MCG: 50 INJECTION, SOLUTION INTRAMUSCULAR; INTRAVENOUS at 10:12

## 2025-03-14 RX ADMIN — NYSTATIN 500000 UNITS: 100000 SUSPENSION ORAL at 16:34

## 2025-03-14 RX ADMIN — WATER 2000 MG: 1 INJECTION INTRAMUSCULAR; INTRAVENOUS; SUBCUTANEOUS at 09:56

## 2025-03-14 RX ADMIN — DEXTROSE AND SODIUM CHLORIDE: 5; 450 INJECTION, SOLUTION INTRAVENOUS at 17:09

## 2025-03-14 RX ADMIN — SODIUM CHLORIDE 15 ML: 900 IRRIGANT IRRIGATION at 20:13

## 2025-03-14 RX ADMIN — NYSTATIN 500000 UNITS: 100000 SUSPENSION ORAL at 20:09

## 2025-03-14 RX ADMIN — FENTANYL CITRATE 25 MCG: 50 INJECTION, SOLUTION INTRAMUSCULAR; INTRAVENOUS at 09:54

## 2025-03-14 RX ADMIN — PROPOFOL 100 MCG/KG/MIN: 10 INJECTION, EMULSION INTRAVENOUS at 09:54

## 2025-03-14 RX ADMIN — FENTANYL CITRATE 25 MCG: 50 INJECTION, SOLUTION INTRAMUSCULAR; INTRAVENOUS at 10:03

## 2025-03-14 RX ADMIN — DEXTROSE AND SODIUM CHLORIDE: 5; .45 INJECTION, SOLUTION INTRAVENOUS at 13:44

## 2025-03-14 RX ADMIN — PROPOFOL 70 MG: 10 INJECTION, EMULSION INTRAVENOUS at 09:52

## 2025-03-14 RX ADMIN — SODIUM CHLORIDE, SODIUM LACTATE, POTASSIUM CHLORIDE, AND CALCIUM CHLORIDE: .6; .31; .03; .02 INJECTION, SOLUTION INTRAVENOUS at 08:58

## 2025-03-14 RX ADMIN — SODIUM CHLORIDE 150 MG: 0.9 INJECTION, SOLUTION INTRAVENOUS at 16:36

## 2025-03-14 RX ADMIN — SODIUM CHLORIDE, SODIUM LACTATE, POTASSIUM CHLORIDE, AND CALCIUM CHLORIDE: .6; .31; .03; .02 INJECTION, SOLUTION INTRAVENOUS at 09:47

## 2025-03-14 RX ADMIN — ONDANSETRON HYDROCHLORIDE 24 MG: 8 TABLET, FILM COATED ORAL at 16:34

## 2025-03-14 RX ADMIN — DEXAMETHASONE SODIUM PHOSPHATE 4 MG: 4 INJECTION INTRA-ARTICULAR; INTRALESIONAL; INTRAMUSCULAR; INTRAVENOUS; SOFT TISSUE at 09:56

## 2025-03-14 RX ADMIN — URSODIOL 500 MG: 250 TABLET ORAL at 20:09

## 2025-03-14 RX ADMIN — ONDANSETRON 4 MG: 2 INJECTION INTRAMUSCULAR; INTRAVENOUS at 09:56

## 2025-03-14 RX ADMIN — ENOXAPARIN SODIUM 40 MG: 100 INJECTION SUBCUTANEOUS at 17:47

## 2025-03-14 ASSESSMENT — PAIN SCALES - GENERAL
PAINLEVEL_OUTOF10: 0

## 2025-03-14 ASSESSMENT — PAIN - FUNCTIONAL ASSESSMENT: PAIN_FUNCTIONAL_ASSESSMENT: NONE - DENIES PAIN

## 2025-03-14 NOTE — PROGRESS NOTES
Wife at bedside. She reviewed medications as pt was unsure. She also reports patients port was removed at least 2 weeks ago after having an infection in his jaw in February.     LR infusing in IV in left wrist. Labs collected and sent. Glucose checked: 110.

## 2025-03-14 NOTE — ANESTHESIA PRE PROCEDURE
Department of Anesthesiology  Preprocedure Note       Name:  Myles Meehan   Age:  74 y.o.  :  1950                                          MRN:  [id number]         Date:  3/14/2025      Surgeon: Surgeon(s):  Scout Zuñiga MD    Procedure: TRIPLE LUMEN PRADHAN INSERTION    Medications prior to admission:   Prior to Admission medications    Medication Sig Start Date End Date Taking? Authorizing Provider   ursodiol (ACTIGALL) 300 MG capsule Take 1 capsule by mouth 3 times daily (before meals) 25   Jemal Garsia MD   omega-3 acid ethyl esters (LOVAZA) 1 g capsule Take 2 capsules by mouth 2 times daily    Jemal Garsia MD   oxymetazoline (AFRIN) 0.05 % nasal spray 2 sprays by Nasal route nightly    Jemal Garsia MD   tetrahydrozoline 0.05 % ophthalmic solution Place 1 drop into both eyes 3 times daily  Patient taking differently: Place 1 drop into both eyes 3 times daily as needed (allergies) 25   Jan Barbosa MD   diphenhydrAMINE (BENADRYL) 25 MG tablet Take 1 tablet by mouth every 8 hours as needed for Allergies (runny nose)    Jemal Garsia MD   Acetaminophen-Codeine (TYLENOL WITH CODEINE #3 PO) Take 1 capsule by mouth as needed (PAIN)    Jemal Garsia MD   fluconazole (DIFLUCAN) 200 MG tablet Take 1 tablet by mouth daily    Jemal Garsia MD   pantoprazole (PROTONIX) 40 MG tablet Take 1 tablet by mouth daily    Jemal Garsia MD   valACYclovir (VALTREX) 500 MG tablet Take 1 tablet by mouth 2 times daily    Jemal Garsia MD   ondansetron (ZOFRAN) 4 MG tablet Take 1 tablet by mouth every 8 hours as needed for Nausea or Vomiting    Jemal Garsia MD   Promethazine HCl (PHENERGAN PO) Take 1 tablet by mouth as needed (Nausea & Vomiting) AS NEEDED FOR NAUSEA/VOMITING    Jemal Garsia MD   apixaban (ELIQUIS) 5 MG TABS tablet Take 1 tablet by mouth 2 times daily    Jemal Garsia MD       Current medications:

## 2025-03-14 NOTE — ANESTHESIA POSTPROCEDURE EVALUATION
Department of Anesthesiology  Postprocedure Note    Patient: Myles Meehan  MRN: [id number]  YOB: 1950  Date of evaluation: 3/14/2025    Procedure Summary       Date: 03/14/25 Room / Location: [place] [address] / [facility]    Anesthesia Start: 0947 Anesthesia Stop: 1042    Procedure: TRIPLE LUMEN PRADHAN INSERTION/ LEFT SUBCLAVIAN WITH FLUORSCOPIC GUIDANCE (Chest) Diagnosis:       Acute myeloid leukemia (HCC)      (Acute myeloid leukemia (HCC) [C92.00])    Surgeons: Scout Zuñiga MD Responsible Provider: Charlie Johnson MD    Anesthesia Type: MAC ASA Status: 3            Anesthesia Type: MAC    Karen Phase I: Karen Score: 10    Karen Phase II:      Anesthesia Post Evaluation    Patient location during evaluation: PACU  Patient participation: complete - patient participated  Level of consciousness: awake  Airway patency: patent  Nausea & Vomiting: no nausea and no vomiting  Cardiovascular status: blood pressure returned to baseline and hemodynamically stable  Respiratory status: acceptable  Hydration status: euvolemic  Multimodal analgesia pain management approach  Pain management: adequate    No notable events documented.

## 2025-03-14 NOTE — OP NOTE
OPERATIVE NOTE     Patient name: Myles Meehan  : 1950  MRN: [id number]     PREOPERATIVE DIAGNOSIS: Acute leukemia not having achieved remissio      POSTOPERATIVE DIAGNOSIS: Acute leukemia not having achieved remissio      PROCEDURE: Left SUBCLAVIAN TRIPLE LUMEN PRADHAN CATHETER INSERTION WITH FLUOROSCOPIC GUIDANCE    SURGEON: KESHAV RICHARDSON MD    ANESTHESIA: MAC and Local     EBL: Minimal     COMPLICATIONS: None    Findings:  Infection Present At Time Of Surgery (PATOS) (choose all levels that have infection present):  No infection present  Other Findings: none      INDICATION: Patient requires Pradhan triple lumen catheter placement as recommended by patient's oncologist. Risks, benefits, and alternatives discussed with patient and any available family members in the preoperative area. Risks including, but not limited to: bleeding, infection, hematoma, malposition, need for re-operation or removal, and pneumothorax were discussed. All questions answered and patient consented to proceed.     PROCEDURE DETAILS:  The patient was brought to the operating theater and placed in the supine position with arms padded and tucked at sides. A towel roll was placed between the scapulae. The patient's bilateral upper chest and neck was then prepped and draped in sterile fashion using chlorhexidine prep solution. A time-out was performed confirming the patient's identity and operative site. Antibiotics were confirmed to be infusing. SCDs were on and functioning.    Sedation was started by anesthesia. Patient was placed in Trendelenburg position. The left infraclavicular fossa was anesthetized with local anesthetic. The left subclavian vein was then cannulated on the 1st attempt with return of venous blood. Guidewire was then positioned through the needle, into the superior vena cava. The wire passed easily. This was confirmed using fluoroscopy.     An exit site was chosen, anesthetized, and incised 4 cm inferior to the

## 2025-03-14 NOTE — H&P
PRE-OP/PRE-PROCEDURE H&P    Visit Date: 3/14/2025    History:     Myles Meehan is a 74 y.o. male who presents today for procedure. See my/PCP/oncologist office notes for indications and details.    Patient Active Problem List:     Left knee DJD     Quadriceps tendon rupture     Acute appendicitis     Pancytopenia (HCC)     Abscess of left groin     Transaminitis     Acute leukemia of unspecified cell type not having achieved remission (HCC)     Acute leukemia not having achieved remission (HCC)     Acute myeloid leukemia (HCC)     AML (acute myeloid leukemia) in remission (HCC)         Current Facility-Administered Medications:     lactated ringers infusion, , IntraVENous, Continuous, Charlie Johnson MD, Last Rate: 50 mL/hr at 03/14/25 0858, New Bag at 03/14/25 0858    sodium chloride flush 0.9 % injection 5-40 mL, 5-40 mL, IntraVENous, 2 times per day, Scout Zuñiga MD    sodium chloride flush 0.9 % injection 5-40 mL, 5-40 mL, IntraVENous, PRN, Scout Zuñiga MD    0.9 % sodium chloride infusion, , IntraVENous, PRN, Scout Zuñiga MD    ceFAZolin (ANCEF) 2,000 mg in sterile water 20 mL IV syringe, 2,000 mg, IntraVENous, On Call to OR, Scout Zuñiga MD  Prior to Admission medications    Medication Sig Start Date End Date Taking? Authorizing Provider   allopurinol (ZYLOPRIM) 100 MG tablet Take 1.5 tablets by mouth daily   Yes Jemal Garsia MD   ursodiol (ACTIGALL) 300 MG capsule Take 1 capsule by mouth 3 times daily (before meals) 1/21/25  Yes Jemal Garsia MD   omega-3 acid ethyl esters (LOVAZA) 1 g capsule Take 2 capsules by mouth 2 times daily   Yes Jemal Garsia MD   oxymetazoline (AFRIN) 0.05 % nasal spray 2 sprays by Nasal route nightly   Yes Jemal Garsia MD   tetrahydrozoline 0.05 % ophthalmic solution Place 1 drop into both eyes 3 times daily  Patient taking differently: Place 1 drop into both eyes 3 times daily as needed (allergies) 2/17/25  Yes Jan Barbosa,  02/20/2015    LEFT KNEE QUADRICEPS TENDON REPAIR     SHOULDER SURGERY Right          Physical Exam:     /76   Pulse 82   Temp 97.5 °F (36.4 °C) (Oral)   Resp 16   Ht 1.753 m (5' 9\")   Wt 96.1 kg (211 lb 12.8 oz)   SpO2 98%   BMI 31.28 kg/m²  Body mass index is 31.28 kg/m².  Constitutional: Appears well-developed and well-nourished.  Pulmonary/Chest: Effort normal. No respiratory distress. No wheezes. No use of accessory muscles.  Neurological: Alert and oriented to person, place, and time. No gross deficits.     Recent labs and imaging reviewed as necessary.    Assessment/Plan:       Proceed as planned for Plascencia catheter placement    Risks/benefits/alternatives of procedure/surgery discussed with patient and any present family members (or appropriate guardian) and understanding verbalized. All questions answered. Patient wishes to proceed.    Electronically signed by Scout Zuñiga MD on 3/14/2025 at 9:16 AM

## 2025-03-14 NOTE — PROGRESS NOTES
Pt arrived from OR, s/p TRIPLE LUMEN PRADHAN INSERTION/ LEFT SUBCLAVIAN WITH FLUORSCOPIC GUIDANCE , report received form CRNA and OR RN, small amount bloody drainage noted under dressing

## 2025-03-14 NOTE — ONCOLOGY
Administration: Chemotherapy drug melphalan independently verified with Emmy Mclaughlin RN prior to administration.  Acknowledgement of informed consent for chemotherapy administration verified.  Original order, appropriateness of regimen, drug supplied, height, weight, BSA, dose calculations, expiration dates/times, drug appearance, and two patient identifiers were verified by both RNs.  Drug checked for vesicant/irritant status and for risk of hypersensitivity.  Most recent laboratory values and allergies, were reviewed.  Positive, brisk blood return via CVC was confirmed prior to administration. Chest x-ray for correct line placement reviewed. Tamar Angelo RN and Emmy Mclaughlin RN verified correct rate of chemotherapy and maintenance IV fluids.  Patient was educated on chemotherapy regimen prior to administration including indication for treatment related to disease & side effects of chemotherapy drug.  Patient verbalizes understanding of all instructions.    Completion of Chemotherapy: Monitoring during infusion done per policy, see Flowsheets.  Blood return verified before, during, and after infusion per policy; no signs of extravasation.  Patient tolerated chemotherapy well and without incident.  Chemotherapy infusion end time on the MAR.

## 2025-03-14 NOTE — PROGRESS NOTES
PACU Transfer Note    Vitals:    03/14/25 1115   BP: (!) 148/73   Pulse: 78   Resp: 21   Temp: 97 °F (36.1 °C)   SpO2: 99%       In: 750 [I.V.:750]  Out: -     Pain assessment:  pt taken to room per transporter, [employer] to get belongings.  BP in range  Pain Level: 0    Report given to Receiving unit RN.    3/14/2025 11:19 AM

## 2025-03-15 LAB
ALBUMIN SERPL-MCNC: 4.1 G/DL (ref 3.4–5)
ALP SERPL-CCNC: 81 U/L (ref 40–129)
ALT SERPL-CCNC: 12 U/L (ref 10–40)
ANION GAP SERPL CALCULATED.3IONS-SCNC: 12 MMOL/L (ref 3–16)
AST SERPL-CCNC: 12 U/L (ref 15–37)
BASOPHILS # BLD: 0 K/UL (ref 0–0.2)
BASOPHILS NFR BLD: 0.1 %
BILIRUB DIRECT SERPL-MCNC: 0.3 MG/DL (ref 0–0.3)
BILIRUB INDIRECT SERPL-MCNC: 0.4 MG/DL (ref 0–1)
BILIRUB SERPL-MCNC: 0.7 MG/DL (ref 0–1)
BUN SERPL-MCNC: 10 MG/DL (ref 7–20)
CALCIUM SERPL-MCNC: 9.6 MG/DL (ref 8.3–10.6)
CHLORIDE SERPL-SCNC: 104 MMOL/L (ref 99–110)
CO2 SERPL-SCNC: 22 MMOL/L (ref 21–32)
CREAT SERPL-MCNC: 0.7 MG/DL (ref 0.8–1.3)
DEPRECATED RDW RBC AUTO: 18.2 % (ref 12.4–15.4)
EOSINOPHIL # BLD: 0 K/UL (ref 0–0.6)
EOSINOPHIL NFR BLD: 0 %
GFR SERPLBLD CREATININE-BSD FMLA CKD-EPI: >90 ML/MIN/{1.73_M2}
GLUCOSE BLD-MCNC: 149 MG/DL (ref 70–99)
GLUCOSE SERPL-MCNC: 156 MG/DL (ref 70–99)
HCT VFR BLD AUTO: 30.9 % (ref 40.5–52.5)
HGB BLD-MCNC: 10.5 G/DL (ref 13.5–17.5)
LDH SERPL L TO P-CCNC: 171 U/L (ref 100–190)
LYMPHOCYTES # BLD: 0.4 K/UL (ref 1–5.1)
LYMPHOCYTES NFR BLD: 7.5 %
MAGNESIUM SERPL-MCNC: 1.89 MG/DL (ref 1.8–2.4)
MCH RBC QN AUTO: 33.7 PG (ref 26–34)
MCHC RBC AUTO-ENTMCNC: 33.8 G/DL (ref 31–36)
MCV RBC AUTO: 99.5 FL (ref 80–100)
MONOCYTES # BLD: 0.1 K/UL (ref 0–1.3)
MONOCYTES NFR BLD: 1.1 %
NEUTROPHILS # BLD: 5.1 K/UL (ref 1.7–7.7)
NEUTROPHILS NFR BLD: 91.3 %
PERFORMED ON: ABNORMAL
PHOSPHATE SERPL-MCNC: 3 MG/DL (ref 2.5–4.9)
PLATELET # BLD AUTO: 84 K/UL (ref 135–450)
PMV BLD AUTO: 8.9 FL (ref 5–10.5)
POTASSIUM SERPL-SCNC: 4.1 MMOL/L (ref 3.5–5.1)
PROT SERPL-MCNC: 6.2 G/DL (ref 6.4–8.2)
RBC # BLD AUTO: 3.11 M/UL (ref 4.2–5.9)
SODIUM SERPL-SCNC: 138 MMOL/L (ref 136–145)
WBC # BLD AUTO: 5.5 K/UL (ref 4–11)

## 2025-03-15 PROCEDURE — 85025 COMPLETE CBC W/AUTO DIFF WBC: CPT

## 2025-03-15 PROCEDURE — 83615 LACTATE (LD) (LDH) ENZYME: CPT

## 2025-03-15 PROCEDURE — 84100 ASSAY OF PHOSPHORUS: CPT

## 2025-03-15 PROCEDURE — 2580000003 HC RX 258: Performed by: INTERNAL MEDICINE

## 2025-03-15 PROCEDURE — 6370000000 HC RX 637 (ALT 250 FOR IP): Performed by: INTERNAL MEDICINE

## 2025-03-15 PROCEDURE — 6360000002 HC RX W HCPCS

## 2025-03-15 PROCEDURE — 2060000000 HC ICU INTERMEDIATE R&B

## 2025-03-15 PROCEDURE — 6370000000 HC RX 637 (ALT 250 FOR IP)

## 2025-03-15 PROCEDURE — 36592 COLLECT BLOOD FROM PICC: CPT

## 2025-03-15 PROCEDURE — 6360000002 HC RX W HCPCS: Performed by: INTERNAL MEDICINE

## 2025-03-15 PROCEDURE — 2500000003 HC RX 250 WO HCPCS

## 2025-03-15 PROCEDURE — 80076 HEPATIC FUNCTION PANEL: CPT

## 2025-03-15 PROCEDURE — 83735 ASSAY OF MAGNESIUM: CPT

## 2025-03-15 PROCEDURE — 80048 BASIC METABOLIC PNL TOTAL CA: CPT

## 2025-03-15 RX ORDER — HYDRALAZINE HYDROCHLORIDE 20 MG/ML
10 INJECTION INTRAMUSCULAR; INTRAVENOUS EVERY 6 HOURS PRN
Status: DISCONTINUED | OUTPATIENT
Start: 2025-03-15 | End: 2025-04-10 | Stop reason: HOSPADM

## 2025-03-15 RX ADMIN — URSODIOL 500 MG: 250 TABLET ORAL at 21:06

## 2025-03-15 RX ADMIN — NYSTATIN 500000 UNITS: 100000 SUSPENSION ORAL at 08:10

## 2025-03-15 RX ADMIN — DEXTROSE AND SODIUM CHLORIDE: 5; 450 INJECTION, SOLUTION INTRAVENOUS at 21:04

## 2025-03-15 RX ADMIN — NYSTATIN 500000 UNITS: 100000 SUSPENSION ORAL at 13:36

## 2025-03-15 RX ADMIN — ONDANSETRON HYDROCHLORIDE 12 MG: 8 TABLET, FILM COATED ORAL at 09:24

## 2025-03-15 RX ADMIN — DEXTROSE AND SODIUM CHLORIDE: 5; 450 INJECTION, SOLUTION INTRAVENOUS at 14:12

## 2025-03-15 RX ADMIN — ENOXAPARIN SODIUM 40 MG: 100 INJECTION SUBCUTANEOUS at 16:54

## 2025-03-15 RX ADMIN — VALACYCLOVIR HYDROCHLORIDE 500 MG: 500 TABLET, FILM COATED ORAL at 21:07

## 2025-03-15 RX ADMIN — URSODIOL 500 MG: 250 TABLET ORAL at 08:11

## 2025-03-15 RX ADMIN — SODIUM CHLORIDE 15 ML: 900 IRRIGANT IRRIGATION at 16:55

## 2025-03-15 RX ADMIN — DEXTROSE AND SODIUM CHLORIDE: 5; 450 INJECTION, SOLUTION INTRAVENOUS at 06:30

## 2025-03-15 RX ADMIN — ALLOPURINOL 150 MG: 300 TABLET ORAL at 08:10

## 2025-03-15 RX ADMIN — SODIUM CHLORIDE, PRESERVATIVE FREE 10 ML: 5 INJECTION INTRAVENOUS at 08:10

## 2025-03-15 RX ADMIN — VALACYCLOVIR HYDROCHLORIDE 500 MG: 500 TABLET, FILM COATED ORAL at 08:10

## 2025-03-15 RX ADMIN — FLUDARABINE PHOSPHATE 55 MG: 25 INJECTION, SOLUTION INTRAVENOUS at 09:57

## 2025-03-15 RX ADMIN — PANTOPRAZOLE SODIUM 40 MG: 40 TABLET, DELAYED RELEASE ORAL at 06:30

## 2025-03-15 RX ADMIN — NYSTATIN 500000 UNITS: 100000 SUSPENSION ORAL at 21:07

## 2025-03-15 RX ADMIN — NYSTATIN 500000 UNITS: 100000 SUSPENSION ORAL at 16:55

## 2025-03-15 RX ADMIN — SODIUM CHLORIDE 15 ML: 900 IRRIGANT IRRIGATION at 11:33

## 2025-03-15 ASSESSMENT — PAIN SCALES - GENERAL: PAINLEVEL_OUTOF10: 0

## 2025-03-15 NOTE — ONCOLOGY
Administration: Chemotherapy drug Fludarabine independently verified with Peg Szymanski RN prior to administration.  Acknowledgement of informed consent for chemotherapy administration verified.  Original order, appropriateness of regimen, drug supplied, height, weight, BSA, dose calculations, expiration dates/times, drug appearance, and two patient identifiers were verified by both RNs.  Drug checked for vesicant/irritant status and for risk of hypersensitivity.  Most recent laboratory values and allergies, were reviewed.  Positive, brisk blood return via CVC was confirmed prior to administration. Chest x-ray for correct line placement reviewed. Dary Damian RN and Peg Szymanski RN verified correct rate of chemotherapy and maintenance IV fluids.  Patient was educated on chemotherapy regimen prior to administration including indication for treatment related to disease & side effects of chemotherapy drug.  Patient verbalizes understanding of all instructions. Premed given vss.     Completion of Chemotherapy: Monitoring during infusion done per policy, see Flowsheets.  Blood return verified before, during, and after infusion per policy; no signs of extravasation.  Pt {tolerated chemotherapy well and without incident.  Chemotherapy infusion end time on the MAR.  Will continue to monitor.

## 2025-03-16 LAB
ALBUMIN SERPL-MCNC: 3.5 G/DL (ref 3.4–5)
ALP SERPL-CCNC: 63 U/L (ref 40–129)
ALT SERPL-CCNC: 10 U/L (ref 10–40)
ANION GAP SERPL CALCULATED.3IONS-SCNC: 9 MMOL/L (ref 3–16)
AST SERPL-CCNC: 10 U/L (ref 15–37)
BASOPHILS # BLD: 0 K/UL (ref 0–0.2)
BASOPHILS NFR BLD: 0 %
BILIRUB DIRECT SERPL-MCNC: 0.3 MG/DL (ref 0–0.3)
BILIRUB INDIRECT SERPL-MCNC: 0.6 MG/DL (ref 0–1)
BILIRUB SERPL-MCNC: 0.9 MG/DL (ref 0–1)
BUN SERPL-MCNC: 17 MG/DL (ref 7–20)
CALCIUM SERPL-MCNC: 9 MG/DL (ref 8.3–10.6)
CHLORIDE SERPL-SCNC: 104 MMOL/L (ref 99–110)
CO2 SERPL-SCNC: 25 MMOL/L (ref 21–32)
CREAT SERPL-MCNC: 0.6 MG/DL (ref 0.8–1.3)
DEPRECATED RDW RBC AUTO: 18.1 % (ref 12.4–15.4)
EOSINOPHIL # BLD: 0 K/UL (ref 0–0.6)
EOSINOPHIL NFR BLD: 0.8 %
GFR SERPLBLD CREATININE-BSD FMLA CKD-EPI: >90 ML/MIN/{1.73_M2}
GLUCOSE SERPL-MCNC: 109 MG/DL (ref 70–99)
HCT VFR BLD AUTO: 26.2 % (ref 40.5–52.5)
HGB BLD-MCNC: 9 G/DL (ref 13.5–17.5)
LDH SERPL L TO P-CCNC: 132 U/L (ref 100–190)
LYMPHOCYTES # BLD: 0.4 K/UL (ref 1–5.1)
LYMPHOCYTES NFR BLD: 6.6 %
MAGNESIUM SERPL-MCNC: 2.04 MG/DL (ref 1.8–2.4)
MCH RBC QN AUTO: 34 PG (ref 26–34)
MCHC RBC AUTO-ENTMCNC: 34.4 G/DL (ref 31–36)
MCV RBC AUTO: 99 FL (ref 80–100)
MONOCYTES # BLD: 0.2 K/UL (ref 0–1.3)
MONOCYTES NFR BLD: 3.4 %
NEUTROPHILS # BLD: 5.6 K/UL (ref 1.7–7.7)
NEUTROPHILS NFR BLD: 89.2 %
PHOSPHATE SERPL-MCNC: 4.2 MG/DL (ref 2.5–4.9)
PLATELET # BLD AUTO: 68 K/UL (ref 135–450)
PMV BLD AUTO: 8 FL (ref 5–10.5)
POTASSIUM SERPL-SCNC: 4 MMOL/L (ref 3.5–5.1)
PROT SERPL-MCNC: 5.2 G/DL (ref 6.4–8.2)
RBC # BLD AUTO: 2.64 M/UL (ref 4.2–5.9)
SODIUM SERPL-SCNC: 138 MMOL/L (ref 136–145)
WBC # BLD AUTO: 6.3 K/UL (ref 4–11)

## 2025-03-16 PROCEDURE — 2060000000 HC ICU INTERMEDIATE R&B

## 2025-03-16 PROCEDURE — 36592 COLLECT BLOOD FROM PICC: CPT

## 2025-03-16 PROCEDURE — 84100 ASSAY OF PHOSPHORUS: CPT

## 2025-03-16 PROCEDURE — 83615 LACTATE (LD) (LDH) ENZYME: CPT

## 2025-03-16 PROCEDURE — 80048 BASIC METABOLIC PNL TOTAL CA: CPT

## 2025-03-16 PROCEDURE — 2580000003 HC RX 258: Performed by: INTERNAL MEDICINE

## 2025-03-16 PROCEDURE — 6360000002 HC RX W HCPCS: Performed by: INTERNAL MEDICINE

## 2025-03-16 PROCEDURE — 80076 HEPATIC FUNCTION PANEL: CPT

## 2025-03-16 PROCEDURE — 6360000002 HC RX W HCPCS

## 2025-03-16 PROCEDURE — 2500000003 HC RX 250 WO HCPCS

## 2025-03-16 PROCEDURE — 85025 COMPLETE CBC W/AUTO DIFF WBC: CPT

## 2025-03-16 PROCEDURE — 6370000000 HC RX 637 (ALT 250 FOR IP)

## 2025-03-16 PROCEDURE — 83735 ASSAY OF MAGNESIUM: CPT

## 2025-03-16 PROCEDURE — 6370000000 HC RX 637 (ALT 250 FOR IP): Performed by: INTERNAL MEDICINE

## 2025-03-16 RX ADMIN — NYSTATIN 500000 UNITS: 100000 SUSPENSION ORAL at 21:37

## 2025-03-16 RX ADMIN — NYSTATIN 500000 UNITS: 100000 SUSPENSION ORAL at 16:43

## 2025-03-16 RX ADMIN — SODIUM CHLORIDE 15 ML: 900 IRRIGANT IRRIGATION at 16:46

## 2025-03-16 RX ADMIN — FLUDARABINE PHOSPHATE 55 MG: 25 INJECTION, SOLUTION INTRAVENOUS at 10:01

## 2025-03-16 RX ADMIN — SODIUM CHLORIDE 15 ML: 900 IRRIGANT IRRIGATION at 11:40

## 2025-03-16 RX ADMIN — SODIUM CHLORIDE, PRESERVATIVE FREE 10 ML: 5 INJECTION INTRAVENOUS at 02:12

## 2025-03-16 RX ADMIN — VALACYCLOVIR HYDROCHLORIDE 500 MG: 500 TABLET, FILM COATED ORAL at 21:30

## 2025-03-16 RX ADMIN — NYSTATIN 500000 UNITS: 100000 SUSPENSION ORAL at 07:53

## 2025-03-16 RX ADMIN — SODIUM CHLORIDE, PRESERVATIVE FREE 10 ML: 5 INJECTION INTRAVENOUS at 07:53

## 2025-03-16 RX ADMIN — URSODIOL 500 MG: 250 TABLET ORAL at 07:54

## 2025-03-16 RX ADMIN — URSODIOL 500 MG: 250 TABLET ORAL at 21:30

## 2025-03-16 RX ADMIN — DEXTROSE AND SODIUM CHLORIDE: 5; 450 INJECTION, SOLUTION INTRAVENOUS at 18:26

## 2025-03-16 RX ADMIN — DEXTROSE AND SODIUM CHLORIDE: 5; 450 INJECTION, SOLUTION INTRAVENOUS at 03:55

## 2025-03-16 RX ADMIN — ENOXAPARIN SODIUM 40 MG: 100 INJECTION SUBCUTANEOUS at 16:43

## 2025-03-16 RX ADMIN — SODIUM CHLORIDE 15 ML: 900 IRRIGANT IRRIGATION at 07:55

## 2025-03-16 RX ADMIN — ONDANSETRON HYDROCHLORIDE 12 MG: 8 TABLET, FILM COATED ORAL at 09:30

## 2025-03-16 RX ADMIN — NYSTATIN 500000 UNITS: 100000 SUSPENSION ORAL at 13:07

## 2025-03-16 RX ADMIN — ALLOPURINOL 150 MG: 300 TABLET ORAL at 07:54

## 2025-03-16 RX ADMIN — SODIUM CHLORIDE 15 ML: 900 IRRIGANT IRRIGATION at 02:13

## 2025-03-16 RX ADMIN — DEXTROSE AND SODIUM CHLORIDE: 5; 450 INJECTION, SOLUTION INTRAVENOUS at 11:39

## 2025-03-16 RX ADMIN — PANTOPRAZOLE SODIUM 40 MG: 40 TABLET, DELAYED RELEASE ORAL at 06:54

## 2025-03-16 RX ADMIN — VALACYCLOVIR HYDROCHLORIDE 500 MG: 500 TABLET, FILM COATED ORAL at 07:54

## 2025-03-16 NOTE — ONCOLOGY
Administration: Chemotherapy drug Fludarabine independently verified with Vanessa Kaur RN prior to administration.  Acknowledgement of informed consent for chemotherapy administration verified.  Original order, appropriateness of regimen, drug supplied, height, weight, BSA, dose calculations, expiration dates/times, drug appearance, and two patient identifiers were verified by both RNs.  Drug checked for vesicant/irritant status and for risk of hypersensitivity.  Most recent laboratory values and allergies, were reviewed.  Positive, brisk blood return via CVC was confirmed prior to administration. Chest x-ray for correct line placement reviewed. Dary Damian RN and Vanessa Kaur RN verified correct rate of chemotherapy and maintenance IV fluids.  Patient was educated on chemotherapy regimen prior to administration including indication for treatment related to disease & side effects of chemotherapy drug.  Patient verbalizes understanding of all instructions.premed given. Vss.     Completion of Chemotherapy: Monitoring during infusion done per policy, see Flowsheets.  Blood return verified before, during, and after infusion per policy; no signs of extravasation.  Pt {tolerated chemotherapy well and without incident.  Chemotherapy infusion end time on the MAR.  Will continue to monitor.

## 2025-03-17 ENCOUNTER — APPOINTMENT (OUTPATIENT)
Dept: GENERAL RADIOLOGY | Age: 75
DRG: 014 | End: 2025-03-17
Attending: STUDENT IN AN ORGANIZED HEALTH CARE EDUCATION/TRAINING PROGRAM
Payer: COMMERCIAL

## 2025-03-17 LAB
ALBUMIN SERPL-MCNC: 3.3 G/DL (ref 3.4–5)
ALP SERPL-CCNC: 60 U/L (ref 40–129)
ALT SERPL-CCNC: 10 U/L (ref 10–40)
ANION GAP SERPL CALCULATED.3IONS-SCNC: 9 MMOL/L (ref 3–16)
APTT BLD: 27.3 SEC (ref 22.1–36.4)
AST SERPL-CCNC: 8 U/L (ref 15–37)
BASOPHILS # BLD: 0 K/UL (ref 0–0.2)
BASOPHILS NFR BLD: 0.2 %
BILIRUB DIRECT SERPL-MCNC: 0.3 MG/DL (ref 0–0.3)
BILIRUB INDIRECT SERPL-MCNC: 0.7 MG/DL (ref 0–1)
BILIRUB SERPL-MCNC: 1 MG/DL (ref 0–1)
BILIRUB UR QL STRIP.AUTO: NEGATIVE
BUN SERPL-MCNC: 14 MG/DL (ref 7–20)
CALCIUM SERPL-MCNC: 8.5 MG/DL (ref 8.3–10.6)
CHLORIDE SERPL-SCNC: 105 MMOL/L (ref 99–110)
CLARITY UR: CLEAR
CO2 SERPL-SCNC: 25 MMOL/L (ref 21–32)
COLOR UR: YELLOW
CREAT SERPL-MCNC: 0.6 MG/DL (ref 0.8–1.3)
DEPRECATED RDW RBC AUTO: 17.8 % (ref 12.4–15.4)
EOSINOPHIL # BLD: 0 K/UL (ref 0–0.6)
EOSINOPHIL NFR BLD: 0.4 %
FUNGUS BLD CULT: NORMAL
GFR SERPLBLD CREATININE-BSD FMLA CKD-EPI: >90 ML/MIN/{1.73_M2}
GLUCOSE SERPL-MCNC: 85 MG/DL (ref 70–99)
GLUCOSE UR STRIP.AUTO-MCNC: NEGATIVE MG/DL
HCT VFR BLD AUTO: 25.2 % (ref 40.5–52.5)
HGB BLD-MCNC: 8.6 G/DL (ref 13.5–17.5)
HGB UR QL STRIP.AUTO: NEGATIVE
INR PPP: 1.08 (ref 0.85–1.15)
KETONES UR STRIP.AUTO-MCNC: NEGATIVE MG/DL
LDH SERPL L TO P-CCNC: 125 U/L (ref 100–190)
LEUKOCYTE ESTERASE UR QL STRIP.AUTO: NEGATIVE
LYMPHOCYTES # BLD: 0.3 K/UL (ref 1–5.1)
LYMPHOCYTES NFR BLD: 10.4 %
MAGNESIUM SERPL-MCNC: 1.9 MG/DL (ref 1.8–2.4)
MCH RBC QN AUTO: 33.6 PG (ref 26–34)
MCHC RBC AUTO-ENTMCNC: 34.1 G/DL (ref 31–36)
MCV RBC AUTO: 98.7 FL (ref 80–100)
MONOCYTES # BLD: 0 K/UL (ref 0–1.3)
MONOCYTES NFR BLD: 0.6 %
NEUTROPHILS # BLD: 2.2 K/UL (ref 1.7–7.7)
NEUTROPHILS NFR BLD: 88.4 %
NITRITE UR QL STRIP.AUTO: NEGATIVE
PH UR STRIP.AUTO: 6 [PH] (ref 5–8)
PHOSPHATE SERPL-MCNC: 4 MG/DL (ref 2.5–4.9)
PLATELET # BLD AUTO: 62 K/UL (ref 135–450)
PMV BLD AUTO: 8.4 FL (ref 5–10.5)
POTASSIUM SERPL-SCNC: 3.8 MMOL/L (ref 3.5–5.1)
PROT SERPL-MCNC: 4.8 G/DL (ref 6.4–8.2)
PROT UR STRIP.AUTO-MCNC: NEGATIVE MG/DL
PROTHROMBIN TIME: 14.2 SEC (ref 11.9–14.9)
RBC # BLD AUTO: 2.56 M/UL (ref 4.2–5.9)
SODIUM SERPL-SCNC: 139 MMOL/L (ref 136–145)
SP GR UR STRIP.AUTO: 1.02 (ref 1–1.03)
UA DIPSTICK W REFLEX MICRO PNL UR: NORMAL
URATE SERPL-MCNC: 4.8 MG/DL (ref 3.5–7.2)
URN SPEC COLLECT METH UR: NORMAL
UROBILINOGEN UR STRIP-ACNC: 0.2 E.U./DL
WBC # BLD AUTO: 2.5 K/UL (ref 4–11)

## 2025-03-17 PROCEDURE — 81003 URINALYSIS AUTO W/O SCOPE: CPT

## 2025-03-17 PROCEDURE — 83615 LACTATE (LD) (LDH) ENZYME: CPT

## 2025-03-17 PROCEDURE — 85610 PROTHROMBIN TIME: CPT

## 2025-03-17 PROCEDURE — 6370000000 HC RX 637 (ALT 250 FOR IP)

## 2025-03-17 PROCEDURE — 85025 COMPLETE CBC W/AUTO DIFF WBC: CPT

## 2025-03-17 PROCEDURE — 97163 PT EVAL HIGH COMPLEX 45 MIN: CPT

## 2025-03-17 PROCEDURE — 2580000003 HC RX 258: Performed by: INTERNAL MEDICINE

## 2025-03-17 PROCEDURE — 6360000002 HC RX W HCPCS

## 2025-03-17 PROCEDURE — 83735 ASSAY OF MAGNESIUM: CPT

## 2025-03-17 PROCEDURE — 80076 HEPATIC FUNCTION PANEL: CPT

## 2025-03-17 PROCEDURE — 36592 COLLECT BLOOD FROM PICC: CPT

## 2025-03-17 PROCEDURE — 2060000000 HC ICU INTERMEDIATE R&B

## 2025-03-17 PROCEDURE — 6370000000 HC RX 637 (ALT 250 FOR IP): Performed by: INTERNAL MEDICINE

## 2025-03-17 PROCEDURE — 6360000002 HC RX W HCPCS: Performed by: INTERNAL MEDICINE

## 2025-03-17 PROCEDURE — 80048 BASIC METABOLIC PNL TOTAL CA: CPT

## 2025-03-17 PROCEDURE — 71045 X-RAY EXAM CHEST 1 VIEW: CPT

## 2025-03-17 PROCEDURE — 84550 ASSAY OF BLOOD/URIC ACID: CPT

## 2025-03-17 PROCEDURE — 97116 GAIT TRAINING THERAPY: CPT

## 2025-03-17 PROCEDURE — 84100 ASSAY OF PHOSPHORUS: CPT

## 2025-03-17 PROCEDURE — 85730 THROMBOPLASTIN TIME PARTIAL: CPT

## 2025-03-17 PROCEDURE — 97165 OT EVAL LOW COMPLEX 30 MIN: CPT

## 2025-03-17 PROCEDURE — 2500000003 HC RX 250 WO HCPCS

## 2025-03-17 PROCEDURE — 97530 THERAPEUTIC ACTIVITIES: CPT

## 2025-03-17 RX ORDER — FUROSEMIDE 10 MG/ML
40 INJECTION INTRAMUSCULAR; INTRAVENOUS ONCE
Status: COMPLETED | OUTPATIENT
Start: 2025-03-17 | End: 2025-03-17

## 2025-03-17 RX ADMIN — SODIUM CHLORIDE, PRESERVATIVE FREE 10 ML: 5 INJECTION INTRAVENOUS at 08:39

## 2025-03-17 RX ADMIN — PANTOPRAZOLE SODIUM 40 MG: 40 TABLET, DELAYED RELEASE ORAL at 07:06

## 2025-03-17 RX ADMIN — DEXTROSE AND SODIUM CHLORIDE: 5; 450 INJECTION, SOLUTION INTRAVENOUS at 09:30

## 2025-03-17 RX ADMIN — SODIUM CHLORIDE 15 ML: 900 IRRIGANT IRRIGATION at 08:47

## 2025-03-17 RX ADMIN — URSODIOL 500 MG: 250 TABLET ORAL at 21:12

## 2025-03-17 RX ADMIN — NYSTATIN 500000 UNITS: 100000 SUSPENSION ORAL at 13:50

## 2025-03-17 RX ADMIN — ALLOPURINOL 150 MG: 300 TABLET ORAL at 08:38

## 2025-03-17 RX ADMIN — SODIUM CHLORIDE, PRESERVATIVE FREE 10 ML: 5 INJECTION INTRAVENOUS at 23:40

## 2025-03-17 RX ADMIN — SODIUM CHLORIDE 15 ML: 900 IRRIGANT IRRIGATION at 23:40

## 2025-03-17 RX ADMIN — SODIUM CHLORIDE 15 ML: 900 IRRIGANT IRRIGATION at 18:03

## 2025-03-17 RX ADMIN — NYSTATIN 500000 UNITS: 100000 SUSPENSION ORAL at 18:02

## 2025-03-17 RX ADMIN — SODIUM CHLORIDE 15 ML: 900 IRRIGANT IRRIGATION at 12:04

## 2025-03-17 RX ADMIN — VALACYCLOVIR HYDROCHLORIDE 500 MG: 500 TABLET, FILM COATED ORAL at 08:38

## 2025-03-17 RX ADMIN — SODIUM CHLORIDE, PRESERVATIVE FREE 10 ML: 5 INJECTION INTRAVENOUS at 04:11

## 2025-03-17 RX ADMIN — DEXTROSE AND SODIUM CHLORIDE: 5; 450 INJECTION, SOLUTION INTRAVENOUS at 02:24

## 2025-03-17 RX ADMIN — SODIUM CHLORIDE 15 ML: 900 IRRIGANT IRRIGATION at 04:11

## 2025-03-17 RX ADMIN — FLUDARABINE PHOSPHATE 55 MG: 25 INJECTION, SOLUTION INTRAVENOUS at 10:08

## 2025-03-17 RX ADMIN — FUROSEMIDE 40 MG: 10 INJECTION, SOLUTION INTRAMUSCULAR; INTRAVENOUS at 10:51

## 2025-03-17 RX ADMIN — ONDANSETRON HYDROCHLORIDE 12 MG: 8 TABLET, FILM COATED ORAL at 08:38

## 2025-03-17 RX ADMIN — URSODIOL 500 MG: 250 TABLET ORAL at 08:38

## 2025-03-17 RX ADMIN — VALACYCLOVIR HYDROCHLORIDE 500 MG: 500 TABLET, FILM COATED ORAL at 21:12

## 2025-03-17 RX ADMIN — NYSTATIN 500000 UNITS: 100000 SUSPENSION ORAL at 08:45

## 2025-03-18 LAB
ANION GAP SERPL CALCULATED.3IONS-SCNC: 8 MMOL/L (ref 3–16)
BASOPHILS # BLD: 0 K/UL (ref 0–0.2)
BASOPHILS NFR BLD: 0.5 %
BILIRUB SERPL-MCNC: 0.7 MG/DL (ref 0–1)
BUN SERPL-MCNC: 13 MG/DL (ref 7–20)
CALCIUM SERPL-MCNC: 8.8 MG/DL (ref 8.3–10.6)
CHLORIDE SERPL-SCNC: 105 MMOL/L (ref 99–110)
CO2 SERPL-SCNC: 27 MMOL/L (ref 21–32)
CREAT SERPL-MCNC: 0.7 MG/DL (ref 0.8–1.3)
DEPRECATED RDW RBC AUTO: 17.7 % (ref 12.4–15.4)
EOSINOPHIL # BLD: 0 K/UL (ref 0–0.6)
EOSINOPHIL NFR BLD: 1.1 %
GFR SERPLBLD CREATININE-BSD FMLA CKD-EPI: >90 ML/MIN/{1.73_M2}
GLUCOSE SERPL-MCNC: 96 MG/DL (ref 70–99)
HCT VFR BLD AUTO: 25.2 % (ref 40.5–52.5)
HGB BLD-MCNC: 8.5 G/DL (ref 13.5–17.5)
LYMPHOCYTES # BLD: 0.1 K/UL (ref 1–5.1)
LYMPHOCYTES NFR BLD: 6.4 %
MAGNESIUM SERPL-MCNC: 1.95 MG/DL (ref 1.8–2.4)
MCH RBC QN AUTO: 33.2 PG (ref 26–34)
MCHC RBC AUTO-ENTMCNC: 33.8 G/DL (ref 31–36)
MCV RBC AUTO: 98.4 FL (ref 80–100)
MONOCYTES # BLD: 0 K/UL (ref 0–1.3)
MONOCYTES NFR BLD: 0.2 %
NEUTROPHILS # BLD: 1.4 K/UL (ref 1.7–7.7)
NEUTROPHILS NFR BLD: 91.8 %
PHOSPHATE SERPL-MCNC: 4.3 MG/DL (ref 2.5–4.9)
PLATELET # BLD AUTO: 55 K/UL (ref 135–450)
PLATELET BLD QL SMEAR: ABNORMAL
PMV BLD AUTO: 8.3 FL (ref 5–10.5)
POTASSIUM SERPL-SCNC: 3.7 MMOL/L (ref 3.5–5.1)
RBC # BLD AUTO: 2.56 M/UL (ref 4.2–5.9)
SODIUM SERPL-SCNC: 140 MMOL/L (ref 136–145)
WBC # BLD AUTO: 1.5 K/UL (ref 4–11)

## 2025-03-18 PROCEDURE — 82247 BILIRUBIN TOTAL: CPT

## 2025-03-18 PROCEDURE — 36592 COLLECT BLOOD FROM PICC: CPT

## 2025-03-18 PROCEDURE — 2580000003 HC RX 258

## 2025-03-18 PROCEDURE — 84100 ASSAY OF PHOSPHORUS: CPT

## 2025-03-18 PROCEDURE — 2060000000 HC ICU INTERMEDIATE R&B

## 2025-03-18 PROCEDURE — 6360000002 HC RX W HCPCS: Performed by: INTERNAL MEDICINE

## 2025-03-18 PROCEDURE — 83735 ASSAY OF MAGNESIUM: CPT

## 2025-03-18 PROCEDURE — 2580000003 HC RX 258: Performed by: INTERNAL MEDICINE

## 2025-03-18 PROCEDURE — 80048 BASIC METABOLIC PNL TOTAL CA: CPT

## 2025-03-18 PROCEDURE — 6370000000 HC RX 637 (ALT 250 FOR IP)

## 2025-03-18 PROCEDURE — 2500000003 HC RX 250 WO HCPCS

## 2025-03-18 PROCEDURE — 6370000000 HC RX 637 (ALT 250 FOR IP): Performed by: INTERNAL MEDICINE

## 2025-03-18 PROCEDURE — 85025 COMPLETE CBC W/AUTO DIFF WBC: CPT

## 2025-03-18 RX ORDER — DICYCLOMINE HYDROCHLORIDE 10 MG/1
10 CAPSULE ORAL 4 TIMES DAILY PRN
Status: DISCONTINUED | OUTPATIENT
Start: 2025-03-18 | End: 2025-04-07

## 2025-03-18 RX ORDER — FLUCONAZOLE 200 MG/1
200 TABLET ORAL DAILY
Status: DISCONTINUED | OUTPATIENT
Start: 2025-03-18 | End: 2025-03-18

## 2025-03-18 RX ORDER — FLUCONAZOLE 200 MG/1
200 TABLET ORAL DAILY
Status: COMPLETED | OUTPATIENT
Start: 2025-03-18 | End: 2025-03-24

## 2025-03-18 RX ORDER — LEVOFLOXACIN 500 MG/1
500 TABLET, FILM COATED ORAL EVERY 24 HOURS
Status: DISCONTINUED | OUTPATIENT
Start: 2025-03-18 | End: 2025-03-30

## 2025-03-18 RX ADMIN — DEXTROSE AND SODIUM CHLORIDE: 5; 450 INJECTION, SOLUTION INTRAVENOUS at 23:55

## 2025-03-18 RX ADMIN — FLUCONAZOLE 200 MG: 200 TABLET ORAL at 09:06

## 2025-03-18 RX ADMIN — FLUDARABINE PHOSPHATE 55 MG: 25 INJECTION, SOLUTION INTRAVENOUS at 09:59

## 2025-03-18 RX ADMIN — ALLOPURINOL 150 MG: 300 TABLET ORAL at 09:06

## 2025-03-18 RX ADMIN — VALACYCLOVIR HYDROCHLORIDE 500 MG: 500 TABLET, FILM COATED ORAL at 09:06

## 2025-03-18 RX ADMIN — NYSTATIN 500000 UNITS: 100000 SUSPENSION ORAL at 20:07

## 2025-03-18 RX ADMIN — ONDANSETRON HYDROCHLORIDE 12 MG: 8 TABLET, FILM COATED ORAL at 09:06

## 2025-03-18 RX ADMIN — PANTOPRAZOLE SODIUM 40 MG: 40 TABLET, DELAYED RELEASE ORAL at 06:43

## 2025-03-18 RX ADMIN — NYSTATIN 500000 UNITS: 100000 SUSPENSION ORAL at 16:41

## 2025-03-18 RX ADMIN — VALACYCLOVIR HYDROCHLORIDE 500 MG: 500 TABLET, FILM COATED ORAL at 20:07

## 2025-03-18 RX ADMIN — NYSTATIN 500000 UNITS: 100000 SUSPENSION ORAL at 13:00

## 2025-03-18 RX ADMIN — FUROSEMIDE 40 MG: 10 INJECTION, SOLUTION INTRAMUSCULAR; INTRAVENOUS at 18:22

## 2025-03-18 RX ADMIN — LEVOFLOXACIN 500 MG: 500 TABLET, FILM COATED ORAL at 20:07

## 2025-03-18 RX ADMIN — SODIUM CHLORIDE, PRESERVATIVE FREE 10 ML: 5 INJECTION INTRAVENOUS at 09:06

## 2025-03-18 RX ADMIN — SODIUM CHLORIDE, PRESERVATIVE FREE 10 ML: 5 INJECTION INTRAVENOUS at 20:08

## 2025-03-18 RX ADMIN — URSODIOL 500 MG: 250 TABLET ORAL at 20:07

## 2025-03-18 RX ADMIN — URSODIOL 500 MG: 250 TABLET ORAL at 09:06

## 2025-03-18 RX ADMIN — DICYCLOMINE HYDROCHLORIDE 10 MG: 10 CAPSULE ORAL at 14:39

## 2025-03-18 RX ADMIN — DEXTROSE AND SODIUM CHLORIDE: 5; 450 INJECTION, SOLUTION INTRAVENOUS at 03:26

## 2025-03-18 ASSESSMENT — PAIN SCALES - GENERAL: PAINLEVEL_OUTOF10: 0

## 2025-03-19 LAB
ALBUMIN SERPL-MCNC: 3.6 G/DL (ref 3.4–5)
ALP SERPL-CCNC: 69 U/L (ref 40–129)
ALT SERPL-CCNC: 10 U/L (ref 10–40)
ANION GAP SERPL CALCULATED.3IONS-SCNC: 9 MMOL/L (ref 3–16)
AST SERPL-CCNC: 10 U/L (ref 15–37)
BASOPHILS # BLD: 0 K/UL (ref 0–0.2)
BASOPHILS NFR BLD: 0.2 %
BILIRUB DIRECT SERPL-MCNC: 0.3 MG/DL (ref 0–0.3)
BILIRUB INDIRECT SERPL-MCNC: 0.5 MG/DL (ref 0–1)
BILIRUB SERPL-MCNC: 0.8 MG/DL (ref 0–1)
BUN SERPL-MCNC: 13 MG/DL (ref 7–20)
CALCIUM SERPL-MCNC: 9 MG/DL (ref 8.3–10.6)
CHLORIDE SERPL-SCNC: 103 MMOL/L (ref 99–110)
CO2 SERPL-SCNC: 27 MMOL/L (ref 21–32)
CREAT SERPL-MCNC: 0.6 MG/DL (ref 0.8–1.3)
DEPRECATED RDW RBC AUTO: 17.6 % (ref 12.4–15.4)
EOSINOPHIL # BLD: 0 K/UL (ref 0–0.6)
EOSINOPHIL NFR BLD: 1.7 %
GFR SERPLBLD CREATININE-BSD FMLA CKD-EPI: >90 ML/MIN/{1.73_M2}
GLUCOSE SERPL-MCNC: 99 MG/DL (ref 70–99)
HCT VFR BLD AUTO: 25.7 % (ref 40.5–52.5)
HGB BLD-MCNC: 8.9 G/DL (ref 13.5–17.5)
LDH SERPL L TO P-CCNC: 131 U/L (ref 100–190)
LYMPHOCYTES # BLD: 0.1 K/UL (ref 1–5.1)
LYMPHOCYTES NFR BLD: 4.1 %
MAGNESIUM SERPL-MCNC: 1.95 MG/DL (ref 1.8–2.4)
MCH RBC QN AUTO: 34 PG (ref 26–34)
MCHC RBC AUTO-ENTMCNC: 34.7 G/DL (ref 31–36)
MCV RBC AUTO: 97.9 FL (ref 80–100)
MONOCYTES # BLD: 0 K/UL (ref 0–1.3)
MONOCYTES NFR BLD: 0.6 %
NEUTROPHILS # BLD: 1.3 K/UL (ref 1.7–7.7)
NEUTROPHILS NFR BLD: 93.4 %
PHOSPHATE SERPL-MCNC: 4.1 MG/DL (ref 2.5–4.9)
PLATELET # BLD AUTO: 49 K/UL (ref 135–450)
PMV BLD AUTO: 8.6 FL (ref 5–10.5)
POTASSIUM SERPL-SCNC: 3.7 MMOL/L (ref 3.5–5.1)
PROT SERPL-MCNC: 5.3 G/DL (ref 6.4–8.2)
RBC # BLD AUTO: 2.63 M/UL (ref 4.2–5.9)
SODIUM SERPL-SCNC: 139 MMOL/L (ref 136–145)
URATE SERPL-MCNC: 4.7 MG/DL (ref 3.5–7.2)
WBC # BLD AUTO: 1.4 K/UL (ref 4–11)

## 2025-03-19 PROCEDURE — 83615 LACTATE (LD) (LDH) ENZYME: CPT

## 2025-03-19 PROCEDURE — 6360000002 HC RX W HCPCS: Performed by: INTERNAL MEDICINE

## 2025-03-19 PROCEDURE — 80048 BASIC METABOLIC PNL TOTAL CA: CPT

## 2025-03-19 PROCEDURE — 2580000003 HC RX 258

## 2025-03-19 PROCEDURE — 84100 ASSAY OF PHOSPHORUS: CPT

## 2025-03-19 PROCEDURE — 80076 HEPATIC FUNCTION PANEL: CPT

## 2025-03-19 PROCEDURE — 2060000000 HC ICU INTERMEDIATE R&B

## 2025-03-19 PROCEDURE — 6370000000 HC RX 637 (ALT 250 FOR IP): Performed by: INTERNAL MEDICINE

## 2025-03-19 PROCEDURE — 2500000003 HC RX 250 WO HCPCS

## 2025-03-19 PROCEDURE — 83735 ASSAY OF MAGNESIUM: CPT

## 2025-03-19 PROCEDURE — 84550 ASSAY OF BLOOD/URIC ACID: CPT

## 2025-03-19 PROCEDURE — 6370000000 HC RX 637 (ALT 250 FOR IP)

## 2025-03-19 PROCEDURE — 36592 COLLECT BLOOD FROM PICC: CPT

## 2025-03-19 PROCEDURE — 85025 COMPLETE CBC W/AUTO DIFF WBC: CPT

## 2025-03-19 RX ADMIN — PANTOPRAZOLE SODIUM 40 MG: 40 TABLET, DELAYED RELEASE ORAL at 06:39

## 2025-03-19 RX ADMIN — SODIUM CHLORIDE 15 ML: 900 IRRIGANT IRRIGATION at 19:31

## 2025-03-19 RX ADMIN — DEXTROSE AND SODIUM CHLORIDE: 5; 450 INJECTION, SOLUTION INTRAVENOUS at 18:51

## 2025-03-19 RX ADMIN — FUROSEMIDE 40 MG: 10 INJECTION, SOLUTION INTRAMUSCULAR; INTRAVENOUS at 17:17

## 2025-03-19 RX ADMIN — URSODIOL 500 MG: 250 TABLET ORAL at 20:05

## 2025-03-19 RX ADMIN — FLUCONAZOLE 200 MG: 200 TABLET ORAL at 08:28

## 2025-03-19 RX ADMIN — ALLOPURINOL 150 MG: 300 TABLET ORAL at 08:28

## 2025-03-19 RX ADMIN — URSODIOL 500 MG: 250 TABLET ORAL at 08:28

## 2025-03-19 RX ADMIN — SODIUM CHLORIDE, PRESERVATIVE FREE 10 ML: 5 INJECTION INTRAVENOUS at 08:28

## 2025-03-19 RX ADMIN — LEVOFLOXACIN 500 MG: 500 TABLET, FILM COATED ORAL at 20:05

## 2025-03-19 RX ADMIN — NYSTATIN 500000 UNITS: 100000 SUSPENSION ORAL at 20:05

## 2025-03-19 RX ADMIN — NYSTATIN 500000 UNITS: 100000 SUSPENSION ORAL at 08:28

## 2025-03-19 RX ADMIN — VALACYCLOVIR HYDROCHLORIDE 500 MG: 500 TABLET, FILM COATED ORAL at 20:05

## 2025-03-19 RX ADMIN — NYSTATIN 500000 UNITS: 100000 SUSPENSION ORAL at 17:17

## 2025-03-19 RX ADMIN — SODIUM CHLORIDE, PRESERVATIVE FREE 10 ML: 5 INJECTION INTRAVENOUS at 19:31

## 2025-03-19 RX ADMIN — VALACYCLOVIR HYDROCHLORIDE 500 MG: 500 TABLET, FILM COATED ORAL at 08:28

## 2025-03-19 ASSESSMENT — PAIN SCALES - GENERAL
PAINLEVEL_OUTOF10: 0

## 2025-03-20 LAB
ANION GAP SERPL CALCULATED.3IONS-SCNC: 10 MMOL/L (ref 3–16)
APTT BLD: 29.6 SEC (ref 22.1–36.4)
BASOPHILS # BLD: 0 K/UL (ref 0–0.2)
BASOPHILS NFR BLD: 2.7 %
BILIRUB SERPL-MCNC: 0.7 MG/DL (ref 0–1)
BUN SERPL-MCNC: 15 MG/DL (ref 7–20)
CALCIUM SERPL-MCNC: 9.1 MG/DL (ref 8.3–10.6)
CHLORIDE SERPL-SCNC: 104 MMOL/L (ref 99–110)
CO2 SERPL-SCNC: 26 MMOL/L (ref 21–32)
CREAT SERPL-MCNC: 0.7 MG/DL (ref 0.8–1.3)
DEPRECATED RDW RBC AUTO: 17.2 % (ref 12.4–15.4)
EOSINOPHIL # BLD: 0 K/UL (ref 0–0.6)
EOSINOPHIL NFR BLD: 2.5 %
GFR SERPLBLD CREATININE-BSD FMLA CKD-EPI: >90 ML/MIN/{1.73_M2}
GLUCOSE SERPL-MCNC: 102 MG/DL (ref 70–99)
HCT VFR BLD AUTO: 24.7 % (ref 40.5–52.5)
HGB BLD-MCNC: 8.5 G/DL (ref 13.5–17.5)
INR PPP: 1.11 (ref 0.85–1.15)
LYMPHOCYTES # BLD: 0 K/UL (ref 1–5.1)
LYMPHOCYTES NFR BLD: 5.3 %
MAGNESIUM SERPL-MCNC: 1.92 MG/DL (ref 1.8–2.4)
MCH RBC QN AUTO: 33.5 PG (ref 26–34)
MCHC RBC AUTO-ENTMCNC: 34.2 G/DL (ref 31–36)
MCV RBC AUTO: 97.8 FL (ref 80–100)
MONOCYTES # BLD: 0 K/UL (ref 0–1.3)
MONOCYTES NFR BLD: 0.1 %
NEUTROPHILS # BLD: 0.5 K/UL (ref 1.7–7.7)
NEUTROPHILS NFR BLD: 89.4 %
PHOSPHATE SERPL-MCNC: 4.3 MG/DL (ref 2.5–4.9)
PLATELET # BLD AUTO: 37 K/UL (ref 135–450)
PMV BLD AUTO: 8.3 FL (ref 5–10.5)
POTASSIUM SERPL-SCNC: 3.6 MMOL/L (ref 3.5–5.1)
PROTHROMBIN TIME: 14.5 SEC (ref 11.9–14.9)
RBC # BLD AUTO: 2.52 M/UL (ref 4.2–5.9)
SODIUM SERPL-SCNC: 140 MMOL/L (ref 136–145)
WBC # BLD AUTO: 0.6 K/UL (ref 4–11)

## 2025-03-20 PROCEDURE — 38207 CRYOPRESERVE STEM CELLS: CPT | Performed by: INTERNAL MEDICINE

## 2025-03-20 PROCEDURE — 84100 ASSAY OF PHOSPHORUS: CPT

## 2025-03-20 PROCEDURE — 2060000000 HC ICU INTERMEDIATE R&B

## 2025-03-20 PROCEDURE — 83735 ASSAY OF MAGNESIUM: CPT

## 2025-03-20 PROCEDURE — 38204 BL DONOR SEARCH MANAGEMENT: CPT | Performed by: INTERNAL MEDICINE

## 2025-03-20 PROCEDURE — 6360000002 HC RX W HCPCS: Performed by: INTERNAL MEDICINE

## 2025-03-20 PROCEDURE — 6370000000 HC RX 637 (ALT 250 FOR IP): Performed by: INTERNAL MEDICINE

## 2025-03-20 PROCEDURE — 85025 COMPLETE CBC W/AUTO DIFF WBC: CPT

## 2025-03-20 PROCEDURE — 36592 COLLECT BLOOD FROM PICC: CPT

## 2025-03-20 PROCEDURE — 30243G3 TRANSFUSION OF ALLOGENEIC UNRELATED BONE MARROW INTO CENTRAL VEIN, PERCUTANEOUS APPROACH: ICD-10-PCS | Performed by: STUDENT IN AN ORGANIZED HEALTH CARE EDUCATION/TRAINING PROGRAM

## 2025-03-20 PROCEDURE — 2500000003 HC RX 250 WO HCPCS

## 2025-03-20 PROCEDURE — 85730 THROMBOPLASTIN TIME PARTIAL: CPT

## 2025-03-20 PROCEDURE — 85610 PROTHROMBIN TIME: CPT

## 2025-03-20 PROCEDURE — 6370000000 HC RX 637 (ALT 250 FOR IP)

## 2025-03-20 PROCEDURE — 38240 TRANSPLT ALLO HCT/DONOR: CPT | Performed by: INTERNAL MEDICINE

## 2025-03-20 PROCEDURE — 80048 BASIC METABOLIC PNL TOTAL CA: CPT

## 2025-03-20 PROCEDURE — 2580000003 HC RX 258

## 2025-03-20 PROCEDURE — 82247 BILIRUBIN TOTAL: CPT

## 2025-03-20 PROCEDURE — 6360000002 HC RX W HCPCS

## 2025-03-20 RX ORDER — ACETAMINOPHEN 325 MG/1
650 TABLET ORAL ONCE
Status: COMPLETED | OUTPATIENT
Start: 2025-03-20 | End: 2025-03-20

## 2025-03-20 RX ORDER — HYDROCORTISONE SODIUM SUCCINATE 100 MG/2ML
100 INJECTION INTRAMUSCULAR; INTRAVENOUS
Status: DISPENSED | OUTPATIENT
Start: 2025-03-20 | End: 2025-03-21

## 2025-03-20 RX ORDER — DIPHENHYDRAMINE HYDROCHLORIDE 50 MG/ML
25 INJECTION, SOLUTION INTRAMUSCULAR; INTRAVENOUS PRN
Status: DISPENSED | OUTPATIENT
Start: 2025-03-20 | End: 2025-03-21

## 2025-03-20 RX ORDER — DIPHENHYDRAMINE HCL 25 MG
25 TABLET ORAL ONCE
Status: COMPLETED | OUTPATIENT
Start: 2025-03-20 | End: 2025-03-20

## 2025-03-20 RX ORDER — HYDROCORTISONE SODIUM SUCCINATE 100 MG/2ML
100 INJECTION INTRAMUSCULAR; INTRAVENOUS ONCE
Status: COMPLETED | OUTPATIENT
Start: 2025-03-20 | End: 2025-03-20

## 2025-03-20 RX ORDER — SODIUM CHLORIDE 9 MG/ML
INJECTION, SOLUTION INTRAVENOUS CONTINUOUS PRN
Status: DISCONTINUED | OUTPATIENT
Start: 2025-03-20 | End: 2025-04-07

## 2025-03-20 RX ORDER — EPINEPHRINE 1 MG/ML
0.3 INJECTION, SOLUTION INTRAMUSCULAR; SUBCUTANEOUS
Status: DISPENSED | OUTPATIENT
Start: 2025-03-20 | End: 2025-03-21

## 2025-03-20 RX ADMIN — URSODIOL 500 MG: 250 TABLET ORAL at 21:04

## 2025-03-20 RX ADMIN — SODIUM CHLORIDE 15 ML: 900 IRRIGANT IRRIGATION at 08:39

## 2025-03-20 RX ADMIN — DEXTROSE AND SODIUM CHLORIDE: 5; 450 INJECTION, SOLUTION INTRAVENOUS at 17:16

## 2025-03-20 RX ADMIN — SODIUM CHLORIDE, PRESERVATIVE FREE 10 ML: 5 INJECTION INTRAVENOUS at 21:04

## 2025-03-20 RX ADMIN — ALLOPURINOL 150 MG: 300 TABLET ORAL at 08:38

## 2025-03-20 RX ADMIN — VALACYCLOVIR HYDROCHLORIDE 500 MG: 500 TABLET, FILM COATED ORAL at 21:04

## 2025-03-20 RX ADMIN — SODIUM CHLORIDE, PRESERVATIVE FREE 10 ML: 5 INJECTION INTRAVENOUS at 08:38

## 2025-03-20 RX ADMIN — PANTOPRAZOLE SODIUM 40 MG: 40 TABLET, DELAYED RELEASE ORAL at 06:41

## 2025-03-20 RX ADMIN — DEXTROSE AND SODIUM CHLORIDE: 5; 450 INJECTION, SOLUTION INTRAVENOUS at 23:11

## 2025-03-20 RX ADMIN — LEVOFLOXACIN 500 MG: 500 TABLET, FILM COATED ORAL at 21:04

## 2025-03-20 RX ADMIN — FLUCONAZOLE 200 MG: 200 TABLET ORAL at 08:38

## 2025-03-20 RX ADMIN — NYSTATIN 500000 UNITS: 100000 SUSPENSION ORAL at 21:04

## 2025-03-20 RX ADMIN — SODIUM CHLORIDE 15 ML: 900 IRRIGANT IRRIGATION at 11:55

## 2025-03-20 RX ADMIN — ACETAMINOPHEN 650 MG: 325 TABLET, FILM COATED ORAL at 13:06

## 2025-03-20 RX ADMIN — NYSTATIN 500000 UNITS: 100000 SUSPENSION ORAL at 17:14

## 2025-03-20 RX ADMIN — SODIUM CHLORIDE 15 ML: 900 IRRIGANT IRRIGATION at 15:40

## 2025-03-20 RX ADMIN — SODIUM CHLORIDE 15 ML: 900 IRRIGANT IRRIGATION at 21:05

## 2025-03-20 RX ADMIN — FUROSEMIDE 40 MG: 10 INJECTION, SOLUTION INTRAMUSCULAR; INTRAVENOUS at 18:37

## 2025-03-20 RX ADMIN — NYSTATIN 500000 UNITS: 100000 SUSPENSION ORAL at 12:54

## 2025-03-20 RX ADMIN — DIPHENHYDRAMINE HYDROCHLORIDE 25 MG: 25 TABLET ORAL at 13:06

## 2025-03-20 RX ADMIN — NYSTATIN 500000 UNITS: 100000 SUSPENSION ORAL at 08:38

## 2025-03-20 RX ADMIN — VALACYCLOVIR HYDROCHLORIDE 500 MG: 500 TABLET, FILM COATED ORAL at 08:38

## 2025-03-20 RX ADMIN — URSODIOL 500 MG: 250 TABLET ORAL at 08:38

## 2025-03-20 RX ADMIN — HYDROCORTISONE SODIUM SUCCINATE 100 MG: 100 INJECTION, POWDER, FOR SOLUTION INTRAMUSCULAR; INTRAVENOUS at 13:06

## 2025-03-20 RX ADMIN — DEXTROSE AND SODIUM CHLORIDE: 5; 450 INJECTION, SOLUTION INTRAVENOUS at 10:55

## 2025-03-20 ASSESSMENT — PAIN SCALES - GENERAL: PAINLEVEL_OUTOF10: 0

## 2025-03-20 NOTE — ONCOLOGY
Transplant (T0) Progress Note      Myles Meehan                           Blood Type: A pos    3/20/25                           Start time:13:49 Completion time14:21    Transplant Type: Allogeneic unrelated    Product Type: PBSC (peripheral blood stem cell)    Product Unit Number: [id number]    Cell Count: 5  x 10^6  Volume: 330 mL      Product Manipulation:      Volume Reduction  No  Plasma Depletion  No  RBC Depletion  No    Catheter lumen used for infusion: red             Positive Blood Return: Yes    Donor Name: allogeneic                                     Blood Type: A pos    Relationship: allogeneic                          Donor Sex: allogeneic    Premeds Given: acetaminophen, benadryl, solu cor-tef    Adverse Reaction(s) and treatment: Baseline vital signs obtained prior to infusion and monitoring completed throughout per [facility] protocol - see flowsheets. All IVFs turned down to KVO during stem cell infusion. Stem cell product(s) verified with 2nd RN parvin garcia prior to infusion using 2 patient identifiers. Infused via gravity with rate controlled by dave mcgovern RN per [facility] protocols.    Emergency medications epinephrine, benadryl, & hydrocortisone available as needed. Emergency medical equipment available as needed including telemetry monitoring throughout infusion, supplemental O2, suction equipment, and crash cart. RN at bedside throughout infusion.   Dave Mcgovern RN

## 2025-03-21 LAB
ALBUMIN SERPL-MCNC: 3.5 G/DL (ref 3.4–5)
ALP SERPL-CCNC: 67 U/L (ref 40–129)
ALT SERPL-CCNC: 10 U/L (ref 10–40)
ANION GAP SERPL CALCULATED.3IONS-SCNC: 9 MMOL/L (ref 3–16)
AST SERPL-CCNC: 11 U/L (ref 15–37)
BILIRUB DIRECT SERPL-MCNC: 0.2 MG/DL (ref 0–0.3)
BILIRUB INDIRECT SERPL-MCNC: 0.3 MG/DL (ref 0–1)
BILIRUB SERPL-MCNC: 0.5 MG/DL (ref 0–1)
BUN SERPL-MCNC: 11 MG/DL (ref 7–20)
CALCIUM SERPL-MCNC: 8.9 MG/DL (ref 8.3–10.6)
CHLORIDE SERPL-SCNC: 105 MMOL/L (ref 99–110)
CO2 SERPL-SCNC: 25 MMOL/L (ref 21–32)
CREAT SERPL-MCNC: 0.6 MG/DL (ref 0.8–1.3)
DEPRECATED RDW RBC AUTO: 17.2 % (ref 12.4–15.4)
GFR SERPLBLD CREATININE-BSD FMLA CKD-EPI: >90 ML/MIN/{1.73_M2}
GLUCOSE SERPL-MCNC: 91 MG/DL (ref 70–99)
HCT VFR BLD AUTO: 22.7 % (ref 40.5–52.5)
HGB BLD-MCNC: 7.7 G/DL (ref 13.5–17.5)
LDH SERPL L TO P-CCNC: 156 U/L (ref 100–190)
MAGNESIUM SERPL-MCNC: 1.87 MG/DL (ref 1.8–2.4)
MCH RBC QN AUTO: 33 PG (ref 26–34)
MCHC RBC AUTO-ENTMCNC: 33.8 G/DL (ref 31–36)
MCV RBC AUTO: 97.7 FL (ref 80–100)
PHOSPHATE SERPL-MCNC: 3.8 MG/DL (ref 2.5–4.9)
PLATELET # BLD AUTO: 33 K/UL (ref 135–450)
PMV BLD AUTO: 9 FL (ref 5–10.5)
POTASSIUM SERPL-SCNC: 3.1 MMOL/L (ref 3.5–5.1)
PROT SERPL-MCNC: 5.2 G/DL (ref 6.4–8.2)
RBC # BLD AUTO: 2.33 M/UL (ref 4.2–5.9)
SODIUM SERPL-SCNC: 139 MMOL/L (ref 136–145)
URATE SERPL-MCNC: 3.9 MG/DL (ref 3.5–7.2)
WBC # BLD AUTO: 0.3 K/UL (ref 4–11)

## 2025-03-21 PROCEDURE — 2580000003 HC RX 258

## 2025-03-21 PROCEDURE — 6370000000 HC RX 637 (ALT 250 FOR IP): Performed by: INTERNAL MEDICINE

## 2025-03-21 PROCEDURE — 85027 COMPLETE CBC AUTOMATED: CPT

## 2025-03-21 PROCEDURE — 84100 ASSAY OF PHOSPHORUS: CPT

## 2025-03-21 PROCEDURE — 84550 ASSAY OF BLOOD/URIC ACID: CPT

## 2025-03-21 PROCEDURE — 83615 LACTATE (LD) (LDH) ENZYME: CPT

## 2025-03-21 PROCEDURE — 80048 BASIC METABOLIC PNL TOTAL CA: CPT

## 2025-03-21 PROCEDURE — 6360000002 HC RX W HCPCS

## 2025-03-21 PROCEDURE — 6370000000 HC RX 637 (ALT 250 FOR IP)

## 2025-03-21 PROCEDURE — 87449 NOS EACH ORGANISM AG IA: CPT

## 2025-03-21 PROCEDURE — 2060000000 HC ICU INTERMEDIATE R&B

## 2025-03-21 PROCEDURE — 36592 COLLECT BLOOD FROM PICC: CPT

## 2025-03-21 PROCEDURE — 6360000002 HC RX W HCPCS: Performed by: INTERNAL MEDICINE

## 2025-03-21 PROCEDURE — 2500000003 HC RX 250 WO HCPCS

## 2025-03-21 PROCEDURE — 80076 HEPATIC FUNCTION PANEL: CPT

## 2025-03-21 PROCEDURE — 87324 CLOSTRIDIUM AG IA: CPT

## 2025-03-21 PROCEDURE — 83735 ASSAY OF MAGNESIUM: CPT

## 2025-03-21 RX ADMIN — NYSTATIN 500000 UNITS: 100000 SUSPENSION ORAL at 16:04

## 2025-03-21 RX ADMIN — SODIUM CHLORIDE, PRESERVATIVE FREE 10 ML: 5 INJECTION INTRAVENOUS at 08:52

## 2025-03-21 RX ADMIN — URSODIOL 500 MG: 250 TABLET ORAL at 20:05

## 2025-03-21 RX ADMIN — POTASSIUM CHLORIDE 20 MEQ: 29.8 INJECTION INTRAVENOUS at 09:01

## 2025-03-21 RX ADMIN — VALACYCLOVIR HYDROCHLORIDE 500 MG: 500 TABLET, FILM COATED ORAL at 08:52

## 2025-03-21 RX ADMIN — NYSTATIN 500000 UNITS: 100000 SUSPENSION ORAL at 20:04

## 2025-03-21 RX ADMIN — FUROSEMIDE 40 MG: 10 INJECTION, SOLUTION INTRAMUSCULAR; INTRAVENOUS at 06:51

## 2025-03-21 RX ADMIN — POTASSIUM CHLORIDE 20 MEQ: 29.8 INJECTION INTRAVENOUS at 06:46

## 2025-03-21 RX ADMIN — SODIUM CHLORIDE 15 ML: 900 IRRIGANT IRRIGATION at 16:04

## 2025-03-21 RX ADMIN — NYSTATIN 500000 UNITS: 100000 SUSPENSION ORAL at 08:52

## 2025-03-21 RX ADMIN — SODIUM CHLORIDE 15 ML: 900 IRRIGANT IRRIGATION at 06:51

## 2025-03-21 RX ADMIN — FUROSEMIDE 40 MG: 10 INJECTION, SOLUTION INTRAMUSCULAR; INTRAVENOUS at 18:30

## 2025-03-21 RX ADMIN — LEVOFLOXACIN 500 MG: 500 TABLET, FILM COATED ORAL at 20:04

## 2025-03-21 RX ADMIN — NYSTATIN 500000 UNITS: 100000 SUSPENSION ORAL at 11:44

## 2025-03-21 RX ADMIN — POTASSIUM CHLORIDE 20 MEQ: 29.8 INJECTION INTRAVENOUS at 05:07

## 2025-03-21 RX ADMIN — DEXTROSE AND SODIUM CHLORIDE: 5; 450 INJECTION, SOLUTION INTRAVENOUS at 10:07

## 2025-03-21 RX ADMIN — ALLOPURINOL 150 MG: 300 TABLET ORAL at 08:52

## 2025-03-21 RX ADMIN — VALACYCLOVIR HYDROCHLORIDE 500 MG: 500 TABLET, FILM COATED ORAL at 20:04

## 2025-03-21 RX ADMIN — SODIUM CHLORIDE 15 ML: 900 IRRIGANT IRRIGATION at 11:45

## 2025-03-21 RX ADMIN — PANTOPRAZOLE SODIUM 40 MG: 40 TABLET, DELAYED RELEASE ORAL at 06:45

## 2025-03-21 RX ADMIN — SODIUM CHLORIDE 15 ML: 900 IRRIGANT IRRIGATION at 20:06

## 2025-03-21 RX ADMIN — FLUCONAZOLE 200 MG: 200 TABLET ORAL at 08:52

## 2025-03-21 RX ADMIN — POTASSIUM CHLORIDE 20 MEQ: 29.8 INJECTION INTRAVENOUS at 10:07

## 2025-03-21 RX ADMIN — URSODIOL 500 MG: 250 TABLET ORAL at 08:52

## 2025-03-21 RX ADMIN — SODIUM CHLORIDE, PRESERVATIVE FREE 10 ML: 5 INJECTION INTRAVENOUS at 20:08

## 2025-03-22 LAB
ANION GAP SERPL CALCULATED.3IONS-SCNC: 10 MMOL/L (ref 3–16)
BILIRUB SERPL-MCNC: 0.8 MG/DL (ref 0–1)
BUN SERPL-MCNC: 11 MG/DL (ref 7–20)
C DIFF TOX A+B STL QL IA: NORMAL
CALCIUM SERPL-MCNC: 9 MG/DL (ref 8.3–10.6)
CHLORIDE SERPL-SCNC: 104 MMOL/L (ref 99–110)
CO2 SERPL-SCNC: 25 MMOL/L (ref 21–32)
CREAT SERPL-MCNC: 0.7 MG/DL (ref 0.8–1.3)
DEPRECATED RDW RBC AUTO: 17 % (ref 12.4–15.4)
GFR SERPLBLD CREATININE-BSD FMLA CKD-EPI: >90 ML/MIN/{1.73_M2}
GLUCOSE SERPL-MCNC: 93 MG/DL (ref 70–99)
HCT VFR BLD AUTO: 22.8 % (ref 40.5–52.5)
HGB BLD-MCNC: 7.9 G/DL (ref 13.5–17.5)
MAGNESIUM SERPL-MCNC: 1.72 MG/DL (ref 1.8–2.4)
MCH RBC QN AUTO: 33.6 PG (ref 26–34)
MCHC RBC AUTO-ENTMCNC: 34.5 G/DL (ref 31–36)
MCV RBC AUTO: 97.4 FL (ref 80–100)
PHOSPHATE SERPL-MCNC: 4.3 MG/DL (ref 2.5–4.9)
PLATELET # BLD AUTO: 20 K/UL (ref 135–450)
PMV BLD AUTO: 9.1 FL (ref 5–10.5)
POTASSIUM SERPL-SCNC: 3.3 MMOL/L (ref 3.5–5.1)
RBC # BLD AUTO: 2.34 M/UL (ref 4.2–5.9)
SODIUM SERPL-SCNC: 139 MMOL/L (ref 136–145)
WBC # BLD AUTO: 0.1 K/UL (ref 4–11)

## 2025-03-22 PROCEDURE — 6370000000 HC RX 637 (ALT 250 FOR IP): Performed by: NURSE PRACTITIONER

## 2025-03-22 PROCEDURE — 2580000003 HC RX 258: Performed by: INTERNAL MEDICINE

## 2025-03-22 PROCEDURE — 85027 COMPLETE CBC AUTOMATED: CPT

## 2025-03-22 PROCEDURE — 83735 ASSAY OF MAGNESIUM: CPT

## 2025-03-22 PROCEDURE — 80048 BASIC METABOLIC PNL TOTAL CA: CPT

## 2025-03-22 PROCEDURE — 2580000003 HC RX 258

## 2025-03-22 PROCEDURE — 36592 COLLECT BLOOD FROM PICC: CPT

## 2025-03-22 PROCEDURE — 84100 ASSAY OF PHOSPHORUS: CPT

## 2025-03-22 PROCEDURE — 6360000002 HC RX W HCPCS

## 2025-03-22 PROCEDURE — 82247 BILIRUBIN TOTAL: CPT

## 2025-03-22 PROCEDURE — 6360000002 HC RX W HCPCS: Performed by: INTERNAL MEDICINE

## 2025-03-22 PROCEDURE — 6370000000 HC RX 637 (ALT 250 FOR IP): Performed by: INTERNAL MEDICINE

## 2025-03-22 PROCEDURE — 2060000000 HC ICU INTERMEDIATE R&B

## 2025-03-22 PROCEDURE — 2500000003 HC RX 250 WO HCPCS

## 2025-03-22 PROCEDURE — 6370000000 HC RX 637 (ALT 250 FOR IP)

## 2025-03-22 RX ORDER — LOPERAMIDE HYDROCHLORIDE 2 MG/1
2 CAPSULE ORAL PRN
Status: DISCONTINUED | OUTPATIENT
Start: 2025-03-22 | End: 2025-04-10 | Stop reason: HOSPADM

## 2025-03-22 RX ADMIN — NYSTATIN 500000 UNITS: 100000 SUSPENSION ORAL at 16:55

## 2025-03-22 RX ADMIN — FLUCONAZOLE 200 MG: 200 TABLET ORAL at 09:03

## 2025-03-22 RX ADMIN — VALACYCLOVIR HYDROCHLORIDE 500 MG: 500 TABLET, FILM COATED ORAL at 20:35

## 2025-03-22 RX ADMIN — URSODIOL 500 MG: 250 TABLET ORAL at 20:35

## 2025-03-22 RX ADMIN — POTASSIUM CHLORIDE 20 MEQ: 29.8 INJECTION INTRAVENOUS at 09:13

## 2025-03-22 RX ADMIN — FUROSEMIDE 40 MG: 10 INJECTION, SOLUTION INTRAMUSCULAR; INTRAVENOUS at 18:25

## 2025-03-22 RX ADMIN — POTASSIUM CHLORIDE 20 MEQ: 29.8 INJECTION INTRAVENOUS at 07:23

## 2025-03-22 RX ADMIN — SODIUM CHLORIDE 15 ML: 900 IRRIGANT IRRIGATION at 09:07

## 2025-03-22 RX ADMIN — SODIUM CHLORIDE: 0.9 INJECTION, SOLUTION INTRAVENOUS at 06:47

## 2025-03-22 RX ADMIN — NYSTATIN 500000 UNITS: 100000 SUSPENSION ORAL at 09:02

## 2025-03-22 RX ADMIN — LOPERAMIDE HYDROCHLORIDE 2 MG: 2 CAPSULE ORAL at 12:18

## 2025-03-22 RX ADMIN — LOPERAMIDE HYDROCHLORIDE 2 MG: 2 CAPSULE ORAL at 15:18

## 2025-03-22 RX ADMIN — VALACYCLOVIR HYDROCHLORIDE 500 MG: 500 TABLET, FILM COATED ORAL at 09:03

## 2025-03-22 RX ADMIN — DICYCLOMINE HYDROCHLORIDE 10 MG: 10 CAPSULE ORAL at 16:56

## 2025-03-22 RX ADMIN — SODIUM CHLORIDE, PRESERVATIVE FREE 10 ML: 5 INJECTION INTRAVENOUS at 20:35

## 2025-03-22 RX ADMIN — LEVOFLOXACIN 500 MG: 500 TABLET, FILM COATED ORAL at 20:35

## 2025-03-22 RX ADMIN — PANTOPRAZOLE SODIUM 40 MG: 40 TABLET, DELAYED RELEASE ORAL at 06:05

## 2025-03-22 RX ADMIN — POTASSIUM CHLORIDE 20 MEQ: 29.8 INJECTION INTRAVENOUS at 10:46

## 2025-03-22 RX ADMIN — SODIUM CHLORIDE, PRESERVATIVE FREE 10 ML: 5 INJECTION INTRAVENOUS at 09:07

## 2025-03-22 RX ADMIN — LOPERAMIDE HYDROCHLORIDE 2 MG: 2 CAPSULE ORAL at 20:45

## 2025-03-22 RX ADMIN — NYSTATIN 500000 UNITS: 100000 SUSPENSION ORAL at 13:39

## 2025-03-22 RX ADMIN — SODIUM CHLORIDE 15 ML: 900 IRRIGANT IRRIGATION at 16:56

## 2025-03-22 RX ADMIN — URSODIOL 500 MG: 250 TABLET ORAL at 09:03

## 2025-03-22 RX ADMIN — NYSTATIN 500000 UNITS: 100000 SUSPENSION ORAL at 20:35

## 2025-03-22 RX ADMIN — ALLOPURINOL 150 MG: 300 TABLET ORAL at 09:03

## 2025-03-22 RX ADMIN — POTASSIUM CHLORIDE 20 MEQ: 29.8 INJECTION INTRAVENOUS at 06:04

## 2025-03-22 RX ADMIN — DEXTROSE AND SODIUM CHLORIDE: 5; 450 INJECTION, SOLUTION INTRAVENOUS at 07:58

## 2025-03-22 ASSESSMENT — PAIN DESCRIPTION - LOCATION: LOCATION: ABDOMEN

## 2025-03-22 ASSESSMENT — PAIN DESCRIPTION - ONSET: ONSET: ON-GOING

## 2025-03-22 ASSESSMENT — PAIN DESCRIPTION - ORIENTATION: ORIENTATION: MID

## 2025-03-22 ASSESSMENT — PAIN DESCRIPTION - FREQUENCY: FREQUENCY: INTERMITTENT

## 2025-03-22 ASSESSMENT — PAIN SCALES - GENERAL: PAINLEVEL_OUTOF10: 8

## 2025-03-22 ASSESSMENT — PAIN DESCRIPTION - PAIN TYPE: TYPE: ACUTE PAIN

## 2025-03-22 ASSESSMENT — PAIN DESCRIPTION - DESCRIPTORS: DESCRIPTORS: CRAMPING;DISCOMFORT

## 2025-03-22 ASSESSMENT — PAIN - FUNCTIONAL ASSESSMENT: PAIN_FUNCTIONAL_ASSESSMENT: ACTIVITIES ARE NOT PREVENTED

## 2025-03-23 LAB
ANION GAP SERPL CALCULATED.3IONS-SCNC: 9 MMOL/L (ref 3–16)
BILIRUB SERPL-MCNC: 0.5 MG/DL (ref 0–1)
BUN SERPL-MCNC: 10 MG/DL (ref 7–20)
CALCIUM SERPL-MCNC: 9.2 MG/DL (ref 8.3–10.6)
CHLORIDE SERPL-SCNC: 104 MMOL/L (ref 99–110)
CO2 SERPL-SCNC: 26 MMOL/L (ref 21–32)
CREAT SERPL-MCNC: 0.6 MG/DL (ref 0.8–1.3)
DEPRECATED RDW RBC AUTO: 16.8 % (ref 12.4–15.4)
GFR SERPLBLD CREATININE-BSD FMLA CKD-EPI: >90 ML/MIN/{1.73_M2}
GLUCOSE SERPL-MCNC: 96 MG/DL (ref 70–99)
HCT VFR BLD AUTO: 22.1 % (ref 40.5–52.5)
HGB BLD-MCNC: 7.6 G/DL (ref 13.5–17.5)
MAGNESIUM SERPL-MCNC: 1.78 MG/DL (ref 1.8–2.4)
MCH RBC QN AUTO: 33.5 PG (ref 26–34)
MCHC RBC AUTO-ENTMCNC: 34.5 G/DL (ref 31–36)
MCV RBC AUTO: 96.9 FL (ref 80–100)
PHOSPHATE SERPL-MCNC: 4.5 MG/DL (ref 2.5–4.9)
PLATELET # BLD AUTO: 15 K/UL (ref 135–450)
PMV BLD AUTO: 9.3 FL (ref 5–10.5)
POTASSIUM SERPL-SCNC: 3.5 MMOL/L (ref 3.5–5.1)
RBC # BLD AUTO: 2.28 M/UL (ref 4.2–5.9)
SODIUM SERPL-SCNC: 139 MMOL/L (ref 136–145)
WBC # BLD AUTO: 0 K/UL (ref 4–11)

## 2025-03-23 PROCEDURE — 6370000000 HC RX 637 (ALT 250 FOR IP)

## 2025-03-23 PROCEDURE — 2580000003 HC RX 258: Performed by: NURSE PRACTITIONER

## 2025-03-23 PROCEDURE — 6360000002 HC RX W HCPCS: Performed by: INTERNAL MEDICINE

## 2025-03-23 PROCEDURE — 2500000003 HC RX 250 WO HCPCS

## 2025-03-23 PROCEDURE — 6370000000 HC RX 637 (ALT 250 FOR IP): Performed by: NURSE PRACTITIONER

## 2025-03-23 PROCEDURE — 6370000000 HC RX 637 (ALT 250 FOR IP): Performed by: INTERNAL MEDICINE

## 2025-03-23 PROCEDURE — 80048 BASIC METABOLIC PNL TOTAL CA: CPT

## 2025-03-23 PROCEDURE — 2580000003 HC RX 258: Performed by: INTERNAL MEDICINE

## 2025-03-23 PROCEDURE — 36592 COLLECT BLOOD FROM PICC: CPT

## 2025-03-23 PROCEDURE — 2060000000 HC ICU INTERMEDIATE R&B

## 2025-03-23 PROCEDURE — 2580000003 HC RX 258

## 2025-03-23 PROCEDURE — 84100 ASSAY OF PHOSPHORUS: CPT

## 2025-03-23 PROCEDURE — 83735 ASSAY OF MAGNESIUM: CPT

## 2025-03-23 PROCEDURE — 82247 BILIRUBIN TOTAL: CPT

## 2025-03-23 PROCEDURE — 85027 COMPLETE CBC AUTOMATED: CPT

## 2025-03-23 RX ORDER — OXYCODONE HYDROCHLORIDE 5 MG/1
5 TABLET ORAL EVERY 6 HOURS PRN
Refills: 0 | Status: DISCONTINUED | OUTPATIENT
Start: 2025-03-23 | End: 2025-03-28

## 2025-03-23 RX ORDER — 0.9 % SODIUM CHLORIDE 0.9 %
1000 INTRAVENOUS SOLUTION INTRAVENOUS ONCE
Status: COMPLETED | OUTPATIENT
Start: 2025-03-23 | End: 2025-03-23

## 2025-03-23 RX ADMIN — SODIUM CHLORIDE: 0.9 INJECTION, SOLUTION INTRAVENOUS at 18:30

## 2025-03-23 RX ADMIN — URSODIOL 500 MG: 250 TABLET ORAL at 20:18

## 2025-03-23 RX ADMIN — DICLOFENAC SODIUM 2 G: 10 GEL TOPICAL at 08:20

## 2025-03-23 RX ADMIN — NYSTATIN 500000 UNITS: 100000 SUSPENSION ORAL at 20:18

## 2025-03-23 RX ADMIN — SODIUM CHLORIDE: 0.9 INJECTION, SOLUTION INTRAVENOUS at 23:36

## 2025-03-23 RX ADMIN — FUROSEMIDE 20 MG: 10 INJECTION, SOLUTION INTRAMUSCULAR; INTRAVENOUS at 18:26

## 2025-03-23 RX ADMIN — CYCLOPHOSPHAMIDE 3500 MG: 1 INJECTION, POWDER, FOR SOLUTION INTRAVENOUS; ORAL at 10:40

## 2025-03-23 RX ADMIN — VALACYCLOVIR HYDROCHLORIDE 500 MG: 500 TABLET, FILM COATED ORAL at 20:18

## 2025-03-23 RX ADMIN — PANTOPRAZOLE SODIUM 40 MG: 40 TABLET, DELAYED RELEASE ORAL at 06:33

## 2025-03-23 RX ADMIN — DEXTROSE AND SODIUM CHLORIDE: 5; 450 INJECTION, SOLUTION INTRAVENOUS at 04:06

## 2025-03-23 RX ADMIN — SODIUM CHLORIDE 150 MG: 0.9 INJECTION, SOLUTION INTRAVENOUS at 09:49

## 2025-03-23 RX ADMIN — ONDANSETRON HYDROCHLORIDE 24 MG: 8 TABLET, FILM COATED ORAL at 09:48

## 2025-03-23 RX ADMIN — MESNA 3500 MG: 100 INJECTION, SOLUTION INTRAVENOUS at 10:42

## 2025-03-23 RX ADMIN — ALLOPURINOL 150 MG: 300 TABLET ORAL at 08:21

## 2025-03-23 RX ADMIN — OXYCODONE HYDROCHLORIDE 5 MG: 5 TABLET ORAL at 14:25

## 2025-03-23 RX ADMIN — MESNA 700 MG: 100 INJECTION, SOLUTION INTRAVENOUS at 10:19

## 2025-03-23 RX ADMIN — SODIUM CHLORIDE, PRESERVATIVE FREE 10 ML: 5 INJECTION INTRAVENOUS at 20:18

## 2025-03-23 RX ADMIN — URSODIOL 500 MG: 250 TABLET ORAL at 08:21

## 2025-03-23 RX ADMIN — NYSTATIN 500000 UNITS: 100000 SUSPENSION ORAL at 08:21

## 2025-03-23 RX ADMIN — VALACYCLOVIR HYDROCHLORIDE 500 MG: 500 TABLET, FILM COATED ORAL at 08:21

## 2025-03-23 RX ADMIN — NYSTATIN 500000 UNITS: 100000 SUSPENSION ORAL at 14:25

## 2025-03-23 RX ADMIN — SODIUM CHLORIDE: 0.9 INJECTION, SOLUTION INTRAVENOUS at 06:37

## 2025-03-23 RX ADMIN — FLUCONAZOLE 200 MG: 200 TABLET ORAL at 08:21

## 2025-03-23 RX ADMIN — LEVOFLOXACIN 500 MG: 500 TABLET, FILM COATED ORAL at 20:18

## 2025-03-23 RX ADMIN — SODIUM CHLORIDE 1000 ML: 0.9 INJECTION, SOLUTION INTRAVENOUS at 08:50

## 2025-03-23 ASSESSMENT — PAIN DESCRIPTION - ONSET
ONSET: ON-GOING
ONSET: ON-GOING

## 2025-03-23 ASSESSMENT — PAIN DESCRIPTION - LOCATION
LOCATION: ARM
LOCATION: BACK

## 2025-03-23 ASSESSMENT — PAIN - FUNCTIONAL ASSESSMENT: PAIN_FUNCTIONAL_ASSESSMENT: PREVENTS OR INTERFERES SOME ACTIVE ACTIVITIES AND ADLS

## 2025-03-23 ASSESSMENT — PAIN SCALES - GENERAL
PAINLEVEL_OUTOF10: 0
PAINLEVEL_OUTOF10: 0
PAINLEVEL_OUTOF10: 5
PAINLEVEL_OUTOF10: 0
PAINLEVEL_OUTOF10: 7
PAINLEVEL_OUTOF10: 2

## 2025-03-23 ASSESSMENT — PAIN DESCRIPTION - DESCRIPTORS
DESCRIPTORS: ACHING
DESCRIPTORS: SORE

## 2025-03-23 ASSESSMENT — PAIN DESCRIPTION - PAIN TYPE
TYPE: ACUTE PAIN
TYPE: ACUTE PAIN

## 2025-03-23 ASSESSMENT — PAIN DESCRIPTION - FREQUENCY
FREQUENCY: INTERMITTENT
FREQUENCY: INTERMITTENT

## 2025-03-23 ASSESSMENT — PAIN DESCRIPTION - ORIENTATION
ORIENTATION: LEFT
ORIENTATION: LOWER

## 2025-03-23 NOTE — ONCOLOGY
Administration: Chemotherapy drug Cytoxan independently verified with Yasmin Almonte RN prior to administration.  Acknowledgement of informed consent for chemotherapy administration verified.  Original order, appropriateness of regimen, drug supplied, height, weight, BSA, dose calculations, expiration dates/times, drug appearance, and two patient identifiers were verified by both RNs.  Drug checked for vesicant/irritant status and for risk of hypersensitivity.  Most recent laboratory values and allergies, were reviewed.  Positive, brisk blood return via CVC was confirmed prior to administration. Chest x-ray for correct line placement reviewed. Angeles Terrell RN and Yasmin Almonte RN verified correct rate of chemotherapy and maintenance IV fluids.  Patient was educated on chemotherapy regimen prior to administration including indication for treatment related to disease & side effects of chemotherapy drug.  Patient verbalizes understanding of all instructions.    Completion of Chemotherapy: Monitoring during infusion done per policy, see Flowsheets.  Blood return verified before, during, and after infusion per policy; no signs of extravasation.  Pt tolerating chemotherapy well and without incident.  Chemotherapy infusion end time on the MAR.  Will continue to monitor.

## 2025-03-24 ENCOUNTER — APPOINTMENT (OUTPATIENT)
Dept: GENERAL RADIOLOGY | Age: 75
DRG: 014 | End: 2025-03-24
Attending: STUDENT IN AN ORGANIZED HEALTH CARE EDUCATION/TRAINING PROGRAM
Payer: COMMERCIAL

## 2025-03-24 LAB
ABO/RH: NORMAL
ALBUMIN SERPL-MCNC: 3.1 G/DL (ref 3.4–5)
ALP SERPL-CCNC: 70 U/L (ref 40–129)
ALT SERPL-CCNC: 7 U/L (ref 10–40)
ANION GAP SERPL CALCULATED.3IONS-SCNC: 9 MMOL/L (ref 3–16)
ANTIBODY SCREEN: NORMAL
APTT BLD: 36.2 SEC (ref 22.1–36.4)
AST SERPL-CCNC: 8 U/L (ref 15–37)
BILIRUB DIRECT SERPL-MCNC: 0.3 MG/DL (ref 0–0.3)
BILIRUB INDIRECT SERPL-MCNC: 0.3 MG/DL (ref 0–1)
BILIRUB SERPL-MCNC: 0.6 MG/DL (ref 0–1)
BUN SERPL-MCNC: 9 MG/DL (ref 7–20)
CALCIUM SERPL-MCNC: 8.3 MG/DL (ref 8.3–10.6)
CHLORIDE SERPL-SCNC: 103 MMOL/L (ref 99–110)
CO2 SERPL-SCNC: 24 MMOL/L (ref 21–32)
CREAT SERPL-MCNC: 0.7 MG/DL (ref 0.8–1.3)
DEPRECATED RDW RBC AUTO: 16.7 % (ref 12.4–15.4)
GFR SERPLBLD CREATININE-BSD FMLA CKD-EPI: >90 ML/MIN/{1.73_M2}
GLUCOSE SERPL-MCNC: 99 MG/DL (ref 70–99)
HCT VFR BLD AUTO: 19.2 % (ref 40.5–52.5)
HGB BLD-MCNC: 6.5 G/DL (ref 13.5–17.5)
INR PPP: 1.32 (ref 0.85–1.15)
LDH SERPL L TO P-CCNC: 106 U/L (ref 100–190)
MAGNESIUM SERPL-MCNC: 1.43 MG/DL (ref 1.8–2.4)
MCH RBC QN AUTO: 33 PG (ref 26–34)
MCHC RBC AUTO-ENTMCNC: 33.8 G/DL (ref 31–36)
MCV RBC AUTO: 97.5 FL (ref 80–100)
PHOSPHATE SERPL-MCNC: 3.8 MG/DL (ref 2.5–4.9)
PLATELET # BLD AUTO: 8 K/UL (ref 135–450)
PMV BLD AUTO: 10.4 FL (ref 5–10.5)
POTASSIUM SERPL-SCNC: 3.3 MMOL/L (ref 3.5–5.1)
PROT SERPL-MCNC: 4.7 G/DL (ref 6.4–8.2)
PROTHROMBIN TIME: 16.6 SEC (ref 11.9–14.9)
RBC # BLD AUTO: 1.98 M/UL (ref 4.2–5.9)
SODIUM SERPL-SCNC: 136 MMOL/L (ref 136–145)
URATE SERPL-MCNC: 2.8 MG/DL (ref 3.5–7.2)
WBC # BLD AUTO: 0 K/UL (ref 4–11)

## 2025-03-24 PROCEDURE — 80076 HEPATIC FUNCTION PANEL: CPT

## 2025-03-24 PROCEDURE — 86923 COMPATIBILITY TEST ELECTRIC: CPT

## 2025-03-24 PROCEDURE — 80048 BASIC METABOLIC PNL TOTAL CA: CPT

## 2025-03-24 PROCEDURE — 6370000000 HC RX 637 (ALT 250 FOR IP): Performed by: NURSE PRACTITIONER

## 2025-03-24 PROCEDURE — 30233R1 TRANSFUSION OF NONAUTOLOGOUS PLATELETS INTO PERIPHERAL VEIN, PERCUTANEOUS APPROACH: ICD-10-PCS | Performed by: STUDENT IN AN ORGANIZED HEALTH CARE EDUCATION/TRAINING PROGRAM

## 2025-03-24 PROCEDURE — 2580000003 HC RX 258: Performed by: INTERNAL MEDICINE

## 2025-03-24 PROCEDURE — 2060000000 HC ICU INTERMEDIATE R&B

## 2025-03-24 PROCEDURE — 83615 LACTATE (LD) (LDH) ENZYME: CPT

## 2025-03-24 PROCEDURE — 71045 X-RAY EXAM CHEST 1 VIEW: CPT

## 2025-03-24 PROCEDURE — 84100 ASSAY OF PHOSPHORUS: CPT

## 2025-03-24 PROCEDURE — 6370000000 HC RX 637 (ALT 250 FOR IP): Performed by: INTERNAL MEDICINE

## 2025-03-24 PROCEDURE — 86900 BLOOD TYPING SEROLOGIC ABO: CPT

## 2025-03-24 PROCEDURE — 85027 COMPLETE CBC AUTOMATED: CPT

## 2025-03-24 PROCEDURE — P9036 PLATELET PHERESIS IRRADIATED: HCPCS

## 2025-03-24 PROCEDURE — 84550 ASSAY OF BLOOD/URIC ACID: CPT

## 2025-03-24 PROCEDURE — 83735 ASSAY OF MAGNESIUM: CPT

## 2025-03-24 PROCEDURE — 6360000002 HC RX W HCPCS: Performed by: INTERNAL MEDICINE

## 2025-03-24 PROCEDURE — 85730 THROMBOPLASTIN TIME PARTIAL: CPT

## 2025-03-24 PROCEDURE — P9040 RBC LEUKOREDUCED IRRADIATED: HCPCS

## 2025-03-24 PROCEDURE — 6370000000 HC RX 637 (ALT 250 FOR IP)

## 2025-03-24 PROCEDURE — 36592 COLLECT BLOOD FROM PICC: CPT

## 2025-03-24 PROCEDURE — 36430 TRANSFUSION BLD/BLD COMPNT: CPT

## 2025-03-24 PROCEDURE — 85610 PROTHROMBIN TIME: CPT

## 2025-03-24 PROCEDURE — 6360000002 HC RX W HCPCS

## 2025-03-24 PROCEDURE — 30233N1 TRANSFUSION OF NONAUTOLOGOUS RED BLOOD CELLS INTO PERIPHERAL VEIN, PERCUTANEOUS APPROACH: ICD-10-PCS | Performed by: STUDENT IN AN ORGANIZED HEALTH CARE EDUCATION/TRAINING PROGRAM

## 2025-03-24 PROCEDURE — 86901 BLOOD TYPING SEROLOGIC RH(D): CPT

## 2025-03-24 PROCEDURE — 2500000003 HC RX 250 WO HCPCS

## 2025-03-24 PROCEDURE — 86850 RBC ANTIBODY SCREEN: CPT

## 2025-03-24 RX ORDER — FUROSEMIDE 10 MG/ML
40 INJECTION INTRAMUSCULAR; INTRAVENOUS ONCE
Status: COMPLETED | OUTPATIENT
Start: 2025-03-24 | End: 2025-03-24

## 2025-03-24 RX ADMIN — PANTOPRAZOLE SODIUM 40 MG: 40 TABLET, DELAYED RELEASE ORAL at 06:34

## 2025-03-24 RX ADMIN — FLUCONAZOLE 200 MG: 200 TABLET ORAL at 09:24

## 2025-03-24 RX ADMIN — POTASSIUM CHLORIDE 20 MEQ: 29.8 INJECTION INTRAVENOUS at 07:32

## 2025-03-24 RX ADMIN — FUROSEMIDE 20 MG: 10 INJECTION, SOLUTION INTRAMUSCULAR; INTRAVENOUS at 18:54

## 2025-03-24 RX ADMIN — POTASSIUM CHLORIDE 20 MEQ: 29.8 INJECTION INTRAVENOUS at 10:38

## 2025-03-24 RX ADMIN — NYSTATIN 500000 UNITS: 100000 SUSPENSION ORAL at 09:27

## 2025-03-24 RX ADMIN — LEVOFLOXACIN 500 MG: 500 TABLET, FILM COATED ORAL at 20:10

## 2025-03-24 RX ADMIN — URSODIOL 500 MG: 250 TABLET ORAL at 20:10

## 2025-03-24 RX ADMIN — VALACYCLOVIR HYDROCHLORIDE 500 MG: 500 TABLET, FILM COATED ORAL at 20:10

## 2025-03-24 RX ADMIN — OXYCODONE HYDROCHLORIDE 5 MG: 5 TABLET ORAL at 04:23

## 2025-03-24 RX ADMIN — SODIUM CHLORIDE, PRESERVATIVE FREE 10 ML: 5 INJECTION INTRAVENOUS at 20:10

## 2025-03-24 RX ADMIN — URSODIOL 500 MG: 250 TABLET ORAL at 09:25

## 2025-03-24 RX ADMIN — FUROSEMIDE 40 MG: 10 INJECTION, SOLUTION INTRAMUSCULAR; INTRAVENOUS at 10:39

## 2025-03-24 RX ADMIN — ONDANSETRON HYDROCHLORIDE 24 MG: 8 TABLET, FILM COATED ORAL at 09:32

## 2025-03-24 RX ADMIN — NYSTATIN 500000 UNITS: 100000 SUSPENSION ORAL at 20:10

## 2025-03-24 RX ADMIN — SODIUM CHLORIDE: 0.9 INJECTION, SOLUTION INTRAVENOUS at 15:14

## 2025-03-24 RX ADMIN — VALACYCLOVIR HYDROCHLORIDE 500 MG: 500 TABLET, FILM COATED ORAL at 09:24

## 2025-03-24 RX ADMIN — SODIUM CHLORIDE: 0.9 INJECTION, SOLUTION INTRAVENOUS at 06:16

## 2025-03-24 RX ADMIN — CYCLOPHOSPHAMIDE 3500 MG: 1 INJECTION, POWDER, FOR SOLUTION INTRAVENOUS; ORAL at 10:28

## 2025-03-24 RX ADMIN — NYSTATIN 500000 UNITS: 100000 SUSPENSION ORAL at 17:34

## 2025-03-24 RX ADMIN — DICLOFENAC SODIUM 2 G: 10 GEL TOPICAL at 04:24

## 2025-03-24 RX ADMIN — NYSTATIN 500000 UNITS: 100000 SUSPENSION ORAL at 14:47

## 2025-03-24 RX ADMIN — LOPERAMIDE HYDROCHLORIDE 2 MG: 2 CAPSULE ORAL at 20:10

## 2025-03-24 RX ADMIN — POTASSIUM CHLORIDE 20 MEQ: 29.8 INJECTION INTRAVENOUS at 06:37

## 2025-03-24 RX ADMIN — SODIUM CHLORIDE, PRESERVATIVE FREE 10 ML: 5 INJECTION INTRAVENOUS at 09:25

## 2025-03-24 RX ADMIN — POTASSIUM CHLORIDE 20 MEQ: 29.8 INJECTION INTRAVENOUS at 09:33

## 2025-03-24 RX ADMIN — SODIUM CHLORIDE: 0.9 INJECTION, SOLUTION INTRAVENOUS at 21:58

## 2025-03-24 RX ADMIN — MESNA 3500 MG: 100 INJECTION, SOLUTION INTRAVENOUS at 10:25

## 2025-03-24 RX ADMIN — ALLOPURINOL 150 MG: 300 TABLET ORAL at 09:24

## 2025-03-24 RX ADMIN — MESNA 700 MG: 100 INJECTION, SOLUTION INTRAVENOUS at 10:04

## 2025-03-24 ASSESSMENT — PAIN DESCRIPTION - DESCRIPTORS: DESCRIPTORS: ACHING

## 2025-03-24 ASSESSMENT — PAIN SCALES - GENERAL
PAINLEVEL_OUTOF10: 3
PAINLEVEL_OUTOF10: 7
PAINLEVEL_OUTOF10: 0

## 2025-03-24 ASSESSMENT — PAIN DESCRIPTION - ORIENTATION: ORIENTATION: LOWER

## 2025-03-24 ASSESSMENT — PAIN DESCRIPTION - LOCATION: LOCATION: BACK

## 2025-03-24 ASSESSMENT — PAIN - FUNCTIONAL ASSESSMENT: PAIN_FUNCTIONAL_ASSESSMENT: ACTIVITIES ARE NOT PREVENTED

## 2025-03-24 NOTE — CONSENT
Informed Consent for Blood Component Transfusion Note    I have discussed with the patient the rationale for blood component transfusion; its benefits in treating or preventing fatigue, organ damage, or death; and its risk which includes mild transfusion reactions, rare risk of blood borne infection, or more serious but rare reactions. I have discussed the alternatives to transfusion, including the risk and consequences of not receiving transfusion. The patient had an opportunity to ask questions and had agreed to proceed with transfusion of blood components.    Electronically signed by JAY Kumar CNP on 3/24/25 at 8:14 AM EDT

## 2025-03-25 LAB
ANION GAP SERPL CALCULATED.3IONS-SCNC: 9 MMOL/L (ref 3–16)
BILIRUB SERPL-MCNC: 0.6 MG/DL (ref 0–1)
BUN SERPL-MCNC: 8 MG/DL (ref 7–20)
CALCIUM SERPL-MCNC: 8.1 MG/DL (ref 8.3–10.6)
CHLORIDE SERPL-SCNC: 101 MMOL/L (ref 99–110)
CO2 SERPL-SCNC: 21 MMOL/L (ref 21–32)
CREAT SERPL-MCNC: 0.7 MG/DL (ref 0.8–1.3)
DEPRECATED RDW RBC AUTO: 16.4 % (ref 12.4–15.4)
GFR SERPLBLD CREATININE-BSD FMLA CKD-EPI: >90 ML/MIN/{1.73_M2}
GLUCOSE SERPL-MCNC: 102 MG/DL (ref 70–99)
HCT VFR BLD AUTO: 19 % (ref 40.5–52.5)
HGB BLD-MCNC: 6.7 G/DL (ref 13.5–17.5)
MAGNESIUM SERPL-MCNC: 1.47 MG/DL (ref 1.8–2.4)
MCH RBC QN AUTO: 33.4 PG (ref 26–34)
MCHC RBC AUTO-ENTMCNC: 35.2 G/DL (ref 31–36)
MCV RBC AUTO: 94.9 FL (ref 80–100)
PHOSPHATE SERPL-MCNC: 3 MG/DL (ref 2.5–4.9)
PLATELET # BLD AUTO: 15 K/UL (ref 135–450)
PMV BLD AUTO: 7.8 FL (ref 5–10.5)
POTASSIUM SERPL-SCNC: 3.2 MMOL/L (ref 3.5–5.1)
RBC # BLD AUTO: 2 M/UL (ref 4.2–5.9)
SODIUM SERPL-SCNC: 131 MMOL/L (ref 136–145)
WBC # BLD AUTO: 0 K/UL (ref 4–11)

## 2025-03-25 PROCEDURE — 6370000000 HC RX 637 (ALT 250 FOR IP): Performed by: INTERNAL MEDICINE

## 2025-03-25 PROCEDURE — 6360000002 HC RX W HCPCS: Performed by: INTERNAL MEDICINE

## 2025-03-25 PROCEDURE — 6360000002 HC RX W HCPCS

## 2025-03-25 PROCEDURE — 36430 TRANSFUSION BLD/BLD COMPNT: CPT

## 2025-03-25 PROCEDURE — 6370000000 HC RX 637 (ALT 250 FOR IP)

## 2025-03-25 PROCEDURE — 2580000003 HC RX 258: Performed by: INTERNAL MEDICINE

## 2025-03-25 PROCEDURE — 6370000000 HC RX 637 (ALT 250 FOR IP): Performed by: NURSE PRACTITIONER

## 2025-03-25 PROCEDURE — 85027 COMPLETE CBC AUTOMATED: CPT

## 2025-03-25 PROCEDURE — 83735 ASSAY OF MAGNESIUM: CPT

## 2025-03-25 PROCEDURE — 2060000000 HC ICU INTERMEDIATE R&B

## 2025-03-25 PROCEDURE — 80048 BASIC METABOLIC PNL TOTAL CA: CPT

## 2025-03-25 PROCEDURE — 84100 ASSAY OF PHOSPHORUS: CPT

## 2025-03-25 PROCEDURE — 2500000003 HC RX 250 WO HCPCS

## 2025-03-25 PROCEDURE — 82247 BILIRUBIN TOTAL: CPT

## 2025-03-25 RX ORDER — FUROSEMIDE 10 MG/ML
40 INJECTION INTRAMUSCULAR; INTRAVENOUS ONCE
Status: COMPLETED | OUTPATIENT
Start: 2025-03-25 | End: 2025-03-25

## 2025-03-25 RX ADMIN — FUROSEMIDE 40 MG: 10 INJECTION, SOLUTION INTRAMUSCULAR; INTRAVENOUS at 12:48

## 2025-03-25 RX ADMIN — NYSTATIN 500000 UNITS: 100000 SUSPENSION ORAL at 09:02

## 2025-03-25 RX ADMIN — POTASSIUM CHLORIDE 20 MEQ: 29.8 INJECTION INTRAVENOUS at 08:22

## 2025-03-25 RX ADMIN — URSODIOL 500 MG: 250 TABLET ORAL at 21:20

## 2025-03-25 RX ADMIN — TACROLIMUS 2.5 MG: 1 CAPSULE ORAL at 21:19

## 2025-03-25 RX ADMIN — URSODIOL 500 MG: 250 TABLET ORAL at 09:01

## 2025-03-25 RX ADMIN — MYCOPHENOLATE MOFETIL 1000 MG: 500 TABLET, FILM COATED ORAL at 15:01

## 2025-03-25 RX ADMIN — SODIUM CHLORIDE: 0.9 INJECTION, SOLUTION INTRAVENOUS at 12:00

## 2025-03-25 RX ADMIN — FUROSEMIDE 20 MG: 10 INJECTION, SOLUTION INTRAMUSCULAR; INTRAVENOUS at 03:32

## 2025-03-25 RX ADMIN — POTASSIUM CHLORIDE 20 MEQ: 29.8 INJECTION INTRAVENOUS at 11:10

## 2025-03-25 RX ADMIN — NYSTATIN 500000 UNITS: 100000 SUSPENSION ORAL at 15:01

## 2025-03-25 RX ADMIN — ALLOPURINOL 150 MG: 300 TABLET ORAL at 09:02

## 2025-03-25 RX ADMIN — TACROLIMUS 2.5 MG: 1 CAPSULE ORAL at 09:01

## 2025-03-25 RX ADMIN — POTASSIUM CHLORIDE 20 MEQ: 29.8 INJECTION INTRAVENOUS at 09:27

## 2025-03-25 RX ADMIN — VALACYCLOVIR HYDROCHLORIDE 500 MG: 500 TABLET, FILM COATED ORAL at 09:02

## 2025-03-25 RX ADMIN — OXYCODONE HYDROCHLORIDE 5 MG: 5 TABLET ORAL at 03:47

## 2025-03-25 RX ADMIN — SODIUM CHLORIDE: 0.9 INJECTION, SOLUTION INTRAVENOUS at 05:00

## 2025-03-25 RX ADMIN — LOPERAMIDE HYDROCHLORIDE 2 MG: 2 CAPSULE ORAL at 13:52

## 2025-03-25 RX ADMIN — FILGRASTIM-AAFI 300 MCG: 300 INJECTION, SOLUTION SUBCUTANEOUS at 18:33

## 2025-03-25 RX ADMIN — NYSTATIN 500000 UNITS: 100000 SUSPENSION ORAL at 21:24

## 2025-03-25 RX ADMIN — LEVOFLOXACIN 500 MG: 500 TABLET, FILM COATED ORAL at 21:20

## 2025-03-25 RX ADMIN — MYCOPHENOLATE MOFETIL 1000 MG: 500 TABLET, FILM COATED ORAL at 21:19

## 2025-03-25 RX ADMIN — POTASSIUM CHLORIDE 20 MEQ: 29.8 INJECTION INTRAVENOUS at 05:52

## 2025-03-25 RX ADMIN — FLUCONAZOLE 400 MG: 200 TABLET ORAL at 09:01

## 2025-03-25 RX ADMIN — VALACYCLOVIR HYDROCHLORIDE 500 MG: 500 TABLET, FILM COATED ORAL at 21:20

## 2025-03-25 RX ADMIN — SODIUM CHLORIDE, PRESERVATIVE FREE 10 ML: 5 INJECTION INTRAVENOUS at 09:02

## 2025-03-25 RX ADMIN — MYCOPHENOLATE MOFETIL 1000 MG: 500 TABLET, FILM COATED ORAL at 09:02

## 2025-03-25 RX ADMIN — NYSTATIN 500000 UNITS: 100000 SUSPENSION ORAL at 18:05

## 2025-03-25 RX ADMIN — PANTOPRAZOLE SODIUM 40 MG: 40 TABLET, DELAYED RELEASE ORAL at 06:03

## 2025-03-25 ASSESSMENT — PAIN - FUNCTIONAL ASSESSMENT: PAIN_FUNCTIONAL_ASSESSMENT: ACTIVITIES ARE NOT PREVENTED

## 2025-03-25 ASSESSMENT — PAIN DESCRIPTION - ONSET: ONSET: ON-GOING

## 2025-03-25 ASSESSMENT — PAIN DESCRIPTION - FREQUENCY: FREQUENCY: INTERMITTENT

## 2025-03-25 ASSESSMENT — PAIN DESCRIPTION - LOCATION: LOCATION: BACK

## 2025-03-25 ASSESSMENT — PAIN DESCRIPTION - ORIENTATION: ORIENTATION: MID;LOWER

## 2025-03-25 ASSESSMENT — PAIN DESCRIPTION - PAIN TYPE: TYPE: CHRONIC PAIN

## 2025-03-25 ASSESSMENT — PAIN DESCRIPTION - DESCRIPTORS: DESCRIPTORS: ACHING;DISCOMFORT

## 2025-03-25 ASSESSMENT — PAIN SCALES - GENERAL
PAINLEVEL_OUTOF10: 3
PAINLEVEL_OUTOF10: 7

## 2025-03-26 LAB
ALBUMIN SERPL-MCNC: 3.1 G/DL (ref 3.4–5)
ALP SERPL-CCNC: 70 U/L (ref 40–129)
ALT SERPL-CCNC: 8 U/L (ref 10–40)
ANION GAP SERPL CALCULATED.3IONS-SCNC: 10 MMOL/L (ref 3–16)
ANION GAP SERPL CALCULATED.3IONS-SCNC: 5 MMOL/L (ref 3–16)
AST SERPL-CCNC: 8 U/L (ref 15–37)
BILIRUB DIRECT SERPL-MCNC: 0.3 MG/DL (ref 0–0.3)
BILIRUB INDIRECT SERPL-MCNC: 0.2 MG/DL (ref 0–1)
BILIRUB SERPL-MCNC: 0.5 MG/DL (ref 0–1)
BILIRUB UR QL STRIP.AUTO: NEGATIVE
BUN SERPL-MCNC: 8 MG/DL (ref 7–20)
BUN SERPL-MCNC: 9 MG/DL (ref 7–20)
CALCIUM SERPL-MCNC: 8.3 MG/DL (ref 8.3–10.6)
CALCIUM SERPL-MCNC: 8.4 MG/DL (ref 8.3–10.6)
CHLORIDE SERPL-SCNC: 105 MMOL/L (ref 99–110)
CHLORIDE SERPL-SCNC: 109 MMOL/L (ref 99–110)
CLARITY UR: CLEAR
CO2 SERPL-SCNC: 22 MMOL/L (ref 21–32)
CO2 SERPL-SCNC: 23 MMOL/L (ref 21–32)
COLOR UR: YELLOW
CREAT SERPL-MCNC: 0.5 MG/DL (ref 0.8–1.3)
CREAT SERPL-MCNC: 0.6 MG/DL (ref 0.8–1.3)
DEPRECATED RDW RBC AUTO: 16.6 % (ref 12.4–15.4)
GFR SERPLBLD CREATININE-BSD FMLA CKD-EPI: >90 ML/MIN/{1.73_M2}
GFR SERPLBLD CREATININE-BSD FMLA CKD-EPI: >90 ML/MIN/{1.73_M2}
GLUCOSE SERPL-MCNC: 100 MG/DL (ref 70–99)
GLUCOSE SERPL-MCNC: 94 MG/DL (ref 70–99)
GLUCOSE UR STRIP.AUTO-MCNC: NEGATIVE MG/DL
HCT VFR BLD AUTO: 20.6 % (ref 40.5–52.5)
HGB BLD-MCNC: 7.4 G/DL (ref 13.5–17.5)
HGB UR QL STRIP.AUTO: ABNORMAL
KETONES UR STRIP.AUTO-MCNC: NEGATIVE MG/DL
LDH SERPL L TO P-CCNC: 126 U/L (ref 100–190)
LEUKOCYTE ESTERASE UR QL STRIP.AUTO: NEGATIVE
MAGNESIUM SERPL-MCNC: 1.58 MG/DL (ref 1.8–2.4)
MCH RBC QN AUTO: 33 PG (ref 26–34)
MCHC RBC AUTO-ENTMCNC: 36 G/DL (ref 31–36)
MCV RBC AUTO: 91.6 FL (ref 80–100)
NITRITE UR QL STRIP.AUTO: NEGATIVE
PH UR STRIP.AUTO: 6 [PH] (ref 5–8)
PHOSPHATE SERPL-MCNC: 3.1 MG/DL (ref 2.5–4.9)
PLATELET # BLD AUTO: 10 K/UL (ref 135–450)
PMV BLD AUTO: 8.4 FL (ref 5–10.5)
POTASSIUM SERPL-SCNC: 3.1 MMOL/L (ref 3.5–5.1)
POTASSIUM SERPL-SCNC: 5 MMOL/L (ref 3.5–5.1)
PROT SERPL-MCNC: 4.8 G/DL (ref 6.4–8.2)
PROT UR STRIP.AUTO-MCNC: 30 MG/DL
RBC # BLD AUTO: 2.25 M/UL (ref 4.2–5.9)
RBC #/AREA URNS HPF: ABNORMAL /HPF (ref 0–4)
SODIUM SERPL-SCNC: 137 MMOL/L (ref 136–145)
SODIUM SERPL-SCNC: 137 MMOL/L (ref 136–145)
SP GR UR STRIP.AUTO: 1.02 (ref 1–1.03)
UA DIPSTICK W REFLEX MICRO PNL UR: YES
URATE SERPL-MCNC: 2.6 MG/DL (ref 3.5–7.2)
URN SPEC COLLECT METH UR: ABNORMAL
UROBILINOGEN UR STRIP-ACNC: 0.2 E.U./DL
WBC # BLD AUTO: 0 K/UL (ref 4–11)
WBC #/AREA URNS HPF: ABNORMAL /HPF (ref 0–5)

## 2025-03-26 PROCEDURE — 6370000000 HC RX 637 (ALT 250 FOR IP): Performed by: NURSE PRACTITIONER

## 2025-03-26 PROCEDURE — 81001 URINALYSIS AUTO W/SCOPE: CPT

## 2025-03-26 PROCEDURE — 6370000000 HC RX 637 (ALT 250 FOR IP)

## 2025-03-26 PROCEDURE — 6370000000 HC RX 637 (ALT 250 FOR IP): Performed by: INTERNAL MEDICINE

## 2025-03-26 PROCEDURE — 87086 URINE CULTURE/COLONY COUNT: CPT

## 2025-03-26 PROCEDURE — 2060000000 HC ICU INTERMEDIATE R&B

## 2025-03-26 PROCEDURE — 2580000003 HC RX 258: Performed by: NURSE PRACTITIONER

## 2025-03-26 PROCEDURE — 83735 ASSAY OF MAGNESIUM: CPT

## 2025-03-26 PROCEDURE — 2580000003 HC RX 258: Performed by: INTERNAL MEDICINE

## 2025-03-26 PROCEDURE — 83615 LACTATE (LD) (LDH) ENZYME: CPT

## 2025-03-26 PROCEDURE — 6360000002 HC RX W HCPCS: Performed by: NURSE PRACTITIONER

## 2025-03-26 PROCEDURE — 36430 TRANSFUSION BLD/BLD COMPNT: CPT

## 2025-03-26 PROCEDURE — 84550 ASSAY OF BLOOD/URIC ACID: CPT

## 2025-03-26 PROCEDURE — 6360000002 HC RX W HCPCS: Performed by: INTERNAL MEDICINE

## 2025-03-26 PROCEDURE — 6360000002 HC RX W HCPCS

## 2025-03-26 PROCEDURE — 80048 BASIC METABOLIC PNL TOTAL CA: CPT

## 2025-03-26 PROCEDURE — 85027 COMPLETE CBC AUTOMATED: CPT

## 2025-03-26 PROCEDURE — 80076 HEPATIC FUNCTION PANEL: CPT

## 2025-03-26 PROCEDURE — 2500000003 HC RX 250 WO HCPCS

## 2025-03-26 PROCEDURE — 84100 ASSAY OF PHOSPHORUS: CPT

## 2025-03-26 RX ORDER — HYDROCORTISONE 25 MG/G
1 CREAM TOPICAL 2 TIMES DAILY PRN
Status: DISCONTINUED | OUTPATIENT
Start: 2025-03-26 | End: 2025-04-10 | Stop reason: HOSPADM

## 2025-03-26 RX ADMIN — POTASSIUM CHLORIDE 20 MEQ: 29.8 INJECTION INTRAVENOUS at 05:18

## 2025-03-26 RX ADMIN — FILGRASTIM-AAFI 300 MCG: 300 INJECTION, SOLUTION SUBCUTANEOUS at 17:38

## 2025-03-26 RX ADMIN — LEVOFLOXACIN 500 MG: 500 TABLET, FILM COATED ORAL at 21:16

## 2025-03-26 RX ADMIN — POTASSIUM CHLORIDE: 2 INJECTION, SOLUTION, CONCENTRATE INTRAVENOUS at 09:26

## 2025-03-26 RX ADMIN — POTASSIUM CHLORIDE 20 MEQ: 29.8 INJECTION INTRAVENOUS at 07:52

## 2025-03-26 RX ADMIN — SODIUM CHLORIDE: 0.9 INJECTION, SOLUTION INTRAVENOUS at 05:22

## 2025-03-26 RX ADMIN — POTASSIUM CHLORIDE 20 MEQ: 29.8 INJECTION INTRAVENOUS at 09:29

## 2025-03-26 RX ADMIN — TACROLIMUS 2.5 MG: 1 CAPSULE ORAL at 09:20

## 2025-03-26 RX ADMIN — VALACYCLOVIR HYDROCHLORIDE 500 MG: 500 TABLET, FILM COATED ORAL at 09:21

## 2025-03-26 RX ADMIN — NYSTATIN 500000 UNITS: 100000 SUSPENSION ORAL at 17:19

## 2025-03-26 RX ADMIN — NYSTATIN 500000 UNITS: 100000 SUSPENSION ORAL at 09:22

## 2025-03-26 RX ADMIN — TACROLIMUS 2.5 MG: 1 CAPSULE ORAL at 21:15

## 2025-03-26 RX ADMIN — NYSTATIN 500000 UNITS: 100000 SUSPENSION ORAL at 14:34

## 2025-03-26 RX ADMIN — MYCOPHENOLATE MOFETIL 1000 MG: 500 TABLET, FILM COATED ORAL at 21:16

## 2025-03-26 RX ADMIN — VALACYCLOVIR HYDROCHLORIDE 500 MG: 500 TABLET, FILM COATED ORAL at 21:16

## 2025-03-26 RX ADMIN — OXYCODONE HYDROCHLORIDE 5 MG: 5 TABLET ORAL at 00:41

## 2025-03-26 RX ADMIN — SODIUM CHLORIDE, PRESERVATIVE FREE 10 ML: 5 INJECTION INTRAVENOUS at 09:21

## 2025-03-26 RX ADMIN — URSODIOL 500 MG: 250 TABLET ORAL at 09:20

## 2025-03-26 RX ADMIN — LOPERAMIDE HYDROCHLORIDE 2 MG: 2 CAPSULE ORAL at 12:28

## 2025-03-26 RX ADMIN — PANTOPRAZOLE SODIUM 40 MG: 40 TABLET, DELAYED RELEASE ORAL at 06:25

## 2025-03-26 RX ADMIN — POTASSIUM CHLORIDE 20 MEQ: 29.8 INJECTION INTRAVENOUS at 11:00

## 2025-03-26 RX ADMIN — FLUCONAZOLE 400 MG: 200 TABLET ORAL at 09:20

## 2025-03-26 RX ADMIN — MYCOPHENOLATE MOFETIL 1000 MG: 500 TABLET, FILM COATED ORAL at 14:34

## 2025-03-26 RX ADMIN — LOPERAMIDE HYDROCHLORIDE 2 MG: 2 CAPSULE ORAL at 00:37

## 2025-03-26 RX ADMIN — ALLOPURINOL 150 MG: 300 TABLET ORAL at 09:20

## 2025-03-26 RX ADMIN — POTASSIUM CHLORIDE: 2 INJECTION, SOLUTION, CONCENTRATE INTRAVENOUS at 23:37

## 2025-03-26 RX ADMIN — MYCOPHENOLATE MOFETIL 1000 MG: 500 TABLET, FILM COATED ORAL at 09:20

## 2025-03-26 RX ADMIN — URSODIOL 500 MG: 250 TABLET ORAL at 21:16

## 2025-03-26 RX ADMIN — OXYCODONE HYDROCHLORIDE 5 MG: 5 TABLET ORAL at 09:20

## 2025-03-26 RX ADMIN — NYSTATIN 500000 UNITS: 100000 SUSPENSION ORAL at 21:28

## 2025-03-26 RX ADMIN — DICYCLOMINE HYDROCHLORIDE 10 MG: 10 CAPSULE ORAL at 12:28

## 2025-03-26 ASSESSMENT — PAIN DESCRIPTION - ORIENTATION
ORIENTATION: LOWER

## 2025-03-26 ASSESSMENT — PAIN DESCRIPTION - FREQUENCY
FREQUENCY: INTERMITTENT
FREQUENCY: INTERMITTENT

## 2025-03-26 ASSESSMENT — PAIN DESCRIPTION - PAIN TYPE
TYPE: CHRONIC PAIN
TYPE: CHRONIC PAIN

## 2025-03-26 ASSESSMENT — PAIN DESCRIPTION - LOCATION
LOCATION: BACK

## 2025-03-26 ASSESSMENT — PAIN SCALES - GENERAL
PAINLEVEL_OUTOF10: 7
PAINLEVEL_OUTOF10: 0
PAINLEVEL_OUTOF10: 8

## 2025-03-26 ASSESSMENT — PAIN DESCRIPTION - DESCRIPTORS
DESCRIPTORS: ACHING

## 2025-03-26 ASSESSMENT — PAIN DESCRIPTION - ONSET
ONSET: ON-GOING
ONSET: ON-GOING

## 2025-03-27 ENCOUNTER — APPOINTMENT (OUTPATIENT)
Dept: CT IMAGING | Age: 75
DRG: 014 | End: 2025-03-27
Attending: STUDENT IN AN ORGANIZED HEALTH CARE EDUCATION/TRAINING PROGRAM
Payer: COMMERCIAL

## 2025-03-27 LAB
ANION GAP SERPL CALCULATED.3IONS-SCNC: 8 MMOL/L (ref 3–16)
APTT BLD: 35.2 SEC (ref 22.1–36.4)
BACTERIA UR CULT: NORMAL
BILIRUB SERPL-MCNC: 0.5 MG/DL (ref 0–1)
BLOOD BANK DISPENSE STATUS: NORMAL
BLOOD BANK PRODUCT CODE: NORMAL
BPU ID: NORMAL
BUN SERPL-MCNC: 8 MG/DL (ref 7–20)
CALCIUM SERPL-MCNC: 8.4 MG/DL (ref 8.3–10.6)
CHLORIDE SERPL-SCNC: 108 MMOL/L (ref 99–110)
CO2 SERPL-SCNC: 21 MMOL/L (ref 21–32)
CREAT SERPL-MCNC: 0.5 MG/DL (ref 0.8–1.3)
DEPRECATED RDW RBC AUTO: 16.4 % (ref 12.4–15.4)
DESCRIPTION BLOOD BANK: NORMAL
GFR SERPLBLD CREATININE-BSD FMLA CKD-EPI: >90 ML/MIN/{1.73_M2}
GLUCOSE SERPL-MCNC: 89 MG/DL (ref 70–99)
HCT VFR BLD AUTO: 19.5 % (ref 40.5–52.5)
HGB BLD-MCNC: 7 G/DL (ref 13.5–17.5)
INR PPP: 1.11 (ref 0.85–1.15)
MAGNESIUM SERPL-MCNC: 1.7 MG/DL (ref 1.8–2.4)
MCH RBC QN AUTO: 32.8 PG (ref 26–34)
MCHC RBC AUTO-ENTMCNC: 35.7 G/DL (ref 31–36)
MCV RBC AUTO: 92 FL (ref 80–100)
PHOSPHATE SERPL-MCNC: 2.8 MG/DL (ref 2.5–4.9)
PLATELET # BLD AUTO: 18 K/UL (ref 135–450)
PMV BLD AUTO: 8.1 FL (ref 5–10.5)
POTASSIUM SERPL-SCNC: 3.4 MMOL/L (ref 3.5–5.1)
PROTHROMBIN TIME: 14.5 SEC (ref 11.9–14.9)
RBC # BLD AUTO: 2.12 M/UL (ref 4.2–5.9)
SODIUM SERPL-SCNC: 137 MMOL/L (ref 136–145)
WBC # BLD AUTO: 0 K/UL (ref 4–11)

## 2025-03-27 PROCEDURE — 2060000000 HC ICU INTERMEDIATE R&B

## 2025-03-27 PROCEDURE — 6370000000 HC RX 637 (ALT 250 FOR IP)

## 2025-03-27 PROCEDURE — 6370000000 HC RX 637 (ALT 250 FOR IP): Performed by: NURSE PRACTITIONER

## 2025-03-27 PROCEDURE — 6370000000 HC RX 637 (ALT 250 FOR IP): Performed by: INTERNAL MEDICINE

## 2025-03-27 PROCEDURE — 2500000003 HC RX 250 WO HCPCS

## 2025-03-27 PROCEDURE — 85730 THROMBOPLASTIN TIME PARTIAL: CPT

## 2025-03-27 PROCEDURE — 83735 ASSAY OF MAGNESIUM: CPT

## 2025-03-27 PROCEDURE — 74176 CT ABD & PELVIS W/O CONTRAST: CPT

## 2025-03-27 PROCEDURE — 6360000002 HC RX W HCPCS: Performed by: NURSE PRACTITIONER

## 2025-03-27 PROCEDURE — 84100 ASSAY OF PHOSPHORUS: CPT

## 2025-03-27 PROCEDURE — 85610 PROTHROMBIN TIME: CPT

## 2025-03-27 PROCEDURE — 36430 TRANSFUSION BLD/BLD COMPNT: CPT

## 2025-03-27 PROCEDURE — 6360000002 HC RX W HCPCS

## 2025-03-27 PROCEDURE — 80048 BASIC METABOLIC PNL TOTAL CA: CPT

## 2025-03-27 PROCEDURE — 82247 BILIRUBIN TOTAL: CPT

## 2025-03-27 PROCEDURE — 2580000003 HC RX 258: Performed by: NURSE PRACTITIONER

## 2025-03-27 PROCEDURE — 6360000002 HC RX W HCPCS: Performed by: INTERNAL MEDICINE

## 2025-03-27 PROCEDURE — 85027 COMPLETE CBC AUTOMATED: CPT

## 2025-03-27 RX ORDER — CYCLOBENZAPRINE HCL 10 MG
10 TABLET ORAL 3 TIMES DAILY
Status: DISCONTINUED | OUTPATIENT
Start: 2025-03-27 | End: 2025-03-27

## 2025-03-27 RX ORDER — MORPHINE SULFATE 4 MG/ML
4 INJECTION INTRAVENOUS ONCE
Status: COMPLETED | OUTPATIENT
Start: 2025-03-27 | End: 2025-03-27

## 2025-03-27 RX ORDER — SODIUM CHLORIDE 9 MG/ML
INJECTION, SOLUTION INTRAVENOUS PRN
Status: DISCONTINUED | OUTPATIENT
Start: 2025-03-27 | End: 2025-04-07

## 2025-03-27 RX ORDER — MORPHINE SULFATE 2 MG/ML
2 INJECTION, SOLUTION INTRAMUSCULAR; INTRAVENOUS EVERY 4 HOURS PRN
Refills: 0 | Status: DISCONTINUED | OUTPATIENT
Start: 2025-03-27 | End: 2025-03-28

## 2025-03-27 RX ORDER — CYCLOBENZAPRINE HCL 10 MG
5 TABLET ORAL 3 TIMES DAILY
Status: DISCONTINUED | OUTPATIENT
Start: 2025-03-27 | End: 2025-03-30

## 2025-03-27 RX ORDER — CYCLOBENZAPRINE HCL 10 MG
10 TABLET ORAL 3 TIMES DAILY PRN
Status: DISCONTINUED | OUTPATIENT
Start: 2025-03-27 | End: 2025-03-27

## 2025-03-27 RX ADMIN — MYCOPHENOLATE MOFETIL 1000 MG: 500 TABLET, FILM COATED ORAL at 08:26

## 2025-03-27 RX ADMIN — VALACYCLOVIR HYDROCHLORIDE 500 MG: 500 TABLET, FILM COATED ORAL at 08:26

## 2025-03-27 RX ADMIN — OXYCODONE HYDROCHLORIDE 5 MG: 5 TABLET ORAL at 21:22

## 2025-03-27 RX ADMIN — SODIUM CHLORIDE, PRESERVATIVE FREE 10 ML: 5 INJECTION INTRAVENOUS at 08:26

## 2025-03-27 RX ADMIN — CYCLOBENZAPRINE HYDROCHLORIDE 5 MG: 10 TABLET, FILM COATED ORAL at 15:09

## 2025-03-27 RX ADMIN — POTASSIUM CHLORIDE: 2 INJECTION, SOLUTION, CONCENTRATE INTRAVENOUS at 21:21

## 2025-03-27 RX ADMIN — MYCOPHENOLATE MOFETIL 1000 MG: 500 TABLET, FILM COATED ORAL at 15:09

## 2025-03-27 RX ADMIN — MORPHINE SULFATE 2 MG: 2 INJECTION, SOLUTION INTRAMUSCULAR; INTRAVENOUS at 08:11

## 2025-03-27 RX ADMIN — OXYCODONE HYDROCHLORIDE 5 MG: 5 TABLET ORAL at 01:56

## 2025-03-27 RX ADMIN — POTASSIUM CHLORIDE 20 MEQ: 29.8 INJECTION INTRAVENOUS at 05:57

## 2025-03-27 RX ADMIN — NYSTATIN 500000 UNITS: 100000 SUSPENSION ORAL at 15:09

## 2025-03-27 RX ADMIN — NYSTATIN 500000 UNITS: 100000 SUSPENSION ORAL at 08:25

## 2025-03-27 RX ADMIN — NYSTATIN 500000 UNITS: 100000 SUSPENSION ORAL at 21:21

## 2025-03-27 RX ADMIN — PANTOPRAZOLE SODIUM 40 MG: 40 TABLET, DELAYED RELEASE ORAL at 05:28

## 2025-03-27 RX ADMIN — NYSTATIN 500000 UNITS: 100000 SUSPENSION ORAL at 14:02

## 2025-03-27 RX ADMIN — CYCLOBENZAPRINE HYDROCHLORIDE 10 MG: 10 TABLET, FILM COATED ORAL at 05:28

## 2025-03-27 RX ADMIN — MORPHINE SULFATE 2 MG: 2 INJECTION, SOLUTION INTRAMUSCULAR; INTRAVENOUS at 19:16

## 2025-03-27 RX ADMIN — LORAZEPAM 0.5 MG: 2 INJECTION INTRAMUSCULAR; INTRAVENOUS at 23:59

## 2025-03-27 RX ADMIN — FILGRASTIM-AAFI 300 MCG: 300 INJECTION, SOLUTION SUBCUTANEOUS at 18:13

## 2025-03-27 RX ADMIN — CYCLOBENZAPRINE HYDROCHLORIDE 5 MG: 10 TABLET, FILM COATED ORAL at 21:22

## 2025-03-27 RX ADMIN — POTASSIUM CHLORIDE 20 MEQ: 29.8 INJECTION INTRAVENOUS at 08:25

## 2025-03-27 RX ADMIN — LEVOFLOXACIN 500 MG: 500 TABLET, FILM COATED ORAL at 21:22

## 2025-03-27 RX ADMIN — ALLOPURINOL 150 MG: 300 TABLET ORAL at 08:26

## 2025-03-27 RX ADMIN — TACROLIMUS 2.5 MG: 1 CAPSULE ORAL at 08:26

## 2025-03-27 RX ADMIN — POTASSIUM CHLORIDE 20 MEQ: 29.8 INJECTION INTRAVENOUS at 11:30

## 2025-03-27 RX ADMIN — MORPHINE SULFATE 2 MG: 2 INJECTION, SOLUTION INTRAMUSCULAR; INTRAVENOUS at 13:37

## 2025-03-27 RX ADMIN — TACROLIMUS 2.5 MG: 1 CAPSULE ORAL at 21:23

## 2025-03-27 RX ADMIN — FLUCONAZOLE 400 MG: 200 TABLET ORAL at 08:26

## 2025-03-27 RX ADMIN — MORPHINE SULFATE 4 MG: 4 INJECTION INTRAVENOUS at 22:48

## 2025-03-27 RX ADMIN — MYCOPHENOLATE MOFETIL 1000 MG: 500 TABLET, FILM COATED ORAL at 21:22

## 2025-03-27 RX ADMIN — VALACYCLOVIR HYDROCHLORIDE 500 MG: 500 TABLET, FILM COATED ORAL at 21:23

## 2025-03-27 RX ADMIN — POTASSIUM CHLORIDE 20 MEQ: 29.8 INJECTION INTRAVENOUS at 13:46

## 2025-03-27 RX ADMIN — URSODIOL 500 MG: 250 TABLET ORAL at 21:23

## 2025-03-27 RX ADMIN — CYCLOBENZAPRINE HYDROCHLORIDE 5 MG: 10 TABLET, FILM COATED ORAL at 11:19

## 2025-03-27 RX ADMIN — URSODIOL 500 MG: 250 TABLET ORAL at 08:26

## 2025-03-27 ASSESSMENT — PAIN SCALES - GENERAL
PAINLEVEL_OUTOF10: 10
PAINLEVEL_OUTOF10: 8
PAINLEVEL_OUTOF10: 10
PAINLEVEL_OUTOF10: 8
PAINLEVEL_OUTOF10: 9
PAINLEVEL_OUTOF10: 8
PAINLEVEL_OUTOF10: 3
PAINLEVEL_OUTOF10: 5
PAINLEVEL_OUTOF10: 10
PAINLEVEL_OUTOF10: 8
PAINLEVEL_OUTOF10: 9
PAINLEVEL_OUTOF10: 9
PAINLEVEL_OUTOF10: 7
PAINLEVEL_OUTOF10: 6

## 2025-03-27 ASSESSMENT — PAIN DESCRIPTION - ONSET
ONSET: ON-GOING
ONSET: ON-GOING
ONSET: PROGRESSIVE
ONSET: ON-GOING

## 2025-03-27 ASSESSMENT — PAIN DESCRIPTION - DESCRIPTORS
DESCRIPTORS: SHARP
DESCRIPTORS: SHARP
DESCRIPTORS: STABBING;ACHING
DESCRIPTORS: SHARP
DESCRIPTORS: PINS AND NEEDLES
DESCRIPTORS: STABBING
DESCRIPTORS: SHARP
DESCRIPTORS: STABBING
DESCRIPTORS: STABBING;ACHING
DESCRIPTORS: STABBING

## 2025-03-27 ASSESSMENT — PAIN DESCRIPTION - ORIENTATION
ORIENTATION: RIGHT;LOWER
ORIENTATION: LOWER
ORIENTATION: RIGHT;LOWER

## 2025-03-27 ASSESSMENT — PAIN DESCRIPTION - PAIN TYPE
TYPE: ACUTE PAIN
TYPE: CHRONIC PAIN
TYPE: ACUTE PAIN

## 2025-03-27 ASSESSMENT — PAIN DESCRIPTION - LOCATION
LOCATION: FLANK;BACK
LOCATION: FLANK;BACK
LOCATION: BACK
LOCATION: TOE (COMMENT WHICH ONE);BACK
LOCATION: BACK
LOCATION: FLANK

## 2025-03-27 ASSESSMENT — PAIN DESCRIPTION - FREQUENCY
FREQUENCY: CONTINUOUS
FREQUENCY: INTERMITTENT
FREQUENCY: CONTINUOUS
FREQUENCY: CONTINUOUS

## 2025-03-27 ASSESSMENT — PAIN - FUNCTIONAL ASSESSMENT
PAIN_FUNCTIONAL_ASSESSMENT: PREVENTS OR INTERFERES SOME ACTIVE ACTIVITIES AND ADLS
PAIN_FUNCTIONAL_ASSESSMENT: ACTIVITIES ARE NOT PREVENTED
PAIN_FUNCTIONAL_ASSESSMENT: ACTIVITIES ARE NOT PREVENTED
PAIN_FUNCTIONAL_ASSESSMENT: PREVENTS OR INTERFERES SOME ACTIVE ACTIVITIES AND ADLS
PAIN_FUNCTIONAL_ASSESSMENT: ACTIVITIES ARE NOT PREVENTED
PAIN_FUNCTIONAL_ASSESSMENT: PREVENTS OR INTERFERES SOME ACTIVE ACTIVITIES AND ADLS
PAIN_FUNCTIONAL_ASSESSMENT: ACTIVITIES ARE NOT PREVENTED
PAIN_FUNCTIONAL_ASSESSMENT: PREVENTS OR INTERFERES SOME ACTIVE ACTIVITIES AND ADLS
PAIN_FUNCTIONAL_ASSESSMENT: ACTIVITIES ARE NOT PREVENTED
PAIN_FUNCTIONAL_ASSESSMENT: PREVENTS OR INTERFERES SOME ACTIVE ACTIVITIES AND ADLS

## 2025-03-28 LAB
ALBUMIN SERPL-MCNC: 3.3 G/DL (ref 3.4–5)
ALP SERPL-CCNC: 81 U/L (ref 40–129)
ALT SERPL-CCNC: 8 U/L (ref 10–40)
ANION GAP SERPL CALCULATED.3IONS-SCNC: 10 MMOL/L (ref 3–16)
AST SERPL-CCNC: 9 U/L (ref 15–37)
BILIRUB DIRECT SERPL-MCNC: 0.3 MG/DL (ref 0–0.3)
BILIRUB INDIRECT SERPL-MCNC: 0.4 MG/DL (ref 0–1)
BILIRUB SERPL-MCNC: 0.7 MG/DL (ref 0–1)
BLOOD BANK DISPENSE STATUS: NORMAL
BLOOD BANK PRODUCT CODE: NORMAL
BPU ID: NORMAL
BUN SERPL-MCNC: 8 MG/DL (ref 7–20)
CALCIUM SERPL-MCNC: 8.7 MG/DL (ref 8.3–10.6)
CHLORIDE SERPL-SCNC: 106 MMOL/L (ref 99–110)
CO2 SERPL-SCNC: 21 MMOL/L (ref 21–32)
CREAT SERPL-MCNC: 0.5 MG/DL (ref 0.8–1.3)
DEPRECATED RDW RBC AUTO: 16.1 % (ref 12.4–15.4)
DESCRIPTION BLOOD BANK: NORMAL
GFR SERPLBLD CREATININE-BSD FMLA CKD-EPI: >90 ML/MIN/{1.73_M2}
GLUCOSE SERPL-MCNC: 91 MG/DL (ref 70–99)
HCT VFR BLD AUTO: 22.7 % (ref 40.5–52.5)
HGB BLD-MCNC: 8.1 G/DL (ref 13.5–17.5)
LDH SERPL L TO P-CCNC: 131 U/L (ref 100–190)
MAGNESIUM SERPL-MCNC: 1.74 MG/DL (ref 1.8–2.4)
MCH RBC QN AUTO: 32.5 PG (ref 26–34)
MCHC RBC AUTO-ENTMCNC: 35.6 G/DL (ref 31–36)
MCV RBC AUTO: 91.3 FL (ref 80–100)
PHOSPHATE SERPL-MCNC: 2.9 MG/DL (ref 2.5–4.9)
PLATELET # BLD AUTO: 23 K/UL (ref 135–450)
PMV BLD AUTO: 7.9 FL (ref 5–10.5)
POTASSIUM SERPL-SCNC: 3.8 MMOL/L (ref 3.5–5.1)
PROT SERPL-MCNC: 5.1 G/DL (ref 6.4–8.2)
RBC # BLD AUTO: 2.48 M/UL (ref 4.2–5.9)
SODIUM SERPL-SCNC: 137 MMOL/L (ref 136–145)
URATE SERPL-MCNC: 1.9 MG/DL (ref 3.5–7.2)
WBC # BLD AUTO: 0 K/UL (ref 4–11)

## 2025-03-28 PROCEDURE — 6370000000 HC RX 637 (ALT 250 FOR IP): Performed by: INTERNAL MEDICINE

## 2025-03-28 PROCEDURE — 6360000002 HC RX W HCPCS

## 2025-03-28 PROCEDURE — 83735 ASSAY OF MAGNESIUM: CPT

## 2025-03-28 PROCEDURE — 84100 ASSAY OF PHOSPHORUS: CPT

## 2025-03-28 PROCEDURE — 6370000000 HC RX 637 (ALT 250 FOR IP)

## 2025-03-28 PROCEDURE — 97530 THERAPEUTIC ACTIVITIES: CPT

## 2025-03-28 PROCEDURE — 97116 GAIT TRAINING THERAPY: CPT

## 2025-03-28 PROCEDURE — 2060000000 HC ICU INTERMEDIATE R&B

## 2025-03-28 PROCEDURE — 2580000003 HC RX 258: Performed by: NURSE PRACTITIONER

## 2025-03-28 PROCEDURE — 36592 COLLECT BLOOD FROM PICC: CPT

## 2025-03-28 PROCEDURE — 2500000003 HC RX 250 WO HCPCS

## 2025-03-28 PROCEDURE — 80076 HEPATIC FUNCTION PANEL: CPT

## 2025-03-28 PROCEDURE — 6360000002 HC RX W HCPCS: Performed by: NURSE PRACTITIONER

## 2025-03-28 PROCEDURE — 80048 BASIC METABOLIC PNL TOTAL CA: CPT

## 2025-03-28 PROCEDURE — 97110 THERAPEUTIC EXERCISES: CPT

## 2025-03-28 PROCEDURE — 83615 LACTATE (LD) (LDH) ENZYME: CPT

## 2025-03-28 PROCEDURE — P9036 PLATELET PHERESIS IRRADIATED: HCPCS

## 2025-03-28 PROCEDURE — 84550 ASSAY OF BLOOD/URIC ACID: CPT

## 2025-03-28 PROCEDURE — 6360000002 HC RX W HCPCS: Performed by: INTERNAL MEDICINE

## 2025-03-28 PROCEDURE — 85027 COMPLETE CBC AUTOMATED: CPT

## 2025-03-28 PROCEDURE — 36430 TRANSFUSION BLD/BLD COMPNT: CPT

## 2025-03-28 RX ORDER — OXYCODONE HYDROCHLORIDE 5 MG/1
5 TABLET ORAL EVERY 4 HOURS PRN
Refills: 0 | Status: DISCONTINUED | OUTPATIENT
Start: 2025-03-28 | End: 2025-04-08

## 2025-03-28 RX ORDER — OXYCODONE HYDROCHLORIDE 5 MG/1
10 TABLET ORAL EVERY 4 HOURS PRN
Refills: 0 | Status: DISCONTINUED | OUTPATIENT
Start: 2025-03-28 | End: 2025-04-08

## 2025-03-28 RX ORDER — FUROSEMIDE 10 MG/ML
40 INJECTION INTRAMUSCULAR; INTRAVENOUS ONCE
Status: COMPLETED | OUTPATIENT
Start: 2025-03-28 | End: 2025-03-28

## 2025-03-28 RX ADMIN — ALLOPURINOL 150 MG: 300 TABLET ORAL at 09:26

## 2025-03-28 RX ADMIN — MYCOPHENOLATE MOFETIL 1000 MG: 500 TABLET, FILM COATED ORAL at 20:38

## 2025-03-28 RX ADMIN — VALACYCLOVIR HYDROCHLORIDE 500 MG: 500 TABLET, FILM COATED ORAL at 09:27

## 2025-03-28 RX ADMIN — PROCHLORPERAZINE MALEATE 5 MG: 10 TABLET ORAL at 09:53

## 2025-03-28 RX ADMIN — PROCHLORPERAZINE EDISYLATE 5 MG: 5 INJECTION INTRAMUSCULAR; INTRAVENOUS at 05:22

## 2025-03-28 RX ADMIN — SODIUM CHLORIDE 15 ML: 900 IRRIGANT IRRIGATION at 20:38

## 2025-03-28 RX ADMIN — FILGRASTIM-AAFI 300 MCG: 300 INJECTION, SOLUTION SUBCUTANEOUS at 17:51

## 2025-03-28 RX ADMIN — VALACYCLOVIR HYDROCHLORIDE 500 MG: 500 TABLET, FILM COATED ORAL at 20:37

## 2025-03-28 RX ADMIN — SODIUM CHLORIDE, PRESERVATIVE FREE 10 ML: 5 INJECTION INTRAVENOUS at 09:30

## 2025-03-28 RX ADMIN — MYCOPHENOLATE MOFETIL 1000 MG: 500 TABLET, FILM COATED ORAL at 09:27

## 2025-03-28 RX ADMIN — NYSTATIN 500000 UNITS: 100000 SUSPENSION ORAL at 12:43

## 2025-03-28 RX ADMIN — NYSTATIN 500000 UNITS: 100000 SUSPENSION ORAL at 17:51

## 2025-03-28 RX ADMIN — TACROLIMUS 2.5 MG: 1 CAPSULE ORAL at 20:37

## 2025-03-28 RX ADMIN — SODIUM CHLORIDE, PRESERVATIVE FREE 10 ML: 5 INJECTION INTRAVENOUS at 20:38

## 2025-03-28 RX ADMIN — CYCLOBENZAPRINE HYDROCHLORIDE 5 MG: 10 TABLET, FILM COATED ORAL at 09:26

## 2025-03-28 RX ADMIN — CYCLOBENZAPRINE HYDROCHLORIDE 5 MG: 10 TABLET, FILM COATED ORAL at 15:21

## 2025-03-28 RX ADMIN — MYCOPHENOLATE MOFETIL 1000 MG: 500 TABLET, FILM COATED ORAL at 15:21

## 2025-03-28 RX ADMIN — PANTOPRAZOLE SODIUM 40 MG: 40 TABLET, DELAYED RELEASE ORAL at 06:59

## 2025-03-28 RX ADMIN — NYSTATIN 500000 UNITS: 100000 SUSPENSION ORAL at 09:27

## 2025-03-28 RX ADMIN — URSODIOL 500 MG: 250 TABLET ORAL at 09:27

## 2025-03-28 RX ADMIN — MORPHINE SULFATE 2 MG: 2 INJECTION, SOLUTION INTRAMUSCULAR; INTRAVENOUS at 04:04

## 2025-03-28 RX ADMIN — NYSTATIN 500000 UNITS: 100000 SUSPENSION ORAL at 20:38

## 2025-03-28 RX ADMIN — TACROLIMUS 2.5 MG: 1 CAPSULE ORAL at 09:26

## 2025-03-28 RX ADMIN — LEVOFLOXACIN 500 MG: 500 TABLET, FILM COATED ORAL at 20:37

## 2025-03-28 RX ADMIN — CYCLOBENZAPRINE HYDROCHLORIDE 5 MG: 10 TABLET, FILM COATED ORAL at 20:38

## 2025-03-28 RX ADMIN — URSODIOL 500 MG: 250 TABLET ORAL at 20:37

## 2025-03-28 RX ADMIN — OXYCODONE HYDROCHLORIDE 5 MG: 5 TABLET ORAL at 07:05

## 2025-03-28 RX ADMIN — POTASSIUM CHLORIDE: 2 INJECTION, SOLUTION, CONCENTRATE INTRAVENOUS at 18:58

## 2025-03-28 RX ADMIN — FLUCONAZOLE 400 MG: 200 TABLET ORAL at 09:27

## 2025-03-28 RX ADMIN — FUROSEMIDE 40 MG: 10 INJECTION, SOLUTION INTRAMUSCULAR; INTRAVENOUS at 10:07

## 2025-03-28 RX ADMIN — OXYCODONE HYDROCHLORIDE 5 MG: 5 TABLET ORAL at 15:21

## 2025-03-28 ASSESSMENT — PAIN SCALES - GENERAL
PAINLEVEL_OUTOF10: 7
PAINLEVEL_OUTOF10: 7
PAINLEVEL_OUTOF10: 6
PAINLEVEL_OUTOF10: 8
PAINLEVEL_OUTOF10: 6
PAINLEVEL_OUTOF10: 7
PAINLEVEL_OUTOF10: 7
PAINLEVEL_OUTOF10: 8
PAINLEVEL_OUTOF10: 6

## 2025-03-28 ASSESSMENT — PAIN DESCRIPTION - DESCRIPTORS
DESCRIPTORS: ACHING;STABBING
DESCRIPTORS: ACHING;STABBING
DESCRIPTORS: STABBING
DESCRIPTORS: ACHING;STABBING
DESCRIPTORS: ACHING;STABBING

## 2025-03-28 ASSESSMENT — PAIN DESCRIPTION - ORIENTATION
ORIENTATION: RIGHT;LOWER

## 2025-03-28 ASSESSMENT — PAIN DESCRIPTION - PAIN TYPE
TYPE: ACUTE PAIN

## 2025-03-28 ASSESSMENT — PAIN DESCRIPTION - ONSET
ONSET: ON-GOING

## 2025-03-28 ASSESSMENT — PAIN DESCRIPTION - LOCATION
LOCATION: BACK

## 2025-03-28 ASSESSMENT — PAIN DESCRIPTION - FREQUENCY
FREQUENCY: CONTINUOUS

## 2025-03-29 ENCOUNTER — APPOINTMENT (OUTPATIENT)
Dept: ULTRASOUND IMAGING | Age: 75
DRG: 014 | End: 2025-03-29
Attending: STUDENT IN AN ORGANIZED HEALTH CARE EDUCATION/TRAINING PROGRAM
Payer: COMMERCIAL

## 2025-03-29 LAB
ABO/RH: NORMAL
ANION GAP SERPL CALCULATED.3IONS-SCNC: 11 MMOL/L (ref 3–16)
ANTIBODY SCREEN: NORMAL
BILIRUB SERPL-MCNC: 0.6 MG/DL (ref 0–1)
BUN SERPL-MCNC: 12 MG/DL (ref 7–20)
CALCIUM SERPL-MCNC: 8.5 MG/DL (ref 8.3–10.6)
CHLORIDE SERPL-SCNC: 104 MMOL/L (ref 99–110)
CO2 SERPL-SCNC: 21 MMOL/L (ref 21–32)
CREAT SERPL-MCNC: 0.6 MG/DL (ref 0.8–1.3)
DEPRECATED RDW RBC AUTO: 16.1 % (ref 12.4–15.4)
GFR SERPLBLD CREATININE-BSD FMLA CKD-EPI: >90 ML/MIN/{1.73_M2}
GLUCOSE SERPL-MCNC: 95 MG/DL (ref 70–99)
HCT VFR BLD AUTO: 22.2 % (ref 40.5–52.5)
HGB BLD-MCNC: 8 G/DL (ref 13.5–17.5)
MAGNESIUM SERPL-MCNC: 1.66 MG/DL (ref 1.8–2.4)
MCH RBC QN AUTO: 32.7 PG (ref 26–34)
MCHC RBC AUTO-ENTMCNC: 36.1 G/DL (ref 31–36)
MCV RBC AUTO: 90.5 FL (ref 80–100)
PHOSPHATE SERPL-MCNC: 3.4 MG/DL (ref 2.5–4.9)
PLATELET # BLD AUTO: 21 K/UL (ref 135–450)
PMV BLD AUTO: 8 FL (ref 5–10.5)
POTASSIUM SERPL-SCNC: 3.9 MMOL/L (ref 3.5–5.1)
RBC # BLD AUTO: 2.45 M/UL (ref 4.2–5.9)
SODIUM SERPL-SCNC: 136 MMOL/L (ref 136–145)
TACROLIMUS BLOOD: 6.8 NG/ML (ref 5–20)
WBC # BLD AUTO: 0 K/UL (ref 4–11)

## 2025-03-29 PROCEDURE — 85027 COMPLETE CBC AUTOMATED: CPT

## 2025-03-29 PROCEDURE — 82247 BILIRUBIN TOTAL: CPT

## 2025-03-29 PROCEDURE — 84100 ASSAY OF PHOSPHORUS: CPT

## 2025-03-29 PROCEDURE — 86900 BLOOD TYPING SEROLOGIC ABO: CPT

## 2025-03-29 PROCEDURE — 2060000000 HC ICU INTERMEDIATE R&B

## 2025-03-29 PROCEDURE — 6370000000 HC RX 637 (ALT 250 FOR IP)

## 2025-03-29 PROCEDURE — 6360000002 HC RX W HCPCS

## 2025-03-29 PROCEDURE — 6360000002 HC RX W HCPCS: Performed by: INTERNAL MEDICINE

## 2025-03-29 PROCEDURE — 2500000003 HC RX 250 WO HCPCS

## 2025-03-29 PROCEDURE — 80197 ASSAY OF TACROLIMUS: CPT

## 2025-03-29 PROCEDURE — 6370000000 HC RX 637 (ALT 250 FOR IP): Performed by: NURSE PRACTITIONER

## 2025-03-29 PROCEDURE — 86850 RBC ANTIBODY SCREEN: CPT

## 2025-03-29 PROCEDURE — 76705 ECHO EXAM OF ABDOMEN: CPT

## 2025-03-29 PROCEDURE — 80048 BASIC METABOLIC PNL TOTAL CA: CPT

## 2025-03-29 PROCEDURE — 6360000002 HC RX W HCPCS: Performed by: NURSE PRACTITIONER

## 2025-03-29 PROCEDURE — 6370000000 HC RX 637 (ALT 250 FOR IP): Performed by: INTERNAL MEDICINE

## 2025-03-29 PROCEDURE — 86901 BLOOD TYPING SEROLOGIC RH(D): CPT

## 2025-03-29 PROCEDURE — P9036 PLATELET PHERESIS IRRADIATED: HCPCS

## 2025-03-29 PROCEDURE — 83735 ASSAY OF MAGNESIUM: CPT

## 2025-03-29 PROCEDURE — 36430 TRANSFUSION BLD/BLD COMPNT: CPT

## 2025-03-29 PROCEDURE — 36592 COLLECT BLOOD FROM PICC: CPT

## 2025-03-29 PROCEDURE — 2580000003 HC RX 258: Performed by: NURSE PRACTITIONER

## 2025-03-29 RX ORDER — POLYETHYLENE GLYCOL 3350 17 G/17G
17 POWDER, FOR SOLUTION ORAL 3 TIMES DAILY PRN
Status: DISCONTINUED | OUTPATIENT
Start: 2025-03-29 | End: 2025-04-10 | Stop reason: HOSPADM

## 2025-03-29 RX ORDER — SENNA AND DOCUSATE SODIUM 50; 8.6 MG/1; MG/1
2 TABLET, FILM COATED ORAL 2 TIMES DAILY PRN
Status: DISCONTINUED | OUTPATIENT
Start: 2025-03-29 | End: 2025-04-10 | Stop reason: HOSPADM

## 2025-03-29 RX ORDER — FUROSEMIDE 10 MG/ML
40 INJECTION INTRAMUSCULAR; INTRAVENOUS ONCE
Status: COMPLETED | OUTPATIENT
Start: 2025-03-29 | End: 2025-03-29

## 2025-03-29 RX ADMIN — CYCLOBENZAPRINE HYDROCHLORIDE 5 MG: 10 TABLET, FILM COATED ORAL at 08:44

## 2025-03-29 RX ADMIN — CYCLOBENZAPRINE HYDROCHLORIDE 5 MG: 10 TABLET, FILM COATED ORAL at 20:35

## 2025-03-29 RX ADMIN — OXYCODONE 5 MG: 5 TABLET ORAL at 01:16

## 2025-03-29 RX ADMIN — URSODIOL 500 MG: 250 TABLET ORAL at 08:43

## 2025-03-29 RX ADMIN — FILGRASTIM-AAFI 300 MCG: 300 INJECTION, SOLUTION SUBCUTANEOUS at 17:54

## 2025-03-29 RX ADMIN — PANTOPRAZOLE SODIUM 40 MG: 40 TABLET, DELAYED RELEASE ORAL at 05:19

## 2025-03-29 RX ADMIN — WATER 2 MG: 1 INJECTION INTRAMUSCULAR; INTRAVENOUS; SUBCUTANEOUS at 10:37

## 2025-03-29 RX ADMIN — NYSTATIN 500000 UNITS: 100000 SUSPENSION ORAL at 13:32

## 2025-03-29 RX ADMIN — CYCLOBENZAPRINE HYDROCHLORIDE 5 MG: 10 TABLET, FILM COATED ORAL at 13:30

## 2025-03-29 RX ADMIN — OXYCODONE 10 MG: 5 TABLET ORAL at 05:18

## 2025-03-29 RX ADMIN — POTASSIUM CHLORIDE: 2 INJECTION, SOLUTION, CONCENTRATE INTRAVENOUS at 17:55

## 2025-03-29 RX ADMIN — VALACYCLOVIR HYDROCHLORIDE 500 MG: 500 TABLET, FILM COATED ORAL at 08:43

## 2025-03-29 RX ADMIN — LORAZEPAM 0.5 MG: 0.5 TABLET ORAL at 02:05

## 2025-03-29 RX ADMIN — LEVOFLOXACIN 500 MG: 500 TABLET, FILM COATED ORAL at 20:36

## 2025-03-29 RX ADMIN — SODIUM CHLORIDE 15 ML: 900 IRRIGANT IRRIGATION at 13:32

## 2025-03-29 RX ADMIN — MYCOPHENOLATE MOFETIL 1000 MG: 500 TABLET, FILM COATED ORAL at 15:48

## 2025-03-29 RX ADMIN — POLYETHYLENE GLYCOL 3350 17 G: 17 POWDER, FOR SOLUTION ORAL at 13:37

## 2025-03-29 RX ADMIN — MYCOPHENOLATE MOFETIL 1000 MG: 500 TABLET, FILM COATED ORAL at 08:43

## 2025-03-29 RX ADMIN — OXYCODONE 10 MG: 5 TABLET ORAL at 13:30

## 2025-03-29 RX ADMIN — SODIUM CHLORIDE, PRESERVATIVE FREE 10 ML: 5 INJECTION INTRAVENOUS at 09:16

## 2025-03-29 RX ADMIN — PROCHLORPERAZINE EDISYLATE 5 MG: 5 INJECTION INTRAMUSCULAR; INTRAVENOUS at 08:17

## 2025-03-29 RX ADMIN — VALACYCLOVIR HYDROCHLORIDE 500 MG: 500 TABLET, FILM COATED ORAL at 20:35

## 2025-03-29 RX ADMIN — FLUCONAZOLE 400 MG: 200 TABLET ORAL at 08:44

## 2025-03-29 RX ADMIN — NYSTATIN 500000 UNITS: 100000 SUSPENSION ORAL at 08:42

## 2025-03-29 RX ADMIN — URSODIOL 500 MG: 250 TABLET ORAL at 20:35

## 2025-03-29 RX ADMIN — NYSTATIN 500000 UNITS: 100000 SUSPENSION ORAL at 20:36

## 2025-03-29 RX ADMIN — TACROLIMUS 2.5 MG: 1 CAPSULE ORAL at 20:35

## 2025-03-29 RX ADMIN — NYSTATIN 500000 UNITS: 100000 SUSPENSION ORAL at 17:53

## 2025-03-29 RX ADMIN — OXYCODONE 5 MG: 5 TABLET ORAL at 09:18

## 2025-03-29 RX ADMIN — SODIUM CHLORIDE 15 ML: 900 IRRIGANT IRRIGATION at 09:17

## 2025-03-29 RX ADMIN — ALLOPURINOL 150 MG: 300 TABLET ORAL at 08:42

## 2025-03-29 RX ADMIN — TACROLIMUS 2.5 MG: 1 CAPSULE ORAL at 09:17

## 2025-03-29 RX ADMIN — MYCOPHENOLATE MOFETIL 1000 MG: 500 TABLET, FILM COATED ORAL at 20:35

## 2025-03-29 RX ADMIN — FUROSEMIDE 40 MG: 10 INJECTION, SOLUTION INTRAMUSCULAR; INTRAVENOUS at 11:17

## 2025-03-29 RX ADMIN — DICLOFENAC SODIUM 2 G: 10 GEL TOPICAL at 04:01

## 2025-03-29 RX ADMIN — SENNOSIDES, DOCUSATE SODIUM 2 TABLET: 50; 8.6 TABLET, FILM COATED ORAL at 13:37

## 2025-03-29 ASSESSMENT — PAIN DESCRIPTION - ONSET
ONSET: ON-GOING

## 2025-03-29 ASSESSMENT — PAIN SCALES - GENERAL
PAINLEVEL_OUTOF10: 5
PAINLEVEL_OUTOF10: 3
PAINLEVEL_OUTOF10: 0
PAINLEVEL_OUTOF10: 5
PAINLEVEL_OUTOF10: 5
PAINLEVEL_OUTOF10: 7
PAINLEVEL_OUTOF10: 6
PAINLEVEL_OUTOF10: 2
PAINLEVEL_OUTOF10: 4
PAINLEVEL_OUTOF10: 6

## 2025-03-29 ASSESSMENT — PAIN DESCRIPTION - ORIENTATION
ORIENTATION: MID;LOWER;RIGHT
ORIENTATION: MID
ORIENTATION: LOWER;POSTERIOR
ORIENTATION: MID
ORIENTATION: LOWER;MID;POSTERIOR
ORIENTATION: LOWER;POSTERIOR
ORIENTATION: LOWER;MID;POSTERIOR

## 2025-03-29 ASSESSMENT — PAIN DESCRIPTION - PAIN TYPE
TYPE: ACUTE PAIN

## 2025-03-29 ASSESSMENT — PAIN DESCRIPTION - DESCRIPTORS
DESCRIPTORS: ACHING
DESCRIPTORS: ACHING;DISCOMFORT
DESCRIPTORS: SHOOTING
DESCRIPTORS: ACHING;DISCOMFORT;SHARP

## 2025-03-29 ASSESSMENT — PAIN - FUNCTIONAL ASSESSMENT

## 2025-03-29 ASSESSMENT — PAIN DESCRIPTION - FREQUENCY
FREQUENCY: CONTINUOUS

## 2025-03-29 ASSESSMENT — PAIN DESCRIPTION - LOCATION
LOCATION: BACK

## 2025-03-30 ENCOUNTER — APPOINTMENT (OUTPATIENT)
Dept: GENERAL RADIOLOGY | Age: 75
DRG: 014 | End: 2025-03-30
Attending: STUDENT IN AN ORGANIZED HEALTH CARE EDUCATION/TRAINING PROGRAM
Payer: COMMERCIAL

## 2025-03-30 LAB
ANION GAP SERPL CALCULATED.3IONS-SCNC: 10 MMOL/L (ref 3–16)
BILIRUB SERPL-MCNC: 0.5 MG/DL (ref 0–1)
BILIRUB UR QL STRIP.AUTO: NEGATIVE
BUN SERPL-MCNC: 15 MG/DL (ref 7–20)
CALCIUM SERPL-MCNC: 8.6 MG/DL (ref 8.3–10.6)
CHLORIDE SERPL-SCNC: 104 MMOL/L (ref 99–110)
CLARITY UR: CLEAR
CO2 SERPL-SCNC: 21 MMOL/L (ref 21–32)
COLOR UR: YELLOW
CREAT SERPL-MCNC: 0.7 MG/DL (ref 0.8–1.3)
DEPRECATED RDW RBC AUTO: 15.9 % (ref 12.4–15.4)
GFR SERPLBLD CREATININE-BSD FMLA CKD-EPI: >90 ML/MIN/{1.73_M2}
GLUCOSE SERPL-MCNC: 91 MG/DL (ref 70–99)
GLUCOSE UR STRIP.AUTO-MCNC: NEGATIVE MG/DL
HCT VFR BLD AUTO: 21.3 % (ref 40.5–52.5)
HGB BLD-MCNC: 7.7 G/DL (ref 13.5–17.5)
HGB UR QL STRIP.AUTO: NEGATIVE
KETONES UR STRIP.AUTO-MCNC: 15 MG/DL
LACTATE BLDV-SCNC: 0.6 MMOL/L (ref 0.4–2)
LEUKOCYTE ESTERASE UR QL STRIP.AUTO: NEGATIVE
MAGNESIUM SERPL-MCNC: 1.64 MG/DL (ref 1.8–2.4)
MCH RBC QN AUTO: 32.6 PG (ref 26–34)
MCHC RBC AUTO-ENTMCNC: 36.1 G/DL (ref 31–36)
MCV RBC AUTO: 90.3 FL (ref 80–100)
NITRITE UR QL STRIP.AUTO: NEGATIVE
PH UR STRIP.AUTO: 6 [PH] (ref 5–8)
PHOSPHATE SERPL-MCNC: 3.9 MG/DL (ref 2.5–4.9)
PLATELET # BLD AUTO: 18 K/UL (ref 135–450)
PMV BLD AUTO: 7.5 FL (ref 5–10.5)
POTASSIUM SERPL-SCNC: 4 MMOL/L (ref 3.5–5.1)
PROT UR STRIP.AUTO-MCNC: NEGATIVE MG/DL
RBC # BLD AUTO: 2.36 M/UL (ref 4.2–5.9)
SODIUM SERPL-SCNC: 135 MMOL/L (ref 136–145)
SP GR UR STRIP.AUTO: 1.02 (ref 1–1.03)
UA DIPSTICK W REFLEX MICRO PNL UR: ABNORMAL
URN SPEC COLLECT METH UR: ABNORMAL
UROBILINOGEN UR STRIP-ACNC: 0.2 E.U./DL
WBC # BLD AUTO: 0 K/UL (ref 4–11)

## 2025-03-30 PROCEDURE — 87077 CULTURE AEROBIC IDENTIFY: CPT

## 2025-03-30 PROCEDURE — 87086 URINE CULTURE/COLONY COUNT: CPT

## 2025-03-30 PROCEDURE — 83605 ASSAY OF LACTIC ACID: CPT

## 2025-03-30 PROCEDURE — 82247 BILIRUBIN TOTAL: CPT

## 2025-03-30 PROCEDURE — 87103 BLOOD FUNGUS CULTURE: CPT

## 2025-03-30 PROCEDURE — 99223 1ST HOSP IP/OBS HIGH 75: CPT | Performed by: SURGERY

## 2025-03-30 PROCEDURE — 87070 CULTURE OTHR SPECIMN AEROBIC: CPT

## 2025-03-30 PROCEDURE — 71045 X-RAY EXAM CHEST 1 VIEW: CPT

## 2025-03-30 PROCEDURE — 83735 ASSAY OF MAGNESIUM: CPT

## 2025-03-30 PROCEDURE — 6360000002 HC RX W HCPCS

## 2025-03-30 PROCEDURE — 87040 BLOOD CULTURE FOR BACTERIA: CPT

## 2025-03-30 PROCEDURE — 36592 COLLECT BLOOD FROM PICC: CPT

## 2025-03-30 PROCEDURE — 87102 FUNGUS ISOLATION CULTURE: CPT

## 2025-03-30 PROCEDURE — 87252 VIRUS INOCULATION TISSUE: CPT

## 2025-03-30 PROCEDURE — 2060000000 HC ICU INTERMEDIATE R&B

## 2025-03-30 PROCEDURE — 80048 BASIC METABOLIC PNL TOTAL CA: CPT

## 2025-03-30 PROCEDURE — 81003 URINALYSIS AUTO W/O SCOPE: CPT

## 2025-03-30 PROCEDURE — 6370000000 HC RX 637 (ALT 250 FOR IP): Performed by: INTERNAL MEDICINE

## 2025-03-30 PROCEDURE — 84100 ASSAY OF PHOSPHORUS: CPT

## 2025-03-30 PROCEDURE — 6360000002 HC RX W HCPCS: Performed by: NURSE PRACTITIONER

## 2025-03-30 PROCEDURE — 6370000000 HC RX 637 (ALT 250 FOR IP): Performed by: NURSE PRACTITIONER

## 2025-03-30 PROCEDURE — 87186 SC STD MICRODIL/AGAR DIL: CPT

## 2025-03-30 PROCEDURE — 6370000000 HC RX 637 (ALT 250 FOR IP)

## 2025-03-30 PROCEDURE — 2580000003 HC RX 258: Performed by: NURSE PRACTITIONER

## 2025-03-30 PROCEDURE — 2580000003 HC RX 258

## 2025-03-30 PROCEDURE — 36430 TRANSFUSION BLD/BLD COMPNT: CPT

## 2025-03-30 PROCEDURE — 2500000003 HC RX 250 WO HCPCS

## 2025-03-30 PROCEDURE — 85027 COMPLETE CBC AUTOMATED: CPT

## 2025-03-30 PROCEDURE — 87253 VIRUS INOCULATE TISSUE ADDL: CPT

## 2025-03-30 PROCEDURE — 6360000002 HC RX W HCPCS: Performed by: INTERNAL MEDICINE

## 2025-03-30 PROCEDURE — 87205 SMEAR GRAM STAIN: CPT

## 2025-03-30 RX ORDER — ACETAMINOPHEN 325 MG/1
650 TABLET ORAL EVERY 4 HOURS PRN
Status: DISCONTINUED | OUTPATIENT
Start: 2025-03-30 | End: 2025-04-10 | Stop reason: HOSPADM

## 2025-03-30 RX ADMIN — PIPERACILLIN AND TAZOBACTAM 3375 MG: 3; .375 INJECTION, POWDER, LYOPHILIZED, FOR SOLUTION INTRAVENOUS at 17:23

## 2025-03-30 RX ADMIN — URSODIOL 500 MG: 250 TABLET ORAL at 20:46

## 2025-03-30 RX ADMIN — TACROLIMUS 2.5 MG: 1 CAPSULE ORAL at 08:39

## 2025-03-30 RX ADMIN — VALACYCLOVIR HYDROCHLORIDE 500 MG: 500 TABLET, FILM COATED ORAL at 08:39

## 2025-03-30 RX ADMIN — LOPERAMIDE HYDROCHLORIDE 2 MG: 2 CAPSULE ORAL at 01:58

## 2025-03-30 RX ADMIN — OXYCODONE 10 MG: 5 TABLET ORAL at 00:42

## 2025-03-30 RX ADMIN — VALACYCLOVIR HYDROCHLORIDE 500 MG: 500 TABLET, FILM COATED ORAL at 20:47

## 2025-03-30 RX ADMIN — NYSTATIN 500000 UNITS: 100000 SUSPENSION ORAL at 13:27

## 2025-03-30 RX ADMIN — MYCOPHENOLATE MOFETIL 1000 MG: 500 TABLET, FILM COATED ORAL at 08:40

## 2025-03-30 RX ADMIN — MYCOPHENOLATE MOFETIL 1000 MG: 500 TABLET, FILM COATED ORAL at 15:16

## 2025-03-30 RX ADMIN — NYSTATIN 500000 UNITS: 100000 SUSPENSION ORAL at 17:20

## 2025-03-30 RX ADMIN — ALLOPURINOL 150 MG: 300 TABLET ORAL at 08:40

## 2025-03-30 RX ADMIN — POTASSIUM CHLORIDE: 2 INJECTION, SOLUTION, CONCENTRATE INTRAVENOUS at 13:11

## 2025-03-30 RX ADMIN — FILGRASTIM-AAFI 300 MCG: 300 INJECTION, SOLUTION SUBCUTANEOUS at 17:20

## 2025-03-30 RX ADMIN — NYSTATIN 500000 UNITS: 100000 SUSPENSION ORAL at 08:41

## 2025-03-30 RX ADMIN — PIPERACILLIN AND TAZOBACTAM 4500 MG: 4; .5 INJECTION, POWDER, LYOPHILIZED, FOR SOLUTION INTRAVENOUS; PARENTERAL at 13:12

## 2025-03-30 RX ADMIN — OXYCODONE 10 MG: 5 TABLET ORAL at 20:46

## 2025-03-30 RX ADMIN — OXYCODONE 10 MG: 5 TABLET ORAL at 15:25

## 2025-03-30 RX ADMIN — OXYCODONE 10 MG: 5 TABLET ORAL at 08:41

## 2025-03-30 RX ADMIN — PANTOPRAZOLE SODIUM 40 MG: 40 TABLET, DELAYED RELEASE ORAL at 06:24

## 2025-03-30 RX ADMIN — URSODIOL 500 MG: 250 TABLET ORAL at 08:41

## 2025-03-30 RX ADMIN — CYCLOBENZAPRINE HYDROCHLORIDE 5 MG: 10 TABLET, FILM COATED ORAL at 08:41

## 2025-03-30 RX ADMIN — SODIUM CHLORIDE, PRESERVATIVE FREE 10 ML: 5 INJECTION INTRAVENOUS at 09:06

## 2025-03-30 RX ADMIN — TACROLIMUS 2.5 MG: 1 CAPSULE ORAL at 20:46

## 2025-03-30 RX ADMIN — MYCOPHENOLATE MOFETIL 1000 MG: 500 TABLET, FILM COATED ORAL at 20:46

## 2025-03-30 RX ADMIN — OXYCODONE 10 MG: 5 TABLET ORAL at 04:57

## 2025-03-30 RX ADMIN — FLUCONAZOLE 400 MG: 200 TABLET ORAL at 08:40

## 2025-03-30 RX ADMIN — NYSTATIN 500000 UNITS: 100000 SUSPENSION ORAL at 20:51

## 2025-03-30 ASSESSMENT — PAIN SCALES - GENERAL
PAINLEVEL_OUTOF10: 8
PAINLEVEL_OUTOF10: 9
PAINLEVEL_OUTOF10: 0
PAINLEVEL_OUTOF10: 7
PAINLEVEL_OUTOF10: 4
PAINLEVEL_OUTOF10: 0
PAINLEVEL_OUTOF10: 7
PAINLEVEL_OUTOF10: 3
PAINLEVEL_OUTOF10: 8
PAINLEVEL_OUTOF10: 9

## 2025-03-30 ASSESSMENT — PAIN DESCRIPTION - FREQUENCY
FREQUENCY: CONTINUOUS
FREQUENCY: CONTINUOUS

## 2025-03-30 ASSESSMENT — PAIN DESCRIPTION - DESCRIPTORS
DESCRIPTORS: SORE
DESCRIPTORS: SORE;DISCOMFORT
DESCRIPTORS: ACHING;SORE
DESCRIPTORS: ACHING

## 2025-03-30 ASSESSMENT — PAIN DESCRIPTION - ORIENTATION
ORIENTATION: RIGHT;LEFT
ORIENTATION: LOWER
ORIENTATION: LOWER;LEFT

## 2025-03-30 ASSESSMENT — PAIN DESCRIPTION - PAIN TYPE: TYPE: ACUTE PAIN

## 2025-03-30 ASSESSMENT — PAIN DESCRIPTION - LOCATION
LOCATION: LEG
LOCATION: BACK;LEG
LOCATION: LEG
LOCATION: BACK

## 2025-03-30 ASSESSMENT — PAIN - FUNCTIONAL ASSESSMENT
PAIN_FUNCTIONAL_ASSESSMENT: ACTIVITIES ARE NOT PREVENTED

## 2025-03-30 ASSESSMENT — PAIN DESCRIPTION - ONSET
ONSET: ON-GOING
ONSET: ON-GOING

## 2025-03-30 ASSESSMENT — PAIN SCALES - WONG BAKER: WONGBAKER_NUMERICALRESPONSE: NO HURT

## 2025-03-31 ENCOUNTER — APPOINTMENT (OUTPATIENT)
Dept: NUCLEAR MEDICINE | Age: 75
DRG: 014 | End: 2025-03-31
Attending: STUDENT IN AN ORGANIZED HEALTH CARE EDUCATION/TRAINING PROGRAM
Payer: COMMERCIAL

## 2025-03-31 ENCOUNTER — APPOINTMENT (OUTPATIENT)
Dept: GENERAL RADIOLOGY | Age: 75
DRG: 014 | End: 2025-03-31
Attending: STUDENT IN AN ORGANIZED HEALTH CARE EDUCATION/TRAINING PROGRAM
Payer: COMMERCIAL

## 2025-03-31 PROBLEM — R10.11 RUQ PAIN: Status: ACTIVE | Noted: 2025-03-31

## 2025-03-31 LAB
ALBUMIN SERPL-MCNC: 3.4 G/DL (ref 3.4–5)
ALP SERPL-CCNC: 93 U/L (ref 40–129)
ALT SERPL-CCNC: 9 U/L (ref 10–40)
ANION GAP SERPL CALCULATED.3IONS-SCNC: 10 MMOL/L (ref 3–16)
APTT BLD: 36.2 SEC (ref 22.1–36.4)
AST SERPL-CCNC: 9 U/L (ref 15–37)
BILIRUB DIRECT SERPL-MCNC: 0.2 MG/DL (ref 0–0.3)
BILIRUB INDIRECT SERPL-MCNC: 0.3 MG/DL (ref 0–1)
BILIRUB SERPL-MCNC: 0.5 MG/DL (ref 0–1)
BILIRUB UR QL STRIP.AUTO: NEGATIVE
BUN SERPL-MCNC: 13 MG/DL (ref 7–20)
CALCIUM SERPL-MCNC: 8.8 MG/DL (ref 8.3–10.6)
CHLORIDE SERPL-SCNC: 104 MMOL/L (ref 99–110)
CLARITY UR: CLEAR
CO2 SERPL-SCNC: 22 MMOL/L (ref 21–32)
COLOR UR: YELLOW
CREAT SERPL-MCNC: 0.7 MG/DL (ref 0.8–1.3)
DEPRECATED RDW RBC AUTO: 15.8 % (ref 12.4–15.4)
GFR SERPLBLD CREATININE-BSD FMLA CKD-EPI: >90 ML/MIN/{1.73_M2}
GLUCOSE SERPL-MCNC: 95 MG/DL (ref 70–99)
GLUCOSE UR STRIP.AUTO-MCNC: NEGATIVE MG/DL
HCT VFR BLD AUTO: 20.9 % (ref 40.5–52.5)
HGB BLD-MCNC: 7.6 G/DL (ref 13.5–17.5)
HGB UR QL STRIP.AUTO: NEGATIVE
INR PPP: 1.19 (ref 0.85–1.15)
KETONES UR STRIP.AUTO-MCNC: NEGATIVE MG/DL
LDH SERPL L TO P-CCNC: 122 U/L (ref 100–190)
LEUKOCYTE ESTERASE UR QL STRIP.AUTO: NEGATIVE
MAGNESIUM SERPL-MCNC: 1.67 MG/DL (ref 1.8–2.4)
MCH RBC QN AUTO: 32.5 PG (ref 26–34)
MCHC RBC AUTO-ENTMCNC: 36.2 G/DL (ref 31–36)
MCV RBC AUTO: 89.9 FL (ref 80–100)
NITRITE UR QL STRIP.AUTO: NEGATIVE
PH UR STRIP.AUTO: 6 [PH] (ref 5–8)
PHOSPHATE SERPL-MCNC: 3.9 MG/DL (ref 2.5–4.9)
PLATELET # BLD AUTO: 19 K/UL (ref 135–450)
PMV BLD AUTO: 8.2 FL (ref 5–10.5)
POTASSIUM SERPL-SCNC: 3.9 MMOL/L (ref 3.5–5.1)
PROT SERPL-MCNC: 5.4 G/DL (ref 6.4–8.2)
PROT UR STRIP.AUTO-MCNC: NEGATIVE MG/DL
PROTHROMBIN TIME: 15.3 SEC (ref 11.9–14.9)
RBC # BLD AUTO: 2.33 M/UL (ref 4.2–5.9)
SODIUM SERPL-SCNC: 136 MMOL/L (ref 136–145)
SP GR UR STRIP.AUTO: 1.01 (ref 1–1.03)
TACROLIMUS BLOOD: 8 NG/ML (ref 5–20)
UA DIPSTICK W REFLEX MICRO PNL UR: NORMAL
URATE SERPL-MCNC: 2 MG/DL (ref 3.5–7.2)
URN SPEC COLLECT METH UR: NORMAL
UROBILINOGEN UR STRIP-ACNC: 0.2 E.U./DL
WBC # BLD AUTO: 0 K/UL (ref 4–11)

## 2025-03-31 PROCEDURE — 2580000003 HC RX 258: Performed by: NURSE PRACTITIONER

## 2025-03-31 PROCEDURE — 78226 HEPATOBILIARY SYSTEM IMAGING: CPT

## 2025-03-31 PROCEDURE — 83735 ASSAY OF MAGNESIUM: CPT

## 2025-03-31 PROCEDURE — 6360000002 HC RX W HCPCS

## 2025-03-31 PROCEDURE — 80197 ASSAY OF TACROLIMUS: CPT

## 2025-03-31 PROCEDURE — 6360000002 HC RX W HCPCS: Performed by: INTERNAL MEDICINE

## 2025-03-31 PROCEDURE — 36430 TRANSFUSION BLD/BLD COMPNT: CPT

## 2025-03-31 PROCEDURE — 97530 THERAPEUTIC ACTIVITIES: CPT

## 2025-03-31 PROCEDURE — 6370000000 HC RX 637 (ALT 250 FOR IP): Performed by: STUDENT IN AN ORGANIZED HEALTH CARE EDUCATION/TRAINING PROGRAM

## 2025-03-31 PROCEDURE — 85730 THROMBOPLASTIN TIME PARTIAL: CPT

## 2025-03-31 PROCEDURE — A9537 TC99M MEBROFENIN: HCPCS

## 2025-03-31 PROCEDURE — 80076 HEPATIC FUNCTION PANEL: CPT

## 2025-03-31 PROCEDURE — 6370000000 HC RX 637 (ALT 250 FOR IP): Performed by: INTERNAL MEDICINE

## 2025-03-31 PROCEDURE — 99232 SBSQ HOSP IP/OBS MODERATE 35: CPT | Performed by: SURGERY

## 2025-03-31 PROCEDURE — 2500000003 HC RX 250 WO HCPCS

## 2025-03-31 PROCEDURE — 97535 SELF CARE MNGMENT TRAINING: CPT

## 2025-03-31 PROCEDURE — 2060000000 HC ICU INTERMEDIATE R&B

## 2025-03-31 PROCEDURE — 84100 ASSAY OF PHOSPHORUS: CPT

## 2025-03-31 PROCEDURE — 2580000003 HC RX 258

## 2025-03-31 PROCEDURE — 85027 COMPLETE CBC AUTOMATED: CPT

## 2025-03-31 PROCEDURE — 71045 X-RAY EXAM CHEST 1 VIEW: CPT

## 2025-03-31 PROCEDURE — 6360000002 HC RX W HCPCS: Performed by: NURSE PRACTITIONER

## 2025-03-31 PROCEDURE — 81003 URINALYSIS AUTO W/O SCOPE: CPT

## 2025-03-31 PROCEDURE — 83615 LACTATE (LD) (LDH) ENZYME: CPT

## 2025-03-31 PROCEDURE — 3430000000 HC RX DIAGNOSTIC RADIOPHARMACEUTICAL

## 2025-03-31 PROCEDURE — 80048 BASIC METABOLIC PNL TOTAL CA: CPT

## 2025-03-31 PROCEDURE — 97116 GAIT TRAINING THERAPY: CPT

## 2025-03-31 PROCEDURE — 6370000000 HC RX 637 (ALT 250 FOR IP)

## 2025-03-31 PROCEDURE — 85610 PROTHROMBIN TIME: CPT

## 2025-03-31 PROCEDURE — 84550 ASSAY OF BLOOD/URIC ACID: CPT

## 2025-03-31 RX ORDER — FUROSEMIDE 10 MG/ML
40 INJECTION INTRAMUSCULAR; INTRAVENOUS ONCE
Status: COMPLETED | OUTPATIENT
Start: 2025-03-31 | End: 2025-03-31

## 2025-03-31 RX ORDER — FLUORIDE TOOTHPASTE
15 TOOTHPASTE DENTAL 3 TIMES DAILY PRN
Status: DISCONTINUED | OUTPATIENT
Start: 2025-03-31 | End: 2025-04-10 | Stop reason: HOSPADM

## 2025-03-31 RX ORDER — KIT FOR THE PREPARATION OF TECHNETIUM TC 99M MEBROFENIN 45 MG/10ML
5.1 INJECTION, POWDER, LYOPHILIZED, FOR SOLUTION INTRAVENOUS
Status: COMPLETED | OUTPATIENT
Start: 2025-03-31 | End: 2025-03-31

## 2025-03-31 RX ADMIN — POTASSIUM CHLORIDE: 2 INJECTION, SOLUTION, CONCENTRATE INTRAVENOUS at 11:05

## 2025-03-31 RX ADMIN — PANTOPRAZOLE SODIUM 40 MG: 40 TABLET, DELAYED RELEASE ORAL at 08:36

## 2025-03-31 RX ADMIN — SODIUM CHLORIDE, PRESERVATIVE FREE 10 ML: 5 INJECTION INTRAVENOUS at 20:20

## 2025-03-31 RX ADMIN — NYSTATIN 500000 UNITS: 100000 SUSPENSION ORAL at 13:15

## 2025-03-31 RX ADMIN — URSODIOL 500 MG: 250 TABLET ORAL at 20:20

## 2025-03-31 RX ADMIN — MYCOPHENOLATE MOFETIL 1000 MG: 500 TABLET, FILM COATED ORAL at 20:20

## 2025-03-31 RX ADMIN — MEBROFENIN 5.1 MILLICURIE: 45 INJECTION, POWDER, LYOPHILIZED, FOR SOLUTION INTRAVENOUS at 11:02

## 2025-03-31 RX ADMIN — ALLOPURINOL 150 MG: 300 TABLET ORAL at 08:36

## 2025-03-31 RX ADMIN — VALACYCLOVIR HYDROCHLORIDE 500 MG: 500 TABLET, FILM COATED ORAL at 08:36

## 2025-03-31 RX ADMIN — NYSTATIN 500000 UNITS: 100000 SUSPENSION ORAL at 17:12

## 2025-03-31 RX ADMIN — MYCOPHENOLATE MOFETIL 1000 MG: 500 TABLET, FILM COATED ORAL at 08:36

## 2025-03-31 RX ADMIN — OXYCODONE 10 MG: 5 TABLET ORAL at 14:30

## 2025-03-31 RX ADMIN — TACROLIMUS 2.5 MG: 1 CAPSULE ORAL at 08:36

## 2025-03-31 RX ADMIN — OXYCODONE 10 MG: 5 TABLET ORAL at 00:13

## 2025-03-31 RX ADMIN — NYSTATIN 500000 UNITS: 100000 SUSPENSION ORAL at 20:20

## 2025-03-31 RX ADMIN — NYSTATIN 500000 UNITS: 100000 SUSPENSION ORAL at 08:36

## 2025-03-31 RX ADMIN — VALACYCLOVIR HYDROCHLORIDE 500 MG: 500 TABLET, FILM COATED ORAL at 20:20

## 2025-03-31 RX ADMIN — MYCOPHENOLATE MOFETIL 1000 MG: 500 TABLET, FILM COATED ORAL at 14:30

## 2025-03-31 RX ADMIN — URSODIOL 500 MG: 250 TABLET ORAL at 08:36

## 2025-03-31 RX ADMIN — HYDRALAZINE HYDROCHLORIDE 10 MG: 20 INJECTION INTRAMUSCULAR; INTRAVENOUS at 06:16

## 2025-03-31 RX ADMIN — FUROSEMIDE 40 MG: 10 INJECTION, SOLUTION INTRAMUSCULAR; INTRAVENOUS at 13:15

## 2025-03-31 RX ADMIN — TACROLIMUS 2.5 MG: 1 CAPSULE ORAL at 20:20

## 2025-03-31 RX ADMIN — FILGRASTIM-AAFI 300 MCG: 300 INJECTION, SOLUTION SUBCUTANEOUS at 17:12

## 2025-03-31 RX ADMIN — FLUCONAZOLE 400 MG: 200 TABLET ORAL at 08:36

## 2025-03-31 RX ADMIN — SODIUM CHLORIDE, PRESERVATIVE FREE 10 ML: 5 INJECTION INTRAVENOUS at 08:43

## 2025-03-31 RX ADMIN — PIPERACILLIN AND TAZOBACTAM 3375 MG: 3; .375 INJECTION, POWDER, LYOPHILIZED, FOR SOLUTION INTRAVENOUS at 08:43

## 2025-03-31 RX ADMIN — PIPERACILLIN AND TAZOBACTAM 3375 MG: 3; .375 INJECTION, POWDER, LYOPHILIZED, FOR SOLUTION INTRAVENOUS at 00:24

## 2025-03-31 RX ADMIN — PIPERACILLIN AND TAZOBACTAM 4500 MG: 4; .5 INJECTION, POWDER, LYOPHILIZED, FOR SOLUTION INTRAVENOUS at 17:12

## 2025-03-31 ASSESSMENT — PAIN DESCRIPTION - DESCRIPTORS
DESCRIPTORS: ACHING;SORE
DESCRIPTORS: ACHING
DESCRIPTORS: ACHING
DESCRIPTORS: ACHING;SORE
DESCRIPTORS: ACHING;SORE

## 2025-03-31 ASSESSMENT — PAIN DESCRIPTION - LOCATION
LOCATION: BACK
LOCATION: BACK;LEG
LOCATION: LEG
LOCATION: LEG
LOCATION: BACK

## 2025-03-31 ASSESSMENT — PAIN DESCRIPTION - ORIENTATION
ORIENTATION: MID;LOWER;LEFT;RIGHT
ORIENTATION: LOWER;LEFT;RIGHT
ORIENTATION: RIGHT;LEFT
ORIENTATION: RIGHT;LEFT
ORIENTATION: LOWER

## 2025-03-31 ASSESSMENT — PAIN SCALES - WONG BAKER: WONGBAKER_NUMERICALRESPONSE: NO HURT

## 2025-03-31 ASSESSMENT — PAIN SCALES - GENERAL
PAINLEVEL_OUTOF10: 8
PAINLEVEL_OUTOF10: 0
PAINLEVEL_OUTOF10: 6
PAINLEVEL_OUTOF10: 6
PAINLEVEL_OUTOF10: 7
PAINLEVEL_OUTOF10: 9
PAINLEVEL_OUTOF10: 0
PAINLEVEL_OUTOF10: 3
PAINLEVEL_OUTOF10: 5

## 2025-03-31 ASSESSMENT — PAIN DESCRIPTION - ONSET: ONSET: ON-GOING

## 2025-03-31 ASSESSMENT — PAIN - FUNCTIONAL ASSESSMENT
PAIN_FUNCTIONAL_ASSESSMENT: ACTIVITIES ARE NOT PREVENTED

## 2025-03-31 ASSESSMENT — PAIN DESCRIPTION - PAIN TYPE: TYPE: ACUTE PAIN

## 2025-03-31 ASSESSMENT — PAIN DESCRIPTION - FREQUENCY: FREQUENCY: CONTINUOUS

## 2025-04-01 LAB
ANION GAP SERPL CALCULATED.3IONS-SCNC: 10 MMOL/L (ref 3–16)
BACTERIA THROAT AEROBE CULT: NORMAL
BACTERIA UR CULT: ABNORMAL
BILIRUB SERPL-MCNC: 0.6 MG/DL (ref 0–1)
BLOOD BANK DISPENSE STATUS: NORMAL
BLOOD BANK PRODUCT CODE: NORMAL
BPU ID: NORMAL
BUN SERPL-MCNC: 13 MG/DL (ref 7–20)
CALCIUM SERPL-MCNC: 8.9 MG/DL (ref 8.3–10.6)
CHLORIDE SERPL-SCNC: 107 MMOL/L (ref 99–110)
CO2 SERPL-SCNC: 22 MMOL/L (ref 21–32)
CREAT SERPL-MCNC: 0.8 MG/DL (ref 0.8–1.3)
DEPRECATED RDW RBC AUTO: 15.6 % (ref 12.4–15.4)
DESCRIPTION BLOOD BANK: NORMAL
GFR SERPLBLD CREATININE-BSD FMLA CKD-EPI: >90 ML/MIN/{1.73_M2}
GLUCOSE SERPL-MCNC: 104 MG/DL (ref 70–99)
HCT VFR BLD AUTO: 20.2 % (ref 40.5–52.5)
HGB BLD-MCNC: 7.2 G/DL (ref 13.5–17.5)
MAGNESIUM SERPL-MCNC: 1.69 MG/DL (ref 1.8–2.4)
MCH RBC QN AUTO: 32.1 PG (ref 26–34)
MCHC RBC AUTO-ENTMCNC: 35.7 G/DL (ref 31–36)
MCV RBC AUTO: 90 FL (ref 80–100)
ORGANISM: ABNORMAL
PHOSPHATE SERPL-MCNC: 4.1 MG/DL (ref 2.5–4.9)
PLATELET # BLD AUTO: 25 K/UL (ref 135–450)
PMV BLD AUTO: 7.8 FL (ref 5–10.5)
POTASSIUM SERPL-SCNC: 3.9 MMOL/L (ref 3.5–5.1)
RBC # BLD AUTO: 2.24 M/UL (ref 4.2–5.9)
SODIUM SERPL-SCNC: 139 MMOL/L (ref 136–145)
WBC # BLD AUTO: 0 K/UL (ref 4–11)

## 2025-04-01 PROCEDURE — 84100 ASSAY OF PHOSPHORUS: CPT

## 2025-04-01 PROCEDURE — 6370000000 HC RX 637 (ALT 250 FOR IP): Performed by: STUDENT IN AN ORGANIZED HEALTH CARE EDUCATION/TRAINING PROGRAM

## 2025-04-01 PROCEDURE — 6360000002 HC RX W HCPCS: Performed by: NURSE PRACTITIONER

## 2025-04-01 PROCEDURE — 6370000000 HC RX 637 (ALT 250 FOR IP): Performed by: INTERNAL MEDICINE

## 2025-04-01 PROCEDURE — 6370000000 HC RX 637 (ALT 250 FOR IP)

## 2025-04-01 PROCEDURE — 82247 BILIRUBIN TOTAL: CPT

## 2025-04-01 PROCEDURE — 36430 TRANSFUSION BLD/BLD COMPNT: CPT

## 2025-04-01 PROCEDURE — 2580000003 HC RX 258

## 2025-04-01 PROCEDURE — 2580000003 HC RX 258: Performed by: NURSE PRACTITIONER

## 2025-04-01 PROCEDURE — 36592 COLLECT BLOOD FROM PICC: CPT

## 2025-04-01 PROCEDURE — 85027 COMPLETE CBC AUTOMATED: CPT

## 2025-04-01 PROCEDURE — 6360000002 HC RX W HCPCS

## 2025-04-01 PROCEDURE — 2060000000 HC ICU INTERMEDIATE R&B

## 2025-04-01 PROCEDURE — 99232 SBSQ HOSP IP/OBS MODERATE 35: CPT | Performed by: SURGERY

## 2025-04-01 PROCEDURE — 2500000003 HC RX 250 WO HCPCS

## 2025-04-01 PROCEDURE — 6360000002 HC RX W HCPCS: Performed by: INTERNAL MEDICINE

## 2025-04-01 PROCEDURE — 80048 BASIC METABOLIC PNL TOTAL CA: CPT

## 2025-04-01 PROCEDURE — 83735 ASSAY OF MAGNESIUM: CPT

## 2025-04-01 RX ORDER — FUROSEMIDE 10 MG/ML
40 INJECTION INTRAMUSCULAR; INTRAVENOUS ONCE
Status: COMPLETED | OUTPATIENT
Start: 2025-04-01 | End: 2025-04-01

## 2025-04-01 RX ADMIN — FLUCONAZOLE 400 MG: 200 TABLET ORAL at 08:41

## 2025-04-01 RX ADMIN — VALACYCLOVIR HYDROCHLORIDE 500 MG: 500 TABLET, FILM COATED ORAL at 08:41

## 2025-04-01 RX ADMIN — NYSTATIN 500000 UNITS: 100000 SUSPENSION ORAL at 17:54

## 2025-04-01 RX ADMIN — ALLOPURINOL 150 MG: 300 TABLET ORAL at 08:41

## 2025-04-01 RX ADMIN — CEFEPIME 2000 MG: 2 INJECTION, POWDER, FOR SOLUTION INTRAVENOUS at 18:52

## 2025-04-01 RX ADMIN — NYSTATIN 500000 UNITS: 100000 SUSPENSION ORAL at 20:33

## 2025-04-01 RX ADMIN — TACROLIMUS 2.5 MG: 1 CAPSULE ORAL at 08:44

## 2025-04-01 RX ADMIN — URSODIOL 500 MG: 250 TABLET ORAL at 08:41

## 2025-04-01 RX ADMIN — PIPERACILLIN AND TAZOBACTAM 4500 MG: 4; .5 INJECTION, POWDER, LYOPHILIZED, FOR SOLUTION INTRAVENOUS at 08:46

## 2025-04-01 RX ADMIN — NYSTATIN 500000 UNITS: 100000 SUSPENSION ORAL at 08:41

## 2025-04-01 RX ADMIN — PIPERACILLIN AND TAZOBACTAM 4500 MG: 4; .5 INJECTION, POWDER, LYOPHILIZED, FOR SOLUTION INTRAVENOUS at 01:46

## 2025-04-01 RX ADMIN — SODIUM CHLORIDE, PRESERVATIVE FREE 10 ML: 5 INJECTION INTRAVENOUS at 08:46

## 2025-04-01 RX ADMIN — CEFEPIME 2000 MG: 2 INJECTION, POWDER, FOR SOLUTION INTRAVENOUS at 12:35

## 2025-04-01 RX ADMIN — NYSTATIN 500000 UNITS: 100000 SUSPENSION ORAL at 12:38

## 2025-04-01 RX ADMIN — FILGRASTIM-AAFI 300 MCG: 300 INJECTION, SOLUTION SUBCUTANEOUS at 17:54

## 2025-04-01 RX ADMIN — URSODIOL 500 MG: 250 TABLET ORAL at 20:33

## 2025-04-01 RX ADMIN — TACROLIMUS 2.5 MG: 1 CAPSULE ORAL at 20:32

## 2025-04-01 RX ADMIN — POTASSIUM CHLORIDE: 2 INJECTION, SOLUTION, CONCENTRATE INTRAVENOUS at 04:09

## 2025-04-01 RX ADMIN — SODIUM CHLORIDE, PRESERVATIVE FREE 10 ML: 5 INJECTION INTRAVENOUS at 20:33

## 2025-04-01 RX ADMIN — FUROSEMIDE 40 MG: 10 INJECTION, SOLUTION INTRAMUSCULAR; INTRAVENOUS at 11:07

## 2025-04-01 RX ADMIN — OXYCODONE 10 MG: 5 TABLET ORAL at 02:32

## 2025-04-01 RX ADMIN — VALACYCLOVIR HYDROCHLORIDE 500 MG: 500 TABLET, FILM COATED ORAL at 20:33

## 2025-04-01 RX ADMIN — MYCOPHENOLATE MOFETIL 1000 MG: 500 TABLET, FILM COATED ORAL at 08:41

## 2025-04-01 RX ADMIN — MYCOPHENOLATE MOFETIL 1000 MG: 500 TABLET, FILM COATED ORAL at 20:33

## 2025-04-01 RX ADMIN — POTASSIUM CHLORIDE: 2 INJECTION, SOLUTION, CONCENTRATE INTRAVENOUS at 21:26

## 2025-04-01 RX ADMIN — PANTOPRAZOLE SODIUM 40 MG: 40 TABLET, DELAYED RELEASE ORAL at 06:28

## 2025-04-01 RX ADMIN — MYCOPHENOLATE MOFETIL 1000 MG: 500 TABLET, FILM COATED ORAL at 15:25

## 2025-04-01 ASSESSMENT — PAIN DESCRIPTION - LOCATION
LOCATION: BACK
LOCATION: BACK

## 2025-04-01 ASSESSMENT — PAIN DESCRIPTION - ORIENTATION
ORIENTATION: LOWER
ORIENTATION: LOWER

## 2025-04-01 ASSESSMENT — PAIN SCALES - GENERAL
PAINLEVEL_OUTOF10: 5
PAINLEVEL_OUTOF10: 0
PAINLEVEL_OUTOF10: 0
PAINLEVEL_OUTOF10: 5
PAINLEVEL_OUTOF10: 0
PAINLEVEL_OUTOF10: 0
PAINLEVEL_OUTOF10: 10
PAINLEVEL_OUTOF10: 0

## 2025-04-01 ASSESSMENT — PAIN DESCRIPTION - ONSET
ONSET: ON-GOING
ONSET: AWAKENED FROM SLEEP

## 2025-04-01 ASSESSMENT — PAIN DESCRIPTION - PAIN TYPE
TYPE: ACUTE PAIN
TYPE: ACUTE PAIN

## 2025-04-01 ASSESSMENT — PAIN DESCRIPTION - FREQUENCY
FREQUENCY: INTERMITTENT
FREQUENCY: INTERMITTENT

## 2025-04-01 ASSESSMENT — PAIN DESCRIPTION - DESCRIPTORS: DESCRIPTORS: ACHING

## 2025-04-02 ENCOUNTER — APPOINTMENT (OUTPATIENT)
Dept: ULTRASOUND IMAGING | Age: 75
DRG: 014 | End: 2025-04-02
Attending: STUDENT IN AN ORGANIZED HEALTH CARE EDUCATION/TRAINING PROGRAM
Payer: COMMERCIAL

## 2025-04-02 LAB
ABO/RH: NORMAL
ALBUMIN SERPL-MCNC: 3.4 G/DL (ref 3.4–5)
ALP SERPL-CCNC: 93 U/L (ref 40–129)
ALT SERPL-CCNC: 9 U/L (ref 10–40)
ANION GAP SERPL CALCULATED.3IONS-SCNC: 7 MMOL/L (ref 3–16)
ANTIBODY SCREEN: NORMAL
AST SERPL-CCNC: 9 U/L (ref 15–37)
BILIRUB DIRECT SERPL-MCNC: 0.2 MG/DL (ref 0–0.3)
BILIRUB INDIRECT SERPL-MCNC: 0.3 MG/DL (ref 0–1)
BILIRUB SERPL-MCNC: 0.5 MG/DL (ref 0–1)
BLOOD BANK DISPENSE STATUS: NORMAL
BLOOD BANK PRODUCT CODE: NORMAL
BPU ID: NORMAL
BUN SERPL-MCNC: 16 MG/DL (ref 7–20)
CALCIUM SERPL-MCNC: 8.5 MG/DL (ref 8.3–10.6)
CHLORIDE SERPL-SCNC: 109 MMOL/L (ref 99–110)
CO2 SERPL-SCNC: 22 MMOL/L (ref 21–32)
CREAT SERPL-MCNC: 0.7 MG/DL (ref 0.8–1.3)
DEPRECATED RDW RBC AUTO: 15.4 % (ref 12.4–15.4)
DESCRIPTION BLOOD BANK: NORMAL
FINAL REPORT: NORMAL
GFR SERPLBLD CREATININE-BSD FMLA CKD-EPI: >90 ML/MIN/{1.73_M2}
GLUCOSE SERPL-MCNC: 121 MG/DL (ref 70–99)
HCT VFR BLD AUTO: 18.3 % (ref 40.5–52.5)
HGB BLD-MCNC: 6.5 G/DL (ref 13.5–17.5)
LDH SERPL L TO P-CCNC: 121 U/L (ref 100–190)
MAGNESIUM SERPL-MCNC: 1.64 MG/DL (ref 1.8–2.4)
MCH RBC QN AUTO: 31.9 PG (ref 26–34)
MCHC RBC AUTO-ENTMCNC: 35.6 G/DL (ref 31–36)
MCV RBC AUTO: 89.5 FL (ref 80–100)
PHOSPHATE SERPL-MCNC: 3.7 MG/DL (ref 2.5–4.9)
PHOSPHATE SERPL-MCNC: 3.9 MG/DL (ref 2.5–4.9)
PLATELET # BLD AUTO: 26 K/UL (ref 135–450)
PMV BLD AUTO: 7.2 FL (ref 5–10.5)
POTASSIUM SERPL-SCNC: 4.4 MMOL/L (ref 3.5–5.1)
PRELIMINARY: NORMAL
PROT SERPL-MCNC: 5.4 G/DL (ref 6.4–8.2)
RBC # BLD AUTO: 2.05 M/UL (ref 4.2–5.9)
SODIUM SERPL-SCNC: 138 MMOL/L (ref 136–145)
TACROLIMUS BLOOD: 7.5 NG/ML (ref 5–20)
URATE SERPL-MCNC: 2.1 MG/DL (ref 3.5–7.2)
WBC # BLD AUTO: 0 K/UL (ref 4–11)

## 2025-04-02 PROCEDURE — 2500000003 HC RX 250 WO HCPCS

## 2025-04-02 PROCEDURE — 80197 ASSAY OF TACROLIMUS: CPT

## 2025-04-02 PROCEDURE — 86923 COMPATIBILITY TEST ELECTRIC: CPT

## 2025-04-02 PROCEDURE — 6360000002 HC RX W HCPCS: Performed by: INTERNAL MEDICINE

## 2025-04-02 PROCEDURE — 51798 US URINE CAPACITY MEASURE: CPT

## 2025-04-02 PROCEDURE — 2580000003 HC RX 258

## 2025-04-02 PROCEDURE — 84100 ASSAY OF PHOSPHORUS: CPT

## 2025-04-02 PROCEDURE — 6360000002 HC RX W HCPCS: Performed by: NURSE PRACTITIONER

## 2025-04-02 PROCEDURE — 6370000000 HC RX 637 (ALT 250 FOR IP)

## 2025-04-02 PROCEDURE — 85027 COMPLETE CBC AUTOMATED: CPT

## 2025-04-02 PROCEDURE — 99232 SBSQ HOSP IP/OBS MODERATE 35: CPT | Performed by: SURGERY

## 2025-04-02 PROCEDURE — P9036 PLATELET PHERESIS IRRADIATED: HCPCS

## 2025-04-02 PROCEDURE — 2060000000 HC ICU INTERMEDIATE R&B

## 2025-04-02 PROCEDURE — 36592 COLLECT BLOOD FROM PICC: CPT

## 2025-04-02 PROCEDURE — 86900 BLOOD TYPING SEROLOGIC ABO: CPT

## 2025-04-02 PROCEDURE — 84550 ASSAY OF BLOOD/URIC ACID: CPT

## 2025-04-02 PROCEDURE — 6370000000 HC RX 637 (ALT 250 FOR IP): Performed by: INTERNAL MEDICINE

## 2025-04-02 PROCEDURE — 86901 BLOOD TYPING SEROLOGIC RH(D): CPT

## 2025-04-02 PROCEDURE — P9040 RBC LEUKOREDUCED IRRADIATED: HCPCS

## 2025-04-02 PROCEDURE — 83615 LACTATE (LD) (LDH) ENZYME: CPT

## 2025-04-02 PROCEDURE — 6370000000 HC RX 637 (ALT 250 FOR IP): Performed by: STUDENT IN AN ORGANIZED HEALTH CARE EDUCATION/TRAINING PROGRAM

## 2025-04-02 PROCEDURE — 80076 HEPATIC FUNCTION PANEL: CPT

## 2025-04-02 PROCEDURE — 2580000003 HC RX 258: Performed by: NURSE PRACTITIONER

## 2025-04-02 PROCEDURE — 80069 RENAL FUNCTION PANEL: CPT

## 2025-04-02 PROCEDURE — 83735 ASSAY OF MAGNESIUM: CPT

## 2025-04-02 PROCEDURE — 6360000002 HC RX W HCPCS

## 2025-04-02 PROCEDURE — 86850 RBC ANTIBODY SCREEN: CPT

## 2025-04-02 PROCEDURE — 36430 TRANSFUSION BLD/BLD COMPNT: CPT

## 2025-04-02 RX ORDER — FUROSEMIDE 10 MG/ML
40 INJECTION INTRAMUSCULAR; INTRAVENOUS 2 TIMES DAILY
Status: DISCONTINUED | OUTPATIENT
Start: 2025-04-02 | End: 2025-04-08

## 2025-04-02 RX ADMIN — NYSTATIN 500000 UNITS: 100000 SUSPENSION ORAL at 08:10

## 2025-04-02 RX ADMIN — TACROLIMUS 2.5 MG: 1 CAPSULE ORAL at 08:09

## 2025-04-02 RX ADMIN — SODIUM CHLORIDE, PRESERVATIVE FREE 10 ML: 5 INJECTION INTRAVENOUS at 20:23

## 2025-04-02 RX ADMIN — TACROLIMUS 2.5 MG: 1 CAPSULE ORAL at 20:59

## 2025-04-02 RX ADMIN — VALACYCLOVIR HYDROCHLORIDE 500 MG: 500 TABLET, FILM COATED ORAL at 08:10

## 2025-04-02 RX ADMIN — FLUCONAZOLE 400 MG: 200 TABLET ORAL at 08:11

## 2025-04-02 RX ADMIN — URSODIOL 500 MG: 250 TABLET ORAL at 20:59

## 2025-04-02 RX ADMIN — CEFEPIME 2000 MG: 2 INJECTION, POWDER, FOR SOLUTION INTRAVENOUS at 12:03

## 2025-04-02 RX ADMIN — FUROSEMIDE 40 MG: 10 INJECTION, SOLUTION INTRAMUSCULAR; INTRAVENOUS at 18:04

## 2025-04-02 RX ADMIN — OXYCODONE 10 MG: 5 TABLET ORAL at 05:51

## 2025-04-02 RX ADMIN — SODIUM CHLORIDE, PRESERVATIVE FREE 10 ML: 5 INJECTION INTRAVENOUS at 08:11

## 2025-04-02 RX ADMIN — CEFEPIME 2000 MG: 2 INJECTION, POWDER, FOR SOLUTION INTRAVENOUS at 18:38

## 2025-04-02 RX ADMIN — POTASSIUM CHLORIDE: 2 INJECTION, SOLUTION, CONCENTRATE INTRAVENOUS at 18:36

## 2025-04-02 RX ADMIN — PANTOPRAZOLE SODIUM 40 MG: 40 TABLET, DELAYED RELEASE ORAL at 05:52

## 2025-04-02 RX ADMIN — MYCOPHENOLATE MOFETIL 1000 MG: 500 TABLET, FILM COATED ORAL at 08:10

## 2025-04-02 RX ADMIN — FUROSEMIDE 40 MG: 10 INJECTION, SOLUTION INTRAMUSCULAR; INTRAVENOUS at 11:57

## 2025-04-02 RX ADMIN — SODIUM CHLORIDE 15 ML: 900 IRRIGANT IRRIGATION at 20:23

## 2025-04-02 RX ADMIN — OXYCODONE 10 MG: 5 TABLET ORAL at 01:09

## 2025-04-02 RX ADMIN — VALACYCLOVIR HYDROCHLORIDE 500 MG: 500 TABLET, FILM COATED ORAL at 20:59

## 2025-04-02 RX ADMIN — URSODIOL 500 MG: 250 TABLET ORAL at 08:09

## 2025-04-02 RX ADMIN — NYSTATIN 500000 UNITS: 100000 SUSPENSION ORAL at 21:00

## 2025-04-02 RX ADMIN — DICYCLOMINE HYDROCHLORIDE 10 MG: 10 CAPSULE ORAL at 18:05

## 2025-04-02 RX ADMIN — CEFEPIME 2000 MG: 2 INJECTION, POWDER, FOR SOLUTION INTRAVENOUS at 03:58

## 2025-04-02 RX ADMIN — MYCOPHENOLATE MOFETIL 1000 MG: 500 TABLET, FILM COATED ORAL at 20:59

## 2025-04-02 RX ADMIN — MYCOPHENOLATE MOFETIL 1000 MG: 500 TABLET, FILM COATED ORAL at 15:01

## 2025-04-02 RX ADMIN — FILGRASTIM-AAFI 300 MCG: 300 INJECTION, SOLUTION SUBCUTANEOUS at 18:05

## 2025-04-02 RX ADMIN — NYSTATIN 500000 UNITS: 100000 SUSPENSION ORAL at 12:12

## 2025-04-02 RX ADMIN — ALLOPURINOL 150 MG: 300 TABLET ORAL at 08:10

## 2025-04-02 RX ADMIN — NYSTATIN 500000 UNITS: 100000 SUSPENSION ORAL at 18:05

## 2025-04-02 ASSESSMENT — PAIN DESCRIPTION - PAIN TYPE
TYPE: ACUTE PAIN
TYPE: ACUTE PAIN

## 2025-04-02 ASSESSMENT — PAIN SCALES - GENERAL
PAINLEVEL_OUTOF10: 4
PAINLEVEL_OUTOF10: 0
PAINLEVEL_OUTOF10: 4
PAINLEVEL_OUTOF10: 6
PAINLEVEL_OUTOF10: 10
PAINLEVEL_OUTOF10: 0
PAINLEVEL_OUTOF10: 0
PAINLEVEL_OUTOF10: 5

## 2025-04-02 ASSESSMENT — PAIN - FUNCTIONAL ASSESSMENT
PAIN_FUNCTIONAL_ASSESSMENT: ACTIVITIES ARE NOT PREVENTED

## 2025-04-02 ASSESSMENT — PAIN DESCRIPTION - LOCATION
LOCATION: BACK

## 2025-04-02 ASSESSMENT — PAIN DESCRIPTION - DESCRIPTORS
DESCRIPTORS: ACHING;DISCOMFORT
DESCRIPTORS: DISCOMFORT;ACHING
DESCRIPTORS: ACHING

## 2025-04-02 ASSESSMENT — PAIN DESCRIPTION - ORIENTATION
ORIENTATION: LOWER
ORIENTATION: POSTERIOR;LOWER
ORIENTATION: LOWER

## 2025-04-02 ASSESSMENT — PAIN DESCRIPTION - FREQUENCY
FREQUENCY: INTERMITTENT
FREQUENCY: INTERMITTENT

## 2025-04-02 ASSESSMENT — PAIN DESCRIPTION - ONSET
ONSET: ON-GOING
ONSET: AWAKENED FROM SLEEP

## 2025-04-03 ENCOUNTER — APPOINTMENT (OUTPATIENT)
Dept: ULTRASOUND IMAGING | Age: 75
DRG: 014 | End: 2025-04-03
Attending: STUDENT IN AN ORGANIZED HEALTH CARE EDUCATION/TRAINING PROGRAM
Payer: COMMERCIAL

## 2025-04-03 LAB
ALBUMIN SERPL-MCNC: 3.5 G/DL (ref 3.4–5)
ANION GAP SERPL CALCULATED.3IONS-SCNC: 12 MMOL/L (ref 3–16)
APTT BLD: 34.4 SEC (ref 22.1–36.4)
BACTERIA BLD CULT ORG #2: NORMAL
BACTERIA BLD CULT: NORMAL
BILIRUB SERPL-MCNC: 0.5 MG/DL (ref 0–1)
BUN SERPL-MCNC: 16 MG/DL (ref 7–20)
CALCIUM SERPL-MCNC: 8.8 MG/DL (ref 8.3–10.6)
CHLORIDE SERPL-SCNC: 106 MMOL/L (ref 99–110)
CO2 SERPL-SCNC: 21 MMOL/L (ref 21–32)
CREAT SERPL-MCNC: 0.8 MG/DL (ref 0.8–1.3)
DEPRECATED RDW RBC AUTO: 15.8 % (ref 12.4–15.4)
GFR SERPLBLD CREATININE-BSD FMLA CKD-EPI: >90 ML/MIN/{1.73_M2}
GLUCOSE SERPL-MCNC: 93 MG/DL (ref 70–99)
HCT VFR BLD AUTO: 19.7 % (ref 40.5–52.5)
HGB BLD-MCNC: 6.9 G/DL (ref 13.5–17.5)
INR PPP: 1.33 (ref 0.85–1.15)
MAGNESIUM SERPL-MCNC: 1.54 MG/DL (ref 1.8–2.4)
MCH RBC QN AUTO: 31.7 PG (ref 26–34)
MCHC RBC AUTO-ENTMCNC: 35 G/DL (ref 31–36)
MCV RBC AUTO: 90.6 FL (ref 80–100)
PHOSPHATE SERPL-MCNC: 4 MG/DL (ref 2.5–4.9)
PHOSPHATE SERPL-MCNC: 4 MG/DL (ref 2.5–4.9)
PLATELET # BLD AUTO: 36 K/UL (ref 135–450)
PMV BLD AUTO: 7.7 FL (ref 5–10.5)
POTASSIUM SERPL-SCNC: 3.8 MMOL/L (ref 3.5–5.1)
PROTHROMBIN TIME: 16.6 SEC (ref 11.9–14.9)
RBC # BLD AUTO: 2.17 M/UL (ref 4.2–5.9)
SODIUM SERPL-SCNC: 139 MMOL/L (ref 136–145)
WBC # BLD AUTO: 0.1 K/UL (ref 4–11)

## 2025-04-03 PROCEDURE — 76870 US EXAM SCROTUM: CPT

## 2025-04-03 PROCEDURE — 84100 ASSAY OF PHOSPHORUS: CPT

## 2025-04-03 PROCEDURE — 85610 PROTHROMBIN TIME: CPT

## 2025-04-03 PROCEDURE — 82247 BILIRUBIN TOTAL: CPT

## 2025-04-03 PROCEDURE — 6370000000 HC RX 637 (ALT 250 FOR IP): Performed by: INTERNAL MEDICINE

## 2025-04-03 PROCEDURE — 2580000003 HC RX 258: Performed by: NURSE PRACTITIONER

## 2025-04-03 PROCEDURE — 85027 COMPLETE CBC AUTOMATED: CPT

## 2025-04-03 PROCEDURE — 6360000002 HC RX W HCPCS: Performed by: INTERNAL MEDICINE

## 2025-04-03 PROCEDURE — 93975 VASCULAR STUDY: CPT

## 2025-04-03 PROCEDURE — 6360000002 HC RX W HCPCS: Performed by: NURSE PRACTITIONER

## 2025-04-03 PROCEDURE — 6370000000 HC RX 637 (ALT 250 FOR IP): Performed by: STUDENT IN AN ORGANIZED HEALTH CARE EDUCATION/TRAINING PROGRAM

## 2025-04-03 PROCEDURE — 83735 ASSAY OF MAGNESIUM: CPT

## 2025-04-03 PROCEDURE — 2580000003 HC RX 258: Performed by: INTERNAL MEDICINE

## 2025-04-03 PROCEDURE — 2060000000 HC ICU INTERMEDIATE R&B

## 2025-04-03 PROCEDURE — 6370000000 HC RX 637 (ALT 250 FOR IP)

## 2025-04-03 PROCEDURE — 36430 TRANSFUSION BLD/BLD COMPNT: CPT

## 2025-04-03 PROCEDURE — 2580000003 HC RX 258

## 2025-04-03 PROCEDURE — 80069 RENAL FUNCTION PANEL: CPT

## 2025-04-03 PROCEDURE — 36592 COLLECT BLOOD FROM PICC: CPT

## 2025-04-03 PROCEDURE — 85730 THROMBOPLASTIN TIME PARTIAL: CPT

## 2025-04-03 PROCEDURE — 6360000002 HC RX W HCPCS

## 2025-04-03 PROCEDURE — 99231 SBSQ HOSP IP/OBS SF/LOW 25: CPT | Performed by: SURGERY

## 2025-04-03 PROCEDURE — 2500000003 HC RX 250 WO HCPCS

## 2025-04-03 RX ORDER — SODIUM CHLORIDE 9 MG/ML
INJECTION, SOLUTION INTRAVENOUS PRN
Status: DISCONTINUED | OUTPATIENT
Start: 2025-04-03 | End: 2025-04-07

## 2025-04-03 RX ORDER — MICONAZOLE NITRATE 20 MG/G
CREAM TOPICAL 2 TIMES DAILY
Status: DISCONTINUED | OUTPATIENT
Start: 2025-04-03 | End: 2025-04-10 | Stop reason: HOSPADM

## 2025-04-03 RX ADMIN — POTASSIUM CHLORIDE: 2 INJECTION, SOLUTION, CONCENTRATE INTRAVENOUS at 02:56

## 2025-04-03 RX ADMIN — ALLOPURINOL 150 MG: 300 TABLET ORAL at 08:42

## 2025-04-03 RX ADMIN — SODIUM CHLORIDE: 0.9 INJECTION, SOLUTION INTRAVENOUS at 02:58

## 2025-04-03 RX ADMIN — FUROSEMIDE 40 MG: 10 INJECTION, SOLUTION INTRAMUSCULAR; INTRAVENOUS at 08:46

## 2025-04-03 RX ADMIN — TACROLIMUS 2.5 MG: 1 CAPSULE ORAL at 08:41

## 2025-04-03 RX ADMIN — MYCOPHENOLATE MOFETIL 1000 MG: 500 TABLET, FILM COATED ORAL at 08:42

## 2025-04-03 RX ADMIN — CEFEPIME 2000 MG: 2 INJECTION, POWDER, FOR SOLUTION INTRAVENOUS at 19:00

## 2025-04-03 RX ADMIN — NYSTATIN 500000 UNITS: 100000 SUSPENSION ORAL at 15:39

## 2025-04-03 RX ADMIN — VALACYCLOVIR HYDROCHLORIDE 500 MG: 500 TABLET, FILM COATED ORAL at 20:26

## 2025-04-03 RX ADMIN — VANCOMYCIN HYDROCHLORIDE 1750 MG: 10 INJECTION, POWDER, LYOPHILIZED, FOR SOLUTION INTRAVENOUS at 11:51

## 2025-04-03 RX ADMIN — SODIUM CHLORIDE 15 ML: 900 IRRIGANT IRRIGATION at 19:32

## 2025-04-03 RX ADMIN — OXYCODONE 10 MG: 5 TABLET ORAL at 15:46

## 2025-04-03 RX ADMIN — VALACYCLOVIR HYDROCHLORIDE 500 MG: 500 TABLET, FILM COATED ORAL at 08:42

## 2025-04-03 RX ADMIN — NYSTATIN 500000 UNITS: 100000 SUSPENSION ORAL at 11:43

## 2025-04-03 RX ADMIN — CEFEPIME 2000 MG: 2 INJECTION, POWDER, FOR SOLUTION INTRAVENOUS at 03:08

## 2025-04-03 RX ADMIN — URSODIOL 500 MG: 250 TABLET ORAL at 08:42

## 2025-04-03 RX ADMIN — NYSTATIN 500000 UNITS: 100000 SUSPENSION ORAL at 08:41

## 2025-04-03 RX ADMIN — DICYCLOMINE HYDROCHLORIDE 10 MG: 10 CAPSULE ORAL at 15:46

## 2025-04-03 RX ADMIN — FILGRASTIM-AAFI 300 MCG: 300 INJECTION, SOLUTION SUBCUTANEOUS at 18:42

## 2025-04-03 RX ADMIN — MYCOPHENOLATE MOFETIL 1000 MG: 500 TABLET, FILM COATED ORAL at 20:26

## 2025-04-03 RX ADMIN — OXYCODONE 10 MG: 5 TABLET ORAL at 02:32

## 2025-04-03 RX ADMIN — SODIUM CHLORIDE, PRESERVATIVE FREE 10 ML: 5 INJECTION INTRAVENOUS at 19:33

## 2025-04-03 RX ADMIN — MICONAZOLE NITRATE: 20 CREAM TOPICAL at 20:30

## 2025-04-03 RX ADMIN — POTASSIUM CHLORIDE: 2 INJECTION, SOLUTION, CONCENTRATE INTRAVENOUS at 21:42

## 2025-04-03 RX ADMIN — MYCOPHENOLATE MOFETIL 1000 MG: 500 TABLET, FILM COATED ORAL at 15:38

## 2025-04-03 RX ADMIN — TACROLIMUS 2.5 MG: 1 CAPSULE ORAL at 20:26

## 2025-04-03 RX ADMIN — URSODIOL 500 MG: 250 TABLET ORAL at 20:26

## 2025-04-03 RX ADMIN — SODIUM CHLORIDE, PRESERVATIVE FREE 10 ML: 5 INJECTION INTRAVENOUS at 11:52

## 2025-04-03 RX ADMIN — FUROSEMIDE 40 MG: 10 INJECTION, SOLUTION INTRAMUSCULAR; INTRAVENOUS at 18:41

## 2025-04-03 RX ADMIN — CEFEPIME 2000 MG: 2 INJECTION, POWDER, FOR SOLUTION INTRAVENOUS at 11:40

## 2025-04-03 RX ADMIN — MICONAZOLE NITRATE: 20 CREAM TOPICAL at 11:42

## 2025-04-03 RX ADMIN — NYSTATIN 500000 UNITS: 100000 SUSPENSION ORAL at 20:26

## 2025-04-03 RX ADMIN — PANTOPRAZOLE SODIUM 40 MG: 40 TABLET, DELAYED RELEASE ORAL at 05:45

## 2025-04-03 RX ADMIN — HYDRALAZINE HYDROCHLORIDE 10 MG: 20 INJECTION INTRAMUSCULAR; INTRAVENOUS at 03:08

## 2025-04-03 RX ADMIN — FLUCONAZOLE 400 MG: 200 TABLET ORAL at 08:42

## 2025-04-03 RX ADMIN — PROCHLORPERAZINE MALEATE 5 MG: 10 TABLET ORAL at 20:27

## 2025-04-03 ASSESSMENT — PAIN - FUNCTIONAL ASSESSMENT
PAIN_FUNCTIONAL_ASSESSMENT: ACTIVITIES ARE NOT PREVENTED

## 2025-04-03 ASSESSMENT — PAIN DESCRIPTION - DESCRIPTORS
DESCRIPTORS: ACHING
DESCRIPTORS: CRAMPING
DESCRIPTORS: ACHING

## 2025-04-03 ASSESSMENT — PAIN DESCRIPTION - LOCATION
LOCATION: BACK
LOCATION: BACK
LOCATION: ABDOMEN

## 2025-04-03 ASSESSMENT — PAIN DESCRIPTION - ORIENTATION
ORIENTATION: LOWER
ORIENTATION: LOWER
ORIENTATION: MID

## 2025-04-03 ASSESSMENT — PAIN SCALES - GENERAL
PAINLEVEL_OUTOF10: 7
PAINLEVEL_OUTOF10: 0
PAINLEVEL_OUTOF10: 5

## 2025-04-03 ASSESSMENT — PAIN DESCRIPTION - PAIN TYPE: TYPE: ACUTE PAIN
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
## 2025-04-04 LAB
ALBUMIN SERPL-MCNC: 3.2 G/DL (ref 3.4–5)
ALP SERPL-CCNC: 85 U/L (ref 40–129)
ALT SERPL-CCNC: 7 U/L (ref 10–40)
ANION GAP SERPL CALCULATED.3IONS-SCNC: 9 MMOL/L (ref 3–16)
AST SERPL-CCNC: 9 U/L (ref 15–37)
BILIRUB DIRECT SERPL-MCNC: <0.1 MG/DL (ref 0–0.3)
BILIRUB INDIRECT SERPL-MCNC: 0.2 MG/DL (ref 0–1)
BILIRUB SERPL-MCNC: 0.3 MG/DL (ref 0–1)
BLOOD BANK DISPENSE STATUS: NORMAL
BLOOD BANK PRODUCT CODE: NORMAL
BPU ID: NORMAL
BUN SERPL-MCNC: 19 MG/DL (ref 7–20)
CALCIUM SERPL-MCNC: 8.4 MG/DL (ref 8.3–10.6)
CHLORIDE SERPL-SCNC: 108 MMOL/L (ref 99–110)
CO2 SERPL-SCNC: 21 MMOL/L (ref 21–32)
CREAT SERPL-MCNC: 0.9 MG/DL (ref 0.8–1.3)
DEPRECATED RDW RBC AUTO: 16.1 % (ref 12.4–15.4)
DESCRIPTION BLOOD BANK: NORMAL
GFR SERPLBLD CREATININE-BSD FMLA CKD-EPI: 89 ML/MIN/{1.73_M2}
GLUCOSE SERPL-MCNC: 92 MG/DL (ref 70–99)
HCT VFR BLD AUTO: 19.1 % (ref 40.5–52.5)
HGB BLD-MCNC: 6.7 G/DL (ref 13.5–17.5)
LDH SERPL L TO P-CCNC: 129 U/L (ref 100–190)
MAGNESIUM SERPL-MCNC: 1.49 MG/DL (ref 1.8–2.4)
MCH RBC QN AUTO: 31.3 PG (ref 26–34)
MCHC RBC AUTO-ENTMCNC: 35.1 G/DL (ref 31–36)
MCV RBC AUTO: 89.1 FL (ref 80–100)
PHOSPHATE SERPL-MCNC: 4.1 MG/DL (ref 2.5–4.9)
PLATELET # BLD AUTO: 24 K/UL (ref 135–450)
PMV BLD AUTO: 8.3 FL (ref 5–10.5)
POTASSIUM SERPL-SCNC: 3.9 MMOL/L (ref 3.5–5.1)
PROT SERPL-MCNC: 5 G/DL (ref 6.4–8.2)
RBC # BLD AUTO: 2.15 M/UL (ref 4.2–5.9)
SODIUM SERPL-SCNC: 138 MMOL/L (ref 136–145)
TACROLIMUS BLOOD: 9.5 NG/ML (ref 5–20)
URATE SERPL-MCNC: 2.4 MG/DL (ref 3.5–7.2)
WBC # BLD AUTO: 0.3 K/UL (ref 4–11)

## 2025-04-04 PROCEDURE — 2500000003 HC RX 250 WO HCPCS

## 2025-04-04 PROCEDURE — 6360000002 HC RX W HCPCS

## 2025-04-04 PROCEDURE — 2580000003 HC RX 258

## 2025-04-04 PROCEDURE — 6360000002 HC RX W HCPCS: Performed by: INTERNAL MEDICINE

## 2025-04-04 PROCEDURE — 84100 ASSAY OF PHOSPHORUS: CPT

## 2025-04-04 PROCEDURE — 6370000000 HC RX 637 (ALT 250 FOR IP): Performed by: INTERNAL MEDICINE

## 2025-04-04 PROCEDURE — 80197 ASSAY OF TACROLIMUS: CPT

## 2025-04-04 PROCEDURE — 85027 COMPLETE CBC AUTOMATED: CPT

## 2025-04-04 PROCEDURE — 83735 ASSAY OF MAGNESIUM: CPT

## 2025-04-04 PROCEDURE — 6370000000 HC RX 637 (ALT 250 FOR IP): Performed by: STUDENT IN AN ORGANIZED HEALTH CARE EDUCATION/TRAINING PROGRAM

## 2025-04-04 PROCEDURE — 99231 SBSQ HOSP IP/OBS SF/LOW 25: CPT | Performed by: SURGERY

## 2025-04-04 PROCEDURE — 2580000003 HC RX 258: Performed by: INTERNAL MEDICINE

## 2025-04-04 PROCEDURE — 6370000000 HC RX 637 (ALT 250 FOR IP)

## 2025-04-04 PROCEDURE — 83615 LACTATE (LD) (LDH) ENZYME: CPT

## 2025-04-04 PROCEDURE — 80076 HEPATIC FUNCTION PANEL: CPT

## 2025-04-04 PROCEDURE — 80048 BASIC METABOLIC PNL TOTAL CA: CPT

## 2025-04-04 PROCEDURE — 2060000000 HC ICU INTERMEDIATE R&B

## 2025-04-04 PROCEDURE — 84550 ASSAY OF BLOOD/URIC ACID: CPT

## 2025-04-04 PROCEDURE — 36430 TRANSFUSION BLD/BLD COMPNT: CPT

## 2025-04-04 RX ORDER — SODIUM CHLORIDE 9 MG/ML
INJECTION, SOLUTION INTRAVENOUS PRN
Status: DISCONTINUED | OUTPATIENT
Start: 2025-04-04 | End: 2025-04-07

## 2025-04-04 RX ADMIN — MYCOPHENOLATE MOFETIL 1000 MG: 500 TABLET, FILM COATED ORAL at 08:31

## 2025-04-04 RX ADMIN — ALLOPURINOL 150 MG: 300 TABLET ORAL at 08:30

## 2025-04-04 RX ADMIN — SODIUM CHLORIDE, PRESERVATIVE FREE 10 ML: 5 INJECTION INTRAVENOUS at 08:31

## 2025-04-04 RX ADMIN — FILGRASTIM-AAFI 300 MCG: 300 INJECTION, SOLUTION SUBCUTANEOUS at 17:27

## 2025-04-04 RX ADMIN — SODIUM CHLORIDE 15 ML: 900 IRRIGANT IRRIGATION at 09:40

## 2025-04-04 RX ADMIN — MYCOPHENOLATE MOFETIL 1000 MG: 500 TABLET, FILM COATED ORAL at 20:04

## 2025-04-04 RX ADMIN — SODIUM CHLORIDE 15 ML: 900 IRRIGANT IRRIGATION at 08:21

## 2025-04-04 RX ADMIN — MYCOPHENOLATE MOFETIL 1000 MG: 500 TABLET, FILM COATED ORAL at 15:33

## 2025-04-04 RX ADMIN — VALACYCLOVIR HYDROCHLORIDE 500 MG: 500 TABLET, FILM COATED ORAL at 20:04

## 2025-04-04 RX ADMIN — PROCHLORPERAZINE EDISYLATE 5 MG: 5 INJECTION INTRAMUSCULAR; INTRAVENOUS at 20:57

## 2025-04-04 RX ADMIN — FUROSEMIDE 40 MG: 10 INJECTION, SOLUTION INTRAMUSCULAR; INTRAVENOUS at 17:25

## 2025-04-04 RX ADMIN — VALACYCLOVIR HYDROCHLORIDE 500 MG: 500 TABLET, FILM COATED ORAL at 08:29

## 2025-04-04 RX ADMIN — NYSTATIN 500000 UNITS: 100000 SUSPENSION ORAL at 08:31

## 2025-04-04 RX ADMIN — NYSTATIN 500000 UNITS: 100000 SUSPENSION ORAL at 11:31

## 2025-04-04 RX ADMIN — TACROLIMUS 2.5 MG: 1 CAPSULE ORAL at 08:30

## 2025-04-04 RX ADMIN — MICONAZOLE NITRATE: 20 CREAM TOPICAL at 08:42

## 2025-04-04 RX ADMIN — CEFEPIME 2000 MG: 2 INJECTION, POWDER, FOR SOLUTION INTRAVENOUS at 11:27

## 2025-04-04 RX ADMIN — VANCOMYCIN HYDROCHLORIDE 1750 MG: 10 INJECTION, POWDER, LYOPHILIZED, FOR SOLUTION INTRAVENOUS at 11:31

## 2025-04-04 RX ADMIN — OXYCODONE 10 MG: 5 TABLET ORAL at 22:07

## 2025-04-04 RX ADMIN — OXYCODONE 10 MG: 5 TABLET ORAL at 00:49

## 2025-04-04 RX ADMIN — FLUCONAZOLE 400 MG: 200 TABLET ORAL at 08:31

## 2025-04-04 RX ADMIN — FUROSEMIDE 40 MG: 10 INJECTION, SOLUTION INTRAMUSCULAR; INTRAVENOUS at 08:32

## 2025-04-04 RX ADMIN — PANTOPRAZOLE SODIUM 40 MG: 40 TABLET, DELAYED RELEASE ORAL at 05:52

## 2025-04-04 RX ADMIN — TACROLIMUS 2.5 MG: 1 CAPSULE ORAL at 20:04

## 2025-04-04 RX ADMIN — SODIUM CHLORIDE 15 ML: 900 IRRIGANT IRRIGATION at 15:44

## 2025-04-04 RX ADMIN — CEFEPIME 2000 MG: 2 INJECTION, POWDER, FOR SOLUTION INTRAVENOUS at 03:43

## 2025-04-04 RX ADMIN — URSODIOL 500 MG: 250 TABLET ORAL at 20:04

## 2025-04-04 RX ADMIN — NYSTATIN 500000 UNITS: 100000 SUSPENSION ORAL at 20:06

## 2025-04-04 RX ADMIN — CEFEPIME 2000 MG: 2 INJECTION, POWDER, FOR SOLUTION INTRAVENOUS at 19:57

## 2025-04-04 RX ADMIN — NYSTATIN 500000 UNITS: 100000 SUSPENSION ORAL at 15:34

## 2025-04-04 RX ADMIN — URSODIOL 500 MG: 250 TABLET ORAL at 08:31

## 2025-04-04 RX ADMIN — OXYCODONE 10 MG: 5 TABLET ORAL at 08:29

## 2025-04-04 ASSESSMENT — PAIN SCALES - GENERAL
PAINLEVEL_OUTOF10: 5
PAINLEVEL_OUTOF10: 6
PAINLEVEL_OUTOF10: 5
PAINLEVEL_OUTOF10: 7
PAINLEVEL_OUTOF10: 7
PAINLEVEL_OUTOF10: 8
PAINLEVEL_OUTOF10: 5

## 2025-04-04 ASSESSMENT — PAIN - FUNCTIONAL ASSESSMENT
PAIN_FUNCTIONAL_ASSESSMENT: PREVENTS OR INTERFERES SOME ACTIVE ACTIVITIES AND ADLS
PAIN_FUNCTIONAL_ASSESSMENT: PREVENTS OR INTERFERES SOME ACTIVE ACTIVITIES AND ADLS
PAIN_FUNCTIONAL_ASSESSMENT: ACTIVITIES ARE NOT PREVENTED

## 2025-04-04 ASSESSMENT — PAIN DESCRIPTION - ONSET
ONSET: ON-GOING

## 2025-04-04 ASSESSMENT — PAIN DESCRIPTION - ORIENTATION
ORIENTATION: LOWER
ORIENTATION: LOWER;MID
ORIENTATION: LOWER;MID

## 2025-04-04 ASSESSMENT — PAIN DESCRIPTION - PAIN TYPE
TYPE: CHRONIC PAIN

## 2025-04-04 ASSESSMENT — PAIN DESCRIPTION - DESCRIPTORS
DESCRIPTORS: ACHING
DESCRIPTORS: ACHING;SORE
DESCRIPTORS: ACHING;DISCOMFORT
DESCRIPTORS: ACHING;DISCOMFORT
DESCRIPTORS: ACHING;SORE

## 2025-04-04 ASSESSMENT — PAIN DESCRIPTION - FREQUENCY
FREQUENCY: CONTINUOUS

## 2025-04-04 ASSESSMENT — PAIN DESCRIPTION - LOCATION
LOCATION: BACK

## 2025-04-05 LAB
ALBUMIN SERPL-MCNC: 3.3 G/DL (ref 3.4–5)
ANION GAP SERPL CALCULATED.3IONS-SCNC: 11 MMOL/L (ref 3–16)
BASOPHILS # BLD: 0 K/UL (ref 0–0.2)
BASOPHILS NFR BLD: 0 %
BILIRUB SERPL-MCNC: 0.5 MG/DL (ref 0–1)
BUN SERPL-MCNC: 22 MG/DL (ref 7–20)
CALCIUM SERPL-MCNC: 8.9 MG/DL (ref 8.3–10.6)
CHLORIDE SERPL-SCNC: 107 MMOL/L (ref 99–110)
CO2 SERPL-SCNC: 21 MMOL/L (ref 21–32)
CREAT SERPL-MCNC: 0.9 MG/DL (ref 0.8–1.3)
DEPRECATED RDW RBC AUTO: 16.1 % (ref 12.4–15.4)
EOSINOPHIL # BLD: 0 K/UL (ref 0–0.6)
EOSINOPHIL NFR BLD: 0 %
GFR SERPLBLD CREATININE-BSD FMLA CKD-EPI: 89 ML/MIN/{1.73_M2}
GLUCOSE SERPL-MCNC: 82 MG/DL (ref 70–99)
HCT VFR BLD AUTO: 20.8 % (ref 40.5–52.5)
HGB BLD-MCNC: 7.4 G/DL (ref 13.5–17.5)
LYMPHOCYTES # BLD: 0.1 K/UL (ref 1–5.1)
LYMPHOCYTES NFR BLD: 12 %
MAGNESIUM SERPL-MCNC: 1.5 MG/DL (ref 1.8–2.4)
MCH RBC QN AUTO: 30.9 PG (ref 26–34)
MCHC RBC AUTO-ENTMCNC: 35.5 G/DL (ref 31–36)
MCV RBC AUTO: 87 FL (ref 80–100)
MONOCYTES # BLD: 0 K/UL (ref 0–1.3)
MONOCYTES NFR BLD: 2 %
NEUTROPHILS # BLD: 0.7 K/UL (ref 1.7–7.7)
NEUTROPHILS NFR BLD: 86 %
OVALOCYTES BLD QL SMEAR: ABNORMAL
PHOSPHATE SERPL-MCNC: 3.8 MG/DL (ref 2.5–4.9)
PLATELET # BLD AUTO: 22 K/UL (ref 135–450)
PLATELET BLD QL SMEAR: ABNORMAL
PMV BLD AUTO: 8.4 FL (ref 5–10.5)
POTASSIUM SERPL-SCNC: 3.7 MMOL/L (ref 3.5–5.1)
RBC # BLD AUTO: 2.39 M/UL (ref 4.2–5.9)
SLIDE REVIEW: ABNORMAL
SODIUM SERPL-SCNC: 139 MMOL/L (ref 136–145)
VANCOMYCIN SERPL-MCNC: 11.5 UG/ML
WBC # BLD AUTO: 0.8 K/UL (ref 4–11)

## 2025-04-05 PROCEDURE — 82247 BILIRUBIN TOTAL: CPT

## 2025-04-05 PROCEDURE — 80069 RENAL FUNCTION PANEL: CPT

## 2025-04-05 PROCEDURE — 2060000000 HC ICU INTERMEDIATE R&B

## 2025-04-05 PROCEDURE — 6370000000 HC RX 637 (ALT 250 FOR IP): Performed by: STUDENT IN AN ORGANIZED HEALTH CARE EDUCATION/TRAINING PROGRAM

## 2025-04-05 PROCEDURE — 6360000002 HC RX W HCPCS: Performed by: NURSE PRACTITIONER

## 2025-04-05 PROCEDURE — 6370000000 HC RX 637 (ALT 250 FOR IP)

## 2025-04-05 PROCEDURE — 6370000000 HC RX 637 (ALT 250 FOR IP): Performed by: INTERNAL MEDICINE

## 2025-04-05 PROCEDURE — 6360000002 HC RX W HCPCS

## 2025-04-05 PROCEDURE — 83735 ASSAY OF MAGNESIUM: CPT

## 2025-04-05 PROCEDURE — 2500000003 HC RX 250 WO HCPCS

## 2025-04-05 PROCEDURE — 85025 COMPLETE CBC W/AUTO DIFF WBC: CPT

## 2025-04-05 PROCEDURE — 2580000003 HC RX 258

## 2025-04-05 PROCEDURE — 99231 SBSQ HOSP IP/OBS SF/LOW 25: CPT | Performed by: SURGERY

## 2025-04-05 PROCEDURE — 2580000003 HC RX 258: Performed by: NURSE PRACTITIONER

## 2025-04-05 PROCEDURE — 2580000003 HC RX 258: Performed by: INTERNAL MEDICINE

## 2025-04-05 PROCEDURE — 80202 ASSAY OF VANCOMYCIN: CPT

## 2025-04-05 PROCEDURE — 36592 COLLECT BLOOD FROM PICC: CPT

## 2025-04-05 PROCEDURE — 6360000002 HC RX W HCPCS: Performed by: INTERNAL MEDICINE

## 2025-04-05 RX ADMIN — PANTOPRAZOLE SODIUM 40 MG: 40 TABLET, DELAYED RELEASE ORAL at 06:55

## 2025-04-05 RX ADMIN — URSODIOL 500 MG: 250 TABLET ORAL at 08:49

## 2025-04-05 RX ADMIN — URSODIOL 500 MG: 250 TABLET ORAL at 20:15

## 2025-04-05 RX ADMIN — CEFEPIME 2000 MG: 2 INJECTION, POWDER, FOR SOLUTION INTRAVENOUS at 11:40

## 2025-04-05 RX ADMIN — NYSTATIN 500000 UNITS: 100000 SUSPENSION ORAL at 13:20

## 2025-04-05 RX ADMIN — FILGRASTIM-AAFI 300 MCG: 300 INJECTION, SOLUTION SUBCUTANEOUS at 17:46

## 2025-04-05 RX ADMIN — VALACYCLOVIR HYDROCHLORIDE 500 MG: 500 TABLET, FILM COATED ORAL at 20:15

## 2025-04-05 RX ADMIN — TACROLIMUS 2.5 MG: 1 CAPSULE ORAL at 20:15

## 2025-04-05 RX ADMIN — CEFEPIME 2000 MG: 2 INJECTION, POWDER, FOR SOLUTION INTRAVENOUS at 19:30

## 2025-04-05 RX ADMIN — MICONAZOLE NITRATE: 20 CREAM TOPICAL at 20:15

## 2025-04-05 RX ADMIN — MYCOPHENOLATE MOFETIL 1000 MG: 500 TABLET, FILM COATED ORAL at 15:19

## 2025-04-05 RX ADMIN — SODIUM CHLORIDE 15 ML: 900 IRRIGANT IRRIGATION at 11:45

## 2025-04-05 RX ADMIN — OXYCODONE 10 MG: 5 TABLET ORAL at 08:49

## 2025-04-05 RX ADMIN — MYCOPHENOLATE MOFETIL 1000 MG: 500 TABLET, FILM COATED ORAL at 20:15

## 2025-04-05 RX ADMIN — NYSTATIN 500000 UNITS: 100000 SUSPENSION ORAL at 17:43

## 2025-04-05 RX ADMIN — TACROLIMUS 2.5 MG: 1 CAPSULE ORAL at 08:49

## 2025-04-05 RX ADMIN — NYSTATIN 500000 UNITS: 100000 SUSPENSION ORAL at 20:15

## 2025-04-05 RX ADMIN — SODIUM CHLORIDE 15 ML: 900 IRRIGANT IRRIGATION at 17:46

## 2025-04-05 RX ADMIN — FUROSEMIDE 40 MG: 10 INJECTION, SOLUTION INTRAMUSCULAR; INTRAVENOUS at 17:44

## 2025-04-05 RX ADMIN — OXYCODONE 10 MG: 5 TABLET ORAL at 13:20

## 2025-04-05 RX ADMIN — POTASSIUM CHLORIDE: 2 INJECTION, SOLUTION, CONCENTRATE INTRAVENOUS at 13:36

## 2025-04-05 RX ADMIN — NYSTATIN 500000 UNITS: 100000 SUSPENSION ORAL at 08:52

## 2025-04-05 RX ADMIN — CEFEPIME 2000 MG: 2 INJECTION, POWDER, FOR SOLUTION INTRAVENOUS at 03:29

## 2025-04-05 RX ADMIN — VANCOMYCIN HYDROCHLORIDE 1750 MG: 10 INJECTION, POWDER, LYOPHILIZED, FOR SOLUTION INTRAVENOUS at 11:32

## 2025-04-05 RX ADMIN — MICONAZOLE NITRATE: 20 CREAM TOPICAL at 08:55

## 2025-04-05 RX ADMIN — VALACYCLOVIR HYDROCHLORIDE 500 MG: 500 TABLET, FILM COATED ORAL at 08:52

## 2025-04-05 RX ADMIN — ALLOPURINOL 150 MG: 300 TABLET ORAL at 08:49

## 2025-04-05 RX ADMIN — FUROSEMIDE 40 MG: 10 INJECTION, SOLUTION INTRAMUSCULAR; INTRAVENOUS at 08:53

## 2025-04-05 RX ADMIN — MYCOPHENOLATE MOFETIL 1000 MG: 500 TABLET, FILM COATED ORAL at 08:49

## 2025-04-05 RX ADMIN — FLUCONAZOLE 400 MG: 200 TABLET ORAL at 08:48

## 2025-04-05 RX ADMIN — SODIUM CHLORIDE, PRESERVATIVE FREE 10 ML: 5 INJECTION INTRAVENOUS at 08:54

## 2025-04-05 ASSESSMENT — PAIN DESCRIPTION - ORIENTATION
ORIENTATION_2: MID
ORIENTATION_2: MID
ORIENTATION_3: LOWER
ORIENTATION: LOWER
ORIENTATION_3: LOWER
ORIENTATION_2: MID
ORIENTATION: LOWER
ORIENTATION: LOWER
ORIENTATION_3: LOWER
ORIENTATION: LOWER
ORIENTATION: LOWER
ORIENTATION_2: MID
ORIENTATION_3: LOWER
ORIENTATION_2: MID
ORIENTATION_3: LOWER
ORIENTATION_3: LOWER
ORIENTATION_2: MID

## 2025-04-05 ASSESSMENT — PAIN DESCRIPTION - ONSET
ONSET: ON-GOING

## 2025-04-05 ASSESSMENT — PAIN SCALES - GENERAL
PAINLEVEL_OUTOF10: 7
PAINLEVEL_OUTOF10: 6
PAINLEVEL_OUTOF10: 7
PAINLEVEL_OUTOF10: 3
PAINLEVEL_OUTOF10: 5
PAINLEVEL_OUTOF10: 7
PAINLEVEL_OUTOF10: 5
PAINLEVEL_OUTOF10: 6
PAINLEVEL_OUTOF10: 6
PAINLEVEL_OUTOF10: 5
PAINLEVEL_OUTOF10: 7

## 2025-04-05 ASSESSMENT — PAIN DESCRIPTION - DESCRIPTORS
DESCRIPTORS: SHARP
DESCRIPTORS: SHARP
DESCRIPTORS_3: CRAMPING
DESCRIPTORS: SHARP
DESCRIPTORS_2: ACHING
DESCRIPTORS_3: CRAMPING
DESCRIPTORS_2: ACHING
DESCRIPTORS_3: CRAMPING
DESCRIPTORS: SHARP
DESCRIPTORS: SHARP
DESCRIPTORS_2: ACHING
DESCRIPTORS_3: CRAMPING
DESCRIPTORS_2: ACHING
DESCRIPTORS_3: CRAMPING
DESCRIPTORS_3: CRAMPING
DESCRIPTORS: SHARP
DESCRIPTORS_2: ACHING
DESCRIPTORS: SHARP
DESCRIPTORS: ACHING
DESCRIPTORS_2: ACHING

## 2025-04-05 ASSESSMENT — PAIN DESCRIPTION - FREQUENCY
FREQUENCY: CONTINUOUS

## 2025-04-05 ASSESSMENT — PAIN DESCRIPTION - PAIN TYPE
TYPE: CHRONIC PAIN

## 2025-04-05 ASSESSMENT — PAIN - FUNCTIONAL ASSESSMENT
PAIN_FUNCTIONAL_ASSESSMENT: ACTIVITIES ARE NOT PREVENTED
PAIN_FUNCTIONAL_ASSESSMENT_SITE2: ACTIVITIES ARE NOT PREVENTED
PAIN_FUNCTIONAL_ASSESSMENT: ACTIVITIES ARE NOT PREVENTED
PAIN_FUNCTIONAL_ASSESSMENT_SITE2: ACTIVITIES ARE NOT PREVENTED
PAIN_FUNCTIONAL_ASSESSMENT: ACTIVITIES ARE NOT PREVENTED
PAIN_FUNCTIONAL_ASSESSMENT_SITE2: ACTIVITIES ARE NOT PREVENTED
PAIN_FUNCTIONAL_ASSESSMENT_SITE2: ACTIVITIES ARE NOT PREVENTED
PAIN_FUNCTIONAL_ASSESSMENT: PREVENTS OR INTERFERES SOME ACTIVE ACTIVITIES AND ADLS
PAIN_FUNCTIONAL_ASSESSMENT_SITE2: ACTIVITIES ARE NOT PREVENTED
PAIN_FUNCTIONAL_ASSESSMENT: ACTIVITIES ARE NOT PREVENTED
PAIN_FUNCTIONAL_ASSESSMENT_SITE2: ACTIVITIES ARE NOT PREVENTED
PAIN_FUNCTIONAL_ASSESSMENT: ACTIVITIES ARE NOT PREVENTED

## 2025-04-05 ASSESSMENT — PAIN DESCRIPTION - LOCATION
LOCATION: BACK
LOCATION_3: ABDOMEN
LOCATION_3: ABDOMEN
LOCATION: BACK
LOCATION_2: HEAD
LOCATION_3: ABDOMEN
LOCATION_3: ABDOMEN
LOCATION_2: HEAD
LOCATION: BACK
LOCATION_2: HEAD
LOCATION: BACK
LOCATION_3: ABDOMEN
LOCATION_3: ABDOMEN
LOCATION_2: HEAD

## 2025-04-05 ASSESSMENT — PAIN DESCRIPTION - INTENSITY
RATING_2: 5
RATING_2: 2
RATING_2: 6
RATING_3: 3
RATING_2: 3
RATING_3: 3
RATING_3: 3
RATING_2: 2
RATING_3: 4
RATING_2: 5

## 2025-04-06 LAB
ANION GAP SERPL CALCULATED.3IONS-SCNC: 12 MMOL/L (ref 3–16)
BASOPHILS # BLD: 0 K/UL (ref 0–0.2)
BASOPHILS NFR BLD: 0 %
BILIRUB SERPL-MCNC: 0.4 MG/DL (ref 0–1)
BUN SERPL-MCNC: 22 MG/DL (ref 7–20)
CALCIUM SERPL-MCNC: 9.1 MG/DL (ref 8.3–10.6)
CHLORIDE SERPL-SCNC: 105 MMOL/L (ref 99–110)
CO2 SERPL-SCNC: 20 MMOL/L (ref 21–32)
CREAT SERPL-MCNC: 1 MG/DL (ref 0.8–1.3)
DEPRECATED RDW RBC AUTO: 15.7 % (ref 12.4–15.4)
EOSINOPHIL # BLD: 0 K/UL (ref 0–0.6)
EOSINOPHIL NFR BLD: 0 %
GFR SERPLBLD CREATININE-BSD FMLA CKD-EPI: 78 ML/MIN/{1.73_M2}
GLUCOSE SERPL-MCNC: 86 MG/DL (ref 70–99)
HCT VFR BLD AUTO: 21.7 % (ref 40.5–52.5)
HGB BLD-MCNC: 7.8 G/DL (ref 13.5–17.5)
LYMPHOCYTES # BLD: 0.2 K/UL (ref 1–5.1)
LYMPHOCYTES NFR BLD: 7 %
MAGNESIUM SERPL-MCNC: 1.53 MG/DL (ref 1.8–2.4)
MCH RBC QN AUTO: 31.2 PG (ref 26–34)
MCHC RBC AUTO-ENTMCNC: 35.8 G/DL (ref 31–36)
MCV RBC AUTO: 87.2 FL (ref 80–100)
MONOCYTES # BLD: 0.2 K/UL (ref 0–1.3)
MONOCYTES NFR BLD: 9 %
NEUTROPHILS # BLD: 1.8 K/UL (ref 1.7–7.7)
NEUTROPHILS NFR BLD: 82 %
NEUTS BAND NFR BLD MANUAL: 2 % (ref 0–7)
PHOSPHATE SERPL-MCNC: 3.2 MG/DL (ref 2.5–4.9)
PLATELET # BLD AUTO: 20 K/UL (ref 135–450)
PLATELET BLD QL SMEAR: ABNORMAL
PMV BLD AUTO: 8.4 FL (ref 5–10.5)
POTASSIUM SERPL-SCNC: 3.8 MMOL/L (ref 3.5–5.1)
RBC # BLD AUTO: 2.49 M/UL (ref 4.2–5.9)
RBC MORPH BLD: NORMAL
SLIDE REVIEW: ABNORMAL
SODIUM SERPL-SCNC: 137 MMOL/L (ref 136–145)
WBC # BLD AUTO: 2.2 K/UL (ref 4–11)

## 2025-04-06 PROCEDURE — 2060000000 HC ICU INTERMEDIATE R&B

## 2025-04-06 PROCEDURE — 6360000002 HC RX W HCPCS: Performed by: NURSE PRACTITIONER

## 2025-04-06 PROCEDURE — 84100 ASSAY OF PHOSPHORUS: CPT

## 2025-04-06 PROCEDURE — 2580000003 HC RX 258: Performed by: INTERNAL MEDICINE

## 2025-04-06 PROCEDURE — 83735 ASSAY OF MAGNESIUM: CPT

## 2025-04-06 PROCEDURE — 2580000003 HC RX 258

## 2025-04-06 PROCEDURE — 6370000000 HC RX 637 (ALT 250 FOR IP): Performed by: INTERNAL MEDICINE

## 2025-04-06 PROCEDURE — 99231 SBSQ HOSP IP/OBS SF/LOW 25: CPT | Performed by: SURGERY

## 2025-04-06 PROCEDURE — 6360000002 HC RX W HCPCS

## 2025-04-06 PROCEDURE — 2580000003 HC RX 258: Performed by: NURSE PRACTITIONER

## 2025-04-06 PROCEDURE — 85025 COMPLETE CBC W/AUTO DIFF WBC: CPT

## 2025-04-06 PROCEDURE — 6370000000 HC RX 637 (ALT 250 FOR IP): Performed by: STUDENT IN AN ORGANIZED HEALTH CARE EDUCATION/TRAINING PROGRAM

## 2025-04-06 PROCEDURE — 36592 COLLECT BLOOD FROM PICC: CPT

## 2025-04-06 PROCEDURE — 80048 BASIC METABOLIC PNL TOTAL CA: CPT

## 2025-04-06 PROCEDURE — 82247 BILIRUBIN TOTAL: CPT

## 2025-04-06 PROCEDURE — 2500000003 HC RX 250 WO HCPCS

## 2025-04-06 PROCEDURE — 6370000000 HC RX 637 (ALT 250 FOR IP)

## 2025-04-06 PROCEDURE — 6360000002 HC RX W HCPCS: Performed by: INTERNAL MEDICINE

## 2025-04-06 RX ORDER — ATOVAQUONE 750 MG/5ML
1500 SUSPENSION ORAL DAILY
Status: CANCELLED | OUTPATIENT
Start: 2025-04-06

## 2025-04-06 RX ADMIN — LORAZEPAM 0.5 MG: 0.5 TABLET ORAL at 11:16

## 2025-04-06 RX ADMIN — OXYCODONE 10 MG: 5 TABLET ORAL at 08:09

## 2025-04-06 RX ADMIN — MYCOPHENOLATE MOFETIL 1000 MG: 500 TABLET, FILM COATED ORAL at 20:29

## 2025-04-06 RX ADMIN — FUROSEMIDE 40 MG: 10 INJECTION, SOLUTION INTRAMUSCULAR; INTRAVENOUS at 08:12

## 2025-04-06 RX ADMIN — MYCOPHENOLATE MOFETIL 1000 MG: 500 TABLET, FILM COATED ORAL at 14:40

## 2025-04-06 RX ADMIN — NYSTATIN 500000 UNITS: 100000 SUSPENSION ORAL at 12:20

## 2025-04-06 RX ADMIN — VALACYCLOVIR HYDROCHLORIDE 500 MG: 500 TABLET, FILM COATED ORAL at 08:10

## 2025-04-06 RX ADMIN — MICONAZOLE NITRATE: 20 CREAM TOPICAL at 08:13

## 2025-04-06 RX ADMIN — MICONAZOLE NITRATE: 20 CREAM TOPICAL at 20:32

## 2025-04-06 RX ADMIN — FUROSEMIDE 40 MG: 10 INJECTION, SOLUTION INTRAMUSCULAR; INTRAVENOUS at 18:05

## 2025-04-06 RX ADMIN — TACROLIMUS 2.5 MG: 1 CAPSULE ORAL at 20:29

## 2025-04-06 RX ADMIN — FILGRASTIM-AAFI 300 MCG: 300 INJECTION, SOLUTION SUBCUTANEOUS at 18:06

## 2025-04-06 RX ADMIN — PROCHLORPERAZINE MALEATE 5 MG: 10 TABLET ORAL at 08:29

## 2025-04-06 RX ADMIN — ALLOPURINOL 150 MG: 300 TABLET ORAL at 08:09

## 2025-04-06 RX ADMIN — NYSTATIN 500000 UNITS: 100000 SUSPENSION ORAL at 20:28

## 2025-04-06 RX ADMIN — NYSTATIN 500000 UNITS: 100000 SUSPENSION ORAL at 16:57

## 2025-04-06 RX ADMIN — OXYCODONE 5 MG: 5 TABLET ORAL at 12:20

## 2025-04-06 RX ADMIN — OXYCODONE 10 MG: 5 TABLET ORAL at 20:29

## 2025-04-06 RX ADMIN — CEFEPIME 2000 MG: 2 INJECTION, POWDER, FOR SOLUTION INTRAVENOUS at 11:55

## 2025-04-06 RX ADMIN — URSODIOL 500 MG: 250 TABLET ORAL at 20:29

## 2025-04-06 RX ADMIN — CEFEPIME 2000 MG: 2 INJECTION, POWDER, FOR SOLUTION INTRAVENOUS at 03:32

## 2025-04-06 RX ADMIN — MYCOPHENOLATE MOFETIL 1000 MG: 500 TABLET, FILM COATED ORAL at 08:10

## 2025-04-06 RX ADMIN — TACROLIMUS 2.5 MG: 1 CAPSULE ORAL at 08:10

## 2025-04-06 RX ADMIN — POTASSIUM CHLORIDE: 2 INJECTION, SOLUTION, CONCENTRATE INTRAVENOUS at 11:41

## 2025-04-06 RX ADMIN — VANCOMYCIN HYDROCHLORIDE 1750 MG: 10 INJECTION, POWDER, LYOPHILIZED, FOR SOLUTION INTRAVENOUS at 11:51

## 2025-04-06 RX ADMIN — CEFEPIME 2000 MG: 2 INJECTION, POWDER, FOR SOLUTION INTRAVENOUS at 19:07

## 2025-04-06 RX ADMIN — URSODIOL 500 MG: 250 TABLET ORAL at 08:08

## 2025-04-06 RX ADMIN — VALACYCLOVIR HYDROCHLORIDE 500 MG: 500 TABLET, FILM COATED ORAL at 20:29

## 2025-04-06 RX ADMIN — FLUCONAZOLE 400 MG: 200 TABLET ORAL at 08:10

## 2025-04-06 RX ADMIN — PANTOPRAZOLE SODIUM 40 MG: 40 TABLET, DELAYED RELEASE ORAL at 07:18

## 2025-04-06 RX ADMIN — SODIUM CHLORIDE 15 ML: 900 IRRIGANT IRRIGATION at 16:57

## 2025-04-06 RX ADMIN — OXYCODONE 10 MG: 5 TABLET ORAL at 03:27

## 2025-04-06 RX ADMIN — SODIUM CHLORIDE, PRESERVATIVE FREE 10 ML: 5 INJECTION INTRAVENOUS at 08:08

## 2025-04-06 RX ADMIN — SODIUM CHLORIDE 15 ML: 900 IRRIGANT IRRIGATION at 11:58

## 2025-04-06 ASSESSMENT — PAIN DESCRIPTION - LOCATION
LOCATION: BACK

## 2025-04-06 ASSESSMENT — PAIN SCALES - GENERAL
PAINLEVEL_OUTOF10: 6
PAINLEVEL_OUTOF10: 5
PAINLEVEL_OUTOF10: 8
PAINLEVEL_OUTOF10: 7
PAINLEVEL_OUTOF10: 4
PAINLEVEL_OUTOF10: 6
PAINLEVEL_OUTOF10: 5
PAINLEVEL_OUTOF10: 9
PAINLEVEL_OUTOF10: 6
PAINLEVEL_OUTOF10: 6

## 2025-04-06 ASSESSMENT — PAIN - FUNCTIONAL ASSESSMENT
PAIN_FUNCTIONAL_ASSESSMENT: PREVENTS OR INTERFERES SOME ACTIVE ACTIVITIES AND ADLS

## 2025-04-06 ASSESSMENT — PAIN DESCRIPTION - ORIENTATION
ORIENTATION: LOWER

## 2025-04-06 ASSESSMENT — PAIN DESCRIPTION - PAIN TYPE
TYPE: CHRONIC PAIN

## 2025-04-06 ASSESSMENT — PAIN DESCRIPTION - DESCRIPTORS
DESCRIPTORS: ACHING
DESCRIPTORS: ACHING;DISCOMFORT

## 2025-04-06 ASSESSMENT — PAIN DESCRIPTION - FREQUENCY
FREQUENCY: CONTINUOUS

## 2025-04-06 ASSESSMENT — PAIN DESCRIPTION - ONSET
ONSET: ON-GOING

## 2025-04-07 ENCOUNTER — APPOINTMENT (OUTPATIENT)
Dept: GENERAL RADIOLOGY | Age: 75
DRG: 014 | End: 2025-04-07
Attending: STUDENT IN AN ORGANIZED HEALTH CARE EDUCATION/TRAINING PROGRAM
Payer: COMMERCIAL

## 2025-04-07 PROBLEM — R10.11 RUQ PAIN: Status: RESOLVED | Noted: 2025-03-31 | Resolved: 2025-04-07

## 2025-04-07 LAB
ALBUMIN SERPL-MCNC: 3.5 G/DL (ref 3.4–5)
ALP SERPL-CCNC: 98 U/L (ref 40–129)
ALT SERPL-CCNC: 6 U/L (ref 10–40)
ANION GAP SERPL CALCULATED.3IONS-SCNC: 12 MMOL/L (ref 3–16)
APTT BLD: 32.9 SEC (ref 22.1–36.4)
AST SERPL-CCNC: 9 U/L (ref 15–37)
BACTERIA URNS QL MICRO: ABNORMAL /HPF
BASOPHILS # BLD: 0 K/UL (ref 0–0.2)
BASOPHILS NFR BLD: 0 %
BILIRUB DIRECT SERPL-MCNC: 0.2 MG/DL (ref 0–0.3)
BILIRUB INDIRECT SERPL-MCNC: 0.1 MG/DL (ref 0–1)
BILIRUB SERPL-MCNC: 0.3 MG/DL (ref 0–1)
BILIRUB UR QL STRIP.AUTO: NEGATIVE
BUN SERPL-MCNC: 23 MG/DL (ref 7–20)
CALCIUM SERPL-MCNC: 9.2 MG/DL (ref 8.3–10.6)
CHLORIDE SERPL-SCNC: 108 MMOL/L (ref 99–110)
CLARITY UR: CLEAR
CO2 SERPL-SCNC: 20 MMOL/L (ref 21–32)
COLOR UR: YELLOW
CREAT SERPL-MCNC: 1 MG/DL (ref 0.8–1.3)
DEPRECATED RDW RBC AUTO: 15.7 % (ref 12.4–15.4)
DOHLE BOD BLD QL SMEAR: PRESENT
EOSINOPHIL # BLD: 0 K/UL (ref 0–0.6)
EOSINOPHIL NFR BLD: 0 %
FUNGUS BLD CULT: NORMAL
FUNGUS BLD CULT: NORMAL
FUNGUS SPEC CULT: NORMAL
FUNGUS SPEC CULT: NORMAL
GFR SERPLBLD CREATININE-BSD FMLA CKD-EPI: 78 ML/MIN/{1.73_M2}
GLUCOSE SERPL-MCNC: 84 MG/DL (ref 70–99)
GLUCOSE UR STRIP.AUTO-MCNC: NEGATIVE MG/DL
HCT VFR BLD AUTO: 20.6 % (ref 40.5–52.5)
HGB BLD-MCNC: 7.2 G/DL (ref 13.5–17.5)
HGB UR QL STRIP.AUTO: NEGATIVE
HYALINE CASTS #/AREA URNS LPF: ABNORMAL /LPF (ref 0–2)
INR PPP: 1.22 (ref 0.85–1.15)
KETONES UR STRIP.AUTO-MCNC: 15 MG/DL
LEUKOCYTE ESTERASE UR QL STRIP.AUTO: NEGATIVE
LOEFFLER MB STN SPEC: NORMAL
LOEFFLER MB STN SPEC: NORMAL
LYMPHOCYTES # BLD: 0.1 K/UL (ref 1–5.1)
LYMPHOCYTES NFR BLD: 3 %
MAGNESIUM SERPL-MCNC: 1.46 MG/DL (ref 1.8–2.4)
MCH RBC QN AUTO: 31.2 PG (ref 26–34)
MCHC RBC AUTO-ENTMCNC: 34.8 G/DL (ref 31–36)
MCV RBC AUTO: 89.7 FL (ref 80–100)
METAMYELOCYTES NFR BLD MANUAL: 4 %
MONOCYTES # BLD: 0.6 K/UL (ref 0–1.3)
MONOCYTES NFR BLD: 14 %
NEUTROPHILS # BLD: 3.5 K/UL (ref 1.7–7.7)
NEUTROPHILS NFR BLD: 78 %
NEUTS BAND NFR BLD MANUAL: 1 % (ref 0–7)
NITRITE UR QL STRIP.AUTO: NEGATIVE
OVALOCYTES BLD QL SMEAR: ABNORMAL
PH UR STRIP.AUTO: 6 [PH] (ref 5–8)
PHOSPHATE SERPL-MCNC: 3.2 MG/DL (ref 2.5–4.9)
PLATELET # BLD AUTO: 20 K/UL (ref 135–450)
PLATELET BLD QL SMEAR: ABNORMAL
PMV BLD AUTO: 8.5 FL (ref 5–10.5)
POTASSIUM SERPL-SCNC: 3.9 MMOL/L (ref 3.5–5.1)
PROT SERPL-MCNC: 5.6 G/DL (ref 6.4–8.2)
PROT UR STRIP.AUTO-MCNC: ABNORMAL MG/DL
PROTHROMBIN TIME: 15.6 SEC (ref 11.9–14.9)
RBC # BLD AUTO: 2.3 M/UL (ref 4.2–5.9)
RBC #/AREA URNS HPF: ABNORMAL /HPF (ref 0–4)
SODIUM SERPL-SCNC: 140 MMOL/L (ref 136–145)
SP GR UR STRIP.AUTO: 1.02 (ref 1–1.03)
TACROLIMUS BLOOD: 8.8 NG/ML (ref 5–20)
UA DIPSTICK W REFLEX MICRO PNL UR: YES
URATE SERPL-MCNC: 3.4 MG/DL (ref 3.5–7.2)
URN SPEC COLLECT METH UR: ABNORMAL
UROBILINOGEN UR STRIP-ACNC: 0.2 E.U./DL
WBC # BLD AUTO: 4.2 K/UL (ref 4–11)
WBC #/AREA URNS HPF: ABNORMAL /HPF (ref 0–5)

## 2025-04-07 PROCEDURE — 36592 COLLECT BLOOD FROM PICC: CPT

## 2025-04-07 PROCEDURE — 6360000002 HC RX W HCPCS

## 2025-04-07 PROCEDURE — 6370000000 HC RX 637 (ALT 250 FOR IP)

## 2025-04-07 PROCEDURE — 99231 SBSQ HOSP IP/OBS SF/LOW 25: CPT | Performed by: SURGERY

## 2025-04-07 PROCEDURE — 80076 HEPATIC FUNCTION PANEL: CPT

## 2025-04-07 PROCEDURE — 80197 ASSAY OF TACROLIMUS: CPT

## 2025-04-07 PROCEDURE — 97116 GAIT TRAINING THERAPY: CPT

## 2025-04-07 PROCEDURE — 97530 THERAPEUTIC ACTIVITIES: CPT

## 2025-04-07 PROCEDURE — 84100 ASSAY OF PHOSPHORUS: CPT

## 2025-04-07 PROCEDURE — 2580000003 HC RX 258: Performed by: INTERNAL MEDICINE

## 2025-04-07 PROCEDURE — 85610 PROTHROMBIN TIME: CPT

## 2025-04-07 PROCEDURE — 6360000002 HC RX W HCPCS: Performed by: INTERNAL MEDICINE

## 2025-04-07 PROCEDURE — 83735 ASSAY OF MAGNESIUM: CPT

## 2025-04-07 PROCEDURE — 81001 URINALYSIS AUTO W/SCOPE: CPT

## 2025-04-07 PROCEDURE — 80048 BASIC METABOLIC PNL TOTAL CA: CPT

## 2025-04-07 PROCEDURE — 71045 X-RAY EXAM CHEST 1 VIEW: CPT

## 2025-04-07 PROCEDURE — 6370000000 HC RX 637 (ALT 250 FOR IP): Performed by: INTERNAL MEDICINE

## 2025-04-07 PROCEDURE — 2580000003 HC RX 258: Performed by: NURSE PRACTITIONER

## 2025-04-07 PROCEDURE — 87086 URINE CULTURE/COLONY COUNT: CPT

## 2025-04-07 PROCEDURE — 2580000003 HC RX 258

## 2025-04-07 PROCEDURE — 2500000003 HC RX 250 WO HCPCS

## 2025-04-07 PROCEDURE — 6360000002 HC RX W HCPCS: Performed by: NURSE PRACTITIONER

## 2025-04-07 PROCEDURE — 85730 THROMBOPLASTIN TIME PARTIAL: CPT

## 2025-04-07 PROCEDURE — 85025 COMPLETE CBC W/AUTO DIFF WBC: CPT

## 2025-04-07 PROCEDURE — 2060000000 HC ICU INTERMEDIATE R&B

## 2025-04-07 PROCEDURE — 6370000000 HC RX 637 (ALT 250 FOR IP): Performed by: STUDENT IN AN ORGANIZED HEALTH CARE EDUCATION/TRAINING PROGRAM

## 2025-04-07 PROCEDURE — 84550 ASSAY OF BLOOD/URIC ACID: CPT

## 2025-04-07 RX ORDER — ATOVAQUONE 750 MG/5ML
1500 SUSPENSION ORAL DAILY
Qty: 300 ML | Refills: 3 | Status: SHIPPED | OUTPATIENT
Start: 2025-04-07

## 2025-04-07 RX ORDER — ATOVAQUONE 750 MG/5ML
1500 SUSPENSION ORAL DAILY
Status: DISCONTINUED | OUTPATIENT
Start: 2025-04-07 | End: 2025-04-10 | Stop reason: HOSPADM

## 2025-04-07 RX ORDER — PENICILLIN V POTASSIUM 500 MG/1
500 TABLET, FILM COATED ORAL 2 TIMES DAILY
Status: DISCONTINUED | OUTPATIENT
Start: 2025-04-14 | End: 2025-04-10 | Stop reason: HOSPADM

## 2025-04-07 RX ORDER — PENICILLIN V POTASSIUM 500 MG/1
500 TABLET, FILM COATED ORAL 2 TIMES DAILY
Status: DISCONTINUED | OUTPATIENT
Start: 2025-04-08 | End: 2025-04-07

## 2025-04-07 RX ORDER — FLUCONAZOLE 200 MG/1
400 TABLET ORAL DAILY
Qty: 120 TABLET | Refills: 3 | Status: SHIPPED | OUTPATIENT
Start: 2025-04-08

## 2025-04-07 RX ORDER — MYCOPHENOLATE MOFETIL 500 MG/1
1000 TABLET ORAL 3 TIMES DAILY
Qty: 60 TABLET | Refills: 0
Start: 2025-04-07 | End: 2025-04-25

## 2025-04-07 RX ORDER — PENICILLIN V POTASSIUM 500 MG/1
500 TABLET, FILM COATED ORAL 2 TIMES DAILY
Qty: 60 TABLET | Refills: 11 | Status: SHIPPED | OUTPATIENT
Start: 2025-04-14

## 2025-04-07 RX ORDER — DOXYCYCLINE 100 MG/1
100 CAPSULE ORAL EVERY 12 HOURS SCHEDULED
Status: DISCONTINUED | OUTPATIENT
Start: 2025-04-07 | End: 2025-04-10 | Stop reason: HOSPADM

## 2025-04-07 RX ORDER — NYSTATIN 100000 [USP'U]/ML
5 SUSPENSION ORAL 4 TIMES DAILY
Qty: 300 ML | Refills: 2 | Status: SHIPPED | OUTPATIENT
Start: 2025-04-07

## 2025-04-07 RX ORDER — TACROLIMUS 0.5 MG/1
2.5 CAPSULE ORAL EVERY 12 HOURS
Qty: 60 CAPSULE | Refills: 3
Start: 2025-04-07

## 2025-04-07 RX ORDER — MICONAZOLE NITRATE 20 MG/G
CREAM TOPICAL
Qty: 1 G | Refills: 3 | Status: SHIPPED | OUTPATIENT
Start: 2025-04-07

## 2025-04-07 RX ORDER — PROCHLORPERAZINE MALEATE 5 MG/1
5 TABLET ORAL EVERY 4 HOURS PRN
Qty: 60 TABLET | Refills: 1 | Status: SHIPPED | OUTPATIENT
Start: 2025-04-07

## 2025-04-07 RX ORDER — FLUORIDE TOOTHPASTE
15 TOOTHPASTE DENTAL 3 TIMES DAILY PRN
COMMUNITY
Start: 2025-04-07

## 2025-04-07 RX ADMIN — VALACYCLOVIR HYDROCHLORIDE 500 MG: 500 TABLET, FILM COATED ORAL at 20:52

## 2025-04-07 RX ADMIN — NYSTATIN 500000 UNITS: 100000 SUSPENSION ORAL at 13:14

## 2025-04-07 RX ADMIN — MYCOPHENOLATE MOFETIL 1000 MG: 500 TABLET, FILM COATED ORAL at 17:33

## 2025-04-07 RX ADMIN — NYSTATIN 500000 UNITS: 100000 SUSPENSION ORAL at 20:51

## 2025-04-07 RX ADMIN — CEFEPIME 2000 MG: 2 INJECTION, POWDER, FOR SOLUTION INTRAVENOUS at 20:51

## 2025-04-07 RX ADMIN — OXYCODONE 10 MG: 5 TABLET ORAL at 16:07

## 2025-04-07 RX ADMIN — URSODIOL 500 MG: 250 TABLET ORAL at 20:52

## 2025-04-07 RX ADMIN — TACROLIMUS 2.5 MG: 1 CAPSULE ORAL at 20:51

## 2025-04-07 RX ADMIN — MICONAZOLE NITRATE: 20 CREAM TOPICAL at 08:15

## 2025-04-07 RX ADMIN — PROCHLORPERAZINE EDISYLATE 5 MG: 5 INJECTION INTRAMUSCULAR; INTRAVENOUS at 08:22

## 2025-04-07 RX ADMIN — OXYCODONE 10 MG: 5 TABLET ORAL at 20:51

## 2025-04-07 RX ADMIN — MICONAZOLE NITRATE: 20 CREAM TOPICAL at 20:52

## 2025-04-07 RX ADMIN — NYSTATIN 500000 UNITS: 100000 SUSPENSION ORAL at 08:00

## 2025-04-07 RX ADMIN — DOXYCYCLINE 100 MG: 100 CAPSULE ORAL at 20:51

## 2025-04-07 RX ADMIN — PROCHLORPERAZINE MALEATE 5 MG: 10 TABLET ORAL at 17:33

## 2025-04-07 RX ADMIN — POTASSIUM CHLORIDE: 2 INJECTION, SOLUTION, CONCENTRATE INTRAVENOUS at 08:00

## 2025-04-07 RX ADMIN — CEFEPIME 2000 MG: 2 INJECTION, POWDER, FOR SOLUTION INTRAVENOUS at 04:06

## 2025-04-07 RX ADMIN — OXYCODONE 10 MG: 5 TABLET ORAL at 08:13

## 2025-04-07 RX ADMIN — MYCOPHENOLATE MOFETIL 1000 MG: 500 TABLET, FILM COATED ORAL at 08:27

## 2025-04-07 RX ADMIN — TACROLIMUS 2.5 MG: 1 CAPSULE ORAL at 08:14

## 2025-04-07 RX ADMIN — ATOVAQUONE 1500 MG: 750 SUSPENSION ORAL at 16:15

## 2025-04-07 RX ADMIN — PANTOPRAZOLE SODIUM 40 MG: 40 TABLET, DELAYED RELEASE ORAL at 07:59

## 2025-04-07 RX ADMIN — VANCOMYCIN HYDROCHLORIDE 1750 MG: 10 INJECTION, POWDER, LYOPHILIZED, FOR SOLUTION INTRAVENOUS at 11:40

## 2025-04-07 RX ADMIN — FUROSEMIDE 40 MG: 10 INJECTION, SOLUTION INTRAMUSCULAR; INTRAVENOUS at 08:00

## 2025-04-07 RX ADMIN — VALACYCLOVIR HYDROCHLORIDE 500 MG: 500 TABLET, FILM COATED ORAL at 08:00

## 2025-04-07 RX ADMIN — FLUCONAZOLE 400 MG: 200 TABLET ORAL at 07:59

## 2025-04-07 RX ADMIN — SODIUM CHLORIDE, PRESERVATIVE FREE 10 ML: 5 INJECTION INTRAVENOUS at 08:23

## 2025-04-07 RX ADMIN — URSODIOL 500 MG: 250 TABLET ORAL at 07:59

## 2025-04-07 RX ADMIN — NYSTATIN 500000 UNITS: 100000 SUSPENSION ORAL at 16:18

## 2025-04-07 RX ADMIN — FILGRASTIM-AAFI 300 MCG: 300 INJECTION, SOLUTION SUBCUTANEOUS at 17:33

## 2025-04-07 RX ADMIN — ALLOPURINOL 150 MG: 300 TABLET ORAL at 07:59

## 2025-04-07 RX ADMIN — CEFEPIME 2000 MG: 2 INJECTION, POWDER, FOR SOLUTION INTRAVENOUS at 13:13

## 2025-04-07 RX ADMIN — FUROSEMIDE 40 MG: 10 INJECTION, SOLUTION INTRAMUSCULAR; INTRAVENOUS at 17:32

## 2025-04-07 RX ADMIN — MYCOPHENOLATE MOFETIL 1000 MG: 500 TABLET, FILM COATED ORAL at 21:44

## 2025-04-07 ASSESSMENT — PAIN - FUNCTIONAL ASSESSMENT
PAIN_FUNCTIONAL_ASSESSMENT: ACTIVITIES ARE NOT PREVENTED
PAIN_FUNCTIONAL_ASSESSMENT: PREVENTS OR INTERFERES SOME ACTIVE ACTIVITIES AND ADLS
PAIN_FUNCTIONAL_ASSESSMENT: PREVENTS OR INTERFERES SOME ACTIVE ACTIVITIES AND ADLS
PAIN_FUNCTIONAL_ASSESSMENT: ACTIVITIES ARE NOT PREVENTED
PAIN_FUNCTIONAL_ASSESSMENT: ACTIVITIES ARE NOT PREVENTED

## 2025-04-07 ASSESSMENT — PAIN DESCRIPTION - ORIENTATION
ORIENTATION: LOWER

## 2025-04-07 ASSESSMENT — PAIN DESCRIPTION - FREQUENCY
FREQUENCY: CONTINUOUS

## 2025-04-07 ASSESSMENT — PAIN DESCRIPTION - ONSET
ONSET: GRADUAL
ONSET: GRADUAL
ONSET: ON-GOING

## 2025-04-07 ASSESSMENT — PAIN SCALES - GENERAL
PAINLEVEL_OUTOF10: 0
PAINLEVEL_OUTOF10: 7
PAINLEVEL_OUTOF10: 0
PAINLEVEL_OUTOF10: 8
PAINLEVEL_OUTOF10: 6
PAINLEVEL_OUTOF10: 10
PAINLEVEL_OUTOF10: 0
PAINLEVEL_OUTOF10: 9

## 2025-04-07 ASSESSMENT — PAIN DESCRIPTION - LOCATION
LOCATION: BACK

## 2025-04-07 ASSESSMENT — PAIN DESCRIPTION - DESCRIPTORS
DESCRIPTORS: ACHING

## 2025-04-07 ASSESSMENT — PAIN DESCRIPTION - PAIN TYPE
TYPE: CHRONIC PAIN

## 2025-04-07 NOTE — CONSULTS
Comprehensive Nutrition Assessment    RECOMMENDATIONS:  PO Diet: Continue Regular; Low Microbial  Nutrition Supplement: offer Ensure supplement if po intake <50%.  Nutrition Education: Education/Counseling completed (Low Microbial Diet)     NUTRITION ASSESSMENT:   Nutritional summary & status: Nikia/Flu prep for ALLO BMT D-5. RD reviewed low microbial diet education with pt and wife at bedside. Provided Low Microbial Nutrition Therapy Handout. Pt/wife familiar with diet principles and increased nutrition needs through transplant as Outpt Oncology RD provided education in 1/25. Pt reports good appetite at present with % meal intake. Unintentional wt loss in Sept. 2024, however, wt has remained stable since November. Declined offer of Ensure supplement at present, however, agreeable to receive with decline in po intake. Will monitor intake adequacy and need for nutrition supplementation.   Admission // PMH: AML//HLD, HTN    MALNUTRITION ASSESSMENT  Context of Malnutrition: Chronic Illness   Malnutrition Status: At risk for malnutrition  Findings of the 6 clinical characteristics of malnutrition (Minimum of 2 out of 6 clinical characteristics is required to make the diagnosis of moderate or severe Protein Calorie Malnutrition based on AND/ASPEN Guidelines):  Energy Intake:  No decrease in energy intake  Weight Loss:  Mild weight loss (9% x 7 months)     Body Fat Loss:  No body fat loss     Muscle Mass Loss:  No muscle mass loss      NUTRITION DIAGNOSIS   Increased nutrient needs related to catabolic illness as evidenced by other (ALLO BMT for AML)    Nutrition Monitoring and Evaluation:   Food/Nutrient Intake Outcomes:  Food and Nutrient Intake  Physical Signs/Symptoms Outcomes:  Biochemical Data, Nutrition Focused Physical Findings, Weight     OBJECTIVE DATA: Significant to nutrition assessment  Nutrition Related Findings: No BM documented. No edema. Glu 149.  Wounds: None  Nutrition Goals: PO intake 75% or 
Vancomycin has been discontinued. Pharmacy will sign off consult. If medication dosing is resumed, please re-consult pharmacy.    Please call with any questions.  Mia FengD, BCPS  Main pharmacy: [id number]  4/7/2025 12:00 PM      
  Lab Results   Component Value Date/Time    APTT 34.4 04/03/2025 03:15 AM   [APTT    Imaging: Scrotal US pending    Impression/Plan: 74yo M with AML. We were consulted for penile/scrotal pain.    -Pain present for 1 week. No voiding issues reported, PVR 0  -Exam revealing rash on scrotal skin. Primary has ordered mycelex cream. He was mildly TTP of R epididymis but testicles soft. No penile abnormalities  -Pending scrotal US. Will FU on results. If negative, we will see PRN. Please call with any questions    NATHANAEL Padilla  
LEUKOCYTESUR Negative 03/26/2025 12:42 PM    UROBILINOGEN 0.2 03/26/2025 12:42 PM    BILIRUBINUR Negative 03/26/2025 12:42 PM    BLOODU SMALL 03/26/2025 12:42 PM    GLUCOSEU Negative 03/26/2025 12:42 PM       Imaging:   US ABDOMEN LIMITED Specify organ? GALLBLADDER   Final Result      Distended gallbladder containing sludge. Trace perihepatic versus pericholecystic fluid. Nonspecific but if appropriate clinical setting, findings could suggest cholecystitis.      Electronically signed by Sulaiman Snyder MD      CT ABDOMEN PELVIS WO CONTRAST Additional Contrast? None   Final Result      1.  Wall thickening and stranding along the urinary bladder consistent with cystitis. Recommend correlation with urinalysis.   2.  Nonspecific gallbladder distention.   3.  Colonic diverticulosis.      Electronically signed by Manny Velasquez      XR CHEST PORTABLE   Final Result   No acute cardiopulmonary abnormality.      Electronically signed by Casey Calhoun MD      XR CHEST PORTABLE   Final Result      1.  No acute disease.         Electronically signed by Manny Velasquez      XR CHEST PORTABLE    (Results Pending)          Assessment/Plan:  This is a 75 y.o. male with Hx of HLD, appendectomy, HTN, and AML. Recent Plascencia catheter placement. Consulted for cholecystitis.     - No acute surgical intervention at this time   - Recommend HIDA scan    - Hold narcotics for possible scan   - Surgical intervention pending results of scan and clinical course     Discussed with Dr. Yogesh Martin,   General Surgery PGY-2  03/30/25  11:04 AM  [phone]

## 2025-04-08 LAB
ABO/RH: NORMAL
ANION GAP SERPL CALCULATED.3IONS-SCNC: 11 MMOL/L (ref 3–16)
ANTIBODY SCREEN: NORMAL
BACTERIA UR CULT: NORMAL
BASOPHILS # BLD: 0 K/UL (ref 0–0.2)
BASOPHILS NFR BLD: 0 %
BILIRUB SERPL-MCNC: 0.4 MG/DL (ref 0–1)
BLOOD BANK DISPENSE STATUS: NORMAL
BLOOD BANK PRODUCT CODE: NORMAL
BPU ID: NORMAL
BUN SERPL-MCNC: 21 MG/DL (ref 7–20)
CALCIUM SERPL-MCNC: 9 MG/DL (ref 8.3–10.6)
CHLORIDE SERPL-SCNC: 108 MMOL/L (ref 99–110)
CO2 SERPL-SCNC: 21 MMOL/L (ref 21–32)
CREAT SERPL-MCNC: 1.1 MG/DL (ref 0.8–1.3)
DEPRECATED RDW RBC AUTO: 15.9 % (ref 12.4–15.4)
DESCRIPTION BLOOD BANK: NORMAL
EOSINOPHIL # BLD: 0 K/UL (ref 0–0.6)
EOSINOPHIL NFR BLD: 0 %
GFR SERPLBLD CREATININE-BSD FMLA CKD-EPI: 70 ML/MIN/{1.73_M2}
GLUCOSE SERPL-MCNC: 88 MG/DL (ref 70–99)
HCT VFR BLD AUTO: 19.9 % (ref 40.5–52.5)
HGB BLD-MCNC: 7 G/DL (ref 13.5–17.5)
LYMPHOCYTES # BLD: 0.1 K/UL (ref 1–5.1)
LYMPHOCYTES NFR BLD: 2 %
MAGNESIUM SERPL-MCNC: 1.5 MG/DL (ref 1.8–2.4)
MCH RBC QN AUTO: 30.9 PG (ref 26–34)
MCHC RBC AUTO-ENTMCNC: 35.1 G/DL (ref 31–36)
MCV RBC AUTO: 87.8 FL (ref 80–100)
MONOCYTES # BLD: 1.8 K/UL (ref 0–1.3)
MONOCYTES NFR BLD: 26 %
NEUTROPHILS # BLD: 5 K/UL (ref 1.7–7.7)
NEUTROPHILS NFR BLD: 72 %
PHOSPHATE SERPL-MCNC: 3 MG/DL (ref 2.5–4.9)
PLATELET # BLD AUTO: 19 K/UL (ref 135–450)
PMV BLD AUTO: 8.7 FL (ref 5–10.5)
POTASSIUM SERPL-SCNC: 3.9 MMOL/L (ref 3.5–5.1)
RBC # BLD AUTO: 2.26 M/UL (ref 4.2–5.9)
RBC MORPH BLD: NORMAL
SODIUM SERPL-SCNC: 140 MMOL/L (ref 136–145)
WBC # BLD AUTO: 7 K/UL (ref 4–11)

## 2025-04-08 PROCEDURE — 86850 RBC ANTIBODY SCREEN: CPT

## 2025-04-08 PROCEDURE — 6370000000 HC RX 637 (ALT 250 FOR IP): Performed by: INTERNAL MEDICINE

## 2025-04-08 PROCEDURE — 6360000002 HC RX W HCPCS: Performed by: INTERNAL MEDICINE

## 2025-04-08 PROCEDURE — 84100 ASSAY OF PHOSPHORUS: CPT

## 2025-04-08 PROCEDURE — 85025 COMPLETE CBC W/AUTO DIFF WBC: CPT

## 2025-04-08 PROCEDURE — 86900 BLOOD TYPING SEROLOGIC ABO: CPT

## 2025-04-08 PROCEDURE — 86923 COMPATIBILITY TEST ELECTRIC: CPT

## 2025-04-08 PROCEDURE — 80048 BASIC METABOLIC PNL TOTAL CA: CPT

## 2025-04-08 PROCEDURE — 99231 SBSQ HOSP IP/OBS SF/LOW 25: CPT | Performed by: SURGERY

## 2025-04-08 PROCEDURE — 83735 ASSAY OF MAGNESIUM: CPT

## 2025-04-08 PROCEDURE — 6360000002 HC RX W HCPCS: Performed by: NURSE PRACTITIONER

## 2025-04-08 PROCEDURE — 36430 TRANSFUSION BLD/BLD COMPNT: CPT

## 2025-04-08 PROCEDURE — P9040 RBC LEUKOREDUCED IRRADIATED: HCPCS

## 2025-04-08 PROCEDURE — 82247 BILIRUBIN TOTAL: CPT

## 2025-04-08 PROCEDURE — 2580000003 HC RX 258: Performed by: NURSE PRACTITIONER

## 2025-04-08 PROCEDURE — 6370000000 HC RX 637 (ALT 250 FOR IP): Performed by: STUDENT IN AN ORGANIZED HEALTH CARE EDUCATION/TRAINING PROGRAM

## 2025-04-08 PROCEDURE — 2060000000 HC ICU INTERMEDIATE R&B

## 2025-04-08 PROCEDURE — 6370000000 HC RX 637 (ALT 250 FOR IP)

## 2025-04-08 PROCEDURE — 6360000002 HC RX W HCPCS

## 2025-04-08 PROCEDURE — 2500000003 HC RX 250 WO HCPCS

## 2025-04-08 PROCEDURE — 86901 BLOOD TYPING SEROLOGIC RH(D): CPT

## 2025-04-08 PROCEDURE — 2580000003 HC RX 258

## 2025-04-08 RX ORDER — ONDANSETRON 2 MG/ML
8 INJECTION INTRAMUSCULAR; INTRAVENOUS
Status: DISCONTINUED | OUTPATIENT
Start: 2025-04-08 | End: 2025-04-10 | Stop reason: HOSPADM

## 2025-04-08 RX ORDER — TRAMADOL HYDROCHLORIDE 50 MG/1
50 TABLET ORAL EVERY 6 HOURS PRN
Status: DISCONTINUED | OUTPATIENT
Start: 2025-04-08 | End: 2025-04-10 | Stop reason: HOSPADM

## 2025-04-08 RX ORDER — ONDANSETRON 8 MG/1
8 TABLET, FILM COATED ORAL
Status: DISCONTINUED | OUTPATIENT
Start: 2025-04-08 | End: 2025-04-10 | Stop reason: HOSPADM

## 2025-04-08 RX ADMIN — TACROLIMUS 2.5 MG: 1 CAPSULE ORAL at 09:33

## 2025-04-08 RX ADMIN — NYSTATIN 500000 UNITS: 100000 SUSPENSION ORAL at 21:00

## 2025-04-08 RX ADMIN — DOXYCYCLINE 100 MG: 100 CAPSULE ORAL at 21:01

## 2025-04-08 RX ADMIN — ALLOPURINOL 150 MG: 300 TABLET ORAL at 09:34

## 2025-04-08 RX ADMIN — FUROSEMIDE 40 MG: 10 INJECTION, SOLUTION INTRAMUSCULAR; INTRAVENOUS at 09:21

## 2025-04-08 RX ADMIN — ONDANSETRON HYDROCHLORIDE 8 MG: 8 TABLET, FILM COATED ORAL at 15:49

## 2025-04-08 RX ADMIN — NYSTATIN 500000 UNITS: 100000 SUSPENSION ORAL at 12:11

## 2025-04-08 RX ADMIN — OXYCODONE 10 MG: 5 TABLET ORAL at 09:19

## 2025-04-08 RX ADMIN — SODIUM CHLORIDE, PRESERVATIVE FREE 10 ML: 5 INJECTION INTRAVENOUS at 09:34

## 2025-04-08 RX ADMIN — DOXYCYCLINE 100 MG: 100 CAPSULE ORAL at 09:33

## 2025-04-08 RX ADMIN — VALACYCLOVIR HYDROCHLORIDE 500 MG: 500 TABLET, FILM COATED ORAL at 09:33

## 2025-04-08 RX ADMIN — NYSTATIN 500000 UNITS: 100000 SUSPENSION ORAL at 09:33

## 2025-04-08 RX ADMIN — FLUCONAZOLE 400 MG: 200 TABLET ORAL at 09:33

## 2025-04-08 RX ADMIN — POTASSIUM CHLORIDE: 2 INJECTION, SOLUTION, CONCENTRATE INTRAVENOUS at 04:36

## 2025-04-08 RX ADMIN — PROCHLORPERAZINE MALEATE 5 MG: 10 TABLET ORAL at 21:02

## 2025-04-08 RX ADMIN — VALACYCLOVIR HYDROCHLORIDE 500 MG: 500 TABLET, FILM COATED ORAL at 21:01

## 2025-04-08 RX ADMIN — CEFEPIME 2000 MG: 2 INJECTION, POWDER, FOR SOLUTION INTRAVENOUS at 04:37

## 2025-04-08 RX ADMIN — NYSTATIN 500000 UNITS: 100000 SUSPENSION ORAL at 17:27

## 2025-04-08 RX ADMIN — URSODIOL 500 MG: 250 TABLET ORAL at 21:00

## 2025-04-08 RX ADMIN — SODIUM CHLORIDE, PRESERVATIVE FREE 10 ML: 5 INJECTION INTRAVENOUS at 21:04

## 2025-04-08 RX ADMIN — MYCOPHENOLATE MOFETIL 1000 MG: 500 TABLET, FILM COATED ORAL at 09:33

## 2025-04-08 RX ADMIN — TACROLIMUS 2.5 MG: 1 CAPSULE ORAL at 21:00

## 2025-04-08 RX ADMIN — MYCOPHENOLATE MOFETIL 1000 MG: 500 TABLET, FILM COATED ORAL at 21:01

## 2025-04-08 RX ADMIN — ONDANSETRON HYDROCHLORIDE 8 MG: 8 TABLET, FILM COATED ORAL at 12:04

## 2025-04-08 RX ADMIN — MYCOPHENOLATE MOFETIL 1000 MG: 500 TABLET, FILM COATED ORAL at 15:49

## 2025-04-08 RX ADMIN — ATOVAQUONE 1500 MG: 750 SUSPENSION ORAL at 09:33

## 2025-04-08 RX ADMIN — URSODIOL 500 MG: 250 TABLET ORAL at 09:35

## 2025-04-08 RX ADMIN — TRAMADOL HYDROCHLORIDE 50 MG: 50 TABLET, COATED ORAL at 21:02

## 2025-04-08 RX ADMIN — MICONAZOLE NITRATE: 20 CREAM TOPICAL at 09:37

## 2025-04-08 RX ADMIN — PROCHLORPERAZINE MALEATE 5 MG: 10 TABLET ORAL at 09:19

## 2025-04-08 RX ADMIN — MICONAZOLE NITRATE: 20 CREAM TOPICAL at 21:06

## 2025-04-08 RX ADMIN — PANTOPRAZOLE SODIUM 40 MG: 40 TABLET, DELAYED RELEASE ORAL at 06:38

## 2025-04-08 RX ADMIN — SODIUM CHLORIDE, PRESERVATIVE FREE 10 ML: 5 INJECTION INTRAVENOUS at 09:37

## 2025-04-08 ASSESSMENT — PAIN DESCRIPTION - ONSET
ONSET: ON-GOING

## 2025-04-08 ASSESSMENT — PAIN DESCRIPTION - PAIN TYPE
TYPE: CHRONIC PAIN

## 2025-04-08 ASSESSMENT — PAIN DESCRIPTION - ORIENTATION
ORIENTATION: LOWER

## 2025-04-08 ASSESSMENT — PAIN DESCRIPTION - LOCATION
LOCATION: BACK

## 2025-04-08 ASSESSMENT — PAIN SCALES - GENERAL
PAINLEVEL_OUTOF10: 5
PAINLEVEL_OUTOF10: 7
PAINLEVEL_OUTOF10: 8
PAINLEVEL_OUTOF10: 7
PAINLEVEL_OUTOF10: 0
PAINLEVEL_OUTOF10: 6
PAINLEVEL_OUTOF10: 0

## 2025-04-08 ASSESSMENT — PAIN - FUNCTIONAL ASSESSMENT
PAIN_FUNCTIONAL_ASSESSMENT: PREVENTS OR INTERFERES SOME ACTIVE ACTIVITIES AND ADLS

## 2025-04-08 ASSESSMENT — PAIN SCALES - WONG BAKER
WONGBAKER_NUMERICALRESPONSE: NO HURT
WONGBAKER_NUMERICALRESPONSE: NO HURT

## 2025-04-08 ASSESSMENT — PAIN DESCRIPTION - FREQUENCY
FREQUENCY: CONTINUOUS

## 2025-04-08 ASSESSMENT — PAIN DESCRIPTION - DESCRIPTORS
DESCRIPTORS: ACHING

## 2025-04-08 NOTE — DISCHARGE SUMMARY
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     Consultations:   Urology   General Surgery     Medications:      Medication List        START taking these medications      atovaquone 750 MG/5ML suspension  Commonly known as: MEPRON  Take 10 mLs by mouth daily  Notes to patient: (Mepron) PCP (pneumonia)  prevention & treatment    Headache, insomnia, skin rash, GI upset, muscle pain     biotene Liqd oral solution  Swish and spit 15 mLs 3 times daily as needed (dry mouth)     doxycycline monohydrate 100 MG capsule  Commonly known as: MONODOX  Take 1 capsule by mouth every 12 hours for 8 doses     miconazole 2 % cream  Commonly known as: MICOTIN  Apply topically 2 times daily.     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 Max Daily Amount: 30 mg     penicillin v potassium 500 MG tablet  Commonly known as: VEETID  Take 1 tablet by mouth 2 times daily  Start taking on: 2025  Notes to patient: Penicillin V Potassium (Pen VK)  Use:  to prevent bacterial infections  Side effects:  diarrhea, nausea, vomiting     prochlorperazine 5 MG tablet  Commonly known as:

## 2025-04-08 NOTE — DISCHARGE INSTRUCTIONS
[facility], we want to make sure you have the help you will need once you leave the hospital.  We are going to go over your discharge instructions with you. We give these to you in writing so you will have a reference if you have questions about symptoms or problems to look for after you leave the hospital.     We know you want to feel better and get home soon. Please answer these questions so we can be sure you have what you need, your questions are answered, and you feel prepared for discharge.    Yes No Do you understand your diagnosis?  Yes No Do you know when and who you need to follow up with?  Yes No Do you feel ready to go home & take care of your daily needs?  Yes No Do you have the help you need at home?  Yes No Do you understand what medications your are taking?  Yes No Do you understand what your medications are for?  Yes No Do you understand what medication side effects to watch for?  Yes No Do you know what symptoms or health problems that require an immediate call to your physician?  Yes No Do you feel ready for discharge?  Yes No Are there any questions that you have re: how to care for yourself at home?  Yes No Do you know about MyChart?    If you have any questions after you get home, feel free to call the unit and ask to speak with your nurse.  In about 7-10 days you will receive a survey. We value your opinion and hope that you have received care that will enable you to choose the best scores when completing the survey.    It was our pleasure to take care of you,  [facility] Staff                                                              [facility]   Inpatient Unit           [phone]                                                     Outpatient Infusion [phone]    [facility] Physician Office [phone]  [facility] Testing or Procedural Scheduling [phone] ([address] [place] [address])

## 2025-04-09 LAB
ALBUMIN SERPL-MCNC: 3.4 G/DL (ref 3.4–5)
ALP SERPL-CCNC: 114 U/L (ref 40–129)
ALT SERPL-CCNC: 7 U/L (ref 10–40)
ANION GAP SERPL CALCULATED.3IONS-SCNC: 11 MMOL/L (ref 3–16)
AST SERPL-CCNC: 11 U/L (ref 15–37)
BASOPHILS # BLD: 0 K/UL (ref 0–0.2)
BASOPHILS NFR BLD: 0.1 %
BILIRUB DIRECT SERPL-MCNC: 0.2 MG/DL (ref 0–0.3)
BILIRUB INDIRECT SERPL-MCNC: 0.2 MG/DL (ref 0–1)
BILIRUB SERPL-MCNC: 0.4 MG/DL (ref 0–1)
BUN SERPL-MCNC: 23 MG/DL (ref 7–20)
CALCIUM SERPL-MCNC: 9.2 MG/DL (ref 8.3–10.6)
CHLORIDE SERPL-SCNC: 108 MMOL/L (ref 99–110)
CO2 SERPL-SCNC: 20 MMOL/L (ref 21–32)
CREAT SERPL-MCNC: 1.1 MG/DL (ref 0.8–1.3)
DEPRECATED RDW RBC AUTO: 16 % (ref 12.4–15.4)
EOSINOPHIL # BLD: 0 K/UL (ref 0–0.6)
EOSINOPHIL NFR BLD: 0 %
GFR SERPLBLD CREATININE-BSD FMLA CKD-EPI: 70 ML/MIN/{1.73_M2}
GLUCOSE SERPL-MCNC: 90 MG/DL (ref 70–99)
HCT VFR BLD AUTO: 21 % (ref 40.5–52.5)
HGB BLD-MCNC: 7.6 G/DL (ref 13.5–17.5)
LDH SERPL L TO P-CCNC: 170 U/L (ref 100–190)
LYMPHOCYTES # BLD: 0 K/UL (ref 1–5.1)
LYMPHOCYTES NFR BLD: 0.6 %
MAGNESIUM SERPL-MCNC: 1.41 MG/DL (ref 1.8–2.4)
MCH RBC QN AUTO: 31.6 PG (ref 26–34)
MCHC RBC AUTO-ENTMCNC: 36 G/DL (ref 31–36)
MCV RBC AUTO: 87.7 FL (ref 80–100)
MONOCYTES # BLD: 1.8 K/UL (ref 0–1.3)
MONOCYTES NFR BLD: 30.2 %
NEUTROPHILS # BLD: 4.1 K/UL (ref 1.7–7.7)
NEUTROPHILS NFR BLD: 69.1 %
PHOSPHATE SERPL-MCNC: 3.4 MG/DL (ref 2.5–4.9)
PLATELET # BLD AUTO: 19 K/UL (ref 135–450)
PMV BLD AUTO: 8.5 FL (ref 5–10.5)
POTASSIUM SERPL-SCNC: 4.1 MMOL/L (ref 3.5–5.1)
PROT SERPL-MCNC: 5.3 G/DL (ref 6.4–8.2)
RBC # BLD AUTO: 2.39 M/UL (ref 4.2–5.9)
SODIUM SERPL-SCNC: 139 MMOL/L (ref 136–145)
TACROLIMUS BLOOD: 6.8 NG/ML (ref 5–20)
URATE SERPL-MCNC: 4.2 MG/DL (ref 3.5–7.2)
WBC # BLD AUTO: 5.9 K/UL (ref 4–11)

## 2025-04-09 PROCEDURE — 36592 COLLECT BLOOD FROM PICC: CPT

## 2025-04-09 PROCEDURE — 2060000000 HC ICU INTERMEDIATE R&B

## 2025-04-09 PROCEDURE — 80048 BASIC METABOLIC PNL TOTAL CA: CPT

## 2025-04-09 PROCEDURE — 6360000002 HC RX W HCPCS: Performed by: NURSE PRACTITIONER

## 2025-04-09 PROCEDURE — 6360000002 HC RX W HCPCS: Performed by: INTERNAL MEDICINE

## 2025-04-09 PROCEDURE — 83735 ASSAY OF MAGNESIUM: CPT

## 2025-04-09 PROCEDURE — 83615 LACTATE (LD) (LDH) ENZYME: CPT

## 2025-04-09 PROCEDURE — 6370000000 HC RX 637 (ALT 250 FOR IP)

## 2025-04-09 PROCEDURE — 2500000003 HC RX 250 WO HCPCS

## 2025-04-09 PROCEDURE — 80076 HEPATIC FUNCTION PANEL: CPT

## 2025-04-09 PROCEDURE — 84550 ASSAY OF BLOOD/URIC ACID: CPT

## 2025-04-09 PROCEDURE — 85025 COMPLETE CBC W/AUTO DIFF WBC: CPT

## 2025-04-09 PROCEDURE — 84100 ASSAY OF PHOSPHORUS: CPT

## 2025-04-09 PROCEDURE — 80197 ASSAY OF TACROLIMUS: CPT

## 2025-04-09 PROCEDURE — 6370000000 HC RX 637 (ALT 250 FOR IP): Performed by: INTERNAL MEDICINE

## 2025-04-09 PROCEDURE — 99231 SBSQ HOSP IP/OBS SF/LOW 25: CPT | Performed by: SURGERY

## 2025-04-09 RX ORDER — LIDOCAINE 4 G/G
1 PATCH TOPICAL DAILY
Status: DISCONTINUED | OUTPATIENT
Start: 2025-04-09 | End: 2025-04-10 | Stop reason: HOSPADM

## 2025-04-09 RX ORDER — FUROSEMIDE 10 MG/ML
40 INJECTION INTRAMUSCULAR; INTRAVENOUS ONCE
Status: DISCONTINUED | OUTPATIENT
Start: 2025-04-09 | End: 2025-04-10 | Stop reason: HOSPADM

## 2025-04-09 RX ORDER — FUROSEMIDE 10 MG/ML
40 INJECTION INTRAMUSCULAR; INTRAVENOUS ONCE
Status: COMPLETED | OUTPATIENT
Start: 2025-04-09 | End: 2025-04-09

## 2025-04-09 RX ORDER — OXYCODONE HYDROCHLORIDE 5 MG/1
5 TABLET ORAL NIGHTLY PRN
Refills: 0 | Status: DISCONTINUED | OUTPATIENT
Start: 2025-04-09 | End: 2025-04-10 | Stop reason: HOSPADM

## 2025-04-09 RX ADMIN — ALLOPURINOL 150 MG: 300 TABLET ORAL at 08:41

## 2025-04-09 RX ADMIN — DOXYCYCLINE 100 MG: 100 CAPSULE ORAL at 19:59

## 2025-04-09 RX ADMIN — MICONAZOLE NITRATE: 20 CREAM TOPICAL at 20:01

## 2025-04-09 RX ADMIN — ONDANSETRON HYDROCHLORIDE 8 MG: 8 TABLET, FILM COATED ORAL at 11:17

## 2025-04-09 RX ADMIN — VALACYCLOVIR HYDROCHLORIDE 500 MG: 500 TABLET, FILM COATED ORAL at 08:42

## 2025-04-09 RX ADMIN — DOXYCYCLINE 100 MG: 100 CAPSULE ORAL at 08:41

## 2025-04-09 RX ADMIN — TRAMADOL HYDROCHLORIDE 50 MG: 50 TABLET, COATED ORAL at 06:56

## 2025-04-09 RX ADMIN — FLUCONAZOLE 400 MG: 200 TABLET ORAL at 08:42

## 2025-04-09 RX ADMIN — FUROSEMIDE 40 MG: 10 INJECTION, SOLUTION INTRAMUSCULAR; INTRAVENOUS at 11:17

## 2025-04-09 RX ADMIN — SODIUM CHLORIDE, PRESERVATIVE FREE 10 ML: 5 INJECTION INTRAVENOUS at 08:45

## 2025-04-09 RX ADMIN — VALACYCLOVIR HYDROCHLORIDE 500 MG: 500 TABLET, FILM COATED ORAL at 19:59

## 2025-04-09 RX ADMIN — TACROLIMUS 2.5 MG: 1 CAPSULE ORAL at 19:59

## 2025-04-09 RX ADMIN — SODIUM CHLORIDE 15 ML: 900 IRRIGANT IRRIGATION at 20:00

## 2025-04-09 RX ADMIN — NYSTATIN 500000 UNITS: 100000 SUSPENSION ORAL at 17:15

## 2025-04-09 RX ADMIN — NYSTATIN 500000 UNITS: 100000 SUSPENSION ORAL at 19:58

## 2025-04-09 RX ADMIN — TACROLIMUS 2.5 MG: 1 CAPSULE ORAL at 08:41

## 2025-04-09 RX ADMIN — ATOVAQUONE 1500 MG: 750 SUSPENSION ORAL at 08:38

## 2025-04-09 RX ADMIN — MYCOPHENOLATE MOFETIL 1000 MG: 500 TABLET, FILM COATED ORAL at 19:59

## 2025-04-09 RX ADMIN — ONDANSETRON HYDROCHLORIDE 8 MG: 8 TABLET, FILM COATED ORAL at 15:18

## 2025-04-09 RX ADMIN — NYSTATIN 500000 UNITS: 100000 SUSPENSION ORAL at 12:51

## 2025-04-09 RX ADMIN — MYCOPHENOLATE MOFETIL 1000 MG: 500 TABLET, FILM COATED ORAL at 08:42

## 2025-04-09 RX ADMIN — URSODIOL 500 MG: 250 TABLET ORAL at 08:41

## 2025-04-09 RX ADMIN — MYCOPHENOLATE MOFETIL 1000 MG: 500 TABLET, FILM COATED ORAL at 15:18

## 2025-04-09 RX ADMIN — URSODIOL 500 MG: 250 TABLET ORAL at 19:58

## 2025-04-09 RX ADMIN — ONDANSETRON HYDROCHLORIDE 8 MG: 8 TABLET, FILM COATED ORAL at 06:54

## 2025-04-09 RX ADMIN — MICONAZOLE NITRATE: 20 CREAM TOPICAL at 08:45

## 2025-04-09 RX ADMIN — PANTOPRAZOLE SODIUM 40 MG: 40 TABLET, DELAYED RELEASE ORAL at 06:54

## 2025-04-09 RX ADMIN — SODIUM CHLORIDE, PRESERVATIVE FREE 10 ML: 5 INJECTION INTRAVENOUS at 20:00

## 2025-04-09 RX ADMIN — NYSTATIN 500000 UNITS: 100000 SUSPENSION ORAL at 08:37

## 2025-04-09 ASSESSMENT — PAIN DESCRIPTION - LOCATION
LOCATION: ABDOMEN
LOCATION: BACK

## 2025-04-09 ASSESSMENT — PAIN DESCRIPTION - FREQUENCY: FREQUENCY: INTERMITTENT

## 2025-04-09 ASSESSMENT — PAIN SCALES - GENERAL
PAINLEVEL_OUTOF10: 3
PAINLEVEL_OUTOF10: 6
PAINLEVEL_OUTOF10: 3
PAINLEVEL_OUTOF10: 3

## 2025-04-09 ASSESSMENT — PAIN DESCRIPTION - ONSET: ONSET: ON-GOING

## 2025-04-09 ASSESSMENT — PAIN DESCRIPTION - PAIN TYPE: TYPE: ACUTE PAIN

## 2025-04-09 ASSESSMENT — PAIN DESCRIPTION - ORIENTATION: ORIENTATION: MID

## 2025-04-09 ASSESSMENT — PAIN - FUNCTIONAL ASSESSMENT: PAIN_FUNCTIONAL_ASSESSMENT: ACTIVITIES ARE NOT PREVENTED

## 2025-04-09 ASSESSMENT — PAIN DESCRIPTION - DESCRIPTORS: DESCRIPTORS: ACHING

## 2025-04-10 VITALS
OXYGEN SATURATION: 91 % | RESPIRATION RATE: 16 BRPM | BODY MASS INDEX: 33.28 KG/M2 | HEART RATE: 91 BPM | HEIGHT: 68 IN | TEMPERATURE: 97.9 F | SYSTOLIC BLOOD PRESSURE: 144 MMHG | DIASTOLIC BLOOD PRESSURE: 77 MMHG | WEIGHT: 219.58 LBS

## 2025-04-10 LAB
AMORPH SED URNS QL MICRO: NORMAL /HPF
ANION GAP SERPL CALCULATED.3IONS-SCNC: 12 MMOL/L (ref 3–16)
APTT BLD: 30.2 SEC (ref 22.1–36.4)
BASOPHILS # BLD: 0 K/UL (ref 0–0.2)
BASOPHILS NFR BLD: 0 %
BILIRUB SERPL-MCNC: 0.4 MG/DL (ref 0–1)
BILIRUB UR QL STRIP.AUTO: NEGATIVE
BUN SERPL-MCNC: 19 MG/DL (ref 7–20)
CALCIUM SERPL-MCNC: 9 MG/DL (ref 8.3–10.6)
CHLORIDE SERPL-SCNC: 105 MMOL/L (ref 99–110)
CLARITY UR: CLEAR
CO2 SERPL-SCNC: 21 MMOL/L (ref 21–32)
COLOR UR: YELLOW
CREAT SERPL-MCNC: 1.2 MG/DL (ref 0.8–1.3)
DEPRECATED RDW RBC AUTO: 15.7 % (ref 12.4–15.4)
ECHO BSA: 2.15 M2
EOSINOPHIL # BLD: 0 K/UL (ref 0–0.6)
EOSINOPHIL NFR BLD: 0 %
EPI CELLS #/AREA URNS HPF: NORMAL /HPF (ref 0–5)
GFR SERPLBLD CREATININE-BSD FMLA CKD-EPI: 63 ML/MIN/{1.73_M2}
GLUCOSE SERPL-MCNC: 95 MG/DL (ref 70–99)
GLUCOSE UR STRIP.AUTO-MCNC: NEGATIVE MG/DL
HCT VFR BLD AUTO: 20.6 % (ref 40.5–52.5)
HGB BLD-MCNC: 7.2 G/DL (ref 13.5–17.5)
HGB UR QL STRIP.AUTO: ABNORMAL
HYALINE CASTS #/AREA URNS LPF: NORMAL /LPF (ref 0–2)
INR PPP: 1.27 (ref 0.85–1.15)
KETONES UR STRIP.AUTO-MCNC: 40 MG/DL
LEUKOCYTE ESTERASE UR QL STRIP.AUTO: NEGATIVE
LYMPHOCYTES # BLD: 0.3 K/UL (ref 1–5.1)
LYMPHOCYTES NFR BLD: 8 %
MAGNESIUM SERPL-MCNC: 1.35 MG/DL (ref 1.8–2.4)
MCH RBC QN AUTO: 30.4 PG (ref 26–34)
MCHC RBC AUTO-ENTMCNC: 34.7 G/DL (ref 31–36)
MCV RBC AUTO: 87.5 FL (ref 80–100)
MONOCYTES # BLD: 0.6 K/UL (ref 0–1.3)
MONOCYTES NFR BLD: 15 %
NEUTROPHILS # BLD: 3.2 K/UL (ref 1.7–7.7)
NEUTROPHILS NFR BLD: 76 %
NEUTS BAND NFR BLD MANUAL: 1 % (ref 0–7)
NITRITE UR QL STRIP.AUTO: NEGATIVE
PH UR STRIP.AUTO: 6 [PH] (ref 5–8)
PHOSPHATE SERPL-MCNC: 3.3 MG/DL (ref 2.5–4.9)
PLATELET # BLD AUTO: 26 K/UL (ref 135–450)
PLATELET BLD QL SMEAR: ABNORMAL
PMV BLD AUTO: 9.3 FL (ref 5–10.5)
POTASSIUM SERPL-SCNC: 3.5 MMOL/L (ref 3.5–5.1)
PROT UR STRIP.AUTO-MCNC: 30 MG/DL
PROTHROMBIN TIME: 16.1 SEC (ref 11.9–14.9)
RBC # BLD AUTO: 2.36 M/UL (ref 4.2–5.9)
RBC #/AREA URNS HPF: NORMAL /HPF (ref 0–4)
RBC MORPH BLD: NORMAL
SLIDE REVIEW: ABNORMAL
SODIUM SERPL-SCNC: 138 MMOL/L (ref 136–145)
SP GR UR STRIP.AUTO: >=1.03 (ref 1–1.03)
UA DIPSTICK W REFLEX MICRO PNL UR: YES
URN SPEC COLLECT METH UR: ABNORMAL
UROBILINOGEN UR STRIP-ACNC: 0.2 E.U./DL
WBC # BLD AUTO: 4.1 K/UL (ref 4–11)
WBC #/AREA URNS HPF: NORMAL /HPF (ref 0–5)

## 2025-04-10 PROCEDURE — 85730 THROMBOPLASTIN TIME PARTIAL: CPT

## 2025-04-10 PROCEDURE — 84100 ASSAY OF PHOSPHORUS: CPT

## 2025-04-10 PROCEDURE — 82247 BILIRUBIN TOTAL: CPT

## 2025-04-10 PROCEDURE — 81001 URINALYSIS AUTO W/SCOPE: CPT

## 2025-04-10 PROCEDURE — 83735 ASSAY OF MAGNESIUM: CPT

## 2025-04-10 PROCEDURE — 2500000003 HC RX 250 WO HCPCS

## 2025-04-10 PROCEDURE — 80048 BASIC METABOLIC PNL TOTAL CA: CPT

## 2025-04-10 PROCEDURE — 6360000002 HC RX W HCPCS: Performed by: INTERNAL MEDICINE

## 2025-04-10 PROCEDURE — 85610 PROTHROMBIN TIME: CPT

## 2025-04-10 PROCEDURE — 6370000000 HC RX 637 (ALT 250 FOR IP): Performed by: NURSE PRACTITIONER

## 2025-04-10 PROCEDURE — 36592 COLLECT BLOOD FROM PICC: CPT

## 2025-04-10 PROCEDURE — 6370000000 HC RX 637 (ALT 250 FOR IP): Performed by: INTERNAL MEDICINE

## 2025-04-10 PROCEDURE — 6370000000 HC RX 637 (ALT 250 FOR IP)

## 2025-04-10 PROCEDURE — 85025 COMPLETE CBC W/AUTO DIFF WBC: CPT

## 2025-04-10 PROCEDURE — 6360000002 HC RX W HCPCS

## 2025-04-10 PROCEDURE — 87086 URINE CULTURE/COLONY COUNT: CPT

## 2025-04-10 RX ORDER — URSODIOL 250 MG/1
500 TABLET, FILM COATED ORAL 2 TIMES DAILY
Qty: 60 TABLET | Refills: 3 | Status: SHIPPED | OUTPATIENT
Start: 2025-04-10

## 2025-04-10 RX ORDER — DOXYCYCLINE 100 MG/1
100 CAPSULE ORAL EVERY 12 HOURS SCHEDULED
Qty: 8 CAPSULE | Refills: 0 | Status: SHIPPED | OUTPATIENT
Start: 2025-04-10 | End: 2025-04-14

## 2025-04-10 RX ORDER — OXYCODONE HYDROCHLORIDE 5 MG/1
5 TABLET ORAL EVERY 4 HOURS PRN
Qty: 30 TABLET | Refills: 0 | Status: SHIPPED | OUTPATIENT
Start: 2025-04-10 | End: 2025-04-15

## 2025-04-10 RX ADMIN — ONDANSETRON HYDROCHLORIDE 8 MG: 8 TABLET, FILM COATED ORAL at 06:14

## 2025-04-10 RX ADMIN — NYSTATIN 500000 UNITS: 100000 SUSPENSION ORAL at 09:07

## 2025-04-10 RX ADMIN — ONDANSETRON HYDROCHLORIDE 8 MG: 8 TABLET, FILM COATED ORAL at 11:29

## 2025-04-10 RX ADMIN — MYCOPHENOLATE MOFETIL 1000 MG: 500 TABLET, FILM COATED ORAL at 09:09

## 2025-04-10 RX ADMIN — ATOVAQUONE 1500 MG: 750 SUSPENSION ORAL at 09:08

## 2025-04-10 RX ADMIN — OXYCODONE 5 MG: 5 TABLET ORAL at 09:09

## 2025-04-10 RX ADMIN — MAGNESIUM SULFATE HEPTAHYDRATE 4000 MG: 40 INJECTION, SOLUTION INTRAVENOUS at 06:12

## 2025-04-10 RX ADMIN — ALLOPURINOL 150 MG: 300 TABLET ORAL at 09:08

## 2025-04-10 RX ADMIN — FLUCONAZOLE 400 MG: 200 TABLET ORAL at 09:09

## 2025-04-10 RX ADMIN — MICONAZOLE NITRATE: 20 CREAM TOPICAL at 09:19

## 2025-04-10 RX ADMIN — URSODIOL 500 MG: 250 TABLET ORAL at 09:08

## 2025-04-10 RX ADMIN — VALACYCLOVIR HYDROCHLORIDE 500 MG: 500 TABLET, FILM COATED ORAL at 09:09

## 2025-04-10 RX ADMIN — TACROLIMUS 2.5 MG: 1 CAPSULE ORAL at 09:07

## 2025-04-10 RX ADMIN — OXYCODONE 5 MG: 5 TABLET ORAL at 02:27

## 2025-04-10 RX ADMIN — DOXYCYCLINE 100 MG: 100 CAPSULE ORAL at 09:08

## 2025-04-10 RX ADMIN — PANTOPRAZOLE SODIUM 40 MG: 40 TABLET, DELAYED RELEASE ORAL at 06:14

## 2025-04-10 RX ADMIN — SODIUM CHLORIDE, PRESERVATIVE FREE 10 ML: 5 INJECTION INTRAVENOUS at 09:18

## 2025-04-10 ASSESSMENT — PAIN SCALES - GENERAL
PAINLEVEL_OUTOF10: 7
PAINLEVEL_OUTOF10: 3
PAINLEVEL_OUTOF10: 6

## 2025-04-10 ASSESSMENT — PAIN DESCRIPTION - DESCRIPTORS
DESCRIPTORS: SHARP;ACHING
DESCRIPTORS: ACHING;DISCOMFORT;SORE

## 2025-04-10 ASSESSMENT — PAIN DESCRIPTION - ONSET
ONSET: AWAKENED FROM SLEEP
ONSET: AWAKENED FROM SLEEP

## 2025-04-10 ASSESSMENT — PAIN DESCRIPTION - LOCATION
LOCATION: BACK

## 2025-04-10 ASSESSMENT — PAIN DESCRIPTION - ORIENTATION
ORIENTATION: LOWER;MID;POSTERIOR

## 2025-04-10 ASSESSMENT — PAIN DESCRIPTION - PAIN TYPE
TYPE: CHRONIC PAIN
TYPE: ACUTE PAIN

## 2025-04-10 ASSESSMENT — PAIN DESCRIPTION - FREQUENCY
FREQUENCY: INTERMITTENT
FREQUENCY: CONTINUOUS

## 2025-04-10 NOTE — PLAN OF CARE
Problem: Gastrointestinal - Adult  Goal: Maintains adequate nutritional intake  Outcome: Not Progressing    Problem: Safety - Adult  Goal: Free from fall injury  4/9/2025 1601 by Cynthia Barrera RN  Outcome: Progressing  Flowsheets (Taken 4/9/2025 0928)  Free From Fall Injury: Instruct family/caregiver on patient safety  4/9/2025 0437 by Cleopatra Rosa RN  Outcome: Progressing     Problem: ABCDS Injury Assessment  Goal: Absence of physical injury  4/9/2025 1601 by Cynthia Barrera RN  Outcome: Progressing  Flowsheets (Taken 4/9/2025 0928)  Absence of Physical Injury: Implement safety measures based on patient assessment  4/9/2025 0437 by Cleopatra Rosa RN  Outcome: Progressing     Problem: Skin/Tissue Integrity - Adult  Goal: Incisions, wounds, or drain sites healing without S/S of infection  4/9/2025 1601 by Cynthia Barrera RN  Outcome: Progressing  Flowsheets  Taken 4/9/2025 0928  Incisions, Wounds, or Drain Sites Healing Without Sign and Symptoms of Infection: ADMISSION and DAILY: Assess and document risk factors for pressure ulcer development  Taken 4/9/2025 0824  Incisions, Wounds, or Drain Sites Healing Without Sign and Symptoms of Infection: ADMISSION and DAILY: Assess and document risk factors for pressure ulcer development  4/9/2025 0437 by Cleopatra Rosa RN  Outcome: Progressing     Problem: Nutrition Deficit:  Goal: Optimize nutritional status  Outcome: Progressing     Problem: Cardiovascular - Adult  Goal: Absence of cardiac dysrhythmias or at baseline  Outcome: Progressing  Flowsheets (Taken 4/9/2025 0824)  Absence of cardiac dysrhythmias or at baseline:   Monitor cardiac rate and rhythm   Assess for signs of decreased cardiac output     Problem: Gastrointestinal - Adult  Goal: Minimal or absence of nausea and vomiting  Outcome: Progressing  Flowsheets (Taken 4/9/2025 0824)  Minimal or absence of nausea and vomiting:   Administer ordered antiemetic medications as needed   Administer IV fluids as ordered 
  Problem: Hematologic - Adult  Goal: Maintains hematologic stability  Outcome: Progressing  Note: Patient's hemoglobin this AM:   Recent Labs     03/29/25  0411   HGB 8.0*     Patient's platelet count this AM:   Recent Labs     03/29/25  0411   PLT 21*    Thrombocytopenia Precautions in place.  Patient showing no signs or symptoms of active bleeding.  Patient received transfusions per orders prior to this shift.  Patient verbalizes understanding of all instructions. Will continue to assess and implement POC. Call light within reach and hourly rounding in place.       Problem: Pain  Goal: Verbalizes/displays adequate comfort level or baseline comfort level  Outcome: Progressing  Flowsheets (Taken 3/29/2025 1903)  Verbalizes/displays adequate comfort level or baseline comfort level:   Encourage patient to monitor pain and request assistance   Administer analgesics based on type and severity of pain and evaluate response   Consider cultural and social influences on pain and pain management   Assess pain using appropriate pain scale   Implement non-pharmacological measures as appropriate and evaluate response   Notify Licensed Independent Practitioner if interventions unsuccessful or patient reports new pain     
  Problem: Metabolic/Fluid and Electrolytes - Adult  Goal: Electrolytes maintained within normal limits  Outcome: Progressing     Problem: Skin/Tissue Integrity - Adult  Goal: Incisions, wounds, or drain sites healing without S/S of infection  Outcome: Progressing  Pt not noted to have any new skin breakdown this PM. Pt able to turn and reposition self in bed.    Problem: Safety - Adult  Goal: Free from fall injury  Outcome: Progressing  Orthostatic vital signs obtained at start of shift - see flowsheet for details.  Pt does not meet criteria for orthostasis.  Pt is a Med fall risk. See Esteves Fall Score and ABCDS Injury Risk assessments. - Screening for Orthostasis AND not a [place] Risk per ESTEVES/ABCDS: Pt bed is in low position, side rails up, call light and belongings are in reach.  Fall risk light is on outside pts room.  Pt encouraged to call for assistance as needed. Will continue with hourly rounds for PO intake, pain needs, toileting and repositioning as needed.     Problem: Hematologic - Adult  Goal: Maintains hematologic stability  3/30/2025 0731 by Lynn Cartwright RN  Outcome: Progressing   Patient's hemoglobin this AM:   Recent Labs     03/30/25  0327   HGB 7.7*     Patient's platelet count this AM:   Recent Labs     03/30/25  0327   PLT 18*    Thrombocytopenia Precautions in place.  Patient showing no signs or symptoms of active bleeding.  Patient transfused blood products per orders - see flowsheet.  Patient verbalizes understanding of all instructions. Will continue to assess and implement POC. Call light within reach and hourly rounding in place.     
  Problem: Safety - Adult  Goal: Free from fall injury  3/15/2025 1418 by Dary Damian, RN  Outcome: Progressing    Pt with activity orders for up ad kishan.  Encouraged pt to be up OOB as much as possible throughout the day and for all meals.  Encouraged frequent short naps as necessary to preserve energy but instructed that while awake, pt should be OOB.    Encouraged pt to ambulate in halls.  Pt seen up ad kishan in halls several times this shift. Pt is visualized to be OOB % of the time this shift.  Will continue to encourage frequent activity.      Problem: Skin/Tissue Integrity - Adult  Goal: Incisions, wounds, or drain sites healing without S/S of infection  3/15/2025 1418 by Dary Damian, RN  Outcome: Progressing   Skin clean dry intact.     Problem: Hematologic - Adult  Goal: Maintains hematologic stability  Outcome: Progressing   Patient's hemoglobin this AM:   Recent Labs     03/15/25  0355   HGB 10.5*     Patient's platelet count this AM:   Recent Labs     03/15/25  0355   PLT 84*    Thrombocytopenia Precautions in place.  Patient showing no signs or symptoms of active bleeding.  Transfusion not indicated at this time.  Patient verbalizes understanding of all instructions. Will continue to assess and implement POC. Call light within reach and hourly rounding in place.     Problem: Nutrition Deficit:  Goal: Optimize nutritional status  Outcome: Progressing   Encourage small frequent meals.   
  Problem: Safety - Adult  Goal: Free from fall injury  3/15/2025 1418 by Dary Damian, RN  Outcome: Progressing    Pt with activity orders for up ad kishan.  Encouraged pt to be up OOB as much as possible throughout the day and for all meals.  Encouraged frequent short naps as necessary to preserve energy but instructed that while awake, pt should be OOB.    Encouraged pt to ambulate in halls.  Pt seen up ad kishan in halls several times this shift. Pt is visualized to be OOB % of the time this shift.  Will continue to encourage frequent activity.      Problem: Skin/Tissue Integrity - Adult  Goal: Incisions, wounds, or drain sites healing without S/S of infection  3/15/2025 1418 by Dary Damian, RN  Outcome: Progressing   Skin clean dry intact.     Problem: Hematologic - Adult  Goal: Maintains hematologic stability  Outcome: Progressing   Patient's hemoglobin this AM:   Recent Labs     03/16/25  0425   HGB 9.0*     Patient's platelet count this AM:   Recent Labs     03/16/25  0425   PLT 68*    Thrombocytopenia Precautions in place.  Patient showing no signs or symptoms of active bleeding.  Transfusion not indicated at this time.  Patient verbalizes understanding of all instructions. Will continue to assess and implement POC. Call light within reach and hourly rounding in place.     Problem: Nutrition Deficit:  Goal: Optimize nutritional status  Outcome: Progressing   Encourage small frequent meals.   
  Problem: Safety - Adult  Goal: Free from fall injury  3/16/2025 0341 by Lexy Whitman RN  Outcome: Progressing  Flowsheets (Taken 3/16/2025 0340)  Free From Fall Injury:   Instruct family/caregiver on patient safety   Based on caregiver fall risk screen, instruct family/caregiver to ask for assistance with transferring infant if caregiver noted to have fall risk factors     Problem: ABCDS Injury Assessment  Goal: Absence of physical injury  3/16/2025 0341 by Lexy Whitman RN  Outcome: Progressing  Flowsheets (Taken 3/16/2025 0340)  Absence of Physical Injury: Implement safety measures based on patient assessment    Problem: Skin/Tissue Integrity - Adult  Goal: Incisions, wounds, or drain sites healing without S/S of infection  3/16/2025 0341 by Lexy Whitman RN  Outcome: Progressing  Flowsheets (Taken 3/16/2025 0340)  Incisions, Wounds, or Drain Sites Healing Without Sign and Symptoms of Infection:   ADMISSION and DAILY: Assess and document risk factors for pressure ulcer development   Implement wound care per orders       Problem: Metabolic/Fluid and Electrolytes - Adult  Goal: Electrolytes maintained within normal limits  3/16/2025 0341 by Lexy Whitman RN  Outcome: Progressing    Problem: Hematologic - Adult  Goal: Maintains hematologic stability  3/16/2025 0341 by Lexy Whitman RN  Outcome: Progressing       
  Problem: Safety - Adult  Goal: Free from fall injury  3/17/2025 1514 by Peg Almonte, RN  Outcome: Progressing  Orthostatic vital signs obtained at start of shift - see flowsheet for details.  Pt does not meet criteria for orthostasis.  Pt is a Med fall risk. See Esteves Fall Score and ABCDS Injury Risk assessments.   - Screening for Orthostasis AND not a [place] Risk per ESTEVES/ABCDS: Pt bed is in low position, side rails up, call light and belongings are in reach.  Fall risk light is on outside pts room.  Pt encouraged to call for assistance as needed. Will continue with hourly rounds for PO intake, pain needs, toileting and repositioning as needed.      Problem: Hematologic - Adult  Goal: Maintains hematologic stability  3/17/2025 1514 by Peg Almonte, RN  Outcome: Progressing  Patient's hemoglobin this AM:   Recent Labs     03/17/25  0231   HGB 8.6*     Patient's platelet count this AM:   Recent Labs     03/17/25  0231   PLT 62*    Thrombocytopenia Precautions in place.  Patient showing no signs or symptoms of active bleeding.  Transfusion not indicated at this time.  Patient verbalizes understanding of all instructions. Will continue to assess and implement POC. Call light within reach and hourly rounding in place.      
  Problem: Safety - Adult  Goal: Free from fall injury  3/18/2025 0244 by Lexy Whitman, RN  Outcome: Progressing  Flowsheets (Taken 3/18/2025 0244)  Free From Fall Injury:   Based on caregiver fall risk screen, instruct family/caregiver to ask for assistance with transferring infant if caregiver noted to have fall risk factors   Instruct family/caregiver on patient safety     Problem: ABCDS Injury Assessment  Goal: Absence of physical injury  Outcome: Progressing  Flowsheets (Taken 3/18/2025 0244)  Absence of Physical Injury: Implement safety measures based on patient assessment     Problem: Skin/Tissue Integrity - Adult  Goal: Incisions, wounds, or drain sites healing without S/S of infection  Outcome: Progressing  Flowsheets (Taken 3/18/2025 0244)  Incisions, Wounds, or Drain Sites Healing Without Sign and Symptoms of Infection:   ADMISSION and DAILY: Assess and document risk factors for pressure ulcer development   TWICE DAILY: Assess and document dressing/incision, wound bed, drain sites and surrounding tissue   Initiate isolation precautions as appropriate     Problem: Metabolic/Fluid and Electrolytes - Adult  Goal: Electrolytes maintained within normal limits  Outcome: Progressing     Problem: Hematologic - Adult  Goal: Maintains hematologic stability  3/18/2025 0244 by Lexy Whitman, RN  Outcome: Progressing     Problem: Nutrition Deficit:  Goal: Optimize nutritional status  Outcome: Progressing     
  Problem: Safety - Adult  Goal: Free from fall injury  3/21/2025 0525 by Jimbo Mendiola RN  Outcome: Progressing     Problem: ABCDS Injury Assessment  Goal: Absence of physical injury  3/21/2025 0525 by Jimbo Mendiola RN  Outcome: Progressing  Flowsheets (Taken 3/21/2025 0525)  Absence of Physical Injury: Implement safety measures based on patient assessment     Problem: Metabolic/Fluid and Electrolytes - Adult  Goal: Electrolytes maintained within normal limits  3/21/2025 0525 by Jimbo Mendiola RN  Outcome: Progressing  Flowsheets (Taken 3/21/2025 0525)  Electrolytes maintained within normal limits:   Monitor labs and assess patient for signs and symptoms of electrolyte imbalances   Administer electrolyte replacement as ordered  Note: Patient needed potassium replacement this AM. Potassium replacement started as per order. See MAR.     Problem: Hematologic - Adult  Goal: Maintains hematologic stability  3/21/2025 0525 by Jimbo Mendiola RN  Outcome: Progressing  Note: Patient's hemoglobin this AM:   Recent Labs     03/21/25  0354   HGB 7.7*     Patient's platelet count this AM:   Recent Labs     03/21/25  0354   PLT 33*    Thrombocytopenia Precautions in place.  Patient showing no signs or symptoms of active bleeding.  Transfusion not indicated at this time.  Patient verbalizes understanding of all instructions. Will continue to assess and implement POC. Call light within reach and hourly rounding in place.        
  Problem: Safety - Adult  Goal: Free from fall injury  3/21/2025 1424 by Barbara Hilario, RN  Outcome: Progressing  Orthostatic vital signs obtained at start of shift - see flowsheet for details.  Pt does not meet criteria for orthostasis.  Pt is a Med fall risk. See Baker Fall Score and ABCDS Injury Risk assessments. Pt bed is in low position, side rails up, call light and belongings are in reach.  Fall risk light is on outside pts room.  Pt encouraged to call for assistance as needed. Will continue with hourly rounds for PO intake, pain needs, toileting and repositioning as needed.       Problem: ABCDS Injury Assessment  Goal: Absence of physical injury  3/21/2025 1424 by Barbara Hilario RN  Outcome: Progressing  No new physical injuries noted.      Problem: Hematologic - Adult  Goal: Maintains hematologic stability  3/21/2025 1424 by Barbara Hilario, RN  Outcome: Progressing  Patient's hemoglobin this AM:   Recent Labs     03/21/25  0354   HGB 7.7*     Patient's platelet count this AM:   Recent Labs     03/21/25  0354   PLT 33*    Thrombocytopenia Precautions in place.  Patient showing no signs or symptoms of active bleeding.  Transfusion not indicated at this time.  Patient verbalizes understanding of all instructions. Will continue to assess and implement POC. Call light within reach and hourly rounding in place.       Problem: Nutrition Deficit:  Goal: Optimize nutritional status  3/21/2025 1424 by Barbara Hilario, RN  Outcome: Progressing  Pt educated on importance of staying hydrated and having as much intake as possible. Verbalized understanding. Will continue to monitor.       
  Problem: Safety - Adult  Goal: Free from fall injury  3/22/2025 1036 by Emmy Arias, RN  Outcome: Progressing     Problem: ABCDS Injury Assessment  Goal: Absence of physical injury  3/22/2025 1036 by Emmy Arias, RN  Outcome: Progressing  No new physical injuries noted.      Problem: Skin/Tissue Integrity - Adult  Goal: Incisions, wounds, or drain sites healing without S/S of infection  3/22/2025 1036 by Emmy Arias, RN  Outcome: Progressing       
  Problem: Safety - Adult  Goal: Free from fall injury  3/23/2025 0201 by Kirstie Smith RN  Outcome: Progressing  Pt is up ad kishan ortho negative. Pt bed in lowest position and wheels locked. Call light and table within reach. Pt ambulates independently.     Problem: ABCDS Injury Assessment  Goal: Absence of physical injury  3/23/2025 0201 by Kirstie Smith RN  Outcome: Progressing  Standard safety measures in place.    Problem: Skin/Tissue Integrity - Adult  Goal: Incisions, wounds, or drain sites healing without S/S of infection  3/23/2025 0201 by Kirstie Smith RN  Outcome: Progressing   Pt has been afebrile throughout shift. No signs and symptoms of infection.     Problem: Metabolic/Fluid and Electrolytes - Adult  Goal: Electrolytes maintained within normal limits  3/23/2025 0201 by Kirstie Smith RN  Outcome: Progressing    Problem: Metabolic/Fluid and Electrolytes - Adult  Goal: Hemodynamic stability and optimal renal function maintained  3/23/2025 0201 by Kirstie Smith RN  Outcome: Progressing     Problem: Metabolic/Fluid and Electrolytes - Adult  Goal: Glucose maintained within prescribed range  3/23/2025 0201 by Kirstie Smith RN  Outcome: Progressing     Problem: Hematologic - Adult  Goal: Maintains hematologic stability  3/23/2025 0201 by Kirstie Smith RN  Outcome: Progressing  Patient's hemoglobin this AM:   Recent Labs     03/23/25  0403   HGB 7.6*     Patient's platelet count this AM:   Recent Labs     03/23/25  0403   PLT 15*    Thrombocytopenia Precautions in place.  Patient showing no signs or symptoms of active bleeding. Patient verbalizes understanding of all instructions. Will continue to assess and implement POC. Call light within reach and hourly rounding in place.     Problem: Nutrition Deficit:  Goal: Optimize nutritional status  3/23/2025 0201 by Kirstie Smith RN  Outcome: Progressing     Problem: Pain  Goal: Verbalizes/displays adequate comfort level or baseline comfort level  3/23/2025 
  Problem: Safety - Adult  Goal: Free from fall injury  3/23/2025 1220 by Angeles Terrell, RN  Outcome: Progressing  Flowsheets (Taken 3/23/2025 1220)  Free From Fall Injury: Instruct family/caregiver on patient safety  Note: Orthostatic vital signs obtained at start of shift - see flowsheet for details.  Pt does not meet criteria for orthostasis.  Pt is a Med fall risk. See Esteves Fall Score and ABCDS Injury Risk assessments.   - Screening for Orthostasis AND not a [place] Risk per ESTEVES/ABCDS: Pt bed is in low position, side rails up, call light and belongings are in reach.  Fall risk light is on outside pts room.  Pt encouraged to call for assistance as needed. Will continue with hourly rounds for PO intake, pain needs, toileting and repositioning as needed.   Patient was orthostatic this am, 1L bolus given. Recheck orthostatic vitals were negative.     Problem: Metabolic/Fluid and Electrolytes - Adult  Goal: Electrolytes maintained within normal limits  3/23/2025 1220 by Angeles Terrell, RN  Outcome: Progressing  Flowsheets (Taken 3/23/2025 1220)  Electrolytes maintained within normal limits:   Monitor labs and assess patient for signs and symptoms of electrolyte imbalances   Administer electrolyte replacement as ordered     Problem: Hematologic - Adult  Goal: Maintains hematologic stability  3/23/2025 1220 by Angeles Terrell, RN  Outcome: Progressing  Flowsheets (Taken 3/23/2025 1220)  Maintains hematologic stability:   Assess for signs and symptoms of bleeding or hemorrhage   Administer blood products/factors as ordered   Monitor labs for bleeding or clotting disorders  Note: Patient's hemoglobin this AM:   Recent Labs     03/23/25  0403   HGB 7.6*     Patient's platelet count this AM:   Recent Labs     03/23/25  0403   PLT 15*    Thrombocytopenia Precautions in place.  Patient showing no signs or symptoms of active bleeding.  Transfusion not indicated at this time.  Patient verbalizes understanding of all 
  Problem: Safety - Adult  Goal: Free from fall injury  3/28/2025 0025 by Flor Skinner, RN  Outcome: Progressing     Problem: ABCDS Injury Assessment  Goal: Absence of physical injury  3/28/2025 0025 by Flor Skinner, RN  Outcome: Progressing     Problem: Skin/Tissue Integrity - Adult  Goal: Incisions, wounds, or drain sites healing without S/S of infection  3/28/2025 0025 by Flor Skinner, RN  Outcome: Progressing     
  Problem: Safety - Adult  Goal: Free from fall injury  3/28/2025 1101 by Pamela Santacruz, RN  Outcome: Progressing  Flowsheets (Taken 3/28/2025 1101)  Free From Fall Injury: Instruct family/caregiver on patient safety  Note: Orthostatic vital signs obtained at start of shift - see flowsheet for details.  Pt does not meet criteria for orthostasis.  Pt is a Med fall risk. See Esteves Fall Score and ABCDS Injury Risk assessments.   - Screening for Orthostasis AND not a [place] Risk per ESTEVES/ABCDS: Pt bed is in low position, side rails up, call light and belongings are in reach.  Fall risk light is on outside pts room.  Pt encouraged to call for assistance as needed. Will continue with hourly rounds for PO intake, pain needs, toileting and repositioning as needed.      Problem: ABCDS Injury Assessment  Goal: Absence of physical injury  3/28/2025 1101 by Pamela Santacruz, RN  Outcome: Progressing  Flowsheets (Taken 3/28/2025 1101)  Absence of Physical Injury: Implement safety measures based on patient assessment     Problem: Skin/Tissue Integrity - Adult  Goal: Incisions, wounds, or drain sites healing without S/S of infection  3/28/2025 1101 by Pamela Santacruz, RN  Outcome: Progressing  Flowsheets (Taken 3/28/2025 1101)  Incisions, Wounds, or Drain Sites Healing Without Sign and Symptoms of Infection:   ADMISSION and DAILY: Assess and document risk factors for pressure ulcer development   TWICE DAILY: Assess and document skin integrity     Problem: Hematologic - Adult  Goal: Maintains hematologic stability  Outcome: Progressing  Flowsheets (Taken 3/28/2025 1101)  Maintains hematologic stability:   Assess for signs and symptoms of bleeding or hemorrhage   Monitor labs for bleeding or clotting disorders   Administer blood products/factors as ordered     Problem: Pain  Goal: Verbalizes/displays adequate comfort level or baseline comfort level  Outcome: Progressing  Flowsheets (Taken 3/28/2025 1101)  Verbalizes/displays adequate 
  Problem: Safety - Adult  Goal: Free from fall injury  3/30/2025 1703 by Vanessa Kaur, RN  Outcome: Progressing     Problem: ABCDS Injury Assessment  Goal: Absence of physical injury  3/30/2025 1703 by Vanessa Kaur, RN  Outcome: Progressing     Problem: Skin/Tissue Integrity - Adult  Goal: Incisions, wounds, or drain sites healing without S/S of infection  3/30/2025 1703 by Vanessa Kaur, RN  Outcome: Progressing     
  Problem: Safety - Adult  Goal: Free from fall injury  3/31/2025 0558 by Lynn Cartwright, RN  Outcome: Progressing     Problem: ABCDS Injury Assessment  Goal: Absence of physical injury  3/30/2025 1703 by Vanessa Kaur, RN  Outcome: Progressing     Problem: Skin/Tissue Integrity - Adult  Goal: Incisions, wounds, or drain sites healing without S/S of infection  3/31/2025 0558 by Lynn Cartwright, RN  Outcome: Progressing     Problem: Metabolic/Fluid and Electrolytes - Adult  Goal: Electrolytes maintained within normal limits  3/31/2025 0558 by Lynn Cartwright, RN  Outcome: Progressing     Problem: Metabolic/Fluid and Electrolytes - Adult  Goal: Hemodynamic stability and optimal renal function maintained  3/30/2025 1703 by Vanessa Kaur, RN  Outcome: Progressing     
  Problem: Safety - Adult  Goal: Free from fall injury  3/31/2025 1553 by Vanessa Kaur, RN  Outcome: Progressing     Problem: ABCDS Injury Assessment  Goal: Absence of physical injury  3/31/2025 1553 by Vanessa Kaur, RN  Outcome: Progressing     Problem: Skin/Tissue Integrity - Adult  Goal: Incisions, wounds, or drain sites healing without S/S of infection  3/31/2025 1553 by Vanessa Kaur, RN  Outcome: Progressing     
  Problem: Safety - Adult  Goal: Free from fall injury  4/1/2025 1027 by Ger Rendon RN  Outcome: Progressing     Problem: ABCDS Injury Assessment  Goal: Absence of physical injury  4/1/2025 1027 by Ger Rendon RN  Outcome: Progressing     Problem: Skin/Tissue Integrity - Adult  Goal: Incisions, wounds, or drain sites healing without S/S of infection  4/1/2025 1027 by Ger Rendon RN  Outcome: Progressing     Problem: Metabolic/Fluid and Electrolytes - Adult  Goal: Electrolytes maintained within normal limits  4/1/2025 1027 by Ger Rendon RN  Outcome: Progressing     Problem: Metabolic/Fluid and Electrolytes - Adult  Goal: Hemodynamic stability and optimal renal function maintained  4/1/2025 1027 by Ger Rendon RN  Outcome: Progressing     Problem: Metabolic/Fluid and Electrolytes - Adult  Goal: Glucose maintained within prescribed range  4/1/2025 1027 by Ger Rendon RN  Outcome: Progressing     
  Problem: Safety - Adult  Goal: Free from fall injury  4/1/2025 1028 by Ger Rendon RN  Outcome: Progressing     Problem: ABCDS Injury Assessment  Goal: Absence of physical injury  4/1/2025 1028 by Ger Rendon RN  Outcome: Progressing     Problem: Skin/Tissue Integrity - Adult  Goal: Incisions, wounds, or drain sites healing without S/S of infection  4/1/2025 1028 by Ger Rendon RN  Outcome: Progressing     Problem: Metabolic/Fluid and Electrolytes - Adult  Goal: Electrolytes maintained within normal limits  4/1/2025 1028 by Ger Rendon RN  Outcome: Progressing     Problem: Metabolic/Fluid and Electrolytes - Adult  Goal: Hemodynamic stability and optimal renal function maintained  4/1/2025 1028 by Ger Rendon RN  Outcome: Progressing     Problem: Metabolic/Fluid and Electrolytes - Adult  Goal: Glucose maintained within prescribed range  4/1/2025 1028 by Ger Rendon RN  Outcome: Progressing     Problem: Hematologic - Adult  Goal: Maintains hematologic stability  4/1/2025 1028 by Ger Rendon RN  Outcome: Progressing     
  Problem: Safety - Adult  Goal: Free from fall injury  4/10/2025 1155 by Cynthia Barrera RN  Outcome: Adequate for Discharge  4/9/2025 2336 by Cary Crouch RN  Outcome: Progressing  Flowsheets (Taken 4/9/2025 2336)  Free From Fall Injury:   Instruct family/caregiver on patient safety   Based on caregiver fall risk screen, instruct family/caregiver to ask for assistance with transferring infant if caregiver noted to have fall risk factors  Note: Orthostatic vital signs obtained at start of shift - see flowsheet for details.  Pt does not meet criteria for orthostasis.  Pt is a Med fall risk. See Esteves Fall Score and ABCDS Injury Risk assessments.   - Screening for Orthostasis AND not a [place] Risk per ESTEVES/ABCDS: Pt bed is in low position, side rails up, call light and belongings are in reach.  Fall risk light is on outside pts room.  Pt encouraged to call for assistance as needed. Will continue with hourly rounds for PO intake, pain needs, toileting and repositioning as needed.      Problem: ABCDS Injury Assessment  Goal: Absence of physical injury  4/10/2025 1155 by Cynthia Barrera RN  Outcome: Adequate for Discharge  4/9/2025 2336 by Cary Crouch RN  Outcome: Progressing  Flowsheets (Taken 4/9/2025 2336)  Absence of Physical Injury: Implement safety measures based on patient assessment     Problem: Skin/Tissue Integrity - Adult  Goal: Incisions, wounds, or drain sites healing without S/S of infection  Outcome: Adequate for Discharge     Problem: Metabolic/Fluid and Electrolytes - Adult  Goal: Electrolytes maintained within normal limits  4/10/2025 1155 by Cynthia Barrera RN  Outcome: Adequate for Discharge  4/9/2025 2336 by Cary Crouch RN  Outcome: Progressing  Flowsheets (Taken 4/9/2025 2336)  Electrolytes maintained within normal limits:   Monitor labs and assess patient for signs and symptoms of electrolyte imbalances   Administer electrolyte replacement as ordered  Goal: Hemodynamic stability 
  Problem: Safety - Adult  Goal: Free from fall injury  4/2/2025 1637 by Mckenzie Cavanaugh RN  Outcome: Progressing  Flowsheets (Taken 4/2/2025 1637)  Free From Fall Injury:   Instruct family/caregiver on patient safety   Based on caregiver fall risk screen, instruct family/caregiver to ask for assistance with transferring infant if caregiver noted to have fall risk factors  Note: Orthostatic vital signs obtained at start of shift - see flowsheet for details.  Pt does not meet criteria for orthostasis.  Pt is a Low fall risk. See Esteves Fall Score and ABCDS Injury Risk assessments.     - Screening for Orthostasis AND not a [place] Risk per ESTEVES/ABCDS: Pt bed is in low position, side rails up, call light and belongings are in reach.  Fall risk light is on outside pts room.  Pt encouraged to call for assistance as needed. Will continue with hourly rounds for PO intake, pain needs, toileting and repositioning as needed.      Problem: ABCDS Injury Assessment  Goal: Absence of physical injury  4/2/2025 1637 by Mckenzie Cavanaugh RN  Outcome: Progressing  Flowsheets (Taken 4/2/2025 1637)  Absence of Physical Injury: Implement safety measures based on patient assessment  Note: Bed in lowest position, call light within reach, bed and chair wheels locked, non-skid socks on, bed side table within reach, 2x side rails up.      Problem: Skin/Tissue Integrity - Adult  Goal: Incisions, wounds, or drain sites healing without S/S of infection  4/2/2025 1637 by Mckenzie Cavanaugh RN  Outcome: Progressing  Flowsheets (Taken 4/2/2025 1637)  Incisions, Wounds, or Drain Sites Healing Without Sign and Symptoms of Infection:   ADMISSION and DAILY: Assess and document risk factors for pressure ulcer development   TWICE DAILY: Assess and document skin integrity   TWICE DAILY: Assess and document dressing/incision, wound bed, drain sites and surrounding tissue   Implement wound care per orders   Initiate isolation precautions as appropriate   
  Problem: Safety - Adult  Goal: Free from fall injury  4/2/2025 2357 by Annie Marcelo RN  Flowsheets (Taken 4/2/2025 2357)  Free From Fall Injury:   Instruct family/caregiver on patient safety   Based on caregiver fall risk screen, instruct family/caregiver to ask for assistance with transferring infant if caregiver noted to have fall risk factors  Note: Orthostatic vital signs obtained at start of shift - see flowsheet for details.  Pt meets criteria for orthostasis.  Pt is a Low fall risk. See Esteves Fall Score and ABCDS Injury Risk assessments.     - Screening for Orthostasis AND not a [place] Risk per ESTEVES/ABCDS: Pt bed is in low position, side rails up, call light and belongings are in reach.  Fall risk light is on outside pts room.  Pt encouraged to call for assistance as needed. Will continue with hourly rounds for PO intake, pain needs, toileting and repositioning as needed.        Problem: Metabolic/Fluid and Electrolytes - Adult  Goal: Electrolytes maintained within normal limits  Flowsheets (Taken 4/2/2025 2357)  Electrolytes maintained within normal limits:   Monitor labs and assess patient for signs and symptoms of electrolyte imbalances   Monitor response to electrolyte replacements, including repeat lab results as appropriate  Note:   Lab Results   Component Value Date     04/02/2025    K 4.4 04/02/2025     04/02/2025    CO2 22 04/02/2025      Problem: Hematologic - Adult  Goal: Maintains hematologic stability  Flowsheets (Taken 4/2/2025 2357)  Maintains hematologic stability:   Administer blood products/factors as ordered   Assess for signs and symptoms of bleeding or hemorrhage  Note:   Lab Results   Component Value Date    WBC 0.0 (LL) 04/02/2025    HGB 6.5 (LL) 04/02/2025    HCT 18.3 (LL) 04/02/2025    MCV 89.5 04/02/2025    PLT 26 (L) 04/02/2025         
  Problem: Safety - Adult  Goal: Free from fall injury  4/3/2025 2351 by Annie Marcelo RN  Flowsheets (Taken 4/3/2025 2351)  Free From Fall Injury:   Instruct family/caregiver on patient safety   Based on caregiver fall risk screen, instruct family/caregiver to ask for assistance with transferring infant if caregiver noted to have fall risk factors  Note: Orthostatic vital signs obtained at start of shift - see flowsheet for details.  Pt meets criteria for orthostasis.  Pt is a Low fall risk. See Esteves Fall Score and ABCDS Injury Risk assessments.     - Screening for Orthostasis AND not a [place] Risk per ESTEVES/ABCDS: Pt bed is in low position, side rails up, call light and belongings are in reach.  Fall risk light is on outside pts room.  Pt encouraged to call for assistance as needed. Will continue with hourly rounds for PO intake, pain needs, toileting and repositioning as needed.      Problem: Metabolic/Fluid and Electrolytes - Adult  Goal: Electrolytes maintained within normal limits  4/3/2025 2351 by Annie Marcelo RN  Flowsheets (Taken 4/3/2025 2351)  Electrolytes maintained within normal limits:   Monitor labs and assess patient for signs and symptoms of electrolyte imbalances   Monitor response to electrolyte replacements, including repeat lab results as appropriate  Note:   Lab Results   Component Value Date     04/03/2025    K 3.8 04/03/2025     04/03/2025    CO2 21 04/03/2025      Problem: Hematologic - Adult  Goal: Maintains hematologic stability  4/3/2025 2351 by Annie Marcelo RN  Flowsheets (Taken 4/3/2025 2351)  Maintains hematologic stability:   Assess for signs and symptoms of bleeding or hemorrhage   Administer blood products/factors as ordered  Note:   Lab Results   Component Value Date    WBC 0.1 (LL) 04/03/2025    HGB 6.9 (LL) 04/03/2025    HCT 19.7 (LL) 04/03/2025    MCV 90.6 04/03/2025    PLT 36 (L) 04/03/2025      
  Problem: Safety - Adult  Goal: Free from fall injury  4/5/2025 0053 by Flor Skinner, RN  Outcome: Progressing     Problem: ABCDS Injury Assessment  Goal: Absence of physical injury  Outcome: Progressing     Problem: Skin/Tissue Integrity - Adult  Goal: Incisions, wounds, or drain sites healing without S/S of infection  4/5/2025 0053 by Flor Skinner, RN  Outcome: Progressing     Problem: Nutrition Deficit:  Goal: Optimize nutritional status  Outcome: Progressing     
  Problem: Safety - Adult  Goal: Free from fall injury  4/6/2025 0505 by Flor Skinner RN  Outcome: Progressing     Problem: ABCDS Injury Assessment  Goal: Absence of physical injury  4/6/2025 0505 by lFor Skinner RN  Outcome: Progressing     Problem: Skin/Tissue Integrity - Adult  Goal: Incisions, wounds, or drain sites healing without S/S of infection  Outcome: Progressing     Problem: Hematologic - Adult  Goal: Maintains hematologic stability  4/6/2025 0505 by Flor Skinner RN  Outcome: Progressing     Problem: Nutrition Deficit:  Goal: Optimize nutritional status  4/6/2025 0505 by Flor Skinner RN  Outcome: Progressing     
  Problem: Safety - Adult  Goal: Free from fall injury  4/6/2025 1431 by Leola Wyman RN  Outcome: Progressing  Note: Orthostatic vital signs obtained at start of shift - see flowsheet for details.  Pt does not meet criteria for orthostasis.  Pt is a Low fall risk. See Esteves Fall Score and ABCDS Injury Risk assessments.   - Screening for Orthostasis AND not a [place] Risk per ESTEVES/ABCDS: Pt bed is in low position, side rails up, call light and belongings are in reach.  Fall risk light is on outside pts room.  Pt encouraged to call for assistance as needed. Will continue with hourly rounds for PO intake, pain needs, toileting and repositioning as needed.      Problem: ABCDS Injury Assessment  Goal: Absence of physical injury  4/6/2025 1431 by Leola Wyman RN  Outcome: Progressing  Note: No new reported or observed physical injurie this shift.      Problem: Hematologic - Adult  Goal: Maintains hematologic stability  4/6/2025 1431 by Leola Wyman RN  Outcome: Progressing  Note: Patient's hemoglobin this AM:   Recent Labs     04/06/25  0330   HGB 7.8*     Patient's platelet count this AM:   Recent Labs     04/06/25  0330   PLT 20*    Thrombocytopenia Precautions in place.  Patient showing no signs or symptoms of active bleeding.  Transfusion not indicated at this time.  Patient verbalizes understanding of all instructions. Will continue to assess and implement POC. Call light within reach and hourly rounding in place.      Problem: Nutrition Deficit:  Goal: Optimize nutritional status  4/6/2025 1431 by Leola Wyman RN  Outcome: Progressing  Note: Patient battling nausea this shift, given compazine and ativan.      Problem: Pain  Goal: Verbalizes/displays adequate comfort level or baseline comfort level  Outcome: Progressing  Note: Pt reported pain this shift. PRN oxycodone given per MAR. Pt educated on importance of calling for pain meds when in pain. Pt verbalized understanding.      
  Problem: Safety - Adult  Goal: Free from fall injury  4/7/2025 0102 by Flor Skinner RN  Outcome: Progressing     Problem: ABCDS Injury Assessment  Goal: Absence of physical injury  4/7/2025 0102 by Flor Skinner RN  Outcome: Progressing     Problem: Hematologic - Adult  Goal: Maintains hematologic stability  4/7/2025 0102 by Flor Skinner RN  Outcome: Progressing     Problem: Nutrition Deficit:  Goal: Optimize nutritional status  4/7/2025 0102 by Flor Skinner RN  Outcome: Progressing     
  Problem: Safety - Adult  Goal: Free from fall injury  4/7/2025 0452 by Flor kSinner, RN  Outcome: Progressing     Problem: ABCDS Injury Assessment  Goal: Absence of physical injury  4/7/2025 0452 by Flor Skinner, RN  Outcome: Progressing     Problem: Metabolic/Fluid and Electrolytes - Adult  Goal: Electrolytes maintained within normal limits  Outcome: Progressing     Problem: Hematologic - Adult  Goal: Maintains hematologic stability  Outcome: Progressing     Problem: Nutrition Deficit:  Goal: Optimize nutritional status  4/7/2025 0452 by Flor Skinner, RN  Outcome: Progressing     Problem: Pain  Goal: Verbalizes/displays adequate comfort level or baseline comfort level  Outcome: Progressing     
  Problem: Safety - Adult  Goal: Free from fall injury  Note: Orthostatic vital signs obtained at start of shift - see flowsheet for details.  Pt does not meet criteria for orthostasis.  Pt is a Med fall risk. See Esteves Fall Score and ABCDS Injury Risk assessments.     - Screening for Orthostasis AND not a [place] Risk per ESTEVES/ABCDS: Pt bed is in low position, side rails up, call light and belongings are in reach.  Fall risk light is on outside pts room.  Pt encouraged to call for assistance as needed. Will continue with hourly rounds for PO intake, pain needs, toileting and repositioning as needed.      Problem: Skin/Tissue Integrity - Adult  Goal: Incisions, wounds, or drain sites healing without S/S of infection  Note: Pt up independently this shift and using urinals.  Pt continent of stool and urine, turns self frequently while in bed.  ADLs and hygiene performed independently throughout shift. Instructed pt on importance of maintaining good hygiene and activity levels.  No signs or symptoms of new skin breakdown noted.      Problem: Hematologic - Adult  Goal: Maintains hematologic stability  Note: Patient's hemoglobin this AM:   Recent Labs     04/04/25  0330   HGB 6.7*     Patient's platelet count this AM:   Recent Labs     04/04/25  0330   PLT 24*    Thrombocytopenia Precautions in place.  Patient showing no signs or symptoms of active bleeding.  Patient received transfusions per orders prior to this shift.  Patient verbalizes understanding of all instructions. Will continue to assess and implement POC. Call light within reach and hourly rounding in place.      Problem: Pain  Goal: Verbalizes/displays adequate comfort level or baseline comfort level  Note: Pt with complaints of 7/10 back pain during shift. Medicated with PRN oxycodone per MAR with good response per pt.  Pt denies further needs at this time. Will continue to monitor and implement plan of care.      
  Problem: Safety - Adult  Goal: Free from fall injury  Outcome: Progressing     Problem: ABCDS Injury Assessment  Goal: Absence of physical injury  Outcome: Progressing     Problem: Hematologic - Adult  Goal: Maintains hematologic stability  Outcome: Progressing     Problem: Nutrition Deficit:  Goal: Optimize nutritional status  Outcome: Progressing     Problem: Gastrointestinal - Adult  Goal: Minimal or absence of nausea and vomiting  Outcome: Progressing     Problem: Gastrointestinal - Adult  Goal: Maintains adequate nutritional intake  Outcome: Progressing     
  Problem: Safety - Adult  Goal: Free from fall injury  Outcome: Progressing     Problem: ABCDS Injury Assessment  Goal: Absence of physical injury  Outcome: Progressing     Problem: Skin/Tissue Integrity - Adult  Goal: Incisions, wounds, or drain sites healing without S/S of infection  Outcome: Progressing     Problem: Metabolic/Fluid and Electrolytes - Adult  Goal: Hemodynamic stability and optimal renal function maintained  Outcome: Progressing     Problem: Hematologic - Adult  Goal: Maintains hematologic stability  Outcome: Progressing     Problem: Nutrition Deficit:  Goal: Optimize nutritional status  Outcome: Progressing   Patient's hemoglobin this AM:   Recent Labs     04/03/25  0315   HGB 6.9*     Patient's platelet count this AM:   Recent Labs     04/03/25  0315   PLT 36*    Thrombocytopenia Precautions in place.  Patient showing no signs or symptoms of active bleeding.  Patient received transfusions per orders prior to this shift.  Patient verbalizes understanding of all instructions. Will continue to assess and implement POC. Call light within reach and hourly rounding in place.    
  Problem: Safety - Adult  Goal: Free from fall injury  Outcome: Progressing    Pt with activity orders for up ad kishan.  Encouraged pt to be up OOB as much as possible throughout the day and for all meals.  Encouraged frequent short naps as necessary to preserve energy but instructed that while awake, pt should be OOB.    Encouraged pt to ambulate in halls.  Pt seen up ad kishan in halls several times this shift. Pt is visualized to be OOB % of the time this shift.  Will continue to encourage frequent activity.      Problem: Hematologic - Adult  Goal: Maintains hematologic stability  Outcome: Progressing   Patient's hemoglobin this AM:   Recent Labs     03/20/25  0325   HGB 8.5*     Patient's platelet count this AM:   Recent Labs     03/20/25  0325   PLT 37*    Thrombocytopenia Precautions in place.  Patient showing no signs or symptoms of active bleeding.  Transfusion not indicated at this time.  Patient verbalizes understanding of all instructions. Will continue to assess and implement POC. Call light within reach and hourly rounding in place.     Problem: Nutrition Deficit:  Goal: Optimize nutritional status  Outcome: Progressing   Encourage small frequent meals.   
  Problem: Safety - Adult  Goal: Free from fall injury  Outcome: Progressing   Orthostatic vital signs obtained at start of shift - see flowsheet for details.  Pt does not meet criteria for orthostasis.  Pt is a Med fall risk. See Esteves Fall Score and ABCDS Injury Risk assessments. - Screening for Orthostasis AND not a [place] Risk per ESTEVES/ABCDS: Pt bed is in low position, side rails up, call light and belongings are in reach.  Fall risk light is on outside pts room.  Pt encouraged to call for assistance as needed. Will continue with hourly rounds for PO intake, pain needs, toileting and repositioning as needed.     Problem: Skin/Tissue Integrity - Adult  Goal: Incisions, wounds, or drain sites healing without S/S of infection  Outcome: Progressing   Pt not noted to have any new skin breakdown this PM. Pt able to turn and reposition self in bed.    Problem: Hematologic - Adult  Goal: Maintains hematologic stability  Outcome: Progressing   Patient's hemoglobin this AM:   Recent Labs     03/22/25  0350   HGB 7.9*     Patient's platelet count this AM:   Recent Labs     03/22/25  0350   PLT 20*    Thrombocytopenia Precautions in place.  Patient showing no signs or symptoms of active bleeding.  Transfusion not indicated at this time.  Patient verbalizes understanding of all instructions. Will continue to assess and implement POC. Call light within reach and hourly rounding in place.     Problem: Nutrition Deficit:  Goal: Optimize nutritional status  Outcome: Progressing   Encouraging PO intake at each encounter, continue with toxicity assessments, fluids provided as bedside within reach.    
  Problem: Safety - Adult  Goal: Free from fall injury  Outcome: Progressing  Flowsheets (Taken 3/15/2025 0410)  Free From Fall Injury:   Instruct family/caregiver on patient safety   Based on caregiver fall risk screen, instruct family/caregiver to ask for assistance with transferring infant if caregiver noted to have fall risk factors  Note: Orthostatic vital signs obtained at start of shift - see flowsheet for details.  Pt does not meet criteria for orthostasis.  Pt is a Med fall risk. See Esteves Fall Score and ABCDS Injury Risk assessments.   - Screening for Orthostasis AND not a [place] Risk per ESTEVES/ABCDS: Pt bed is in low position, side rails up, call light and belongings are in reach.  Fall risk light is on outside pts room.  Pt encouraged to call for assistance as needed. Will continue with hourly rounds for PO intake, pain needs, toileting and repositioning as needed.        Problem: Skin/Tissue Integrity - Adult  Goal: Incisions, wounds, or drain sites healing without S/S of infection  Outcome: Progressing  Flowsheets (Taken 3/15/2025 0410)  Incisions, Wounds, or Drain Sites Healing Without Sign and Symptoms of Infection:   ADMISSION and DAILY: Assess and document risk factors for pressure ulcer development   TWICE DAILY: Assess and document skin integrity   TWICE DAILY: Assess and document dressing/incision, wound bed, drain sites and surrounding tissue  Note: Pt not noted to have any new skin breakdown this PM. Pt able to turn and reposition self in bed.       
  Problem: Safety - Adult  Goal: Free from fall injury  Outcome: Progressing  Flowsheets (Taken 3/17/2025 0413)  Free From Fall Injury:   Instruct family/caregiver on patient safety   Based on caregiver fall risk screen, instruct family/caregiver to ask for assistance with transferring infant if caregiver noted to have fall risk factors     Problem: ABCDS Injury Assessment  Goal: Absence of physical injury  Outcome: Progressing  Flowsheets (Taken 3/17/2025 0413)  Absence of Physical Injury: Implement safety measures based on patient assessment     Problem: Skin/Tissue Integrity - Adult  Goal: Incisions, wounds, or drain sites healing without S/S of infection  Outcome: Progressing  Flowsheets (Taken 3/17/2025 0413)  Incisions, Wounds, or Drain Sites Healing Without Sign and Symptoms of Infection:   ADMISSION and DAILY: Assess and document risk factors for pressure ulcer development   TWICE DAILY: Assess and document dressing/incision, wound bed, drain sites and surrounding tissue     Problem: Metabolic/Fluid and Electrolytes - Adult  Goal: Electrolytes maintained within normal limits  Outcome: Progressing     Problem: Hematologic - Adult  Goal: Maintains hematologic stability  Outcome: Progressing     Problem: Nutrition Deficit:  Goal: Optimize nutritional status  Outcome: Progressing     
  Problem: Safety - Adult  Goal: Free from fall injury  Outcome: Progressing  Flowsheets (Taken 3/18/2025 1837)  Free From Fall Injury: Instruct family/caregiver on patient safety     Problem: ABCDS Injury Assessment  Goal: Absence of physical injury  Outcome: Progressing  Flowsheets (Taken 3/18/2025 1837)  Absence of Physical Injury: Implement safety measures based on patient assessment     Problem: Skin/Tissue Integrity - Adult  Goal: Incisions, wounds, or drain sites healing without S/S of infection  Outcome: Progressing  Flowsheets (Taken 3/18/2025 1837)  Incisions, Wounds, or Drain Sites Healing Without Sign and Symptoms of Infection: ADMISSION and DAILY: Assess and document risk factors for pressure ulcer development     Problem: Metabolic/Fluid and Electrolytes - Adult  Goal: Electrolytes maintained within normal limits  Outcome: Progressing  Flowsheets (Taken 3/18/2025 1837)  Electrolytes maintained within normal limits: Monitor labs and assess patient for signs and symptoms of electrolyte imbalances  Goal: Hemodynamic stability and optimal renal function maintained  Outcome: Progressing  Goal: Glucose maintained within prescribed range  Outcome: Progressing     Problem: Hematologic - Adult  Goal: Maintains hematologic stability  Outcome: Progressing  Flowsheets (Taken 3/18/2025 1837)  Maintains hematologic stability: Assess for signs and symptoms of bleeding or hemorrhage     Problem: Nutrition Deficit:  Goal: Optimize nutritional status  Outcome: Progressing  Flowsheets (Taken 3/18/2025 1837)  Nutrient intake appropriate for improving, restoring, or maintaining nutritional needs: Assess nutritional status and recommend course of action     
  Problem: Safety - Adult  Goal: Free from fall injury  Outcome: Progressing  Flowsheets (Taken 4/1/2025 2028)  Free From Fall Injury: Instruct family/caregiver on patient safety  Pt is up ad kishan ortho negative. Pt bed in lowest position and wheels locked. Call light and table within reach. Pt ambulates well with IV pole and lines.    Problem: ABCDS Injury Assessment  Goal: Absence of physical injury  Outcome: Progressing  Flowsheets (Taken 4/1/2025 2028)  Absence of Physical Injury: Implement safety measures based on patient assessment  Standard safety measures in place.     Problem: Skin/Tissue Integrity - Adult  Goal: Incisions, wounds, or drain sites healing without S/S of infection  Outcome: Progressing  Flowsheets (Taken 4/1/2025 2028)  Incisions, Wounds, or Drain Sites Healing Without Sign and Symptoms of Infection: ADMISSION and DAILY: Assess and document risk factors for pressure ulcer development     Problem: Metabolic/Fluid and Electrolytes - Adult  Goal: Electrolytes maintained within normal limits  Outcome: Progressing  Flowsheets (Taken 4/1/2025 2028)  Electrolytes maintained within normal limits:   Monitor labs and assess patient for signs and symptoms of electrolyte imbalances   Administer electrolyte replacement as ordered   Monitor response to electrolyte replacements, including repeat lab results as appropriate  Goal: Hemodynamic stability and optimal renal function maintained  Outcome: Progressing  Flowsheets (Taken 4/1/2025 2028)  Hemodynamic stability and optimal renal function maintained:   Monitor labs and assess for signs and symptoms of volume excess or deficit   Monitor intake, output and patient weight   Monitor urine specific gravity, serum osmolarity and serum sodium as indicated or ordered   Monitor response to interventions for patient's volume status, including labs, urine output, blood pressure (other measures as available)   Encourage oral intake as appropriate  Goal: Glucose 
  Problem: Safety - Adult  Goal: Free from fall injury  Outcome: Progressing  Note: Orthostatic vital signs obtained at start of shift - see flowsheet for details.  Pt does not meet criteria for orthostasis.  Pt is a Low fall risk. See Esteves Fall Score and ABCDS Injury Risk assessments.   - Screening for Orthostasis AND not a [place] Risk per ESTEVES/ABCDS: Pt bed is in low position, side rails up, call light and belongings are in reach.  Fall risk light is on outside pts room.  Pt encouraged to call for assistance as needed. Will continue with hourly rounds for PO intake, pain needs, toileting and repositioning as needed.      Problem: ABCDS Injury Assessment  Goal: Absence of physical injury  Outcome: Progressing  Note: No new physical injuries this shift.      Problem: Hematologic - Adult  Goal: Maintains hematologic stability  Outcome: Progressing  Note: Patient's hemoglobin this AM:   Recent Labs     04/05/25  0325   HGB 7.4*     Patient's platelet count this AM:   Recent Labs     04/05/25  0325   PLT 22*    Thrombocytopenia Precautions in place.  Patient showing no signs or symptoms of active bleeding.  Transfusion not indicated at this time.  Patient verbalizes understanding of all instructions. Will continue to assess and implement POC. Call light within reach and hourly rounding in place.      Problem: Nutrition Deficit:  Goal: Optimize nutritional status  Outcome: Progressing  Note: Patient has low appetite, but encourage to supplement with protein shakes and eat small amounts throughout day.      Problem: Pain  Goal: Verbalizes/displays adequate comfort level or baseline comfort level  Outcome: Progressing  Note: Pt reported pain this shift. PRN oxycodone given per MAR. Pt educated on importance of calling for pain meds when in pain. Pt verbalized understanding.      
Consult received for penile/scrotal pain. I called nurse and discussed case. States patient's penis and scrotum are both swollen and hard to touch. He is in mild discomfort. Also having trouble with using a urinal but is still voiding. Will order scrotal US and bladder scan after he voids next. If we are not in house later today, will FU tomorrow morning for formal consult.   
Problem: Safety - Adult  Goal: Free from fall injury  Outcome: Progressing  Flowsheets (Taken 3/29/2025 0104)  Free From Fall Injury:   Instruct family/caregiver on patient safety   Based on caregiver fall risk screen, instruct family/caregiver to ask for assistance with transferring infant if caregiver noted to have fall risk factors  Orthostatic vital signs obtained at start of shift - see flowsheet for details.  Pt does not meet criteria for orthostasis.  Pt is a Med fall risk. See Esteves Fall Score and ABCDS Injury Risk assessments.   - Screening for Orthostasis AND not a [place] Risk per ESTEVES/ABCDS: Pt bed is in low position, side rails up, call light and belongings are in reach.  Fall risk light is on outside pts room.  Pt encouraged to call for assistance as needed. Will continue with hourly rounds for PO intake, pain needs, toileting and repositioning as needed.     Problem: ABCDS Injury Assessment  Goal: Absence of physical injury  Outcome: Progressing  Flowsheets (Taken 3/29/2025 0104)  Absence of Physical Injury: Implement safety measures based on patient assessment     Problem: Metabolic/Fluid and Electrolytes - Adult  Goal: Electrolytes maintained within normal limits  Outcome: Progressing  Flowsheets (Taken 3/29/2025 0104)  Electrolytes maintained within normal limits:   Monitor labs and assess patient for signs and symptoms of electrolyte imbalances   Administer electrolyte replacement as ordered   Monitor response to electrolyte replacements, including repeat lab results as appropriate     Problem: Hematologic - Adult  Goal: Maintains hematologic stability  Outcome: Progressing  Flowsheets (Taken 3/29/2025 0104)  Maintains hematologic stability:   Assess for signs and symptoms of bleeding or hemorrhage   Monitor labs for bleeding or clotting disorders   Administer blood products/factors as ordered  Patient's hemoglobin this AM:   Recent Labs     03/29/25  0411   HGB 8.0*     Patient's platelet 
(L) 03/19/2025      
1845)  Nutrient intake appropriate for improving, restoring, or maintaining nutritional needs: Assess nutritional status and recommend course of action     
Monitor response to interventions for patient's volume status, including labs, urine output, blood pressure (other measures as available)   Encourage oral intake as appropriate  Goal: Glucose maintained within prescribed range  Outcome: Progressing  Flowsheets (Taken 3/23/2025 2010)  Glucose maintained within prescribed range:   Monitor blood glucose as ordered   Assess for signs and symptoms of hyperglycemia and hypoglycemia   Administer ordered medications to maintain glucose within target range     Problem: Hematologic - Adult  Goal: Maintains hematologic stability  Outcome: Progressing  Flowsheets (Taken 3/23/2025 2010)  Maintains hematologic stability:   Assess for signs and symptoms of bleeding or hemorrhage   Monitor labs for bleeding or clotting disorders   Administer blood products/factors as ordered   Patient's hemoglobin this AM:   Recent Labs     03/24/25  0430   HGB 6.5*     Patient's platelet count this AM:   Recent Labs     03/24/25  0430   PLT 8*    Thrombocytopenia Precautions in place.  Patient showing no signs or symptoms of active bleeding.  Patient transfused blood products per orders - see flowsheet.  Patient verbalizes understanding of all instructions. Will continue to assess and implement POC. Call light within reach and hourly rounding in place.     Problem: Nutrition Deficit:  Goal: Optimize nutritional status  Outcome: Not Progressing     Problem: Pain  Goal: Verbalizes/displays adequate comfort level or baseline comfort level  Outcome: Progressing   Pt complains of lower back pain. Pt has PRN oxycodone and Voltaren gel - see MAR.     
response to interventions for patient's volume status, including labs, urine output, blood pressure (other measures as available)   Encourage oral intake as appropriate  Goal: Glucose maintained within prescribed range  Outcome: Progressing  Flowsheets (Taken 3/31/2025 2013)  Glucose maintained within prescribed range:   Monitor blood glucose as ordered   Assess for signs and symptoms of hyperglycemia and hypoglycemia   Administer ordered medications to maintain glucose within target range   Assess barriers to adequate nutritional intake and initiate nutrition consult as needed     Problem: Hematologic - Adult  Goal: Maintains hematologic stability  Outcome: Progressing  Flowsheets (Taken 3/31/2025 2013)  Maintains hematologic stability:   Assess for signs and symptoms of bleeding or hemorrhage   Monitor labs for bleeding or clotting disorders   Administer blood products/factors as ordered  Pt Hgb is 25 and was given platelets per Dr orders of Platelets below 30 administer platelets.    Problem: Nutrition Deficit:  Goal: Optimize nutritional status  Outcome: Progressing   Pt has a decreased appetite.     Problem: Pain  Goal: Verbalizes/displays adequate comfort level or baseline comfort level  Outcome: Progressing   Pt complains of pain in his back. Pt has oxycodone PRN - see MAR.    
volume excess or deficit   Monitor intake, output and patient weight   Monitor response to interventions for patient's volume status, including labs, urine output, blood pressure (other measures as available)   Encourage oral intake as appropriate     Problem: Metabolic/Fluid and Electrolytes - Adult  Goal: Glucose maintained within prescribed range  3/19/2025 0531 by Kirstie Smith, RN  Outcome: Progressing  Flowsheets (Taken 3/18/2025 2001)  Glucose maintained within prescribed range:   Monitor blood glucose as ordered   Assess for signs and symptoms of hyperglycemia and hypoglycemia     Problem: Hematologic - Adult  Goal: Maintains hematologic stability  3/19/2025 0531 by Kirstie Smith, RN  Outcome: Progressing  Flowsheets (Taken 3/18/2025 2001)  Maintains hematologic stability:   Assess for signs and symptoms of bleeding or hemorrhage   Monitor labs for bleeding or clotting disorders   Administer blood products/factors as ordered  Pt Hgb is 8.9 and platelets are 49. Pt didn't need any replacements per Dr orders.     Problem: Nutrition Deficit:  Goal: Optimize nutritional status  3/19/2025 0531 by Kirstie Smith, RN  Outcome: Progressing     
Progressing     
scale   Administer analgesics based on type and severity of pain and evaluate response   Implement non-pharmacological measures as appropriate and evaluate response  Note: Patient reported pain during night. Pt was administered PRN pain medication and lidocaine patch applied to mid lower back. Pt satisfied with intervention.

## 2025-04-10 NOTE — PROGRESS NOTES
[facility] Allogene[facility] Progress Note    2025    Myles Meehan    :  1950    MRN:  [id number]    Referring MD: Audi Robin MD  [facility] [place] [address]    Interval History:   He is doing ok, emesis in the morning. Will schedule zofran. He is on oxycodone for pain, will switch to tramadol         ECOG PS:  (1) Restricted in physically strenuous activity, ambulatory and able to do work of light nature    KPS: 80% Normal activity with effort; some signs or symptoms of disease    Isolation:  None     Medications    Scheduled Meds:   doxycycline monohydrate  100 mg Oral 2 times per day    atovaquone  1,500 mg Oral Daily    [START ON 2025] penicillin v potassium  500 mg Oral BID    miconazole   Topical BID    furosemide  40 mg IntraVENous BID    fluconazole  400 mg Oral Daily    sodium chloride flush  5-40 mL IntraVENous 2 times per day    Saline Mouthwash  15 mL Swish & Spit 4x Daily AC & HS    valACYclovir  500 mg Oral BID    ursodiol  500 mg Oral BID    tacrolimus  2.5 mg Oral Q12H    mycophenolate  1,000 mg Oral TID    filgrastim/filgrastim biosimilar  300 mcg SubCUTAneous QPM    allopurinol  150 mg Oral Daily    pantoprazole  40 mg Oral Daily    nystatin  5 mL Oral 4x Daily     Continuous Infusions:   potassium chloride 40 mEq, magnesium sulfate 1,000 mg in sodium chloride 0.9 % 1,000 mL infusion 50 mL/hr at 25 0436    sodium chloride      potassium chloride 20 mEq (25 1346)     PRN Meds:biotene, acetaminophen, sennosides-docusate sodium, polyethylene glycol, oxyCODONE **OR** oxyCODONE, hydrocortisone, loperamide, hydrALAZINE, sodium chloride, sodium chloride flush, potassium chloride, magnesium sulfate, Saline Mouthwash, ALTEplase (CATHFLO) 2 mg in sterile water 2 mL injection, prochlorperazine **OR** prochlorperazine, tetrahydrozoline, LORazepam **OR** LORazepam    ROS:  As noted above, otherwise remainder of 10-point ROS 
                       [facility] Allogeneic Progress Note    2025    Myles Meehan    :  1950    MRN:  [id number]    Referring MD: Audi Robin MD  [facility] [place] [address]    Interval History:   - RUQ pain- US shows distended gallbladder containing sludge- could suggest cholecystitis   - Surgery consulted, no surgical intervention at this time, HIDA scan negative ruling out acute cholecystitis   - Back pain improving  Appetite fair      ECOG PS:  (1) Restricted in physically strenuous activity, ambulatory and able to do work of light nature    KPS: 80% Normal activity with effort; some signs or symptoms of disease    Isolation:  None     Medications    Scheduled Meds:   piperacillin-tazobactam  4,500 mg IntraVENous Q8H    fluconazole  400 mg Oral Daily    sodium chloride flush  5-40 mL IntraVENous 2 times per day    Saline Mouthwash  15 mL Swish & Spit 4x Daily AC & HS    valACYclovir  500 mg Oral BID    ursodiol  500 mg Oral BID    tacrolimus  2.5 mg Oral Q12H    mycophenolate  1,000 mg Oral TID    filgrastim/filgrastim biosimilar  300 mcg SubCUTAneous QPM    allopurinol  150 mg Oral Daily    pantoprazole  40 mg Oral Daily    nystatin  5 mL Oral 4x Daily     Continuous Infusions:   sodium chloride      potassium chloride 40 mEq, magnesium sulfate 1,000 mg in sodium chloride 0.9 % 1,000 mL infusion 50 mL/hr at 25 0409    sodium chloride      sodium chloride      sodium chloride      potassium chloride 20 mEq (25 1346)     PRN Meds:biotene, acetaminophen, sennosides-docusate sodium, polyethylene glycol, oxyCODONE **OR** oxyCODONE, sodium chloride, hydrocortisone, diclofenac sodium, loperamide, sodium chloride, dicyclomine, hydrALAZINE, sodium chloride, sodium chloride flush, sodium chloride, potassium chloride, magnesium sulfate, Saline Mouthwash, ALTEplase (CATHFLO) 2 mg in sterile water 2 mL injection, prochlorperazine **OR** prochlorperazine, tetrahydrozoline, 
                       [facility] Allogeneic Progress Note    2025    Myles Meehan    :  1950    MRN:  [id number]    Referring MD: Audi Robin MD  [facility] [place] [address]    Interval History:   - Scrotal US- Findings suggesting bilateral epididymoorchitis- on cefepime and vanc  - Continue miconazole cream  - Doing well, no other complaints today    ECOG PS:  (1) Restricted in physically strenuous activity, ambulatory and able to do work of light nature    KPS: 80% Normal activity with effort; some signs or symptoms of disease    Isolation:  None     Medications    Scheduled Meds:   miconazole   Topical BID    vancomycin  1,750 mg IntraVENous Q24H    furosemide  40 mg IntraVENous BID    cefepime  2,000 mg IntraVENous Q8H    fluconazole  400 mg Oral Daily    sodium chloride flush  5-40 mL IntraVENous 2 times per day    Saline Mouthwash  15 mL Swish & Spit 4x Daily AC & HS    valACYclovir  500 mg Oral BID    ursodiol  500 mg Oral BID    tacrolimus  2.5 mg Oral Q12H    mycophenolate  1,000 mg Oral TID    filgrastim/filgrastim biosimilar  300 mcg SubCUTAneous QPM    allopurinol  150 mg Oral Daily    pantoprazole  40 mg Oral Daily    nystatin  5 mL Oral 4x Daily     Continuous Infusions:   sodium chloride      sodium chloride      sodium chloride      potassium chloride 40 mEq, magnesium sulfate 1,000 mg in sodium chloride 0.9 % 1,000 mL infusion 50 mL/hr at 25 2142    sodium chloride      sodium chloride      sodium chloride 5 mL/hr at 25 0258    potassium chloride 20 mEq (25 1346)     PRN Meds:sodium chloride, sodium chloride, biotene, acetaminophen, sennosides-docusate sodium, polyethylene glycol, oxyCODONE **OR** oxyCODONE, sodium chloride, hydrocortisone, diclofenac sodium, loperamide, sodium chloride, dicyclomine, hydrALAZINE, sodium chloride, sodium chloride flush, sodium chloride, potassium chloride, magnesium sulfate, Saline Mouthwash, ALTEplase 
                       [facility] Allogeneic Progress Note    2025    Myles Meehan    :  1950    MRN:  [id number]    Referring MD: Audi Robin MD  [facility] [place] [address]    Interval History:   - WBC recovered  - 200 emesis this morning      ECOG PS:  (1) Restricted in physically strenuous activity, ambulatory and able to do work of light nature    KPS: 80% Normal activity with effort; some signs or symptoms of disease    Isolation:  None     Medications    Scheduled Meds:   miconazole   Topical BID    vancomycin  1,750 mg IntraVENous Q24H    furosemide  40 mg IntraVENous BID    cefepime  2,000 mg IntraVENous Q8H    fluconazole  400 mg Oral Daily    sodium chloride flush  5-40 mL IntraVENous 2 times per day    Saline Mouthwash  15 mL Swish & Spit 4x Daily AC & HS    valACYclovir  500 mg Oral BID    ursodiol  500 mg Oral BID    tacrolimus  2.5 mg Oral Q12H    mycophenolate  1,000 mg Oral TID    filgrastim/filgrastim biosimilar  300 mcg SubCUTAneous QPM    allopurinol  150 mg Oral Daily    pantoprazole  40 mg Oral Daily    nystatin  5 mL Oral 4x Daily     Continuous Infusions:   sodium chloride      sodium chloride      sodium chloride      potassium chloride 40 mEq, magnesium sulfate 1,000 mg in sodium chloride 0.9 % 1,000 mL infusion 50 mL/hr at 25 0800    sodium chloride      sodium chloride      sodium chloride 5 mL/hr at 25 0258    potassium chloride 20 mEq (25 1346)     PRN Meds:sodium chloride, sodium chloride, biotene, acetaminophen, sennosides-docusate sodium, polyethylene glycol, oxyCODONE **OR** oxyCODONE, sodium chloride, hydrocortisone, diclofenac sodium, loperamide, sodium chloride, dicyclomine, hydrALAZINE, sodium chloride, sodium chloride flush, sodium chloride, potassium chloride, magnesium sulfate, Saline Mouthwash, ALTEplase (CATHFLO) 2 mg in sterile water 2 mL injection, prochlorperazine **OR** prochlorperazine, tetrahydrozoline, 
                       [facility] Allogeneic Progress Note    3/16/2025    Myles Meehan    :  1950    MRN:  [id number]    Referring MD: Audi Robin MD  [address] [place] [address]      Subjective: The patient has no complaints.     ECOG PS:  (1) Restricted in physically strenuous activity, ambulatory and able to do work of light nature    KPS: 80% Normal activity with effort; some signs or symptoms of disease    Isolation:  None     Medications    Scheduled Meds:   [START ON 3/25/2025] fluconazole  400 mg Oral Daily    sodium chloride flush  5-40 mL IntraVENous 2 times per day    Saline Mouthwash  15 mL Swish & Spit 4x Daily AC & HS    valACYclovir  500 mg Oral BID    enoxaparin  40 mg SubCUTAneous QPM    furosemide  40 mg IntraVENous Q12H    ondansetron  12 mg Oral Q24H    fludarabine (FLUDARA) 55 mg in dextrose 5 % 100 mL chemo IVPB  55 mg IntraVENous Q24H    ursodiol  500 mg Oral BID    [START ON 3/25/2025] tacrolimus  2.5 mg Oral Q12H    [START ON 3/23/2025] ondansetron  24 mg Oral Once    [START ON 3/23/2025] fosaprepitant (EMEND) 150 mg in sodium chloride 0.9 % 250 mL IVPB  150 mg IntraVENous Once    [START ON 3/23/2025] mesna (MESNEX) 700 mg in sodium chloride 0.9 % 50 mL IVPB  700 mg IntraVENous Once    [START ON 3/23/2025] mesna (MESNEX) 3,500 mg in sodium chloride 0.9 % 500 mL IVPB  3,500 mg IntraVENous Q24H    [START ON 3/23/2025] cycloPHOSphamide (CYTOXAN) 3,500 mg in sodium chloride 0.9 % 500 mL chemo infusion  3,500 mg IntraVENous Once    [START ON 3/24/2025] ondansetron  24 mg Oral Once    [START ON 3/24/2025] mesna (MESNEX) 700 mg in sodium chloride 0.9 % 50 mL IVPB  700 mg IntraVENous Once    [START ON 3/24/2025] cycloPHOSphamide (CYTOXAN) 3,500 mg in sodium chloride 0.9 % 500 mL chemo infusion  3,500 mg IntraVENous Once    [START ON 3/25/2025] mycophenolate  1,000 mg Oral TID    [START ON 3/25/2025] filgrastim/filgrastim biosimilar  300 mcg SubCUTAneous QPM    
                       [facility] Allogeneic Progress Note    3/20/2025    Mylse Meehan    :  1950    MRN:  [id number]    Referring MD: Audi Robin MD  [place] [address]    Interval History:  - The patient has no complaints  - Doing well- will receive stem cell infusion this afternoon     ECOG PS:  (1) Restricted in physically strenuous activity, ambulatory and able to do work of light nature    KPS: 80% Normal activity with effort; some signs or symptoms of disease    Isolation:  None     Medications    Scheduled Meds:   levoFLOXacin  500 mg Oral Q24H    fluconazole  200 mg Oral Daily    [START ON 3/25/2025] fluconazole  400 mg Oral Daily    sodium chloride flush  5-40 mL IntraVENous 2 times per day    Saline Mouthwash  15 mL Swish & Spit 4x Daily AC & HS    valACYclovir  500 mg Oral BID    furosemide  40 mg IntraVENous Q12H    ursodiol  500 mg Oral BID    [START ON 3/25/2025] tacrolimus  2.5 mg Oral Q12H    [START ON 3/23/2025] ondansetron  24 mg Oral Once    [START ON 3/23/2025] fosaprepitant (EMEND) 150 mg in sodium chloride 0.9 % 250 mL IVPB  150 mg IntraVENous Once    [START ON 3/23/2025] mesna (MESNEX) 700 mg in sodium chloride 0.9 % 50 mL IVPB  700 mg IntraVENous Once    [START ON 3/23/2025] mesna (MESNEX) 3,500 mg in sodium chloride 0.9 % 500 mL IVPB  3,500 mg IntraVENous Q24H    [START ON 3/23/2025] cycloPHOSphamide (CYTOXAN) 3,500 mg in sodium chloride 0.9 % 500 mL chemo infusion  3,500 mg IntraVENous Once    [START ON 3/24/2025] ondansetron  24 mg Oral Once    [START ON 3/24/2025] mesna (MESNEX) 700 mg in sodium chloride 0.9 % 50 mL IVPB  700 mg IntraVENous Once    [START ON 3/24/2025] cycloPHOSphamide (CYTOXAN) 3,500 mg in sodium chloride 0.9 % 500 mL chemo infusion  3,500 mg IntraVENous Once    [START ON 3/25/2025] mycophenolate  1,000 mg Oral TID    [START ON 3/25/2025] filgrastim/filgrastim biosimilar  300 mcg SubCUTAneous QPM    allopurinol  150 mg Oral 
                       [facility] Allogeneic Progress Note    3/21/2025    Myles Meehan    :  1950    MRN:  [id number]    Referring MD: Audi Robin MD  [address] [place] [address]    Interval History:  - Tolerated stem cell infusion well yesterday     ECOG PS:  (1) Restricted in physically strenuous activity, ambulatory and able to do work of light nature    KPS: 80% Normal activity with effort; some signs or symptoms of disease    Isolation:  None     Medications    Scheduled Meds:   levoFLOXacin  500 mg Oral Q24H    fluconazole  200 mg Oral Daily    [START ON 3/25/2025] fluconazole  400 mg Oral Daily    sodium chloride flush  5-40 mL IntraVENous 2 times per day    Saline Mouthwash  15 mL Swish & Spit 4x Daily AC & HS    valACYclovir  500 mg Oral BID    furosemide  40 mg IntraVENous Q12H    ursodiol  500 mg Oral BID    [START ON 3/25/2025] tacrolimus  2.5 mg Oral Q12H    [START ON 3/23/2025] ondansetron  24 mg Oral Once    [START ON 3/23/2025] fosaprepitant (EMEND) 150 mg in sodium chloride 0.9 % 250 mL IVPB  150 mg IntraVENous Once    [START ON 3/23/2025] mesna (MESNEX) 700 mg in sodium chloride 0.9 % 50 mL IVPB  700 mg IntraVENous Once    [START ON 3/23/2025] mesna (MESNEX) 3,500 mg in sodium chloride 0.9 % 500 mL IVPB  3,500 mg IntraVENous Q24H    [START ON 3/23/2025] cycloPHOSphamide (CYTOXAN) 3,500 mg in sodium chloride 0.9 % 500 mL chemo infusion  3,500 mg IntraVENous Once    [START ON 3/24/2025] ondansetron  24 mg Oral Once    [START ON 3/24/2025] mesna (MESNEX) 700 mg in sodium chloride 0.9 % 50 mL IVPB  700 mg IntraVENous Once    [START ON 3/24/2025] cycloPHOSphamide (CYTOXAN) 3,500 mg in sodium chloride 0.9 % 500 mL chemo infusion  3,500 mg IntraVENous Once    [START ON 3/25/2025] mycophenolate  1,000 mg Oral TID    [START ON 3/25/2025] filgrastim/filgrastim biosimilar  300 mcg SubCUTAneous QPM    allopurinol  150 mg Oral Daily    pantoprazole  40 mg Oral Daily    
                       [facility] Allogeneic Progress Note    3/23/2025    Myles Meehan    :  1950    MRN:  [id number]    Referring MD: Audi Robin MD  [facility] [place] [address]    Interval History:  - Doing well, loose stools resolving   - Fatigued.  Slightly dizzy this morning and was found to be orthostatic.    ECOG PS:  (1) Restricted in physically strenuous activity, ambulatory and able to do work of light nature    KPS: 80% Normal activity with effort; some signs or symptoms of disease    Isolation:  None     Medications    Scheduled Meds:   sodium chloride  1,000 mL IntraVENous Once    levoFLOXacin  500 mg Oral Q24H    fluconazole  200 mg Oral Daily    [START ON 3/25/2025] fluconazole  400 mg Oral Daily    sodium chloride flush  5-40 mL IntraVENous 2 times per day    Saline Mouthwash  15 mL Swish & Spit 4x Daily AC & HS    valACYclovir  500 mg Oral BID    furosemide  40 mg IntraVENous Q12H    ursodiol  500 mg Oral BID    [START ON 3/25/2025] tacrolimus  2.5 mg Oral Q12H    ondansetron  24 mg Oral Once    fosaprepitant (EMEND) 150 mg in sodium chloride 0.9 % 250 mL IVPB  150 mg IntraVENous Once    mesna (MESNEX) 700 mg in sodium chloride 0.9 % 50 mL IVPB  700 mg IntraVENous Once    mesna (MESNEX) 3,500 mg in sodium chloride 0.9 % 500 mL IVPB  3,500 mg IntraVENous Q24H    cycloPHOSphamide (CYTOXAN) 3,500 mg in sodium chloride 0.9 % 500 mL chemo infusion  3,500 mg IntraVENous Once    [START ON 3/24/2025] ondansetron  24 mg Oral Once    [START ON 3/24/2025] mesna (MESNEX) 700 mg in sodium chloride 0.9 % 50 mL IVPB  700 mg IntraVENous Once    [START ON 3/24/2025] cycloPHOSphamide (CYTOXAN) 3,500 mg in sodium chloride 0.9 % 500 mL chemo infusion  3,500 mg IntraVENous Once    [START ON 3/25/2025] mycophenolate  1,000 mg Oral TID    [START ON 3/25/2025] filgrastim/filgrastim biosimilar  300 mcg SubCUTAneous QPM    allopurinol  150 mg Oral Daily    pantoprazole  40 mg Oral Daily    
                       [facility] Allogeneic Progress Note    3/24/2025    Myles Meehan    :  1950    MRN:  [id number]    Referring MD: Audi Robin MD  [address] [place] [address]    Interval History:  - Ortho positive yesterday and received IV fluid bolus  - Getting blood and plts this morning    ECOG PS:  (1) Restricted in physically strenuous activity, ambulatory and able to do work of light nature    KPS: 80% Normal activity with effort; some signs or symptoms of disease    Isolation:  None     Medications    Scheduled Meds:   levoFLOXacin  500 mg Oral Q24H    fluconazole  200 mg Oral Daily    [START ON 3/25/2025] fluconazole  400 mg Oral Daily    sodium chloride flush  5-40 mL IntraVENous 2 times per day    Saline Mouthwash  15 mL Swish & Spit 4x Daily AC & HS    valACYclovir  500 mg Oral BID    ursodiol  500 mg Oral BID    [START ON 3/25/2025] tacrolimus  2.5 mg Oral Q12H    mesna (MESNEX) 3,500 mg in sodium chloride 0.9 % 500 mL IVPB  3,500 mg IntraVENous Q24H    ondansetron  24 mg Oral Once    mesna (MESNEX) 700 mg in sodium chloride 0.9 % 50 mL IVPB  700 mg IntraVENous Once    cycloPHOSphamide (CYTOXAN) 3,500 mg in sodium chloride 0.9 % 500 mL chemo infusion  3,500 mg IntraVENous Once    [START ON 3/25/2025] mycophenolate  1,000 mg Oral TID    [START ON 3/25/2025] filgrastim/filgrastim biosimilar  300 mcg SubCUTAneous QPM    allopurinol  150 mg Oral Daily    pantoprazole  40 mg Oral Daily    nystatin  5 mL Oral 4x Daily     Continuous Infusions:   sodium chloride      sodium chloride      sodium chloride      potassium chloride 20 mEq (25 0732)    sodium chloride 155 mL/hr at 25 0616    [START ON 3/25/2025] sodium chloride       PRN Meds:diclofenac sodium, oxyCODONE, loperamide, sodium chloride, dicyclomine, hydrALAZINE, sodium chloride, sodium chloride flush, sodium chloride, potassium chloride, magnesium sulfate, Saline Mouthwash, ALTEplase (CATHFLO) 2 mg in 
                       [facility] Allogeneic Progress Note    3/25/2025    Myles Meehan    :  1950    MRN:  [id number]    Referring MD: Audi Robin MD  [address] [place] [address]    Interval History:  - Afebrile, VSS  - +2.4 L, received 20 mg lasix this morning    ECOG PS:  (1) Restricted in physically strenuous activity, ambulatory and able to do work of light nature    KPS: 80% Normal activity with effort; some signs or symptoms of disease    Isolation:  None     Medications    Scheduled Meds:   levoFLOXacin  500 mg Oral Q24H    fluconazole  400 mg Oral Daily    sodium chloride flush  5-40 mL IntraVENous 2 times per day    Saline Mouthwash  15 mL Swish & Spit 4x Daily AC & HS    valACYclovir  500 mg Oral BID    ursodiol  500 mg Oral BID    tacrolimus  2.5 mg Oral Q12H    mesna (MESNEX) 3,500 mg in sodium chloride 0.9 % 500 mL IVPB  3,500 mg IntraVENous Q24H    mycophenolate  1,000 mg Oral TID    filgrastim/filgrastim biosimilar  300 mcg SubCUTAneous QPM    allopurinol  150 mg Oral Daily    pantoprazole  40 mg Oral Daily    nystatin  5 mL Oral 4x Daily     Continuous Infusions:   sodium chloride      sodium chloride      sodium chloride      potassium chloride 20 mEq (25 0822)    sodium chloride 155 mL/hr at 25 0500    sodium chloride       PRN Meds:diclofenac sodium, oxyCODONE, loperamide, sodium chloride, dicyclomine, hydrALAZINE, sodium chloride, sodium chloride flush, sodium chloride, potassium chloride, magnesium sulfate, Saline Mouthwash, ALTEplase (CATHFLO) 2 mg in sterile water 2 mL injection, prochlorperazine **OR** prochlorperazine, furosemide, tetrahydrozoline, LORazepam **OR** LORazepam    ROS:  As noted above, otherwise remainder of 10-point ROS negative    Physical Exam:    I&O:    Intake/Output Summary (Last 24 hours) at 3/25/2025 0904  Last data filed at 3/25/2025 0822  Gross per 24 hour   Intake 3418.67 ml   Output 4150 ml   Net -731.33 ml       Vital 
     Urology Attending Progress Note      Subjective: No longer with scrotal pain. No voiding issues    Vitals:  BP (!) 153/104   Pulse 91   Temp 97.8 °F (36.6 °C)   Resp 16   Ht 1.726 m (5' 7.95\") Comment: measured  Wt 101.5 kg (223 lb 12.8 oz)   SpO2 99%   BMI 34.08 kg/m²   Temp  Av.8 °F (36.6 °C)  Min: 97.7 °F (36.5 °C)  Max: 98 °F (36.7 °C)    Intake/Output Summary (Last 24 hours) at 2025 1041  Last data filed at 2025 0944  Gross per 24 hour   Intake 3593 ml   Output 2350 ml   Net 1243 ml       Exam: Mild TTP of R epididymis. Scrotal skin appearance improving    Labs:  WBC:    Lab Results   Component Value Date/Time    WBC 7.0 2025 04:00 AM     Hemoglobin/Hematocrit:    Lab Results   Component Value Date/Time    HGB 7.0 2025 04:00 AM    HCT 19.9 2025 04:00 AM     BMP:    Lab Results   Component Value Date/Time     2025 04:00 AM    K 3.9 2025 04:00 AM    K 4.1 2024 06:57 AM     2025 04:00 AM    CO2 21 2025 04:00 AM    BUN 21 2025 04:00 AM    CREATININE 1.1 2025 04:00 AM    CALCIUM 9.0 2025 04:00 AM    GFRAA >60 2019 12:55 AM    LABGLOM 70 2025 04:00 AM     PT/INR:    Lab Results   Component Value Date/Time    PROTIME 15.6 2025 03:30 AM    INR 1.22 2025 03:30 AM     PTT:    Lab Results   Component Value Date/Time    APTT 32.9 2025 03:30 AM   [APTT    Impression/Plan: 74yo M with AML. He had VANESSA showing epididymo-orchitis    -No scrotal pain or voiding issues reported  -Scrotal skin appearance improved to prior exams. Still slightly TTP on palpation of R epididymis but overall improved  -We can plan to see him for repeat exam in ~2 weeks after DC. Please call with any further questions/concerns       NATHANAEL Padilla  
     Urology Attending Progress Note      Subjective: No new complaints. Explained scrotal ultrasound results to him today.     Vitals:  BP (!) 145/68   Pulse 80   Temp 97.8 °F (36.6 °C) (Oral)   Resp 15   Ht 1.726 m (5' 7.95\") Comment: measured  Wt 102.2 kg (225 lb 6.4 oz)   SpO2 97%   BMI 34.32 kg/m²   Temp  Av °F (36.7 °C)  Min: 97.4 °F (36.3 °C)  Max: 98.2 °F (36.8 °C)    Intake/Output Summary (Last 24 hours) at 2025 1109  Last data filed at 2025 0842  Gross per 24 hour   Intake 3737 ml   Output 2050 ml   Net 1687 ml       Exam: Sitting in chair, appears comfortable. Breathing non-labored.    Labs:  WBC:    Lab Results   Component Value Date/Time    WBC 0.3 2025 03:30 AM     Hemoglobin/Hematocrit:    Lab Results   Component Value Date/Time    HGB 6.7 2025 03:30 AM    HCT 19.1 2025 03:30 AM     BMP:    Lab Results   Component Value Date/Time     2025 03:30 AM    K 3.9 2025 03:30 AM    K 4.1 2024 06:57 AM     2025 03:30 AM    CO2 21 2025 03:30 AM    BUN 19 2025 03:30 AM    CREATININE 0.9 2025 03:30 AM    CALCIUM 8.4 2025 03:30 AM    GFRAA >60 2019 12:55 AM    LABGLOM 89 2025 03:30 AM     PT/INR:    Lab Results   Component Value Date/Time    PROTIME 16.6 2025 03:15 AM    INR 1.33 2025 03:15 AM     PTT:    Lab Results   Component Value Date/Time    APTT 34.4 2025 03:15 AM   [APTT      Antibiotic Therapy:  Cefepime/Vanc    Imaging: IMPRESSION:     1. No evidence of intratesticular mass.  2. Findings suggesting bilateral epididymoorchitis.  3. Small hydroceles. Small varicoceles.  4. 1.5 cm benign-appearing left epididymal cyst.      Impression/Plan: 76yo M with AML. We were consulted for penile/scrotal pain.     -Scrotal US showing evidence of bilateral epididymo-orchitis  -On Cefepime, Vanc and mycelex cream  -Continue with scrotal elevation/ice PRN  -We will re-evaluate early next week. 
  Comprehensive Nutrition Assessment    RECOMMENDATIONS:  PO Diet: Regular; low microbial  Nutrition Supplement: Available PRN  Nutrition Education: Education/Counseling completed (Low Microbial Diet)     NUTRITION ASSESSMENT:   Nutritional summary & status: ALLO D-0. Patient eating well, 3 meals/day. Denies n/v/c/d. Encouraged pt to capitalize on PO intake prior to fransisca. No nutrition interventions at this time given pt meeting energy/protein needs.     Admission // PMH: AML//HLD, HTN    MALNUTRITION ASSESSMENT  Context of Malnutrition: Chronic Illness   Malnutrition Status: At risk for malnutrition  Findings of the 6 clinical characteristics of malnutrition (Minimum of 2 out of 6 clinical characteristics is required to make the diagnosis of moderate or severe Protein Calorie Malnutrition based on AND/ASPEN Guidelines):  Energy Intake:  No decrease in energy intake  Weight Loss:  Mild weight loss (9% x 7 months)     Body Fat Loss:  No body fat loss     Muscle Mass Loss:  No muscle mass loss        NUTRITION DIAGNOSIS   Increased nutrient needs related to catabolic illness as evidenced by ALLO BMT for AML    Nutrition Monitoring and Evaluation:   Food/Nutrient Intake Outcomes:  Food and Nutrient Intake  Physical Signs/Symptoms Outcomes:  Biochemical Data, Nutrition Focused Physical Findings, Weight     OBJECTIVE DATA: Significant to nutrition assessment  Nutrition Related Findings: labs reviewed, -6.6L, loose BM 3/18  Wounds: None  Nutrition Goals: PO intake 75% or greater, by next RD assessment     CURRENT NUTRITION THERAPIES  ADULT DIET; Regular; Low Microbial  PO Intake: %   PO Supplement Intake:None Ordered  Additional Sources of Calories/IVF:d5 and NS @ 50 ml/hr     COMPARATIVE STANDARDS  Energy (kcal):  2233-1595 (20-25 kcal/CBW)     Protein (g):  105-126 (1.5-1.8 gm/IBW)       Fluid (ml/day):  or per provider    ANTHROPOMETRICS  Current Height: 172.6 cm (5' 7.95\") (measured)  Current Weight - Scale: 
0800-Patient complaining of 9/10 severe right sided sharp back pain.Oxy and flexeril PRN administered overnight without relief. NP Kirsten notified, morphine ordered and given per MAR.    1330-Patient still complaining of 9/10 severe R back pain even with scheduled flexeril. Morphine IV given with little relief, pain score improved to 8/10. NP Stephanie aware. No new orders at this time. Care continues.   
0834:  Tacro level obtained through red lumen on central line.  Tacro dose given after lab obtained.   
4 Eyes Skin Assessment     NAME:  Myles Meehan  YOB: 1950  MEDICAL RECORD NUMBER:  [id number]    The patient is being assessed for  Admission    I agree that at least one RN has performed a thorough Head to Toe Skin Assessment on the patient. ALL assessment sites listed below have been assessed.      Areas assessed by both nurses:    Head, Face, Ears, Shoulders, Back, Chest, Arms, Elbows, Hands, Sacrum. Buttock, Coccyx, Ischium, Legs. Feet and Heels, and Under Medical Devices         Does the Patient have a Wound? No noted wound(s)     Patient has an incision noted where left CVC was placed today, and an old port removal site on the right chest that is healed.       Tommy Prevention initiated by RN: No  Wound Care Orders initiated by RN: No    Pressure Injury (Stage 3,4, Unstageable, DTI, NWPT, and Complex wounds) if present, place Wound referral order by RN under : No    New Ostomies, if present place, Ostomy referral order under : No     Nurse 1 eSignature: Electronically signed by Tamar Angelo RN on 3/14/25 at 3:51 PM EDT    **SHARE this note so that the co-signing nurse can place an eSignature**    Nurse 2 eSignature: Electronically signed by Emmy Mclaughlin RN on 3/14/25 at 6:45 PM EDT    
Administration: Chemotherapy drug Cytoxan independently verified with Tamar SANDHU prior to administration.  Acknowledgement of informed consent for chemotherapy administration verified.  Original order, appropriateness of regimen, drug supplied, height, weight, BSA, dose calculations, expiration dates/times, drug appearance, and two patient identifiers were verified by both RNs.  Drug checked for vesicant/irritant status and for risk of hypersensitivity.  Most recent laboratory values and allergies, were reviewed.  Positive, brisk blood return via CVC was confirmed prior to administration. Chest x-ray for correct line placement reviewed. COLE ESTRADA RN and Tamar SANDHU verified correct rate of chemotherapy and maintenance IV fluids.  Patient was educated on chemotherapy regimen prior to administration including indication for treatment related to disease & side effects of chemotherapy drug.  Patient verbalizes understanding of all instructions.      Completion of Chemotherapy: Monitoring during infusion done per policy, see Flowsheets.  Blood return verified before, during, and after infusion per policy; no signs of extravasation.  Pt tolerated chemotherapy well and without incident. Chemotherapy infusion end time on the MAR.      
Administration: Chemotherapy drug Fludarabine independently verified with Barbara Hilario RN prior to administration.  Acknowledgement of informed consent for chemotherapy administration verified.  Original order, appropriateness of regimen, drug supplied, height, weight, BSA, dose calculations, expiration dates/times, drug appearance, and two patient identifiers were verified by both RNs.  Drug checked for vesicant/irritant status and for risk of hypersensitivity.  Most recent laboratory values and allergies, were reviewed.  Positive, brisk blood return via CVC was confirmed prior to administration. Chest x-ray for correct line placement reviewed. Peg Almonte RN and Barbara SANDHU verified correct rate of chemotherapy and maintenance IV fluids.  Patient was educated on chemotherapy regimen prior to administration including indication for treatment related to disease & side effects of chemotherapy drug.  Patient verbalizes understanding of all instructions.    Completion of Chemotherapy: Monitoring during infusion done per policy, see Flowsheets.  Blood return verified before, during, and after infusion per policy; no signs of extravasation.  Pt tolerated chemotherapy well and without incident.  Chemotherapy infusion end time on the MAR.  Will continue to monitor.   
Administration: Chemotherapy drug fludarabine independently verified with Emmy Mclaughlin RN prior to administration.  Acknowledgement of informed consent for chemotherapy administration verified.  Original order, appropriateness of regimen, drug supplied, height, weight, BSA, dose calculations, expiration dates/times, drug appearance, and two patient identifiers were verified by both RNs.  Drug checked for vesicant/irritant status and for risk of hypersensitivity.  Most recent laboratory values and allergies, were reviewed.  Positive, brisk blood return via CVC was confirmed prior to administration. Chest x-ray for correct line placement reviewed. Elizabeth Martines RN and Emmy Mclaughlin RN verified correct rate of chemotherapy and maintenance IV fluids.  Patient was educated on chemotherapy regimen prior to administration including indication for treatment related to disease & side effects of chemotherapy drug.  Patient verbalizes understanding of all instructions.    Completion of Chemotherapy: Monitoring during infusion done per policy, see Flowsheets.  Blood return verified before, during, and after infusion per policy; no signs of extravasation.  Pt tolerating chemotherapy well and without incident.  Chemotherapy infusion end time on the MAR.  Will continue to monitor.    
At 2227 provider Joy Niño  was contacted via [employer] due to patient experiencing pain that was poorly managed by ordered medications. Patient was reporting 10/10 stabbing pain in his lower back primarily in the right side. A one time dose of 4 mg morphine was ordered-see MAR. Provider also recommended encouraging the use of the prn IV ativan that was ordered. Patient received the morphine and ativan during the shift, but reported little relief. Patient was visibly uncomfortable through out the night and did not get very much sleep due to the discomfort. Plan of care ongoing.   
Central line dressing changed using sterile technique following hospital policy due to bloody drainage noted to be leaking out past dressing. Emmy Mclaughlin RN, observed with procedure to ensure proper technique. Patient tolerated procedure well.  
Central line dressing changed using sterile technique following hospital policy. (Barbara Leija, PHOEBE, observed with procedure to ensure proper technique.   
Central line dressing changed using sterile technique following hospital policy. Charge RN, Angeles Terrell RN, observed with procedure to ensure proper technique.  
Central line dressing changed using sterile technique following hospital policy. Emmy Arias RN, observed with procedure to ensure proper technique.   
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  Prograf 2.5 mg po bid to start 3/25 with first tacrolimus level on 3/29 and then MWF thereafter.                               Please call with questions.    Mia Medina.D.  [facility] Clinical Pharmacist  Wireless:  [phone]  3/14/2025 3:14 PM    
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  Prograf 2.5 mg po bid to start 3/25 with first tacrolimus level on 3/29 and then MWF thereafter.   3/29  D+9 0.6 0.6 2.5 mg BID 6.8 Per chart review no changes    3/31  D+11 0.7 0.5 2.5 mg BID 8.0 Per Dr. Barbosa, no changes. Continue MWF levels.                Please call with questions.    Noah Ornelas, PharmD, BCOP  Clinical Pharmacist  [facility]  [phone]      
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  Prograf 2.5 mg po bid to start 3/25 with first tacrolimus level on 3/29 and then MWF thereafter.   3/29  D+9 0.6 0.6 2.5 mg BID 6.8 Per chart review no changes    3/31  D+11 0.7 0.5 2.5 mg BID 8.0 Per Dr. Barbosa, no changes. Continue MWF levels.    4/1  D+13 0.7 0.5 2.5 mg BID 7.5 Per Dr. Barbosa, no changes. Continue MWF levels.        Please call with questions.    Noah Ornelas, PharmD, BCOP  Clinical Pharmacist  [facility]  [phone]      
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  Prograf 2.5 mg po bid to start 3/25 with first tacrolimus level on 3/29 and then MWF thereafter.   3/29  D+9 0.6 0.6 2.5 mg BID 6.8 Per chart review no changes    3/31  D+11 0.7 0.5 2.5 mg BID 8.0 Per Dr. Barbosa, no changes. Continue MWF levels.    4/1  D+13 0.7 0.5 2.5 mg BID 7.5 Per Dr. Barbosa, no changes. Continue MWF levels.    4/4/25; d+15 0.9 0.3 2.5 mg po bid 9.5 Discussed with Dr Barbosa-will continue current Prograf dosing with levels MWF.  (Noted: patient day +15 and beginning to engraft)       Please call with questions.    Cathy Perez, PharmD  [facility] Clinical Pharmacist  [phone]  4/4/25  3pm      
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  Prograf 2.5 mg po bid to start 3/25 with first tacrolimus level on 3/29 and then MWF thereafter.   3/29  D+9 0.6 0.6 2.5 mg BID 6.8 Per chart review no changes    3/31  D+11 0.7 0.5 2.5 mg BID 8.0 Per Dr. Barbosa, no changes. Continue MWF levels.    4/1  D+13 0.7 0.5 2.5 mg BID 7.5 Per Dr. Barbosa, no changes. Continue MWF levels.    4/4/25; d+15 0.9 0.3 2.5 mg po bid 9.5 Discussed with Dr Barbosa-will continue current Prograf dosing with levels MWF.  (Noted: patient day +15 and beginning to engraft)   4/7  D+18 1.0 0.3 2.5 mg PO BID 8.8 Continue current dose. Continue MWF levels.   4/9  D+20 1.1 0.4 2.5 mg PO BID 6.8 Continue current dose. Continue MWF levels.     Please call with questions.    Noah Ornelas, PharmD, BCOP  Clinical Pharmacist  [facility]  [phone]        
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  Prograf 2.5 mg po bid to start 3/25 with first tacrolimus level on 3/29 and then MWF thereafter.   3/29  D+9 0.6 0.6 2.5 mg BID 6.8 Per chart review no changes    3/31  D+11 0.7 0.5 2.5 mg BID 8.0 Per Dr. Barbosa, no changes. Continue MWF levels.    4/1  D+13 0.7 0.5 2.5 mg BID 7.5 Per Dr. Barbosa, no changes. Continue MWF levels.    4/4/25; d+15 0.9 0.3 2.5 mg po bid 9.5 Discussed with Dr Barbosa-will continue current Prograf dosing with levels MWF.  (Noted: patient day +15 and beginning to engraft)   4/7  D+4/7 1.0 0.3 2.5 mg PO BID 8.8 Continue current dose. Continue MWF levels.     Please call with questions.    Noah Ornelas, PharmD, BCOP  Clinical Pharmacist  [facility]  [phone]        
Colorectal Surgery   Daily Progress Note  Patient: Myles Meehan      CC: cholecystitis     SUBJECTIVE:   Patient rested well overnight. HIDA scan negative. Patient still endorsing RUQ abdominal pain     ROS:   A 14 point review of systems was conducted, significant findings as noted above. All other systems negative.    OBJECTIVE:    PHYSICAL EXAM:    Vitals:    04/01/25 0401 04/01/25 0546 04/01/25 0623 04/01/25 0749   BP: (!) 158/71 (!) 147/70 (!) 142/65 138/83   Pulse: 100 91 91 92   Resp: 18 18 18 16   Temp: 97.5 °F (36.4 °C) 97.5 °F (36.4 °C) 97.4 °F (36.3 °C) 97.8 °F (36.6 °C)   TempSrc: Oral Oral Oral Oral   SpO2:  100% 100% 100%   Weight:       Height:           General appearance: alert, no acute distress  Eyes :no scleral icterus  Neck: trachea midline, no JVD  Chest/Lungs: normal effort, no adventitious breathing, no accessory muscle use, on RA  Cardiovascular: RRR  Abdomen: soft, non tender in RUQ.   Skin: warm and dry, no rashes  Extremities: no edema, no cyanosis  Neuro: A&Ox3, no focal deficits, sensation intact    ASSESSMENT & PLAN:   This is a 75 y.o. male with Hx of HLD, appendectomy, HTN, and AML. Recent Plascencia catheter placement. Consulted for cholecystitis.     - No acute surgical intervention at this time   - HIDA without acute cholecystitis, however patient still with pain.   - Surgery will follow     Tom Campos DO   PGY1, General Surgery  04/01/25  8:01 AM  [facility] Surgery: Red Team  Pager: [phone]    
Colorectal Surgery   Daily Progress Note  Patient: Myles Meehan      CC: cholecystitis     SUBJECTIVE:   Patient rested well overnight. Reports no abdominal pain this morning.     ROS:   A 14 point review of systems was conducted, significant findings as noted above. All other systems negative.    OBJECTIVE:    PHYSICAL EXAM:    Vitals:    03/31/25 0319 03/31/25 0551 03/31/25 0612 03/31/25 0650   BP: (!) 153/68 (!) 162/82 (!) 169/76 (!) 136/55   Pulse: 77 88 85 85   Resp: 16 16 16 16   Temp: 98 °F (36.7 °C) 97.9 °F (36.6 °C) 98 °F (36.7 °C) 98.2 °F (36.8 °C)   TempSrc: Oral Oral  Oral   SpO2: 96% 100% 96% 97%   Weight:       Height:           General appearance: alert, no acute distress  Eyes :no scleral icterus  Neck: trachea midline, no JVD  Chest/Lungs: normal effort, no adventitious breathing, no accessory muscle use, on RA  Cardiovascular: RRR  Abdomen: soft, non tender in RUQ.   Skin: warm and dry, no rashes  Extremities: no edema, no cyanosis  Neuro: A&Ox3, no focal deficits, sensation intact    ASSESSMENT & PLAN:   This is a 75 y.o. male with Hx of HLD, appendectomy, HTN, and AML. Recent Plascencia catheter placement. Consulted for cholecystitis.     - No acute surgical intervention at this time   - Plan for HIDA scan today - will follow up results  - Surgical intervention pending results of scan and clinical course     Angela Bueno MD  PGY1, General Surgery  03/31/25  7:24 AM  [phone]   
Colorectal Surgery   Daily Progress Note  Patient: Myles Meehan      CC: cholecystitis     SUBJECTIVE:   Patient rested well overnight. Tolerating diet.     ROS:   A 14 point review of systems was conducted, significant findings as noted above. All other systems negative.    OBJECTIVE:    PHYSICAL EXAM:    Vitals:    04/02/25 0348 04/02/25 0545 04/02/25 0621 04/02/25 0624   BP: (!) 148/87 (!) 159/68  (!) 150/72   Pulse: 87 92  94   Resp: 18 16 16 16   Temp: 97.5 °F (36.4 °C) 98 °F (36.7 °C)  97.4 °F (36.3 °C)   TempSrc: Oral Oral  Oral   SpO2: 94% 98%  99%   Weight:       Height:           General appearance: alert, no acute distress  Eyes :no scleral icterus  Neck: trachea midline, no JVD  Chest/Lungs: normal effort, no adventitious breathing, no accessory muscle use, on RA  Cardiovascular: RRR  Abdomen: soft, minimal tenderness in RUQ, stable from before  Skin: warm and dry, no rashes  Extremities: no edema, no cyanosis  Neuro: A&Ox3, no focal deficits, sensation intact    ASSESSMENT & PLAN:   This is a 75 y.o. male with Hx of HLD, appendectomy, HTN, and AML. Recent Plascencia catheter placement. Consulted for cholecystitis.     - No acute surgical intervention at this time   - HIDA without acute cholecystitis, however patient still with pain on palpation  - Surgery will follow while in house    Torsten Martin DO  PGY2, General Surgery  04/02/25   6:48 AM   [employer]  [phone]        
Colorectal Surgery   Daily Progress Note  Patient: Myles Meehan      CC: cholecystitis     SUBJECTIVE:   Reports no abdominal pain or nausea or vomiting. No postprandial pain.     ROS:   A 14 point review of systems was conducted, significant findings as noted above. All other systems negative.    OBJECTIVE:    PHYSICAL EXAM:    Vitals:    04/04/25 0119 04/04/25 0327 04/04/25 0531 04/04/25 0552   BP:  (!) 113/54 (!) 122/54 (!) 122/50   Pulse:  77 82 78   Resp: 18 18 18 18   Temp:  98.1 °F (36.7 °C) 98 °F (36.7 °C) 98.2 °F (36.8 °C)   TempSrc:  Oral  Oral   SpO2:  97% 95% 93%   Weight:       Height:           General appearance: alert, no acute distress  Eyes :no scleral icterus  Neck: trachea midline, no JVD  Chest/Lungs: normal effort, no adventitious breathing, no accessory muscle use, on RA  Cardiovascular: RRR  Abdomen: soft, stable abdominal exam, minimal RUQ tenderness  Skin: warm and dry, no rashes  Extremities: no edema, no cyanosis  Neuro: A&Ox3, no focal deficits, sensation intact    ASSESSMENT & PLAN:   This is a 75 y.o. male with Hx of HLD, appendectomy, HTN, and AML. Recent Plascencia catheter placement. Consulted for cholecystitis.     - No acute surgical intervention at this time   - HIDA without acute cholecystitis  - Will discuss with staff further plans    Angela Bueno MD  PGY1, General Surgery  04/04/25  7:08 AM  [phone]   
Colorectal Surgery   Daily Progress Note  Patient: Myles Meehan      CC: cholecystitis     SUBJECTIVE:   Reports no abdominal pain or nausea or vomiting. Still reports point tenderness in RUQ, but states that it's only when someone touches it. No postprandial pain.     ROS:   A 14 point review of systems was conducted, significant findings as noted above. All other systems negative.    OBJECTIVE:    PHYSICAL EXAM:    Vitals:    04/03/25 0259 04/03/25 0302 04/03/25 0537 04/03/25 0600   BP: (!) 162/74  128/77 (!) 151/90   Pulse: 91  68 94   Resp: 18 18 18 18   Temp: 98.2 °F (36.8 °C)  97.3 °F (36.3 °C) 97.9 °F (36.6 °C)   TempSrc: Oral  Oral Oral   SpO2: 97%  97% 98%   Weight:       Height:           General appearance: alert, no acute distress  Eyes :no scleral icterus  Neck: trachea midline, no JVD  Chest/Lungs: normal effort, no adventitious breathing, no accessory muscle use, on RA  Cardiovascular: RRR  Abdomen: soft, minimal tenderness in RUQ, stable from before  Skin: warm and dry, no rashes  Extremities: no edema, no cyanosis  Neuro: A&Ox3, no focal deficits, sensation intact    ASSESSMENT & PLAN:   This is a 75 y.o. male with Hx of HLD, appendectomy, HTN, and AML. Recent Plascencia catheter placement. Consulted for cholecystitis.     - No acute surgical intervention at this time   - HIDA without acute cholecystitis, however patient still with pain on palpation  - Surgery will follow while in house    Torsten Martin DO  PGY2, General Surgery  04/03/25   6:57 AM   [employer]  [phone]        
Colorectal Surgery   Daily Progress Note  Patient: Myles Meehan      CC: cholecystitis     SUBJECTIVE:   Resting comfortably in bed. No complaints to abdomen this morning. Tolerating diet    ROS:   A 14 point review of systems was conducted, significant findings as noted above. All other systems negative.    OBJECTIVE:    PHYSICAL EXAM:    Vitals:    04/04/25 2207 04/04/25 2237 04/04/25 2342 04/05/25 0323   BP:   (!) 158/74 124/69   Pulse:   85 81   Resp: 16 17 16 17   Temp:   98 °F (36.7 °C) 97.8 °F (36.6 °C)   TempSrc:   Oral Oral   SpO2:   96% 95%   Weight:       Height:           General appearance: alert, no acute distress  Eyes :no scleral icterus  Neck: trachea midline  Chest/Lungs: normal effort, no adventitious breathing, no accessory muscle use, on RA  Cardiovascular: RRR  Abdomen: soft, stable abdominal exam, minimal RUQ tenderness  Skin: warm and dry, no rashes  Extremities: no edema, no cyanosis  Neuro: A&Ox3, no focal deficits, sensation intact    ASSESSMENT & PLAN:   This is a 75 y.o. male with Hx of HLD, appendectomy, HTN, and AML. Recent Plascencia catheter placement. Consulted for cholecystitis.     - No acute surgical intervention at this time   - HIDA without acute cholecystitis  - Continue serial abdominal exam   - Follow-up on AM labs  - Will discuss on timing of surgical intervention with Attending  - Rest of care per primary care.     Ju Mcdonough DO  PGY1, General Surgery  04/05/25  8:14 AM  [phone]   
Colorectal Surgery   Daily Progress Note  Patient: Myles Meehan      CC: chronic cholecystitis     SUBJECTIVE:     No acute events overnight. Continues to deny abdominal pain    ROS:   A 14 point review of systems was conducted, significant findings as noted above. All other systems negative.    OBJECTIVE:    PHYSICAL EXAM:    Vitals:    04/07/25 2121 04/07/25 2352 04/08/25 0430 04/08/25 0725   BP:  138/61 (!) 106/51    Pulse:  79 84    Resp: 17 18 15    Temp:  97.8 °F (36.6 °C) 97.7 °F (36.5 °C)    TempSrc:  Oral Oral    SpO2:  100% 94%    Weight:    101.5 kg (223 lb 12.8 oz)   Height:           General appearance: alert, no acute distress  Eyes :no scleral icterus  Neck: trachea midline  Chest/Lungs: normal effort, no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    The patient will be monitored per nutrition standards of care. Consult dietitian if additional nutrition interventions are needed prior to RD reassessment.     Eunice Penny RD  [place]:  [phone]      
filgrastim/filgrastim biosimilar  300 mcg SubCUTAneous QPM    allopurinol  150 mg Oral Daily    pantoprazole  40 mg Oral Daily    nystatin  5 mL Oral 4x Daily     Continuous Infusions:   sodium chloride      sodium chloride      potassium chloride      dextrose 5 % and 0.45 % NaCl 150 mL/hr at 03/15/25 0630    [START ON 3/23/2025] sodium chloride      [START ON 3/25/2025] sodium chloride       PRN Meds:hydrALAZINE, sodium chloride, sodium chloride flush, sodium chloride, potassium chloride, magnesium sulfate, Saline Mouthwash, ALTEplase (CATHFLO) 2 mg in sterile water 2 mL injection, prochlorperazine **OR** prochlorperazine, [START ON 3/23/2025] furosemide, tetrahydrozoline, LORazepam **OR** LORazepam    ROS:  As noted above, otherwise remainder of 10-point ROS negative    Physical Exam:    I&O:    Intake/Output Summary (Last 24 hours) at 3/15/2025 0638  Last data filed at 3/15/2025 0527  Gross per 24 hour   Intake 4448 ml   Output 4800 ml   Net -352 ml       Vital Signs:  BP (!) 158/69   Pulse 98   Temp 99.2 °F (37.3 °C) (Oral)   Resp 20   Ht 1.726 m (5' 7.95\") Comment: measured  Wt 97.3 kg (214 lb 9.6 oz)   SpO2 100%   BMI 32.67 kg/m²     Weight:    Wt Readings from Last 3 Encounters:   03/14/25 97.3 kg (214 lb 9.6 oz)   03/14/25 96.1 kg (211 lb 12.8 oz)   02/17/25 103.2 kg (227 lb 9.6 oz)       General: Awake, alert and oriented.  HEENT: normocephalic, alopecia, PERRL, no scleral erythema or icterus, Oral mucosa moist and intact, throat clear  NECK: supple   BACK: Straight  SKIN: warm dry and intact without lesions rashes or masses  CHEST: CTA bilaterally without use of accessory muscles  CV: Normal S1 S2, RRR, no MRG  ABD: NT, ND, normoactive BS  EXTREMITIES: without edema, denies calf tenderness  NEURO: CN II - XII grossly intact  CATHETER: L subclavian triple lumen Plascencia (by Dr. Zuñiga, 3/14/25)    Data:   CBC:   Recent Labs     03/14/25  1402 03/15/25  0355   WBC 3.9* 5.5   HGB 9.9* 10.5*   HCT 29.7* 
is not necessarily indicative of toxicity.    Cultures & Sensitivities:    Date Site Micro Susceptibility / Result   3/30 Urine cx Enterobacter cloacae-50,000 Sensitive to Cefepime/Invanz/gentamicin           Recent Labs     04/04/25  0330 04/05/25  0325 04/06/25  0330   CREATININE 0.9 0.9 1.0   BUN 19 22* 22*   WBC 0.3* 0.8* 2.2*       Estimated Creatinine Clearance: 74 mL/min (based on SCr of 1 mg/dL).    Additional Lab Values / Findings of Note:    No results for input(s): \"PROCAL\" in the last 72 hours.    
seated rest beaks due to increased pain    Exercises  Pt re-educated on HEP and reviewed hand-outs.  Pt verbalized understanding.  Activity log given to patient.     Neuro/balance  Sitting balance:  WFL  Static standing:  Good with unilateral support of IV pole  Dynamic standing:  Fair with IV pole support for ambulation      Patient Education  Pt re-educated to Mobility program, activity promotion, and LE exercise program.  Hand-outs and activity log issued.  Pt verbalized understanding.     Safety Devices  Pt left with needs in reach, seated in chair and RN aware.            AM-PAC score  AM-PAC Inpatient Mobility Raw Score : 23  AM-PAC Inpatient T-Scale Score : 56.93  Mobility Inpatient CMS 0-100% Score: 11.2  Mobility Inpatient CMS G-Code Modifier : CI      Goals:  Goals not met this treatment, continue with current goals.   Time Frame for Short Term Goals: discharge  Short Term Goal 1: Pt will report independence with daily HEP    Short Term Goal 2: Pt will maintain functional independence with transfers and gait throughout hospitalization              Plan:  2-5 times per week  Current Treatment Recommendations: Strengthening, Functional mobility training, Gait training, Stair training, Transfer training, Patient/Caregiver education & training, Therapeutic activities, Home exercise program    Therapy Time    Individual  Concurrent  Group  Co-treatment    Time In  0840            Time Out  0918            Minutes  38              Timed Code Treatment Minutes:  38  Total Treatment Minutes:  38      If patient is discharged prior to next treatment, this note will serve as the discharge summary.    Shanna Damian, PT          
toxicity.    Cultures & Sensitivities:    Date Site Micro Susceptibility / Result   3/30 Urine cx Enterobacter cloacae-50,000 Sensitive to Cefepime/Invanz/gentamicin           Recent Labs     04/03/25  0315 04/04/25  0330 04/05/25  0325   CREATININE 0.8 0.9 0.9   BUN 16 19 22*   WBC 0.1* 0.3* 0.8*       Estimated Creatinine Clearance: 82 mL/min (based on SCr of 0.9 mg/dL).    Additional Lab Values / Findings of Note:    No results for input(s): \"PROCAL\" in the last 72 hours.    
03/18/25  0314 03/19/25  0359    140 139   K 3.8 3.7 3.7    105 103   CO2 25 27 27   PHOS 4.0 4.3 4.1   BUN 14 13 13   CREATININE 0.6* 0.7* 0.6*   MG 1.90 1.95 1.95     LIVP:   Recent Labs     03/17/25  0231 03/18/25  0314 03/19/25  0359   AST 8*  --  10*   ALT 10  --  10   BILIDIR 0.3  --  0.3   BILITOT 1.0 0.7 0.8   ALKPHOS 60  --  69     Uric Acid:  No results for input(s): \"LABURIC\" in the last 72 hours.  Coags:   Recent Labs     03/17/25  0231   PROTIME 14.2   INR 1.08   APTT 27.3     Tacro:  No results for input(s): \"TACROLEV\" in the last 72 hours.  CMV Quant DNA by PCR: No results found for: \"CMVDNAQNT\"    PROBLEM LIST:        Acute Myeloid Leukemia  HTN  Mild Sinus Disease  Jaw Infection   ETOH Abuse  Recurrent gout  GERD  Oral Candadiasis     TREATMENT:         AML  Dacogen and Venetoclax  - C1D1 9/23/24  - C2D1 11/4/24  - C3D1 12/9/24  - C4D1 1/13/25  Preparative Regimen: Melphalan and Fludarabine  Date of BMT: 3/20/2025  Source of stem cells: PBSC- Fresh infusion  Donor/Recipient Blood Type: A+/A+  Donor Sex: Male; Follow STR post-txp. Send engraftment to [facility]  CMV Donor / Recipient: Negative/Negative      ASSESSMENT AND PLAN:          1. Acute Myeloid Leukemia- Adverse risk  - BMBx (9/20/24): 59.3% blast on flow cytometry with immunophenotyping positive for CD 34, HLA-DR, , CD11, CD13, CD14.  FISH: deletion 7   CG: Abnormal Male Karyotype 45,XY,-7[15]/46,XY[5]  NGS: ASXL-1  - Post induction BMBx (10/17/24) showed hypoplastic CR with no morphological blast or aberrant blast.   - BMBx 2/3/25: Hypocellular sample; leukocytes are predominantly heterogeneous T-cells with no increased blast population identified. FISH: Abnormal AML/MDS FISH Panel Monosomy of chromosome 7  - BMBx 2/13/25: negative for AML, FISH negative        PLAN: Nikia/Flu MUD allo (3/20/25)  - Post-Txp Maintenance: TBD    Day 30:  BMBx with FISH, CG, NGS and engraftment  Day 60:  PB engraftment  Day 100: BMBx with FISH, 
No results for input(s): \"LABURIC\" in the last 72 hours.  Coags:   Recent Labs     04/03/25  0315   PROTIME 16.6*   INR 1.33*   APTT 34.4         Tacro:    Recent Labs     04/04/25  0834   TACROLEV 9.5     CMV Quant DNA by PCR: No results found for: \"CMVDNAQNT\"    PROBLEM LIST:        Acute Myeloid Leukemia  HTN  Mild Sinus Disease  Jaw Infection   ETOH Abuse  Recurrent gout  GERD  Oral Candadiasis     TREATMENT:         AML  Dacogen and Venetoclax  - C1D1 9/23/24  - C2D1 11/4/24  - C3D1 12/9/24  - C4D1 1/13/25  Preparative Regimen: Melphalan and Fludarabine  Date of BMT: 3/20/2025  Source of stem cells: PBSC- Fresh infusion  Donor/Recipient Blood Type: A+/A+  Donor Sex: Male; Follow STR post-txp. Send engraftment to [facility]  CMV Donor / Recipient: Negative/Negative      ASSESSMENT AND PLAN:          1. Acute Myeloid Leukemia- Adverse risk  - BMBx (9/20/24): 59.3% blast on flow cytometry with immunophenotyping positive for CD 34, HLA-DR, , CD11, CD13, CD14.  FISH: deletion 7   CG: Abnormal Male Karyotype 45,XY,-7[15]/46,XY[5]  NGS: ASXL-1  - Post induction BMBx (10/17/24) showed hypoplastic CR with no morphological blast or aberrant blast.   - BMBx 2/3/25: Hypocellular sample; leukocytes are predominantly heterogeneous T-cells with no increased blast population identified. FISH: Abnormal AML/MDS FISH Panel Monosomy of chromosome 7  - BMBx 2/13/25: negative for AML, FISH negative        PLAN: Nikia/Flu MUD allo (3/20/25)  - Post-Txp Maintenance: TBD    Day 30 (4/19/25):  BMBx with FISH, CG, NGS and engraftment  Day 60 (5/19/25):  PB engraftment  Day 100 (6/28/25): BMBx with FISH, CG, NGS and engraftment    Day +16      2. ID:   Afebrile  - Cont Valtrex & Diflucan 200 mg daily     Possible Cholecystitis:  - S/P Zosyn (3/30/25-4/1/25)     UTI:  - Enterobacter colacae UTI, sensitive to Cefepime  - Cefepime day +5 (4/1/25)    Scrotal Cellulitis  - Vancomycin day +3 (4/3/25)    H/o mild sinus disease and left jaw 
in recliner with call light nearby, no alarm 2* pt up ad kishan)  Bed Mobility Training  Bed Mobility Training: No  Balance  Sitting: Intact  Standing: Intact  Transfer Training  Transfer Training: Yes  Overall Level of Assistance: Independent  Interventions: Safety awareness training  Sit to Stand: Independent  Stand to Sit: Independent  Gait  Gait Training: Yes  Overall Level of Assistance: Independent  Assistive Device:  (with/without IV pole)  Interventions: Safety awareness training     AROM: Within functional limits  PROM: Within functional limits  Strength: Within functional limits  Coordination: Within functional limits  Tone: Normal  Sensation: Intact  ADL  Putting On/Taking Off Footwear: Independent              Vision  Vision: Within Functional Limits  Hearing  Hearing: Exceptions to WFL  Hearing Exceptions: Bilateral hearing aid  Cognition  Overall Cognitive Status: WFL  Cognition Comment: requires repeated cues at times - unclear if due to [place] vs cognition  Orientation  Overall Orientation Status: Within Normal Limits                  Education Given To: Patient  Education Provided: Role of Therapy;ADL Adaptive Strategies;Fall Prevention Strategies;Transfer Training;Plan of Care  Education Provided Comments: activity log, HEP, mobility program  Education Method: Verbal  Barriers to Learning: None  Education Outcome: Verbalized understanding                         AM-PAC - ADL  AM-PAC Daily Activity - Inpatient   How much help is needed for putting on and taking off regular lower body clothing?: None  How much help is needed for bathing (which includes washing, rinsing, drying)?: A Little  How much help is needed for toileting (which includes using toilet, bedpan, or urinal)?: A Little  How much help is needed for putting on and taking off regular upper body clothing?: None  How much help is needed for taking care of personal grooming?: None  How much help for eating meals?: None  AM-[facility] Inpatient Daily 
functional independence with transfers and gait throughout hospitalization  Patient Goals   Patient Goals : return home       Education  Patient Education  Education Given To: Patient  Education Provided: Role of Therapy;Plan of Care;Home Exercise Program  Education Method: Verbal  Barriers to Learning: None  Education Outcome: Verbalized understanding      Therapy Time   Individual Concurrent Group Co-treatment   Time In 0843         Time Out 0906         Minutes 23                 Timed Code Treatment Minutes:  8    Total Treatment Minutes:  23    If patient is discharged prior to next treatment, this note will serve as the discharge summary.  Alexandria Johnson, PT, DPT  [phone]     
grooming?: None  How much help for eating meals?: None  AM-PAC Inpatient Daily Activity Raw Score: 23  AM-PAC Inpatient ADL T-Scale Score : 51.12  ADL Inpatient CMS 0-100% Score: 15.86  ADL Inpatient CMS G-Code Modifier : CI      Goals  Short Term Goals  Time Frame for Short Term Goals: DC  Short Term Goal 1: Pt will maintain functional independence with ADL routine during hospital stay  Short Term Goal 2: Pt will verbalize/demonstrate independence with activity log and HEP      Therapy Time   Individual Concurrent Group Co-treatment   Time In  0840         Time Out  0918         Minutes  38          Timed Code Treatment Minutes:   38    Total Treatment Minutes: 38         Yoana Ellis OTR/L #[id number]       
    10. MSK:  - Encourage walking and movement   - PT/OT consulted on admission for mobility program     Right sided back pain  - PRN oxycodone  - Scheduled flexeril  - Pain persists with oxy and flexeril, PRN morphine added  - Concern for kidney stone- CT abd/pelvis does not show stones but is consistent with cystitis- see      11. Psychosocial:  - Patient has history of alcohol (quit 9/2024) and tobacco use (quit 1989). Patient does not see any difficulties continuing to abstain from substances. Psychology can monitor.   - Due to self-report of ambivalence during transplant admission, possible patient could experience episodes of irritability, boredom, and low mood. Recommend consulting psychology for monitoring and support.   - Primary caregiver: Chani Meehan (spouse)   - Back-up caregiver: Silver and Joselin Meehan (son and daughter-in-law)  - Lodging: Lives [place], [place] (approximately [place] minutes away from [facility]) with his wife and grandson (21)       - DVT Prophylaxis: Platelets <50,000 cells/dL - prophylactic lovenox on hold and mechanical prophylaxis with bilateral SCDs while in bed in place.  Contraindications to pharmacologic prophylaxis: Thrombocytopenia  Contraindications to mechanical prophylaxis: None    - Disposition: When ANC > 1 and recovered from toxicities from chemotherapy and transplant.       Tracy Schoenhoft, JAY - CNP     
  BILITOT 1.0 0.7       7. Pulmonary:    No active issues  - Encourage IS and ambulation   - Pre-Txp PFTs: 1/3/25 FEV1 105% DLCO cor 140%      8. Cardiac:   No active issues  - Pre-Txp Echo: 9/23/24 EF 55-60%   HTN:  - S/P lisinopril 30 mg daily     9. GI / Nutrition:    Nutrition:  - PO intake adequate  - Dietician to follow- consult placed on admission     Nausea and vomiting:  - Zofran scheduled with chemotherapy  - PRN Compazine   - PRN Lorazepam     GERD:  - Protonix 40 mg daily      10. MSK:  - Encourage walking and movement   - PT/OT consulted on admission for mobility program      11. Psychosocial:  - Patient has history of alcohol (quit 9/2024) and tobacco use (quit 1989). Patient does not see any difficulties continuing to abstain from substances. Psychology can monitor.   - Due to self-report of ambivalence during transplant admission, possible patient could experience episodes of irritability, boredom, and low mood. Recommend consulting psychology for monitoring and support.   - Primary caregiver: Chani Meehan (spouse)   - Back-up caregiver: [place] Meehan (son and daughter-in-law)  - Lodging: Lives [place], [place] (approximately [place] minutes away from [facility]) with his wife and grandson (21)       - DVT Prophylaxis: Platelets >50,000 cells/dL, - daily lovenox prophylaxis ordered  Contraindications to pharmacologic prophylaxis: None  Contraindications to mechanical prophylaxis: None    - Disposition: When ANC > 1 and recovered from toxicities from chemotherapy and transplant.     The patient was seen and examined by Dr. Familia Ortiz, APRN - CNP     Jan Barbosa MD  [facility]  [phone]  
55-60%   HTN:  - S/P lisinopril 30 mg daily     9. GI / Nutrition:    Nutrition:  - PO intake adequate  - Dietician to follow- consult placed on admission     Nausea and vomiting:  - Zofran scheduled with chemotherapy  - PRN Compazine   - PRN Lorazepam     GERD:  - Protonix 40 mg daily     Abd Cramping:  - PRN Bentyl     10. MSK:  - Encourage walking and movement   - PT/OT consulted on admission for mobility program      11. Psychosocial:  - Patient has history of alcohol (quit 9/2024) and tobacco use (quit 1989). Patient does not see any difficulties continuing to abstain from substances. Psychology can monitor.   - Due to self-report of ambivalence during transplant admission, possible patient could experience episodes of irritability, boredom, and low mood. Recommend consulting psychology for monitoring and support.   - Primary caregiver: Chani Meehan (spouse)   - Back-up caregiver: [place] Meehan (son and daughter-in-law)  - Lodging: Lives [place], [place] (approximately 5 minutes away from [facility]) with his wife and grandson (21)       - DVT Prophylaxis: Platelets >50,000 cells/dL, - daily lovenox prophylaxis ordered  Contraindications to pharmacologic prophylaxis: None  Contraindications to mechanical prophylaxis: None    - Disposition: When ANC > 1 and recovered from toxicities from chemotherapy and transplant.     The patient was seen and examined by Dr. Waterhouse Jody M Moehring, APRN - CNP     David M. Waterhouse, M.D., MPH    [facility] [place] [address]  Office [phone]  Cell [phone]  [email or web address]    \"Participating in a clinical trial is the first step to fighting cancer; not the last.\"      
ETOH use     Risk for VOD post-txp   - No/Low suspicion at this time  - Cont Actigall     Admission Weight: 95.6 kg (210 lb 12.2 oz)  Current Weight: Weight - Scale: 100.2 kg (221 lb) (measured).   Recent Labs     03/17/25  0231   BILIDIR 0.3   BILITOT 1.0       7. Pulmonary:    No active issues  - Encourage IS and ambulation   - Pre-Txp PFTs: 1/3/25 FEV1 105% DLCO cor 140%      8. Cardiac:   No active issues  - Pre-Txp Echo: 9/23/24 EF 55-60%   HTN:  - S/P lisinopril 30 mg daily     9. GI / Nutrition:    Nutrition:  - PO intake adequate  - Dietician to follow- consult placed on admission     Nausea and vomiting:  - Zofran scheduled with chemotherapy  - PRN Compazine   - PRN Lorazepam      10. MSK:  - Encourage walking and movement   - PT/OT consulted on admission for mobility program      11. Psychosocial:  - Patient has history of alcohol (quit 9/2024) and tobacco use (quit 1989). Patient does not see any difficulties continuing to abstain from substances. Psychology can monitor.   - Due to self-report of ambivalence during transplant admission, possible patient could experience episodes of irritability, boredom, and low mood. Recommend consulting psychology for monitoring and support.   - Primary caregiver: Chani Meehan (spouse)   - Back-up caregiver: Silver and Joselin Meehan (son and daughter-in-law)  - Lodging: Lives [place], [place] (approximately 5 minutes away from [facility]) with his wife and grandson (21)       - DVT Prophylaxis: Platelets >50,000 cells/dL, - daily lovenox prophylaxis ordered  Contraindications to pharmacologic prophylaxis: None  Contraindications to mechanical prophylaxis: None    - Disposition: When ANC > 1 and recovered from toxicities from chemotherapy and transplant.     The patient was seen and examined by Dr. Familia Ortiz, APRN - CNP     Jan Barbosa MD  [facility]  [phone]      
PT/OT consulted on admission for mobility program     Right sided back pain  - PRN oxycodone  - Scheduled flexeril  - Pain persists with oxy and flexeril, PRN morphine added  - Concern for kidney stone- CT abd/pelvis does not show stones but is consistent with cystitis- see      11. Psychosocial:  - Patient has history of alcohol (quit 9/2024) and tobacco use (quit 1989). Patient does not see any difficulties continuing to abstain from substances. Psychology can monitor.   - Due to self-report of ambivalence during transplant admission, possible patient could experience episodes of irritability, boredom, and low mood. Recommend consulting psychology for monitoring and support.   - Primary caregiver: Chani Meehan (spouse)   - Back-up caregiver: Silver and Joselin Meehan (son and daughter-in-law)  - Lodging: Lives [place], [place] (approximately 5 minutes away from [facility]) with his wife and grandson (21)       - DVT Prophylaxis: Platelets <50,000 cells/dL - prophylactic lovenox on hold and mechanical prophylaxis with bilateral SCDs while in bed in place.  Contraindications to pharmacologic prophylaxis: Thrombocytopenia  Contraindications to mechanical prophylaxis: None    - Disposition: When ANC > 1 and recovered from toxicities from chemotherapy and transplant.     The patient was seen and examined by Dr. Barbosa.    Stephanie Ortiz, APRN - CNP     Jan Barbosa MD  [facility]  [phone]  
abd/pelvis?     11. Psychosocial:  - Patient has history of alcohol (quit 9/2024) and tobacco use (quit 1989). Patient does not see any difficulties continuing to abstain from substances. Psychology can monitor.   - Due to self-report of ambivalence during transplant admission, possible patient could experience episodes of irritability, boredom, and low mood. Recommend consulting psychology for monitoring and support.   - Primary caregiver: Chani Meehan (spouse)   - Back-up caregiver: Silver and Joselin Meehan (son and daughter-in-law)  - Lodging: Lives [place], [place] (approximately 5 minutes away from [facility]) with his wife and grandson (21)       - DVT Prophylaxis: Platelets <50,000 cells/dL - prophylactic lovenox on hold and mechanical prophylaxis with bilateral SCDs while in bed in place.  Contraindications to pharmacologic prophylaxis: Thrombocytopenia  Contraindications to mechanical prophylaxis: None    - Disposition: When ANC > 1 and recovered from toxicities from chemotherapy and transplant.     The patient was seen and examined by Dr. Barbosa.    Stephanie Ortiz, APRN - CNP     Jan Barbosa MD  [facility]  [phone]  
caregiver: Boston Meehan (son and daughter-in-law)  - Lodging: Lives [place], [place] (approximately [place] minutes away from [facility]) with his wife and grandson (21)       - DVT Prophylaxis: Platelets <50,000 cells/dL - prophylactic lovenox on hold and mechanical prophylaxis with bilateral SCDs while in bed in place.  Contraindications to pharmacologic prophylaxis: Thrombocytopenia  Contraindications to mechanical prophylaxis: None    - Disposition: When ANC > 1 and recovered from toxicities from chemotherapy and transplant.     The patient was seen and examined by Dr. Barbosa.    Kirsten Guevara, APRN - CNP     Jan Barbosa MD  [facility]  [phone]      
containing sludge. Trace perihepatic versus pericholecystic fluid. Nonspecific but if appropriate clinical setting, findings could suggest cholecystitis.   - Consult surgery 3/30/25, HIDA scan pending    10.   Dysuria  - Started 3/26/25  - Urine CX 3/26/25: NGTD  Hematuria:  - CT scan 3/27/25 consistent with cystitis     10. MSK:  - Encourage walking and movement   - PT/OT consulted on admission for mobility program     Right sided back pain  - PRN oxycodone  - Scheduled flexeril  - Pain persists with oxy and flexeril, PRN morphine added  - Concern for kidney stone- CT abd/pelvis does not show stones but is consistent with cystitis- see      11. Psychosocial:  - Patient has history of alcohol (quit 9/2024) and tobacco use (quit 1989). Patient does not see any difficulties continuing to abstain from substances. Psychology can monitor.   - Due to self-report of ambivalence during transplant admission, possible patient could experience episodes of irritability, boredom, and low mood. Recommend consulting psychology for monitoring and support.   - Primary caregiver: Chani Meehan (spouse)   - Back-up caregiver: Silver and Joselin Meehan (son and daughter-in-law)  - Lodging: Lives [place], [place] (approximately [place] minutes away from [facility]) with his wife and grandson (21)       - DVT Prophylaxis: Platelets <50,000 cells/dL - prophylactic lovenox on hold and mechanical prophylaxis with bilateral SCDs while in bed in place.  Contraindications to pharmacologic prophylaxis: Thrombocytopenia  Contraindications to mechanical prophylaxis: None    - Disposition: When ANC > 1 and recovered from toxicities from chemotherapy and transplant.       JAY Tilley - NP     Jan Barbosa MD  [facility]  [phone]      
flexeril, PRN morphine added  - Concern for kidney stone- CT abd/pelvis does not show stones but is consistent with cystitis- see      11. Psychosocial:  - Patient has history of alcohol (quit 9/2024) and tobacco use (quit 1989). Patient does not see any difficulties continuing to abstain from substances. Psychology can monitor.   - Due to self-report of ambivalence during transplant admission, possible patient could experience episodes of irritability, boredom, and low mood. Recommend consulting psychology for monitoring and support.   - Primary caregiver: Chani Meehan (spouse)   - Back-up caregiver: Silver and Joselin Meehan (son and daughter-in-law)  - Lodging: Lives [place], [place] (approximately [place] minutes away from [facility]) with his wife and grandson (21)       - DVT Prophylaxis: Platelets <50,000 cells/dL - prophylactic lovenox on hold and mechanical prophylaxis with bilateral SCDs while in bed in place.  Contraindications to pharmacologic prophylaxis: Thrombocytopenia  Contraindications to mechanical prophylaxis: None    - Disposition: When ANC > 1 and recovered from toxicities from chemotherapy and transplant.       Tracy Schoenhoft, APRN - CNP     
(approximately 5 minutes away from [facility]) with his wife and grandson (21)       - DVT Prophylaxis: Platelets <50,000 cells/dL - prophylactic lovenox on hold and mechanical prophylaxis with bilateral SCDs while in bed in place.  Contraindications to pharmacologic prophylaxis: Thrombocytopenia  Contraindications to mechanical prophylaxis: None    - Disposition: When ANC > 1 and recovered from toxicities from chemotherapy and transplant.       Vesna Worthington, APRN - NP     CBC Shows improvement with white count climbing.  Peripheral edema persists     David M. Waterhouse, M.D., MPH    [facility] ([facility])/ [place] [address]  Office [phone]  Cell [phone]  [email or web address]     \"Participating in a clinical trial is the first step to fighting cancer; not the last.\"  
LAXMI Guevara, APRN - CNP    
transplant.       Stephanie Ortiz, APRN - CNP     Jan Barbosa MD  [facility]  [phone]

## 2025-04-10 NOTE — CARE COORDINATION
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) [C92.01]   Date / Time: 3/14/2025  1:34 PM    Current PCP: Magaly Womack MD  Clinic patient: No    Hospitalization in the last 30 days: Yes  Readmission Assessment  Number of Days since last admission?: 8-30 days (planned readmit for transplant)  Previous Disposition: Home with Family (planned readmit for transplant)  Who is being Interviewed: Patient, Caregiver (planned readmit for transplant)  What was the patient's/caregiver's perception as to why they think they needed to return back to the hospital?: Other (Comment) (planned readmit for transplant)  Did you visit your Primary Care Physician after you left the hospital, before you returned this time?: No (Pt goes to [facility]; planned readmit for transplant)  Why weren't you able to visit your PCP?: Did not have an appointment (planned readmit for transplant)  Did you see a specialist, such as Cardiac, Pulmonary, Orthopedic Physician, etc. after you left the hospital?: Yes (planned readmit for transplant)  Who advised the patient to return to the hospital?: Physician (planned readmit for transplant)  Does the patient report anything that got in the way of taking their medications?: No (planned readmit for transplant)  In our efforts to provide the best possible care to you and others like you, can you think of anything that we could have done to help you after you left the hospital the first time, so that you might not have needed to return so soon?: Other (Comment) (n/a planned readmit for 
Attended MD rounds at bedside. No needs identified for SW at this time    PT ordered again per provider. Updated PT/OT.     SW will follow    DARSHAN Delgadillo   for [facility] ([facility])  [employer] Mobile: [phone]      
Attended MD rounds at bedside. Pt's wife present.     No SW needs identified at this time.     SW will follow    DARSHAN Delgadillo   for [facility] ([facility])  [employer] Mobile: [phone]      
Attended MD rounds at bedside. Pt's wife present. Pt aware anticipated discharge is tomorrow. Pt and wife denied needs for SW when discharged.      IMM given.    Tessy TRAN, DARSHAN   for [facility] ([facility])  [employer] Mobile: [phone]      
Attended MD rounds. No SW needs identified at this time.     SW will follow    DARSHAN Delgadillo   for [facility] ([facility])  [employer] Mobile: [phone]      
Attended MD rounds. Pt and wife denied needs.     No needs identified for SW at this time.     SW will follow    DARSHAN Delgadillo   for [facility] ([facility])  [employer] Mobile: [phone]      
Attended rounds at bedside. Pt denied SW needs at this time.     SW will follow    DARSHAN Delgadillo   for [facility] ([facility])  [employer] Mobile: [phone]      
Met with patient and spouse and reviewed transplant recovery timeline.  Written timeline provided.  Reviewed Cytoxan and [place] importance of keeping empty bladder, infection prevention measures, activity and nutrition.  Patient spouse playing Tigers[employer] today.    
Met with patient and wife today to reviewed discharge plan and follow up. Chani patient wife has reviewed all the information and states that she is confident she understands her role and s/s of infection.  Reviewed medication management and follow up appointments. Reviewed importance of dialy activity. They plan to walk at a nearby park.     Patient will be discharged 4/10.   
Met with patient today regarding discharge plan.  Red Discharge folder provided to patient for review of discharge instructions.  Tacro and cellcept will be sent to pharmacy today.     Patient CMV negative patient does not need Letermovir.   
Reviewed discharge instructions with patient and  spouse.  Reviewed discharge medications including dosing, schedule, indication, and adverse reactions.  Reviewed which medications were already taken today and next dosage due for each medication.     Medications were not brought in, emphasized the importance of bringing in medications to follow up appointment at [facility] tomorrow. Patient also made [facility]    Reviewed signs and symptoms that prompt a call to the physician and appropriate phone numbers.  Reviewed follow up appointments that have been made in [facility].  Low microbial diet, activity restrictions, and increased risk of infection were reviewed.     Patient is being discharged with IV access d/t need for ongoing therapy:      Type:  CVC                        Date of placement:  3/14/25  Surgeon:  Dr Zuñiga  Plan:continue   Next dressing change due on: 4/17/25  Cap changes due on: 4/17/25  CVC care and maintenance was reviewed with patient and spouse.  Pt verbalizes understanding of line care and maintenance.      Patient verbalized understanding of all instructions and questions were answered to his. satisfaction.  Signed discharge instructions were given to the patient and a copy placed in the paper-lite chart.  Patient discharged to home per self with spouse.      Eleni Felder RN  
Reviewed discharge plan for patient.  Caregiver reviewed discharge folder.  Reviewed prescriptions that have been called in to pharmacy.  Tacro and cellcept have been picked  up by caregiver.    [facility] have been made for the first year.     Monitoring patient for discharge.   
Spoke with pt at bedside this AM. He denied needs for SW at this time.     SW will follow    DARSHAN Delgadillo   for [facility] ([facility])  [employer] Mobile: [phone]      
Spoke with pt at bedside. He denied needs for SW at this time.     SW will follow    DARSHAN Delgadillo   for [facility] ([facility])  [employer] Mobile: [phone]      
Spoke with pt briefly at bedside this AM. He denied needs for SW.     SW will follow    DARSHAN Delgadillo   for [facility] ([facility])  [place] Mobile: [phone]      
Spoke with pt briefly while ambulating with PT. No SW needs identified.     SW will follow    DARSHAN Delgadillo   for [facility] ([facility])  [employer] Mobile: [phone]      
members/significant others, and if so, who? Yes  Plans to Return to Present Housing: Yes  Other Identified Issues/Barriers to RETURNING to current housing: n/a  Potential Assistance needed at discharge: N/A            Potential DME:    Patient expects to discharge to: House  Plan for transportation at discharge:      Financial    Payor: [employer]VANTAGE / Plan: [employer] PREFERRED PPO / Product Type: *No Product type* /     Does insurance require precert for SNF: Yes    Potential assistance Purchasing Medications: No  Meds-to-Beds request:        [id number] - [place], [place] - [address] - P [phone] - F [phone]  [address] [facility] [address]  Phone: [phone] Fax: [phone]      Notes:    Factors facilitating achievement of predicted outcomes: Family support, Caregiver support, Motivated, Cooperative, Pleasant, Sense of humor, and Good insight into deficits    Barriers to discharge: Medical complications    Additional Case Management Notes: Chart review completed. Spoke with pt and pt's wife at bedside with pt permission. RN also present. Pt stated he has remained independent with his ADL's. They are aware SW will follow while inpatient and assist if needs arise.     Pt was given a [employer] Donation Bag and stated appreciation.     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

## 2025-04-11 LAB — BACTERIA UR CULT: NORMAL

## 2025-04-13 LAB
CMV DNA SERPL NAA+PROBE-ACNC: NOT DETECTED IU/ML
CMV DNA SERPL NAA+PROBE-LOG IU: NOT DETECTED LOG IU/ML
CMV DNA SERPL QL NAA+PROBE: NOT DETECTED

## 2025-04-14 ENCOUNTER — TRANSCRIBE ORDERS (OUTPATIENT)
Dept: ADMINISTRATIVE | Age: 75
End: 2025-04-14

## 2025-04-14 DIAGNOSIS — C92.00 ACUTE MYELOID LEUKEMIA NOT HAVING ACHIEVED REMISSION (HCC): Primary | ICD-10-CM

## 2025-04-14 LAB
FUNGUS BLD CULT: NORMAL
FUNGUS BLD CULT: NORMAL
FUNGUS SPEC CULT: NORMAL
FUNGUS SPEC CULT: NORMAL
LOEFFLER MB STN SPEC: NORMAL
LOEFFLER MB STN SPEC: NORMAL

## 2025-04-18 ENCOUNTER — TELEPHONE (OUTPATIENT)
Dept: GENERAL RADIOLOGY | Age: 75
End: 2025-04-18

## 2025-04-18 NOTE — TELEPHONE ENCOUNTER
Called pt with Pre-procedure instructions for BMBX.  Pt verified he has transportation arranged to and from hospital, will remain NPO after midnight, and pt is not on anticoagulant therapy. Reviewed basics of procedure with pt. Verbalized understanding - no further questions at this time.

## 2025-04-21 ENCOUNTER — HOSPITAL ENCOUNTER (OUTPATIENT)
Dept: CT IMAGING | Age: 75
Discharge: HOME | End: 2025-04-21
Payer: COMMERCIAL

## 2025-04-21 VITALS
SYSTOLIC BLOOD PRESSURE: 143 MMHG | HEART RATE: 71 BPM | DIASTOLIC BLOOD PRESSURE: 95 MMHG | HEIGHT: 69 IN | RESPIRATION RATE: 14 BRPM | WEIGHT: 202 LBS | BODY MASS INDEX: 29.92 KG/M2 | OXYGEN SATURATION: 96 % | TEMPERATURE: 98.9 F

## 2025-04-21 DIAGNOSIS — C92.00 ACUTE MYELOID LEUKEMIA NOT HAVING ACHIEVED REMISSION (HCC): ICD-10-CM

## 2025-04-21 LAB
APTT BLD: 22.8 SEC (ref 22.1–36.4)
BASOPHILS # BLD: 0 K/UL (ref 0–0.2)
BASOPHILS NFR BLD: 0 %
DEPRECATED RDW RBC AUTO: 17.8 % (ref 12.4–15.4)
DOHLE BOD BLD QL SMEAR: PRESENT
EOSINOPHIL # BLD: 0 K/UL (ref 0–0.6)
EOSINOPHIL NFR BLD: 0 %
FUNGUS BLD CULT: NORMAL
FUNGUS BLD CULT: NORMAL
FUNGUS SPEC CULT: NORMAL
FUNGUS SPEC CULT: NORMAL
HCT VFR BLD AUTO: 27.7 % (ref 40.5–52.5)
HGB BLD-MCNC: 9.5 G/DL (ref 13.5–17.5)
INR PPP: 1.13 (ref 0.85–1.15)
LOEFFLER MB STN SPEC: NORMAL
LOEFFLER MB STN SPEC: NORMAL
LYMPHOCYTES # BLD: 0.2 K/UL (ref 1–5.1)
LYMPHOCYTES NFR BLD: 5 %
MCH RBC QN AUTO: 31.1 PG (ref 26–34)
MCHC RBC AUTO-ENTMCNC: 34.1 G/DL (ref 31–36)
MCV RBC AUTO: 91.1 FL (ref 80–100)
MONOCYTES # BLD: 1.3 K/UL (ref 0–1.3)
MONOCYTES NFR BLD: 28 %
MYELOCYTES NFR BLD MANUAL: 1 %
NEUTROPHILS # BLD: 3 K/UL (ref 1.7–7.7)
NEUTROPHILS NFR BLD: 62 %
NEUTS BAND NFR BLD MANUAL: 4 % (ref 0–7)
OVALOCYTES BLD QL SMEAR: ABNORMAL
PLATELET # BLD AUTO: 176 K/UL (ref 135–450)
PMV BLD AUTO: 8 FL (ref 5–10.5)
PROTHROMBIN TIME: 14.7 SEC (ref 11.9–14.9)
RBC # BLD AUTO: 3.04 M/UL (ref 4.2–5.9)
RBC MORPH BLD: NORMAL
WBC # BLD AUTO: 4.5 K/UL (ref 4–11)

## 2025-04-21 PROCEDURE — 6360000002 HC RX W HCPCS

## 2025-04-21 PROCEDURE — 88184 FLOWCYTOMETRY/ TC 1 MARKER: CPT

## 2025-04-21 PROCEDURE — 88341 IMHCHEM/IMCYTCHM EA ADD ANTB: CPT

## 2025-04-21 PROCEDURE — 85730 THROMBOPLASTIN TIME PARTIAL: CPT

## 2025-04-21 PROCEDURE — 36415 COLL VENOUS BLD VENIPUNCTURE: CPT

## 2025-04-21 PROCEDURE — 88313 SPECIAL STAINS GROUP 2: CPT

## 2025-04-21 PROCEDURE — 99152 MOD SED SAME PHYS/QHP 5/>YRS: CPT

## 2025-04-21 PROCEDURE — 88185 FLOWCYTOMETRY/TC ADD-ON: CPT

## 2025-04-21 PROCEDURE — 88342 IMHCHEM/IMCYTCHM 1ST ANTB: CPT

## 2025-04-21 PROCEDURE — 85610 PROTHROMBIN TIME: CPT

## 2025-04-21 PROCEDURE — 88305 TISSUE EXAM BY PATHOLOGIST: CPT

## 2025-04-21 PROCEDURE — 85025 COMPLETE CBC W/AUTO DIFF WBC: CPT

## 2025-04-21 PROCEDURE — 88311 DECALCIFY TISSUE: CPT

## 2025-04-21 PROCEDURE — 6360000002 HC RX W HCPCS: Performed by: STUDENT IN AN ORGANIZED HEALTH CARE EDUCATION/TRAINING PROGRAM

## 2025-04-21 RX ORDER — MIDAZOLAM HYDROCHLORIDE 1 MG/ML
INJECTION, SOLUTION INTRAMUSCULAR; INTRAVENOUS PRN
Status: COMPLETED | OUTPATIENT
Start: 2025-04-21 | End: 2025-04-21

## 2025-04-21 RX ORDER — HEPARIN 100 UNIT/ML
SYRINGE INTRAVENOUS PRN
Status: COMPLETED | OUTPATIENT
Start: 2025-04-21 | End: 2025-04-21

## 2025-04-21 RX ORDER — FENTANYL CITRATE 50 UG/ML
INJECTION, SOLUTION INTRAMUSCULAR; INTRAVENOUS PRN
Status: COMPLETED | OUTPATIENT
Start: 2025-04-21 | End: 2025-04-21

## 2025-04-21 RX ORDER — BUPIVACAINE HYDROCHLORIDE 2.5 MG/ML
INJECTION, SOLUTION INFILTRATION; PERINEURAL PRN
Status: COMPLETED | OUTPATIENT
Start: 2025-04-21 | End: 2025-04-21

## 2025-04-21 RX ORDER — LIDOCAINE HYDROCHLORIDE 10 MG/ML
INJECTION, SOLUTION EPIDURAL; INFILTRATION; INTRACAUDAL; PERINEURAL PRN
Status: COMPLETED | OUTPATIENT
Start: 2025-04-21 | End: 2025-04-21

## 2025-04-21 RX ADMIN — Medication 100 UNITS: at 11:02

## 2025-04-21 RX ADMIN — MIDAZOLAM HYDROCHLORIDE 1 MG: 1 INJECTION, SOLUTION INTRAMUSCULAR; INTRAVENOUS at 10:54

## 2025-04-21 RX ADMIN — FENTANYL CITRATE 50 MCG: 50 INJECTION, SOLUTION INTRAMUSCULAR; INTRAVENOUS at 10:54

## 2025-04-21 RX ADMIN — LIDOCAINE HYDROCHLORIDE 10 ML: 10 INJECTION, SOLUTION EPIDURAL; INFILTRATION; INTRACAUDAL; PERINEURAL at 10:57

## 2025-04-21 RX ADMIN — BUPIVACAINE HYDROCHLORIDE 10 ML: 2.5 INJECTION, SOLUTION INFILTRATION; PERINEURAL at 10:59

## 2025-04-21 ASSESSMENT — PAIN SCALES - GENERAL
PAINLEVEL_OUTOF10: 0

## 2025-04-21 ASSESSMENT — PAIN DESCRIPTION - DESCRIPTORS: DESCRIPTORS: ACHING

## 2025-04-21 ASSESSMENT — PAIN - FUNCTIONAL ASSESSMENT: PAIN_FUNCTIONAL_ASSESSMENT: 0-10

## 2025-04-21 NOTE — PRE SEDATION
sedation planned    HPI / Treatment plan : CT guided bone marrow biopsy       Electronically signed by JAY Ibarra - CNP on 4/21/25 at 10:44 AM EDT

## 2025-04-21 NOTE — SEDATION DOCUMENTATION
IMAGING SERVICES NURSING PROGRESS NOTE    Procedure:  L BM Bx  April 21, 2025  Myles Meehan      Allergies:  No Known Allergies    Vitals:    04/21/25 1055   BP: (!) 145/69   Pulse: 72   Resp: 16   Temp:    SpO2: 98%       Recent lab work reviewed with MD: yes   Procedure explained to patient by MD: yes   Informed consent obtained:yes  Family with patient: Yes    Mental Status:  Normal  Readiness to learn:  Yes  Barriers to learning: No    Pain Assessment Pre-Procedure:  Pain Present:  yes  Pain Score:  4  Pain Quality/Description:  Aching    Time out Procedure Verification with:  [x] RN  [x] Physician  [x] Patient  [x] Other: CT Technologist  Procedure site marked, if applicable:  Yes    Note: Patient arrived A & O x 4, denies pain, breathing easily on room air, Spoke to Peg Reynolds NP prior to procedure.  Procedural sedation:  Fentanyl:  50 mcg  Versed:    1 mg  Post Procedureal Note:  Patient tolerated procedure well.  Breathing easily on room air.  Report given to [facility] RN.  Patient transported in stable conditon to room 7.    Pain Assessment Post-Procedure:  Pain Present:  no  Pain Score:  0  Pain Quality/Description:  none    Plan of Care Goals:  Safety measures met:  Yes  Patient understands explanation of procedure:  Yes    Time in:  1055  Time out:  1110  Mony Gil RN.  R.N. 4/21/2025

## 2025-04-21 NOTE — DISCHARGE INSTRUCTIONS
Discharge Instructions for Biopsy of  Bone marrow   Interventional Radiologist performing procedure JAY Ibarra   Home Care     Remove the bandage after a day or two.  May apply band-aid if needed.   Diet     Resume your normal diet.    Physical Activity     Rest for the first 24-48 hours.      No driving for 24 hours if you have received analgesia/sedation   May shower.  Do not submerge biopsy site in water (ie:bath, hot tub, swimming pool) for one week to prevent infection    Avoid strenuous activities for one week (excercise, heavy lifting etc.)       Call Your Doctor If Any of the Following Occurs   Signs of infection, including fever and chills     Redness, swelling, increasing pain, excessive bleeding, or any discharge from the incision site     Pain that you can't control with over the counter medications   Cough, shortness of breath, or chest pain     Pain when taking a deep breath     You feel your heart rate is fast   In case of an emergency, call 911 immediately.       [facility] AMBULATORY PROCEDURE DISCHARGE INSTRUCTIONS  A responsible person should be with you for the next 24 hours.       MEDICATION INSTRUCTIONS:  [x]Resume your prescribed medications  [x]You may take a non-prescription “headache remedy”, preferably one that does not contain aspirin.  [x] Keep your medication list updated and carry it with you in case of emergencies.    IF YOU TAKE ANTI-COAGULANTS:  You may typically re-start your blood thinning drugs the day after your procedure UNLESS directed otherwise by the doctor who prescribes the drug for you. Some common blood thinning drugs include  Anti-inflammatory drugs (motrin, aleve)     Aspirin  Anti-coagulants such as plavix (clopidogrel) or coumadin (warfarin)      FOLLOW-UP CARE:  Follow up with your doctor for results and appointment    Physician Tracy Schoenhoft        The above instructions were reviewed with patient/significant other.  The following

## 2025-04-21 NOTE — BRIEF OP NOTE
IR Brief Postprocedure/Postoperative Note    Myles Meehan  YOB: 1950  [id number]    Pre-operative Diagnosis: AML    Post-operative Diagnosis: Same    Procedure: CT guided bone marrow biopsy     Anesthesia: moderate sedation     Surgeons/Assistants: Peg Reynolds CNP; Myles Epps MD    Estimated Blood Loss: Minimal    Complications: none    Specimens: were obtained      JAY Ibarra CNP   4/21/2025

## 2025-04-21 NOTE — PROGRESS NOTES
Ambulatory Surgery/Procedure Discharge Note    Vitals:    04/21/25 1210   BP: (!) 143/95   Pulse: 71   Resp: 14   Temp: 98.9 °F (37.2 °C)   SpO2: 96%     Per Karen SCORE PATIENT MEETS REQUIREMENTS FOR DISCHARGE.   In: 80 [P.O.:80]  Out: -     Restroom use offered before discharge.  Yes    Pain assessment:  none  Pain Level: 0    Pt discharged.  Discharge instructions reviewed with pt and spouse.  Pt VSS and tolerating PO fluids .  Written handouts given to pt.  Pt denies any needs at home and after discharge.  Pt's wife here to take him home.  Escorted pt to front door in a wheelchair.       Patient discharged to home/self care. Patient discharged via wheel chair by transporter to waiting family/S.O.       4/21/2025 12:13PM

## 2025-04-23 ENCOUNTER — HOSPITAL ENCOUNTER (OUTPATIENT)
Age: 75
Setting detail: SPECIMEN
Discharge: HOME | End: 2025-04-23
Payer: COMMERCIAL

## 2025-04-23 LAB
FLUAV RNA RESP QL NAA+PROBE: NOT DETECTED
FLUBV RNA RESP QL NAA+PROBE: NOT DETECTED
SARS-COV-2 RNA RESP QL NAA+PROBE: DETECTED

## 2025-04-23 PROCEDURE — 87636 SARSCOV2 & INF A&B AMP PRB: CPT

## 2025-04-29 ENCOUNTER — HOSPITAL ENCOUNTER (INPATIENT)
Age: 75
LOS: 6 days | Discharge: HOME | DRG: 871 | End: 2025-05-05
Attending: STUDENT IN AN ORGANIZED HEALTH CARE EDUCATION/TRAINING PROGRAM | Admitting: STUDENT IN AN ORGANIZED HEALTH CARE EDUCATION/TRAINING PROGRAM
Payer: COMMERCIAL

## 2025-04-29 ENCOUNTER — APPOINTMENT (OUTPATIENT)
Dept: GENERAL RADIOLOGY | Age: 75
DRG: 871 | End: 2025-04-29
Attending: STUDENT IN AN ORGANIZED HEALTH CARE EDUCATION/TRAINING PROGRAM
Payer: COMMERCIAL

## 2025-04-29 PROBLEM — A41.9 SEPSIS (HCC): Status: ACTIVE | Noted: 2025-04-29

## 2025-04-29 LAB
ALBUMIN SERPL-MCNC: 3.3 G/DL (ref 3.4–5)
ALP SERPL-CCNC: 129 U/L (ref 40–129)
ALT SERPL-CCNC: 7 U/L (ref 10–40)
ANION GAP SERPL CALCULATED.3IONS-SCNC: 10 MMOL/L (ref 3–16)
ANION GAP SERPL CALCULATED.3IONS-SCNC: 10 MMOL/L (ref 3–16)
APTT BLD: 43.3 SEC (ref 22.1–36.4)
AST SERPL-CCNC: 14 U/L (ref 15–37)
BASOPHILS # BLD: 0.2 K/UL (ref 0–0.2)
BASOPHILS NFR BLD: 1 %
BILIRUB DIRECT SERPL-MCNC: 0.5 MG/DL (ref 0–0.3)
BILIRUB INDIRECT SERPL-MCNC: 0.4 MG/DL (ref 0–1)
BILIRUB SERPL-MCNC: 0.9 MG/DL (ref 0–1)
BUN SERPL-MCNC: 26 MG/DL (ref 7–20)
BUN SERPL-MCNC: 27 MG/DL (ref 7–20)
CALCIUM SERPL-MCNC: 8.8 MG/DL (ref 8.3–10.6)
CALCIUM SERPL-MCNC: 8.9 MG/DL (ref 8.3–10.6)
CHLORIDE SERPL-SCNC: 105 MMOL/L (ref 99–110)
CHLORIDE SERPL-SCNC: 106 MMOL/L (ref 99–110)
CO2 SERPL-SCNC: 21 MMOL/L (ref 21–32)
CO2 SERPL-SCNC: 21 MMOL/L (ref 21–32)
CREAT SERPL-MCNC: 2 MG/DL (ref 0.8–1.3)
CREAT SERPL-MCNC: 2.1 MG/DL (ref 0.8–1.3)
DEPRECATED RDW RBC AUTO: 20.7 % (ref 12.4–15.4)
EOSINOPHIL # BLD: 0.1 K/UL (ref 0–0.6)
EOSINOPHIL NFR BLD: 0.4 %
GFR SERPLBLD CREATININE-BSD FMLA CKD-EPI: 32 ML/MIN/{1.73_M2}
GFR SERPLBLD CREATININE-BSD FMLA CKD-EPI: 34 ML/MIN/{1.73_M2}
GLUCOSE SERPL-MCNC: 95 MG/DL (ref 70–99)
GLUCOSE SERPL-MCNC: 96 MG/DL (ref 70–99)
HCT VFR BLD AUTO: 21.8 % (ref 40.5–52.5)
HGB BLD-MCNC: 7.3 G/DL (ref 13.5–17.5)
IMM RETICS NFR: 0.45 % (ref 0.21–0.37)
INR PPP: 1.39 (ref 0.85–1.15)
LACTATE BLDV-SCNC: 1.2 MMOL/L (ref 0.4–2)
LDH SERPL L TO P-CCNC: 203 U/L (ref 100–190)
LYMPHOCYTES # BLD: 0.1 K/UL (ref 1–5.1)
LYMPHOCYTES NFR BLD: 0.4 %
MCH RBC QN AUTO: 30.6 PG (ref 26–34)
MCHC RBC AUTO-ENTMCNC: 33.6 G/DL (ref 31–36)
MCV RBC AUTO: 91.2 FL (ref 80–100)
MONOCYTES # BLD: 1.1 K/UL (ref 0–1.3)
MONOCYTES NFR BLD: 5.5 %
NEUTROPHILS # BLD: 18.4 K/UL (ref 1.7–7.7)
NEUTROPHILS NFR BLD: 92.7 %
PHOSPHATE SERPL-MCNC: 2.7 MG/DL (ref 2.5–4.9)
PLATELET # BLD AUTO: 96 K/UL (ref 135–450)
PLATELET BLD QL SMEAR: ABNORMAL
PMV BLD AUTO: 7.8 FL (ref 5–10.5)
POTASSIUM SERPL-SCNC: 4.3 MMOL/L (ref 3.5–5.1)
POTASSIUM SERPL-SCNC: 4.3 MMOL/L (ref 3.5–5.1)
PROCALCITONIN SERPL IA-MCNC: 32 NG/ML (ref 0–0.15)
PROT SERPL-MCNC: 5.6 G/DL (ref 6.4–8.2)
PROTHROMBIN TIME: 17.2 SEC (ref 11.9–14.9)
RBC # BLD AUTO: 2.39 M/UL (ref 4.2–5.9)
RETICS # AUTO: 0.04 M/UL
RETICS/RBC NFR AUTO: 1.72 % (ref 0.5–2.18)
SLIDE REVIEW: ABNORMAL
SODIUM SERPL-SCNC: 136 MMOL/L (ref 136–145)
SODIUM SERPL-SCNC: 137 MMOL/L (ref 136–145)
URATE SERPL-MCNC: 5.9 MG/DL (ref 3.5–7.2)
WBC # BLD AUTO: 19.8 K/UL (ref 4–11)

## 2025-04-29 PROCEDURE — 71046 X-RAY EXAM CHEST 2 VIEWS: CPT

## 2025-04-29 PROCEDURE — 93005 ELECTROCARDIOGRAM TRACING: CPT

## 2025-04-29 PROCEDURE — 84100 ASSAY OF PHOSPHORUS: CPT

## 2025-04-29 PROCEDURE — 84550 ASSAY OF BLOOD/URIC ACID: CPT

## 2025-04-29 PROCEDURE — 2580000003 HC RX 258

## 2025-04-29 PROCEDURE — 80076 HEPATIC FUNCTION PANEL: CPT

## 2025-04-29 PROCEDURE — 84145 PROCALCITONIN (PCT): CPT

## 2025-04-29 PROCEDURE — 85025 COMPLETE CBC W/AUTO DIFF WBC: CPT

## 2025-04-29 PROCEDURE — 83605 ASSAY OF LACTIC ACID: CPT

## 2025-04-29 PROCEDURE — 83010 ASSAY OF HAPTOGLOBIN QUANT: CPT

## 2025-04-29 PROCEDURE — 80048 BASIC METABOLIC PNL TOTAL CA: CPT

## 2025-04-29 PROCEDURE — 83615 LACTATE (LD) (LDH) ENZYME: CPT

## 2025-04-29 PROCEDURE — 85730 THROMBOPLASTIN TIME PARTIAL: CPT

## 2025-04-29 PROCEDURE — 6370000000 HC RX 637 (ALT 250 FOR IP)

## 2025-04-29 PROCEDURE — 85045 AUTOMATED RETICULOCYTE COUNT: CPT

## 2025-04-29 PROCEDURE — 85610 PROTHROMBIN TIME: CPT

## 2025-04-29 PROCEDURE — 2060000000 HC ICU INTERMEDIATE R&B

## 2025-04-29 PROCEDURE — 94150 VITAL CAPACITY TEST: CPT

## 2025-04-29 PROCEDURE — 6360000002 HC RX W HCPCS

## 2025-04-29 PROCEDURE — 36415 COLL VENOUS BLD VENIPUNCTURE: CPT

## 2025-04-29 RX ORDER — OLANZAPINE 5 MG/1
2.5 TABLET, FILM COATED ORAL NIGHTLY
Status: DISCONTINUED | OUTPATIENT
Start: 2025-04-29 | End: 2025-04-30

## 2025-04-29 RX ORDER — VALACYCLOVIR HYDROCHLORIDE 500 MG/1
500 TABLET, FILM COATED ORAL 2 TIMES DAILY
Status: DISCONTINUED | OUTPATIENT
Start: 2025-04-29 | End: 2025-05-01

## 2025-04-29 RX ORDER — SODIUM CHLORIDE 9 MG/ML
INJECTION, SOLUTION INTRAVENOUS PRN
Status: DISCONTINUED | OUTPATIENT
Start: 2025-04-29 | End: 2025-05-05 | Stop reason: HOSPADM

## 2025-04-29 RX ORDER — ATOVAQUONE 750 MG/5ML
1500 SUSPENSION ORAL DAILY
Status: DISCONTINUED | OUTPATIENT
Start: 2025-04-29 | End: 2025-05-05 | Stop reason: HOSPADM

## 2025-04-29 RX ORDER — ALLOPURINOL 300 MG/1
150 TABLET ORAL DAILY
Status: DISCONTINUED | OUTPATIENT
Start: 2025-04-29 | End: 2025-05-05 | Stop reason: HOSPADM

## 2025-04-29 RX ORDER — PANTOPRAZOLE SODIUM 40 MG/1
40 TABLET, DELAYED RELEASE ORAL DAILY
Status: DISCONTINUED | OUTPATIENT
Start: 2025-04-29 | End: 2025-05-05 | Stop reason: HOSPADM

## 2025-04-29 RX ORDER — ENOXAPARIN SODIUM 100 MG/ML
40 INJECTION SUBCUTANEOUS DAILY
Status: DISCONTINUED | OUTPATIENT
Start: 2025-04-29 | End: 2025-05-05 | Stop reason: HOSPADM

## 2025-04-29 RX ORDER — SODIUM CHLORIDE 9 MG/ML
500 INJECTION, SOLUTION INTRAVENOUS CONTINUOUS PRN
Status: DISCONTINUED | OUTPATIENT
Start: 2025-04-29 | End: 2025-05-05 | Stop reason: HOSPADM

## 2025-04-29 RX ORDER — MAGNESIUM SULFATE IN WATER 40 MG/ML
4000 INJECTION, SOLUTION INTRAVENOUS PRN
Status: DISCONTINUED | OUTPATIENT
Start: 2025-04-29 | End: 2025-05-05 | Stop reason: HOSPADM

## 2025-04-29 RX ORDER — 0.9 % SODIUM CHLORIDE 0.9 %
500 INTRAVENOUS SOLUTION INTRAVENOUS ONCE
Status: COMPLETED | OUTPATIENT
Start: 2025-04-29 | End: 2025-04-29

## 2025-04-29 RX ORDER — URSODIOL 250 MG/1
500 TABLET, FILM COATED ORAL 2 TIMES DAILY
Status: DISCONTINUED | OUTPATIENT
Start: 2025-04-29 | End: 2025-05-05 | Stop reason: HOSPADM

## 2025-04-29 RX ORDER — POTASSIUM CHLORIDE 750 MG/1
10 CAPSULE, EXTENDED RELEASE ORAL DAILY
COMMUNITY

## 2025-04-29 RX ORDER — ONDANSETRON 4 MG/1
4 TABLET, FILM COATED ORAL EVERY 8 HOURS PRN
Status: DISCONTINUED | OUTPATIENT
Start: 2025-04-29 | End: 2025-05-05 | Stop reason: HOSPADM

## 2025-04-29 RX ORDER — MOLNUPIRAVIR 200 MG/1
800 CAPSULE ORAL DAILY
COMMUNITY
End: 2025-05-08 | Stop reason: ALTCHOICE

## 2025-04-29 RX ORDER — SODIUM CHLORIDE 9 MG/ML
INJECTION, SOLUTION INTRAVENOUS CONTINUOUS
Status: DISCONTINUED | OUTPATIENT
Start: 2025-04-29 | End: 2025-04-30

## 2025-04-29 RX ORDER — POTASSIUM CHLORIDE 29.8 MG/ML
20 INJECTION INTRAVENOUS CONTINUOUS PRN
Status: DISCONTINUED | OUTPATIENT
Start: 2025-04-29 | End: 2025-05-05 | Stop reason: HOSPADM

## 2025-04-29 RX ORDER — OXYCODONE HYDROCHLORIDE 5 MG/1
5 TABLET ORAL EVERY 4 HOURS PRN
Status: DISCONTINUED | OUTPATIENT
Start: 2025-04-29 | End: 2025-05-05 | Stop reason: HOSPADM

## 2025-04-29 RX ORDER — ACETAMINOPHEN 325 MG/1
650 TABLET ORAL EVERY 4 HOURS PRN
Status: DISCONTINUED | OUTPATIENT
Start: 2025-04-29 | End: 2025-05-05 | Stop reason: HOSPADM

## 2025-04-29 RX ORDER — SODIUM CHLORIDE 0.9 % (FLUSH) 0.9 %
5-40 SYRINGE (ML) INJECTION PRN
Status: DISCONTINUED | OUTPATIENT
Start: 2025-04-29 | End: 2025-05-05 | Stop reason: HOSPADM

## 2025-04-29 RX ORDER — FLUORIDE TOOTHPASTE
15 TOOTHPASTE DENTAL 3 TIMES DAILY PRN
Status: DISCONTINUED | OUTPATIENT
Start: 2025-04-29 | End: 2025-05-05 | Stop reason: HOSPADM

## 2025-04-29 RX ORDER — SODIUM CHLORIDE 0.9 % (FLUSH) 0.9 %
5-40 SYRINGE (ML) INJECTION EVERY 12 HOURS SCHEDULED
Status: DISCONTINUED | OUTPATIENT
Start: 2025-04-29 | End: 2025-05-05 | Stop reason: HOSPADM

## 2025-04-29 RX ORDER — PROCHLORPERAZINE MALEATE 10 MG
5 TABLET ORAL EVERY 4 HOURS PRN
Status: DISCONTINUED | OUTPATIENT
Start: 2025-04-29 | End: 2025-05-05 | Stop reason: HOSPADM

## 2025-04-29 RX ORDER — FLUCONAZOLE 200 MG/1
200 TABLET ORAL DAILY
Status: DISCONTINUED | OUTPATIENT
Start: 2025-04-29 | End: 2025-05-02

## 2025-04-29 RX ORDER — OXYCODONE HYDROCHLORIDE 5 MG/1
10 TABLET ORAL EVERY 4 HOURS PRN
Status: DISCONTINUED | OUTPATIENT
Start: 2025-04-29 | End: 2025-05-05 | Stop reason: HOSPADM

## 2025-04-29 RX ORDER — MICONAZOLE NITRATE 20 MG/G
CREAM TOPICAL 2 TIMES DAILY
Status: DISCONTINUED | OUTPATIENT
Start: 2025-04-29 | End: 2025-05-05 | Stop reason: HOSPADM

## 2025-04-29 RX ORDER — NYSTATIN 100000 [USP'U]/ML
5 SUSPENSION ORAL 4 TIMES DAILY
Status: DISCONTINUED | OUTPATIENT
Start: 2025-04-29 | End: 2025-05-05 | Stop reason: HOSPADM

## 2025-04-29 RX ADMIN — ALLOPURINOL 150 MG: 300 TABLET ORAL at 18:53

## 2025-04-29 RX ADMIN — URSODIOL 500 MG: 250 TABLET, FILM COATED ORAL at 20:13

## 2025-04-29 RX ADMIN — MICONAZOLE NITRATE: 20 CREAM TOPICAL at 20:25

## 2025-04-29 RX ADMIN — ENOXAPARIN SODIUM 40 MG: 100 INJECTION SUBCUTANEOUS at 18:56

## 2025-04-29 RX ADMIN — VALACYCLOVIR HYDROCHLORIDE 500 MG: 500 TABLET, FILM COATED ORAL at 20:13

## 2025-04-29 RX ADMIN — FLUCONAZOLE 200 MG: 200 TABLET ORAL at 18:54

## 2025-04-29 RX ADMIN — OLANZAPINE 2.5 MG: 5 TABLET, FILM COATED ORAL at 20:13

## 2025-04-29 RX ADMIN — SODIUM CHLORIDE: 0.9 INJECTION, SOLUTION INTRAVENOUS at 17:32

## 2025-04-29 RX ADMIN — SODIUM CHLORIDE 500 ML: 0.9 INJECTION, SOLUTION INTRAVENOUS at 20:25

## 2025-04-29 RX ADMIN — NYSTATIN 500000 UNITS: 100000 SUSPENSION ORAL at 20:13

## 2025-04-29 RX ADMIN — Medication 1500 MG: at 18:54

## 2025-04-29 RX ADMIN — PANTOPRAZOLE SODIUM 40 MG: 40 TABLET, DELAYED RELEASE ORAL at 18:53

## 2025-04-29 NOTE — CONSULTS
Consult received.  Labs and notes were reviewed.  Case was discussed with the staff.    Full note to follow.    Thanks  Nephrology  [address] [place] [address] [id number]  [place] [address]  Office: [phone]  Cell: [phone]  Fax: [phone]

## 2025-04-29 NOTE — H&P
[facility]  History and Physical          Attending Physician: Tawana Holt DO    Primary Care: Magaly Womack MD       Referring MD: Tawana Holt DO  [address]  [place] [address]    Name: Myles Meehan :  1950  MRN:  [id number]    Admission: (Not on file)      Date: 2025    Reason for Admission:  Sepsis     History of Present Illness: Myles Meehan is a 74 year old male with a past medical history significant for HTN, recurrent gout, GERD, and AML. 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 For GVHD prophylaxis post transplant he received cytoxan on days +3/+4, cellcept from days +5-+35, and tacrolimus starting on day +5 (currently being held). His post transplant course was complicated by back pain, abdominal pain/diarrhea, scrotal cellulitis/epididymoorchitis, thrush, fluid volume overload, nausea & vomiting, and enterobacter UTI. He has no current signs of GVHD. He is currently day +40.     Patient complained of cough with productive yellow sputum and a respiratory swab was completed on 25. Swab came back positive for covid. He was started on molnupiravir and completed course on 25. Respiratory symptoms have now mostly resolved.     Patient presented to [facility] on 25 for routine follow up. At this visit creatinine was noted to be 1.85. He was instructed to return to [facility] clinic on 25 for IVF and IV magnesium. On 25 during administration of IVF and IV magnesium, patient developed chills and rigors. He denies fever at home and was afebrile in office. He was not hypotensive or hypoxic. Blood cultures were done in clinic and patient was started on Cefepime. He will be admitted for sepsis workup.          Past Surgical History:   Procedure Laterality Date    ANKLE SURGERY Left

## 2025-04-30 ENCOUNTER — HOSPITAL ENCOUNTER (INPATIENT)
Dept: INTERVENTIONAL RADIOLOGY/VASCULAR | Age: 75
Discharge: HOME | DRG: 871 | End: 2025-05-02
Attending: STUDENT IN AN ORGANIZED HEALTH CARE EDUCATION/TRAINING PROGRAM
Payer: COMMERCIAL

## 2025-04-30 LAB
ABO/RH: NORMAL
ALBUMIN SERPL-MCNC: 2.9 G/DL (ref 3.4–5)
ALP SERPL-CCNC: 114 U/L (ref 40–129)
ALT SERPL-CCNC: 7 U/L (ref 10–40)
AMORPH SED URNS QL MICRO: NORMAL /HPF
ANION GAP SERPL CALCULATED.3IONS-SCNC: 9 MMOL/L (ref 3–16)
ANTIBODY SCREEN: NORMAL
AST SERPL-CCNC: 13 U/L (ref 15–37)
BASOPHILS # BLD: 0.1 K/UL (ref 0–0.2)
BASOPHILS NFR BLD: 0.6 %
BILIRUB DIRECT SERPL-MCNC: 0.3 MG/DL (ref 0–0.3)
BILIRUB INDIRECT SERPL-MCNC: 0.3 MG/DL (ref 0–1)
BILIRUB SERPL-MCNC: 0.6 MG/DL (ref 0–1)
BILIRUB UR QL STRIP.AUTO: NEGATIVE
BLOOD BANK DISPENSE STATUS: NORMAL
BLOOD BANK PRODUCT CODE: NORMAL
BPU ID: NORMAL
BUN SERPL-MCNC: 27 MG/DL (ref 7–20)
C DIFF TOX A+B STL QL IA: NORMAL
CALCIUM SERPL-MCNC: 8.5 MG/DL (ref 8.3–10.6)
CHLORIDE SERPL-SCNC: 108 MMOL/L (ref 99–110)
CLARITY UR: CLEAR
CO2 SERPL-SCNC: 20 MMOL/L (ref 21–32)
COLOR UR: YELLOW
CREAT SERPL-MCNC: 2.2 MG/DL (ref 0.8–1.3)
CREAT UR-MCNC: 122 MG/DL (ref 39–259)
DEPRECATED RDW RBC AUTO: 20.7 % (ref 12.4–15.4)
DESCRIPTION BLOOD BANK: NORMAL
ECHO BSA: 2.08 M2
EKG ATRIAL RATE: 95 BPM
EKG DIAGNOSIS: NORMAL
EKG P AXIS: 79 DEGREES
EKG P-R INTERVAL: 182 MS
EKG Q-T INTERVAL: 364 MS
EKG QRS DURATION: 92 MS
EKG QTC CALCULATION (BAZETT): 457 MS
EKG R AXIS: 93 DEGREES
EKG T AXIS: 27 DEGREES
EKG VENTRICULAR RATE: 95 BPM
EOSINOPHIL # BLD: 0.2 K/UL (ref 0–0.6)
EOSINOPHIL NFR BLD: 1.3 %
EOSINOPHIL URNS QL MICRO: NORMAL
GFR SERPLBLD CREATININE-BSD FMLA CKD-EPI: 30 ML/MIN/{1.73_M2}
GLUCOSE SERPL-MCNC: 83 MG/DL (ref 70–99)
GLUCOSE UR STRIP.AUTO-MCNC: NEGATIVE MG/DL
HCT VFR BLD AUTO: 18.8 % (ref 40.5–52.5)
HGB BLD-MCNC: 6.5 G/DL (ref 13.5–17.5)
HGB UR QL STRIP.AUTO: NEGATIVE
KETONES UR STRIP.AUTO-MCNC: ABNORMAL MG/DL
LDH SERPL L TO P-CCNC: 191 U/L (ref 100–190)
LEUKOCYTE ESTERASE UR QL STRIP.AUTO: NEGATIVE
LYMPHOCYTES # BLD: 0.1 K/UL (ref 1–5.1)
LYMPHOCYTES NFR BLD: 0.8 %
MAGNESIUM SERPL-MCNC: 1.58 MG/DL (ref 1.8–2.4)
MCH RBC QN AUTO: 31.5 PG (ref 26–34)
MCHC RBC AUTO-ENTMCNC: 34.6 G/DL (ref 31–36)
MCV RBC AUTO: 91.2 FL (ref 80–100)
MONOCYTES # BLD: 2 K/UL (ref 0–1.3)
MONOCYTES NFR BLD: 12.8 %
NEUTROPHILS # BLD: 12.8 K/UL (ref 1.7–7.7)
NEUTROPHILS NFR BLD: 84.5 %
NITRITE UR QL STRIP.AUTO: NEGATIVE
PH UR STRIP.AUTO: 6 [PH] (ref 5–8)
PHOSPHATE SERPL-MCNC: 3.6 MG/DL (ref 2.5–4.9)
PLATELET # BLD AUTO: 84 K/UL (ref 135–450)
PMV BLD AUTO: 8.2 FL (ref 5–10.5)
POTASSIUM SERPL-SCNC: 4.8 MMOL/L (ref 3.5–5.1)
PROCALCITONIN SERPL IA-MCNC: 76.4 NG/ML (ref 0–0.15)
PROT SERPL-MCNC: 4.9 G/DL (ref 6.4–8.2)
PROT UR STRIP.AUTO-MCNC: ABNORMAL MG/DL
RBC # BLD AUTO: 2.06 M/UL (ref 4.2–5.9)
RBC #/AREA URNS HPF: NORMAL /HPF (ref 0–4)
SODIUM SERPL-SCNC: 137 MMOL/L (ref 136–145)
SODIUM UR-SCNC: 55 MMOL/L
SP GR UR STRIP.AUTO: 1.02 (ref 1–1.03)
TACROLIMUS BLOOD: 8 NG/ML (ref 5–20)
UA DIPSTICK W REFLEX MICRO PNL UR: YES
URATE SERPL-MCNC: 5.8 MG/DL (ref 3.5–7.2)
URN SPEC COLLECT METH UR: ABNORMAL
UROBILINOGEN UR STRIP-ACNC: 0.2 E.U./DL
WBC # BLD AUTO: 15.2 K/UL (ref 4–11)
WBC #/AREA URNS HPF: NORMAL /HPF (ref 0–5)

## 2025-04-30 PROCEDURE — 6370000000 HC RX 637 (ALT 250 FOR IP)

## 2025-04-30 PROCEDURE — 6360000002 HC RX W HCPCS

## 2025-04-30 PROCEDURE — 85025 COMPLETE CBC W/AUTO DIFF WBC: CPT

## 2025-04-30 PROCEDURE — 87449 NOS EACH ORGANISM AG IA: CPT

## 2025-04-30 PROCEDURE — 80076 HEPATIC FUNCTION PANEL: CPT

## 2025-04-30 PROCEDURE — 81001 URINALYSIS AUTO W/SCOPE: CPT

## 2025-04-30 PROCEDURE — 84300 ASSAY OF URINE SODIUM: CPT

## 2025-04-30 PROCEDURE — 86923 COMPATIBILITY TEST ELECTRIC: CPT

## 2025-04-30 PROCEDURE — 2500000003 HC RX 250 WO HCPCS

## 2025-04-30 PROCEDURE — 36415 COLL VENOUS BLD VENIPUNCTURE: CPT

## 2025-04-30 PROCEDURE — 84100 ASSAY OF PHOSPHORUS: CPT

## 2025-04-30 PROCEDURE — 83615 LACTATE (LD) (LDH) ENZYME: CPT

## 2025-04-30 PROCEDURE — 87086 URINE CULTURE/COLONY COUNT: CPT

## 2025-04-30 PROCEDURE — 2580000003 HC RX 258

## 2025-04-30 PROCEDURE — 83735 ASSAY OF MAGNESIUM: CPT

## 2025-04-30 PROCEDURE — 86850 RBC ANTIBODY SCREEN: CPT

## 2025-04-30 PROCEDURE — 6370000000 HC RX 637 (ALT 250 FOR IP): Performed by: INTERNAL MEDICINE

## 2025-04-30 PROCEDURE — 51798 US URINE CAPACITY MEASURE: CPT

## 2025-04-30 PROCEDURE — 80197 ASSAY OF TACROLIMUS: CPT

## 2025-04-30 PROCEDURE — 87205 SMEAR GRAM STAIN: CPT

## 2025-04-30 PROCEDURE — 6370000000 HC RX 637 (ALT 250 FOR IP): Performed by: NURSE PRACTITIONER

## 2025-04-30 PROCEDURE — 84550 ASSAY OF BLOOD/URIC ACID: CPT

## 2025-04-30 PROCEDURE — 36430 TRANSFUSION BLD/BLD COMPNT: CPT

## 2025-04-30 PROCEDURE — 6360000002 HC RX W HCPCS: Performed by: STUDENT IN AN ORGANIZED HEALTH CARE EDUCATION/TRAINING PROGRAM

## 2025-04-30 PROCEDURE — 80048 BASIC METABOLIC PNL TOTAL CA: CPT

## 2025-04-30 PROCEDURE — 30233N1 TRANSFUSION OF NONAUTOLOGOUS RED BLOOD CELLS INTO PERIPHERAL VEIN, PERCUTANEOUS APPROACH: ICD-10-PCS | Performed by: INTERNAL MEDICINE

## 2025-04-30 PROCEDURE — 87040 BLOOD CULTURE FOR BACTERIA: CPT

## 2025-04-30 PROCEDURE — 2060000000 HC ICU INTERMEDIATE R&B

## 2025-04-30 PROCEDURE — 84145 PROCALCITONIN (PCT): CPT

## 2025-04-30 PROCEDURE — P9040 RBC LEUKOREDUCED IRRADIATED: HCPCS

## 2025-04-30 PROCEDURE — 86901 BLOOD TYPING SEROLOGIC RH(D): CPT

## 2025-04-30 PROCEDURE — 82570 ASSAY OF URINE CREATININE: CPT

## 2025-04-30 PROCEDURE — 86900 BLOOD TYPING SEROLOGIC ABO: CPT

## 2025-04-30 PROCEDURE — 87324 CLOSTRIDIUM AG IA: CPT

## 2025-04-30 PROCEDURE — 93010 ELECTROCARDIOGRAM REPORT: CPT | Performed by: INTERNAL MEDICINE

## 2025-04-30 PROCEDURE — 36589 REMOVAL TUNNELED CV CATH: CPT

## 2025-04-30 RX ORDER — 0.9 % SODIUM CHLORIDE 0.9 %
1000 INTRAVENOUS SOLUTION INTRAVENOUS ONCE
Status: COMPLETED | OUTPATIENT
Start: 2025-04-30 | End: 2025-04-30

## 2025-04-30 RX ORDER — LOPERAMIDE HYDROCHLORIDE 2 MG/1
2 CAPSULE ORAL 4 TIMES DAILY PRN
Status: DISCONTINUED | OUTPATIENT
Start: 2025-04-30 | End: 2025-05-05 | Stop reason: HOSPADM

## 2025-04-30 RX ORDER — LOPERAMIDE HYDROCHLORIDE 2 MG/1
4 CAPSULE ORAL ONCE
Status: COMPLETED | OUTPATIENT
Start: 2025-04-30 | End: 2025-04-30

## 2025-04-30 RX ORDER — 0.9 % SODIUM CHLORIDE 0.9 %
1000 INTRAVENOUS SOLUTION INTRAVENOUS ONCE
Status: DISCONTINUED | OUTPATIENT
Start: 2025-04-30 | End: 2025-04-30

## 2025-04-30 RX ORDER — OLANZAPINE 5 MG/1
5 TABLET, FILM COATED ORAL NIGHTLY
Status: DISCONTINUED | OUTPATIENT
Start: 2025-04-30 | End: 2025-05-05 | Stop reason: HOSPADM

## 2025-04-30 RX ORDER — SODIUM CHLORIDE 9 MG/ML
INJECTION, SOLUTION INTRAVENOUS PRN
Status: DISCONTINUED | OUTPATIENT
Start: 2025-04-30 | End: 2025-05-05 | Stop reason: HOSPADM

## 2025-04-30 RX ORDER — LIDOCAINE HYDROCHLORIDE 10 MG/ML
INJECTION, SOLUTION EPIDURAL; INFILTRATION; INTRACAUDAL; PERINEURAL PRN
Status: COMPLETED | OUTPATIENT
Start: 2025-04-30 | End: 2025-04-30

## 2025-04-30 RX ORDER — MAGNESIUM SULFATE IN WATER 40 MG/ML
2000 INJECTION, SOLUTION INTRAVENOUS ONCE
Status: COMPLETED | OUTPATIENT
Start: 2025-04-30 | End: 2025-04-30

## 2025-04-30 RX ADMIN — SODIUM CHLORIDE: 0.9 INJECTION, SOLUTION INTRAVENOUS at 05:18

## 2025-04-30 RX ADMIN — LIDOCAINE HYDROCHLORIDE 10 ML: 10 INJECTION, SOLUTION EPIDURAL; INFILTRATION; INTRACAUDAL; PERINEURAL at 16:15

## 2025-04-30 RX ADMIN — NYSTATIN 500000 UNITS: 100000 SUSPENSION ORAL at 20:31

## 2025-04-30 RX ADMIN — OXYCODONE 10 MG: 5 TABLET ORAL at 21:59

## 2025-04-30 RX ADMIN — SODIUM CHLORIDE 1000 ML: 0.9 INJECTION, SOLUTION INTRAVENOUS at 15:52

## 2025-04-30 RX ADMIN — ALLOPURINOL 150 MG: 300 TABLET ORAL at 08:53

## 2025-04-30 RX ADMIN — ENOXAPARIN SODIUM 40 MG: 100 INJECTION SUBCUTANEOUS at 17:37

## 2025-04-30 RX ADMIN — NYSTATIN 500000 UNITS: 100000 SUSPENSION ORAL at 08:53

## 2025-04-30 RX ADMIN — SODIUM CHLORIDE, PRESERVATIVE FREE 10 ML: 5 INJECTION INTRAVENOUS at 08:53

## 2025-04-30 RX ADMIN — CEFEPIME 2000 MG: 2 INJECTION, POWDER, FOR SOLUTION INTRAVENOUS at 05:19

## 2025-04-30 RX ADMIN — NYSTATIN 500000 UNITS: 100000 SUSPENSION ORAL at 17:37

## 2025-04-30 RX ADMIN — PANTOPRAZOLE SODIUM 40 MG: 40 TABLET, DELAYED RELEASE ORAL at 08:53

## 2025-04-30 RX ADMIN — LOPERAMIDE HYDROCHLORIDE 4 MG: 2 CAPSULE ORAL at 20:31

## 2025-04-30 RX ADMIN — OLANZAPINE 5 MG: 5 TABLET, FILM COATED ORAL at 20:32

## 2025-04-30 RX ADMIN — NYSTATIN 500000 UNITS: 100000 SUSPENSION ORAL at 13:40

## 2025-04-30 RX ADMIN — CEFEPIME 2000 MG: 2 INJECTION, POWDER, FOR SOLUTION INTRAVENOUS at 17:37

## 2025-04-30 RX ADMIN — URSODIOL 500 MG: 250 TABLET, FILM COATED ORAL at 20:31

## 2025-04-30 RX ADMIN — MICONAZOLE NITRATE: 20 CREAM TOPICAL at 20:32

## 2025-04-30 RX ADMIN — MICONAZOLE NITRATE: 20 CREAM TOPICAL at 08:53

## 2025-04-30 RX ADMIN — URSODIOL 500 MG: 250 TABLET, FILM COATED ORAL at 08:52

## 2025-04-30 RX ADMIN — VALACYCLOVIR HYDROCHLORIDE 500 MG: 500 TABLET, FILM COATED ORAL at 08:52

## 2025-04-30 RX ADMIN — VALACYCLOVIR HYDROCHLORIDE 500 MG: 500 TABLET, FILM COATED ORAL at 20:31

## 2025-04-30 RX ADMIN — FLUCONAZOLE 200 MG: 200 TABLET ORAL at 08:52

## 2025-04-30 RX ADMIN — MAGNESIUM SULFATE HEPTAHYDRATE 2000 MG: 40 INJECTION, SOLUTION INTRAVENOUS at 13:40

## 2025-04-30 RX ADMIN — Medication 1500 MG: at 08:53

## 2025-04-30 RX ADMIN — SODIUM CHLORIDE 1000 ML: 0.9 INJECTION, SOLUTION INTRAVENOUS at 17:57

## 2025-04-30 ASSESSMENT — PAIN DESCRIPTION - DESCRIPTORS: DESCRIPTORS: ACHING

## 2025-04-30 ASSESSMENT — PAIN SCALES - GENERAL
PAINLEVEL_OUTOF10: 7
PAINLEVEL_OUTOF10: 6

## 2025-04-30 ASSESSMENT — PAIN DESCRIPTION - ORIENTATION: ORIENTATION: LOWER

## 2025-04-30 ASSESSMENT — PAIN DESCRIPTION - LOCATION: LOCATION: BACK

## 2025-04-30 ASSESSMENT — PAIN - FUNCTIONAL ASSESSMENT: PAIN_FUNCTIONAL_ASSESSMENT: PREVENTS OR INTERFERES SOME ACTIVE ACTIVITIES AND ADLS

## 2025-04-30 NOTE — CARE COORDINATION
Case Management Assessment  Initial Evaluation    Date/Time of Evaluation: 4/30/2025 1:51 PM  Assessment Completed by: DARSHAN Delgadillo   for [facility] [place] ([facility])  [employer] Mobile: [phone]    If patient is discharged prior to next notation, then this note serves as note for discharge by case management.    Patient Name: Myles Meehan                   YOB: 1950  Diagnosis: Sepsis (HCC) [A41.9]                   Date / Time: 4/29/2025  5:01 PM    Patient Admission Status: Inpatient   Readmission Risk (Low < 19, Mod (19-27), High > 27): Readmission Risk Score: 26.2    Current PCP: Magaly Womack MD  PCP verified by CM? Yes ([facility])    Chart Reviewed: Yes      History Provided by: Patient  Patient Orientation: Alert and Oriented    Patient Cognition: Alert    Hospitalization in the last 30 days (Readmission):  Yes    If yes, Readmission Assessment in CM Navigator will be completed.    Advance Directives:      Code Status: Full Code   Patient's Primary Decision Maker is: Named in Scanned ACP Document    Primary Decision Maker: Chani Meehan - Spouse - [phone]    Discharge Planning:    Patient lives with: Spouse/Significant Other Type of Home: House  Primary Care Giver: Self  Patient Support Systems include: Spouse/Significant Other, Family Members   Current Financial resources: Medicare ([employer])  Current community resources: None  Current services prior to admission: Other (Comment) ([facility])            Current DME:              Type of Home Care services:  None    ADLS  Prior functional level: Independent in ADLs/IADLs  Current functional level: Independent in ADLs/IADLs    PT AM-PAC:   /24  OT AM-PAC:   /24    Family can provide assistance at DC: Yes  Would you like Case Management to discuss the discharge plan with any other family members/significant others, and if so, who? Yes  Plans to Return to Present Housing:

## 2025-04-30 NOTE — CONSENT
Informed Consent for Blood Component Transfusion Note    I have discussed with the patient the rationale for blood component transfusion; its benefits in treating or preventing fatigue, organ damage, or death; and its risk which includes mild transfusion reactions, rare risk of blood borne infection, or more serious but rare reactions. I have discussed the alternatives to transfusion, including the risk and consequences of not receiving transfusion. The patient had an opportunity to ask questions and had agreed to proceed with transfusion of blood components.    Electronically signed by JAY Caballero CNP on 4/30/25 at 8:47 AM EDT

## 2025-04-30 NOTE — CONSULTS
Ph: [phone], Fax: [phone]           [facility]               Reason for admission:                 Suspicion for sepsis    Brief Summary :     Myles Meehan is being seen by nephrology for AMANDEEP.      Interval History and plan:      Pt seen at bedside this am. He reports no shortness of breath, leg swelling, changes in urination or headaches. He is Alert and oriented as per his baseline. He does report a rash on his back that his wife had noticed yesterday otherwise he is doing well.    UOP: 250ml with 1 unmeasured      Assessment :      AMANDEEP in the setting of sepsis and immunosuppression   Concern for sepsis, pt was receiving tacrolimus at home post stem cell transplant, no contrast given, he has anita on his back which is erythematous not raised recently noticed and he is on protonix as well, will need to rule out AIN. Blood cultures (25): +enterobacter, pseudomonas   Tacro levels:   - 25: 13.60   - 25: 10.47  - 25: 19.73  -25: 8.0  -IV hydration   -hold Tacrolimus  -tacrolimus levels daily   -Urine eosinophils, Urine Na and Creatinine  -avoid nephrotoxic toxic drugs  -RFP daily  -if Cr continues to worsen will get Renal ultrasound   -2L bolus NS   -bladder scan  -continue cefepime, diflucan, acyclovir and atovaquone.      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 male with PMH significant for AML s/p transplant (3/20/25)on tacrolimus currently, DVT, epididyimoorchitis, Sars-CoV-2 +ve 25  presented to   the

## 2025-05-01 LAB
(1,3)-BETA-D-GLUCAN (FUNGITELL) INTERPRETATION: NEGATIVE
1,3 BETA GLUCAN SER-MCNC: <31 PG/ML
ANION GAP SERPL CALCULATED.3IONS-SCNC: 7 MMOL/L (ref 3–16)
APTT BLD: 50.2 SEC (ref 22.1–36.4)
BACTERIA UR CULT: NORMAL
BASOPHILS # BLD: 0.1 K/UL (ref 0–0.2)
BASOPHILS NFR BLD: 0.7 %
BILIRUB SERPL-MCNC: 0.5 MG/DL (ref 0–1)
BUN SERPL-MCNC: 24 MG/DL (ref 7–20)
CALCIUM SERPL-MCNC: 8.8 MG/DL (ref 8.3–10.6)
CHLORIDE SERPL-SCNC: 109 MMOL/L (ref 99–110)
CO2 SERPL-SCNC: 20 MMOL/L (ref 21–32)
CREAT SERPL-MCNC: 1.8 MG/DL (ref 0.8–1.3)
DEPRECATED RDW RBC AUTO: 19.5 % (ref 12.4–15.4)
EOSINOPHIL # BLD: 0.3 K/UL (ref 0–0.6)
EOSINOPHIL NFR BLD: 3.4 %
GFR SERPLBLD CREATININE-BSD FMLA CKD-EPI: 39 ML/MIN/{1.73_M2}
GLUCOSE SERPL-MCNC: 88 MG/DL (ref 70–99)
HCT VFR BLD AUTO: 21.4 % (ref 40.5–52.5)
HGB BLD-MCNC: 7.5 G/DL (ref 13.5–17.5)
INR PPP: 1.31 (ref 0.85–1.15)
LYMPHOCYTES # BLD: 0.2 K/UL (ref 1–5.1)
LYMPHOCYTES NFR BLD: 2.5 %
MAGNESIUM SERPL-MCNC: 1.91 MG/DL (ref 1.8–2.4)
MCH RBC QN AUTO: 31.4 PG (ref 26–34)
MCHC RBC AUTO-ENTMCNC: 35.1 G/DL (ref 31–36)
MCV RBC AUTO: 89.4 FL (ref 80–100)
MONOCYTES # BLD: 1.8 K/UL (ref 0–1.3)
MONOCYTES NFR BLD: 22.1 %
NEUTROPHILS # BLD: 5.9 K/UL (ref 1.7–7.7)
NEUTROPHILS NFR BLD: 71.3 %
PHOSPHATE SERPL-MCNC: 3.4 MG/DL (ref 2.5–4.9)
PLATELET # BLD AUTO: 87 K/UL (ref 135–450)
PMV BLD AUTO: 8.2 FL (ref 5–10.5)
POTASSIUM SERPL-SCNC: 4.8 MMOL/L (ref 3.5–5.1)
PROTHROMBIN TIME: 16.5 SEC (ref 11.9–14.9)
RBC # BLD AUTO: 2.39 M/UL (ref 4.2–5.9)
SODIUM SERPL-SCNC: 136 MMOL/L (ref 136–145)
TACROLIMUS BLOOD: 6.8 NG/ML (ref 5–20)
WBC # BLD AUTO: 8.2 K/UL (ref 4–11)

## 2025-05-01 PROCEDURE — 6360000002 HC RX W HCPCS: Performed by: INTERNAL MEDICINE

## 2025-05-01 PROCEDURE — 83735 ASSAY OF MAGNESIUM: CPT

## 2025-05-01 PROCEDURE — 85025 COMPLETE CBC W/AUTO DIFF WBC: CPT

## 2025-05-01 PROCEDURE — 80197 ASSAY OF TACROLIMUS: CPT

## 2025-05-01 PROCEDURE — 6370000000 HC RX 637 (ALT 250 FOR IP)

## 2025-05-01 PROCEDURE — 84100 ASSAY OF PHOSPHORUS: CPT

## 2025-05-01 PROCEDURE — 2060000000 HC ICU INTERMEDIATE R&B

## 2025-05-01 PROCEDURE — 6360000002 HC RX W HCPCS

## 2025-05-01 PROCEDURE — 80048 BASIC METABOLIC PNL TOTAL CA: CPT

## 2025-05-01 PROCEDURE — 85730 THROMBOPLASTIN TIME PARTIAL: CPT

## 2025-05-01 PROCEDURE — 82247 BILIRUBIN TOTAL: CPT

## 2025-05-01 PROCEDURE — 2500000003 HC RX 250 WO HCPCS

## 2025-05-01 PROCEDURE — 6370000000 HC RX 637 (ALT 250 FOR IP): Performed by: NURSE PRACTITIONER

## 2025-05-01 PROCEDURE — 36415 COLL VENOUS BLD VENIPUNCTURE: CPT

## 2025-05-01 PROCEDURE — 2580000003 HC RX 258: Performed by: INTERNAL MEDICINE

## 2025-05-01 PROCEDURE — 85610 PROTHROMBIN TIME: CPT

## 2025-05-01 PROCEDURE — 2580000003 HC RX 258

## 2025-05-01 RX ORDER — VALACYCLOVIR HYDROCHLORIDE 500 MG/1
500 TABLET, FILM COATED ORAL DAILY
Status: DISCONTINUED | OUTPATIENT
Start: 2025-05-02 | End: 2025-05-02

## 2025-05-01 RX ORDER — TRIAMCINOLONE ACETONIDE 1 MG/G
OINTMENT TOPICAL 2 TIMES DAILY
Status: DISCONTINUED | OUTPATIENT
Start: 2025-05-01 | End: 2025-05-05 | Stop reason: HOSPADM

## 2025-05-01 RX ORDER — SODIUM CHLORIDE, SODIUM LACTATE, POTASSIUM CHLORIDE, CALCIUM CHLORIDE 600; 310; 30; 20 MG/100ML; MG/100ML; MG/100ML; MG/100ML
INJECTION, SOLUTION INTRAVENOUS CONTINUOUS
Status: ACTIVE | OUTPATIENT
Start: 2025-05-01 | End: 2025-05-02

## 2025-05-01 RX ADMIN — URSODIOL 500 MG: 250 TABLET, FILM COATED ORAL at 21:29

## 2025-05-01 RX ADMIN — FLUCONAZOLE 200 MG: 200 TABLET ORAL at 07:40

## 2025-05-01 RX ADMIN — SODIUM CHLORIDE, SODIUM LACTATE, POTASSIUM CHLORIDE, AND CALCIUM CHLORIDE: .6; .31; .03; .02 INJECTION, SOLUTION INTRAVENOUS at 18:15

## 2025-05-01 RX ADMIN — NYSTATIN 500000 UNITS: 100000 SUSPENSION ORAL at 17:33

## 2025-05-01 RX ADMIN — TACROLIMUS 1.5 MG: 1 CAPSULE ORAL at 21:29

## 2025-05-01 RX ADMIN — OXYCODONE 10 MG: 5 TABLET ORAL at 21:29

## 2025-05-01 RX ADMIN — SODIUM CHLORIDE, PRESERVATIVE FREE 10 ML: 5 INJECTION INTRAVENOUS at 07:41

## 2025-05-01 RX ADMIN — TRIAMCINOLONE ACETONIDE: 1 OINTMENT TOPICAL at 11:53

## 2025-05-01 RX ADMIN — ALLOPURINOL 150 MG: 300 TABLET ORAL at 07:40

## 2025-05-01 RX ADMIN — CEFEPIME 2000 MG: 2 INJECTION, POWDER, FOR SOLUTION INTRAVENOUS at 05:44

## 2025-05-01 RX ADMIN — Medication 1500 MG: at 07:40

## 2025-05-01 RX ADMIN — NYSTATIN 500000 UNITS: 100000 SUSPENSION ORAL at 12:33

## 2025-05-01 RX ADMIN — NYSTATIN 500000 UNITS: 100000 SUSPENSION ORAL at 07:40

## 2025-05-01 RX ADMIN — VALACYCLOVIR HYDROCHLORIDE 500 MG: 500 TABLET, FILM COATED ORAL at 07:40

## 2025-05-01 RX ADMIN — PANTOPRAZOLE SODIUM 40 MG: 40 TABLET, DELAYED RELEASE ORAL at 07:40

## 2025-05-01 RX ADMIN — URSODIOL 500 MG: 250 TABLET, FILM COATED ORAL at 07:40

## 2025-05-01 RX ADMIN — ENOXAPARIN SODIUM 40 MG: 100 INJECTION SUBCUTANEOUS at 17:32

## 2025-05-01 RX ADMIN — SODIUM CHLORIDE, SODIUM LACTATE, POTASSIUM CHLORIDE, AND CALCIUM CHLORIDE: .6; .31; .03; .02 INJECTION, SOLUTION INTRAVENOUS at 07:32

## 2025-05-01 RX ADMIN — CEFEPIME 2000 MG: 2 INJECTION, POWDER, FOR SOLUTION INTRAVENOUS at 17:32

## 2025-05-01 RX ADMIN — TRIAMCINOLONE ACETONIDE: 1 OINTMENT TOPICAL at 21:30

## 2025-05-01 RX ADMIN — NYSTATIN 500000 UNITS: 100000 SUSPENSION ORAL at 21:28

## 2025-05-01 RX ADMIN — MICONAZOLE NITRATE: 20 CREAM TOPICAL at 21:30

## 2025-05-01 RX ADMIN — TACROLIMUS 1.5 MG: 1 CAPSULE ORAL at 11:53

## 2025-05-01 RX ADMIN — MICONAZOLE NITRATE: 20 CREAM TOPICAL at 07:40

## 2025-05-01 RX ADMIN — OLANZAPINE 5 MG: 5 TABLET, FILM COATED ORAL at 21:28

## 2025-05-01 ASSESSMENT — PAIN DESCRIPTION - ORIENTATION: ORIENTATION: LOWER

## 2025-05-01 ASSESSMENT — PAIN DESCRIPTION - LOCATION: LOCATION: BACK

## 2025-05-01 ASSESSMENT — PAIN - FUNCTIONAL ASSESSMENT: PAIN_FUNCTIONAL_ASSESSMENT: PREVENTS OR INTERFERES SOME ACTIVE ACTIVITIES AND ADLS

## 2025-05-01 ASSESSMENT — PAIN SCALES - GENERAL
PAINLEVEL_OUTOF10: 5
PAINLEVEL_OUTOF10: 7

## 2025-05-01 ASSESSMENT — PAIN DESCRIPTION - DESCRIPTORS: DESCRIPTORS: ACHING;DISCOMFORT

## 2025-05-01 NOTE — CONSULTS
IR consult complete, successful tunneled line removal 4/30. See procedure dictation for additional information.

## 2025-05-01 NOTE — CARE COORDINATION
Spoke with pt and brother Noah at bedside with pt permission. Discussed the possibility of IV ABX at home and what this would look like. Discussed infusion companies verbally and explained would need to check benefits. Also explained that home care won't come daily so either his wife or pt would need to learn how to manage the IV ABX at home.     Pt and brother inquired why he couldn't come back to [facility] daily for the IV ABX. Explained will inquire if this is an option but the challenge is if he would need IV ABX more than once a day. Both stated understanding.     Discussed with Kirsten JEFFERS. Awaiting final determination regarding IV ABX.     Tessy TRAN, DARSHAN   for [facility] ([facility])  [employer] Mobile: [phone]

## 2025-05-02 LAB
ALBUMIN SERPL-MCNC: 3.1 G/DL (ref 3.4–5)
ALP SERPL-CCNC: 118 U/L (ref 40–129)
ALT SERPL-CCNC: 7 U/L (ref 10–40)
ANION GAP SERPL CALCULATED.3IONS-SCNC: 9 MMOL/L (ref 3–16)
AST SERPL-CCNC: 12 U/L (ref 15–37)
BASOPHILS # BLD: 0.1 K/UL (ref 0–0.2)
BASOPHILS NFR BLD: 1.2 %
BILIRUB DIRECT SERPL-MCNC: 0.2 MG/DL (ref 0–0.3)
BILIRUB INDIRECT SERPL-MCNC: 0.3 MG/DL (ref 0–1)
BILIRUB SERPL-MCNC: 0.5 MG/DL (ref 0–1)
BUN SERPL-MCNC: 15 MG/DL (ref 7–20)
CALCIUM SERPL-MCNC: 9.2 MG/DL (ref 8.3–10.6)
CHLORIDE SERPL-SCNC: 107 MMOL/L (ref 99–110)
CO2 SERPL-SCNC: 21 MMOL/L (ref 21–32)
CREAT SERPL-MCNC: 1.3 MG/DL (ref 0.8–1.3)
DEPRECATED RDW RBC AUTO: 19.4 % (ref 12.4–15.4)
EOSINOPHIL # BLD: 0.3 K/UL (ref 0–0.6)
EOSINOPHIL NFR BLD: 5.3 %
GFR SERPLBLD CREATININE-BSD FMLA CKD-EPI: 57 ML/MIN/{1.73_M2}
GLUCOSE SERPL-MCNC: 80 MG/DL (ref 70–99)
HAPTOGLOB SERPL-MCNC: 240 MG/DL (ref 30–200)
HCT VFR BLD AUTO: 22.1 % (ref 40.5–52.5)
HGB BLD-MCNC: 7.7 G/DL (ref 13.5–17.5)
LDH SERPL L TO P-CCNC: 189 U/L (ref 100–190)
LYMPHOCYTES # BLD: 0.3 K/UL (ref 1–5.1)
LYMPHOCYTES NFR BLD: 6.2 %
Lab: NORMAL
MAGNESIUM SERPL-MCNC: 1.53 MG/DL (ref 1.8–2.4)
MCH RBC QN AUTO: 31.3 PG (ref 26–34)
MCHC RBC AUTO-ENTMCNC: 34.7 G/DL (ref 31–36)
MCV RBC AUTO: 90.2 FL (ref 80–100)
MONOCYTES # BLD: 1.3 K/UL (ref 0–1.3)
MONOCYTES NFR BLD: 24.3 %
NEUTROPHILS # BLD: 3.4 K/UL (ref 1.7–7.7)
NEUTROPHILS NFR BLD: 63 %
PHOSPHATE SERPL-MCNC: 3.7 MG/DL (ref 2.5–4.9)
PLATELET # BLD AUTO: 92 K/UL (ref 135–450)
PMV BLD AUTO: 8 FL (ref 5–10.5)
POTASSIUM SERPL-SCNC: 4.2 MMOL/L (ref 3.5–5.1)
PROT SERPL-MCNC: 5.3 G/DL (ref 6.4–8.2)
RBC # BLD AUTO: 2.46 M/UL (ref 4.2–5.9)
REPORT: NORMAL
SODIUM SERPL-SCNC: 137 MMOL/L (ref 136–145)
TACROLIMUS BLOOD: 10.1 NG/ML (ref 5–20)
THIS TEST SENT TO: NORMAL
URATE SERPL-MCNC: 4.7 MG/DL (ref 3.5–7.2)
WBC # BLD AUTO: 5.4 K/UL (ref 4–11)

## 2025-05-02 PROCEDURE — 2580000003 HC RX 258

## 2025-05-02 PROCEDURE — 80076 HEPATIC FUNCTION PANEL: CPT

## 2025-05-02 PROCEDURE — 84100 ASSAY OF PHOSPHORUS: CPT

## 2025-05-02 PROCEDURE — 6370000000 HC RX 637 (ALT 250 FOR IP)

## 2025-05-02 PROCEDURE — 6370000000 HC RX 637 (ALT 250 FOR IP): Performed by: NURSE PRACTITIONER

## 2025-05-02 PROCEDURE — 99233 SBSQ HOSP IP/OBS HIGH 50: CPT | Performed by: INTERNAL MEDICINE

## 2025-05-02 PROCEDURE — 2500000003 HC RX 250 WO HCPCS

## 2025-05-02 PROCEDURE — 80197 ASSAY OF TACROLIMUS: CPT

## 2025-05-02 PROCEDURE — 6360000002 HC RX W HCPCS

## 2025-05-02 PROCEDURE — 2580000003 HC RX 258: Performed by: INTERNAL MEDICINE

## 2025-05-02 PROCEDURE — 84550 ASSAY OF BLOOD/URIC ACID: CPT

## 2025-05-02 PROCEDURE — 83735 ASSAY OF MAGNESIUM: CPT

## 2025-05-02 PROCEDURE — 6360000002 HC RX W HCPCS: Performed by: INTERNAL MEDICINE

## 2025-05-02 PROCEDURE — 6370000000 HC RX 637 (ALT 250 FOR IP): Performed by: INTERNAL MEDICINE

## 2025-05-02 PROCEDURE — 85025 COMPLETE CBC W/AUTO DIFF WBC: CPT

## 2025-05-02 PROCEDURE — 83615 LACTATE (LD) (LDH) ENZYME: CPT

## 2025-05-02 PROCEDURE — 2060000000 HC ICU INTERMEDIATE R&B

## 2025-05-02 PROCEDURE — 36415 COLL VENOUS BLD VENIPUNCTURE: CPT

## 2025-05-02 PROCEDURE — 80048 BASIC METABOLIC PNL TOTAL CA: CPT

## 2025-05-02 RX ORDER — VALACYCLOVIR HYDROCHLORIDE 500 MG/1
500 TABLET, FILM COATED ORAL 2 TIMES DAILY
Status: DISCONTINUED | OUTPATIENT
Start: 2025-05-02 | End: 2025-05-05 | Stop reason: HOSPADM

## 2025-05-02 RX ORDER — FLUCONAZOLE 200 MG/1
400 TABLET ORAL DAILY
Status: DISCONTINUED | OUTPATIENT
Start: 2025-05-03 | End: 2025-05-05 | Stop reason: HOSPADM

## 2025-05-02 RX ADMIN — NYSTATIN 500000 UNITS: 100000 SUSPENSION ORAL at 17:30

## 2025-05-02 RX ADMIN — SODIUM CHLORIDE, PRESERVATIVE FREE 10 ML: 5 INJECTION INTRAVENOUS at 21:37

## 2025-05-02 RX ADMIN — URSODIOL 500 MG: 250 TABLET, FILM COATED ORAL at 09:11

## 2025-05-02 RX ADMIN — FLUCONAZOLE 200 MG: 200 TABLET ORAL at 09:11

## 2025-05-02 RX ADMIN — VALACYCLOVIR HYDROCHLORIDE 500 MG: 500 TABLET, FILM COATED ORAL at 09:11

## 2025-05-02 RX ADMIN — OLANZAPINE 5 MG: 5 TABLET, FILM COATED ORAL at 21:34

## 2025-05-02 RX ADMIN — VALACYCLOVIR HYDROCHLORIDE 500 MG: 500 TABLET, FILM COATED ORAL at 21:34

## 2025-05-02 RX ADMIN — NYSTATIN 500000 UNITS: 100000 SUSPENSION ORAL at 09:11

## 2025-05-02 RX ADMIN — CEFEPIME 2000 MG: 2 INJECTION, POWDER, FOR SOLUTION INTRAVENOUS at 04:32

## 2025-05-02 RX ADMIN — CEFEPIME 2000 MG: 2 INJECTION, POWDER, FOR SOLUTION INTRAVENOUS at 17:34

## 2025-05-02 RX ADMIN — URSODIOL 500 MG: 250 TABLET, FILM COATED ORAL at 21:34

## 2025-05-02 RX ADMIN — MICONAZOLE NITRATE: 20 CREAM TOPICAL at 09:13

## 2025-05-02 RX ADMIN — TRIAMCINOLONE ACETONIDE: 1 OINTMENT TOPICAL at 21:36

## 2025-05-02 RX ADMIN — PANTOPRAZOLE SODIUM 40 MG: 40 TABLET, DELAYED RELEASE ORAL at 09:11

## 2025-05-02 RX ADMIN — NYSTATIN 500000 UNITS: 100000 SUSPENSION ORAL at 21:34

## 2025-05-02 RX ADMIN — OXYCODONE 10 MG: 5 TABLET ORAL at 21:46

## 2025-05-02 RX ADMIN — NYSTATIN 500000 UNITS: 100000 SUSPENSION ORAL at 12:03

## 2025-05-02 RX ADMIN — ENOXAPARIN SODIUM 40 MG: 100 INJECTION SUBCUTANEOUS at 17:35

## 2025-05-02 RX ADMIN — SODIUM CHLORIDE, SODIUM LACTATE, POTASSIUM CHLORIDE, AND CALCIUM CHLORIDE: .6; .31; .03; .02 INJECTION, SOLUTION INTRAVENOUS at 04:33

## 2025-05-02 RX ADMIN — ALLOPURINOL 150 MG: 300 TABLET ORAL at 09:11

## 2025-05-02 RX ADMIN — TACROLIMUS 1.5 MG: 1 CAPSULE ORAL at 21:34

## 2025-05-02 RX ADMIN — TACROLIMUS 1.5 MG: 1 CAPSULE ORAL at 10:10

## 2025-05-02 RX ADMIN — TRIAMCINOLONE ACETONIDE: 1 OINTMENT TOPICAL at 09:13

## 2025-05-02 RX ADMIN — Medication 1500 MG: at 09:11

## 2025-05-02 ASSESSMENT — PAIN SCALES - GENERAL
PAINLEVEL_OUTOF10: 5
PAINLEVEL_OUTOF10: 7

## 2025-05-02 ASSESSMENT — PAIN DESCRIPTION - PAIN TYPE: TYPE: ACUTE PAIN

## 2025-05-02 ASSESSMENT — PAIN DESCRIPTION - ORIENTATION: ORIENTATION: LOWER

## 2025-05-02 ASSESSMENT — PAIN DESCRIPTION - DESCRIPTORS: DESCRIPTORS: ACHING

## 2025-05-02 ASSESSMENT — PAIN DESCRIPTION - LOCATION: LOCATION: BACK

## 2025-05-02 ASSESSMENT — PAIN - FUNCTIONAL ASSESSMENT: PAIN_FUNCTIONAL_ASSESSMENT: PREVENTS OR INTERFERES SOME ACTIVE ACTIVITIES AND ADLS

## 2025-05-02 ASSESSMENT — PAIN DESCRIPTION - ONSET: ONSET: ON-GOING

## 2025-05-02 ASSESSMENT — PAIN DESCRIPTION - FREQUENCY: FREQUENCY: CONTINUOUS

## 2025-05-02 NOTE — CARE COORDINATION
Spoke with treatment team this AM in rounds regarding how pt prefers to do IV ABX at [facility] if able.     Final IV ABX is pending at this time and team aware IV ABX won't be arranged over the weekend. Per MD, no planned discharge at this time.    Discussed with Yary SANDHU.     SW will follow and assist with arranging IV ABX once known what is needed.     Tessy TRAN, DARSHAN   for [facility] ([facility])  [employer] Mobile: [phone]

## 2025-05-03 LAB
ANION GAP SERPL CALCULATED.3IONS-SCNC: 11 MMOL/L (ref 3–16)
BASOPHILS # BLD: 0.1 K/UL (ref 0–0.2)
BASOPHILS NFR BLD: 1.3 %
BILIRUB SERPL-MCNC: 0.4 MG/DL (ref 0–1)
BUN SERPL-MCNC: 14 MG/DL (ref 7–20)
CALCIUM SERPL-MCNC: 9.1 MG/DL (ref 8.3–10.6)
CHLORIDE SERPL-SCNC: 106 MMOL/L (ref 99–110)
CO2 SERPL-SCNC: 21 MMOL/L (ref 21–32)
CREAT SERPL-MCNC: 1.1 MG/DL (ref 0.8–1.3)
DEPRECATED RDW RBC AUTO: 19.1 % (ref 12.4–15.4)
EOSINOPHIL # BLD: 0.3 K/UL (ref 0–0.6)
EOSINOPHIL NFR BLD: 7 %
GFR SERPLBLD CREATININE-BSD FMLA CKD-EPI: 70 ML/MIN/{1.73_M2}
GLUCOSE SERPL-MCNC: 84 MG/DL (ref 70–99)
HCT VFR BLD AUTO: 22.1 % (ref 40.5–52.5)
HGB BLD-MCNC: 7.8 G/DL (ref 13.5–17.5)
LYMPHOCYTES # BLD: 0.4 K/UL (ref 1–5.1)
LYMPHOCYTES NFR BLD: 9.2 %
MAGNESIUM SERPL-MCNC: 1.45 MG/DL (ref 1.8–2.4)
MCH RBC QN AUTO: 31.3 PG (ref 26–34)
MCHC RBC AUTO-ENTMCNC: 35.3 G/DL (ref 31–36)
MCV RBC AUTO: 88.6 FL (ref 80–100)
MONOCYTES # BLD: 0.9 K/UL (ref 0–1.3)
MONOCYTES NFR BLD: 19.8 %
NEUTROPHILS # BLD: 2.8 K/UL (ref 1.7–7.7)
NEUTROPHILS NFR BLD: 62.7 %
PHOSPHATE SERPL-MCNC: 3.6 MG/DL (ref 2.5–4.9)
PLATELET # BLD AUTO: 96 K/UL (ref 135–450)
PMV BLD AUTO: 7.8 FL (ref 5–10.5)
POTASSIUM SERPL-SCNC: 4.1 MMOL/L (ref 3.5–5.1)
RBC # BLD AUTO: 2.49 M/UL (ref 4.2–5.9)
SODIUM SERPL-SCNC: 138 MMOL/L (ref 136–145)
WBC # BLD AUTO: 4.5 K/UL (ref 4–11)

## 2025-05-03 PROCEDURE — 99232 SBSQ HOSP IP/OBS MODERATE 35: CPT | Performed by: INTERNAL MEDICINE

## 2025-05-03 PROCEDURE — 85025 COMPLETE CBC W/AUTO DIFF WBC: CPT

## 2025-05-03 PROCEDURE — 6370000000 HC RX 637 (ALT 250 FOR IP): Performed by: NURSE PRACTITIONER

## 2025-05-03 PROCEDURE — 2060000000 HC ICU INTERMEDIATE R&B

## 2025-05-03 PROCEDURE — 6370000000 HC RX 637 (ALT 250 FOR IP): Performed by: INTERNAL MEDICINE

## 2025-05-03 PROCEDURE — 2500000003 HC RX 250 WO HCPCS

## 2025-05-03 PROCEDURE — 6370000000 HC RX 637 (ALT 250 FOR IP)

## 2025-05-03 PROCEDURE — 2580000003 HC RX 258

## 2025-05-03 PROCEDURE — 80048 BASIC METABOLIC PNL TOTAL CA: CPT

## 2025-05-03 PROCEDURE — 36415 COLL VENOUS BLD VENIPUNCTURE: CPT

## 2025-05-03 PROCEDURE — 82247 BILIRUBIN TOTAL: CPT

## 2025-05-03 PROCEDURE — 6360000002 HC RX W HCPCS

## 2025-05-03 PROCEDURE — 84100 ASSAY OF PHOSPHORUS: CPT

## 2025-05-03 PROCEDURE — 83735 ASSAY OF MAGNESIUM: CPT

## 2025-05-03 PROCEDURE — 6360000002 HC RX W HCPCS: Performed by: INTERNAL MEDICINE

## 2025-05-03 RX ORDER — ALBUMIN (HUMAN) 12.5 G/50ML
12.5 SOLUTION INTRAVENOUS EVERY 6 HOURS
Status: DISCONTINUED | OUTPATIENT
Start: 2025-05-03 | End: 2025-05-03

## 2025-05-03 RX ADMIN — TACROLIMUS 1.5 MG: 1 CAPSULE ORAL at 21:00

## 2025-05-03 RX ADMIN — URSODIOL 500 MG: 250 TABLET, FILM COATED ORAL at 21:00

## 2025-05-03 RX ADMIN — NYSTATIN 500000 UNITS: 100000 SUSPENSION ORAL at 09:14

## 2025-05-03 RX ADMIN — TRIAMCINOLONE ACETONIDE: 1 OINTMENT TOPICAL at 09:18

## 2025-05-03 RX ADMIN — NYSTATIN 500000 UNITS: 100000 SUSPENSION ORAL at 21:00

## 2025-05-03 RX ADMIN — ALLOPURINOL 150 MG: 300 TABLET ORAL at 09:08

## 2025-05-03 RX ADMIN — CEFEPIME 2000 MG: 2 INJECTION, POWDER, FOR SOLUTION INTRAVENOUS at 23:55

## 2025-05-03 RX ADMIN — Medication 1500 MG: at 09:14

## 2025-05-03 RX ADMIN — NYSTATIN 500000 UNITS: 100000 SUSPENSION ORAL at 18:23

## 2025-05-03 RX ADMIN — TACROLIMUS 1.5 MG: 1 CAPSULE ORAL at 09:09

## 2025-05-03 RX ADMIN — NYSTATIN 500000 UNITS: 100000 SUSPENSION ORAL at 14:30

## 2025-05-03 RX ADMIN — FLUCONAZOLE 400 MG: 200 TABLET ORAL at 09:08

## 2025-05-03 RX ADMIN — OLANZAPINE 5 MG: 5 TABLET, FILM COATED ORAL at 21:00

## 2025-05-03 RX ADMIN — VALACYCLOVIR HYDROCHLORIDE 500 MG: 500 TABLET, FILM COATED ORAL at 09:09

## 2025-05-03 RX ADMIN — PANTOPRAZOLE SODIUM 40 MG: 40 TABLET, DELAYED RELEASE ORAL at 09:09

## 2025-05-03 RX ADMIN — ENOXAPARIN SODIUM 40 MG: 100 INJECTION SUBCUTANEOUS at 18:23

## 2025-05-03 RX ADMIN — ONDANSETRON HYDROCHLORIDE 4 MG: 4 TABLET, FILM COATED ORAL at 11:29

## 2025-05-03 RX ADMIN — CEFEPIME 2000 MG: 2 INJECTION, POWDER, FOR SOLUTION INTRAVENOUS at 05:30

## 2025-05-03 RX ADMIN — CEFEPIME 2000 MG: 2 INJECTION, POWDER, FOR SOLUTION INTRAVENOUS at 15:08

## 2025-05-03 RX ADMIN — SODIUM CHLORIDE, PRESERVATIVE FREE 10 ML: 5 INJECTION INTRAVENOUS at 09:10

## 2025-05-03 RX ADMIN — MICONAZOLE NITRATE: 20 CREAM TOPICAL at 09:19

## 2025-05-03 RX ADMIN — URSODIOL 500 MG: 250 TABLET, FILM COATED ORAL at 09:09

## 2025-05-03 RX ADMIN — VALACYCLOVIR HYDROCHLORIDE 500 MG: 500 TABLET, FILM COATED ORAL at 21:00

## 2025-05-03 ASSESSMENT — PAIN SCALES - GENERAL
PAINLEVEL_OUTOF10: 0
PAINLEVEL_OUTOF10: 0

## 2025-05-04 LAB
ANION GAP SERPL CALCULATED.3IONS-SCNC: 10 MMOL/L (ref 3–16)
BACTERIA BLD CULT ORG #2: NORMAL
BACTERIA BLD CULT: NORMAL
BASOPHILS # BLD: 0.1 K/UL (ref 0–0.2)
BASOPHILS NFR BLD: 1 %
BILIRUB SERPL-MCNC: 0.4 MG/DL (ref 0–1)
BUN SERPL-MCNC: 13 MG/DL (ref 7–20)
CALCIUM SERPL-MCNC: 9.1 MG/DL (ref 8.3–10.6)
CHLORIDE SERPL-SCNC: 107 MMOL/L (ref 99–110)
CO2 SERPL-SCNC: 22 MMOL/L (ref 21–32)
CREAT SERPL-MCNC: 1.2 MG/DL (ref 0.8–1.3)
DEPRECATED RDW RBC AUTO: 19.3 % (ref 12.4–15.4)
EOSINOPHIL # BLD: 0.4 K/UL (ref 0–0.6)
EOSINOPHIL NFR BLD: 7.9 %
GFR SERPLBLD CREATININE-BSD FMLA CKD-EPI: 63 ML/MIN/{1.73_M2}
GLUCOSE SERPL-MCNC: 91 MG/DL (ref 70–99)
HCT VFR BLD AUTO: 22.6 % (ref 40.5–52.5)
HGB BLD-MCNC: 7.9 G/DL (ref 13.5–17.5)
LYMPHOCYTES # BLD: 0.4 K/UL (ref 1–5.1)
LYMPHOCYTES NFR BLD: 7.1 %
MAGNESIUM SERPL-MCNC: 1.33 MG/DL (ref 1.8–2.4)
MCH RBC QN AUTO: 31.1 PG (ref 26–34)
MCHC RBC AUTO-ENTMCNC: 34.9 G/DL (ref 31–36)
MCV RBC AUTO: 89.1 FL (ref 80–100)
MONOCYTES # BLD: 0.9 K/UL (ref 0–1.3)
MONOCYTES NFR BLD: 15.8 %
NEUTROPHILS # BLD: 3.7 K/UL (ref 1.7–7.7)
NEUTROPHILS NFR BLD: 68.2 %
PHOSPHATE SERPL-MCNC: 3.4 MG/DL (ref 2.5–4.9)
PLATELET # BLD AUTO: 103 K/UL (ref 135–450)
PMV BLD AUTO: 8.2 FL (ref 5–10.5)
POTASSIUM SERPL-SCNC: 3.8 MMOL/L (ref 3.5–5.1)
RBC # BLD AUTO: 2.54 M/UL (ref 4.2–5.9)
SODIUM SERPL-SCNC: 139 MMOL/L (ref 136–145)
WBC # BLD AUTO: 5.5 K/UL (ref 4–11)

## 2025-05-04 PROCEDURE — 99232 SBSQ HOSP IP/OBS MODERATE 35: CPT | Performed by: INTERNAL MEDICINE

## 2025-05-04 PROCEDURE — 6370000000 HC RX 637 (ALT 250 FOR IP): Performed by: NURSE PRACTITIONER

## 2025-05-04 PROCEDURE — 6360000002 HC RX W HCPCS

## 2025-05-04 PROCEDURE — 83735 ASSAY OF MAGNESIUM: CPT

## 2025-05-04 PROCEDURE — 80048 BASIC METABOLIC PNL TOTAL CA: CPT

## 2025-05-04 PROCEDURE — 85025 COMPLETE CBC W/AUTO DIFF WBC: CPT

## 2025-05-04 PROCEDURE — 2580000003 HC RX 258

## 2025-05-04 PROCEDURE — 82247 BILIRUBIN TOTAL: CPT

## 2025-05-04 PROCEDURE — 6370000000 HC RX 637 (ALT 250 FOR IP): Performed by: INTERNAL MEDICINE

## 2025-05-04 PROCEDURE — 6360000002 HC RX W HCPCS: Performed by: INTERNAL MEDICINE

## 2025-05-04 PROCEDURE — 36415 COLL VENOUS BLD VENIPUNCTURE: CPT

## 2025-05-04 PROCEDURE — 6370000000 HC RX 637 (ALT 250 FOR IP)

## 2025-05-04 PROCEDURE — 2500000003 HC RX 250 WO HCPCS

## 2025-05-04 PROCEDURE — 84100 ASSAY OF PHOSPHORUS: CPT

## 2025-05-04 PROCEDURE — 2060000000 HC ICU INTERMEDIATE R&B

## 2025-05-04 RX ADMIN — ALLOPURINOL 150 MG: 300 TABLET ORAL at 09:08

## 2025-05-04 RX ADMIN — NYSTATIN 500000 UNITS: 100000 SUSPENSION ORAL at 09:07

## 2025-05-04 RX ADMIN — CEFEPIME 2000 MG: 2 INJECTION, POWDER, FOR SOLUTION INTRAVENOUS at 23:50

## 2025-05-04 RX ADMIN — SODIUM CHLORIDE, PRESERVATIVE FREE 10 ML: 5 INJECTION INTRAVENOUS at 20:59

## 2025-05-04 RX ADMIN — VALACYCLOVIR HYDROCHLORIDE 500 MG: 500 TABLET, FILM COATED ORAL at 20:59

## 2025-05-04 RX ADMIN — NYSTATIN 500000 UNITS: 100000 SUSPENSION ORAL at 20:59

## 2025-05-04 RX ADMIN — VALACYCLOVIR HYDROCHLORIDE 500 MG: 500 TABLET, FILM COATED ORAL at 09:08

## 2025-05-04 RX ADMIN — URSODIOL 500 MG: 250 TABLET, FILM COATED ORAL at 20:59

## 2025-05-04 RX ADMIN — Medication 1500 MG: at 09:07

## 2025-05-04 RX ADMIN — OLANZAPINE 5 MG: 5 TABLET, FILM COATED ORAL at 20:59

## 2025-05-04 RX ADMIN — FLUCONAZOLE 400 MG: 200 TABLET ORAL at 09:08

## 2025-05-04 RX ADMIN — CEFEPIME 2000 MG: 2 INJECTION, POWDER, FOR SOLUTION INTRAVENOUS at 15:49

## 2025-05-04 RX ADMIN — TACROLIMUS 1.5 MG: 1 CAPSULE ORAL at 20:59

## 2025-05-04 RX ADMIN — MAGNESIUM SULFATE HEPTAHYDRATE 4000 MG: 40 INJECTION, SOLUTION INTRAVENOUS at 06:56

## 2025-05-04 RX ADMIN — SODIUM CHLORIDE, PRESERVATIVE FREE 10 ML: 5 INJECTION INTRAVENOUS at 09:08

## 2025-05-04 RX ADMIN — NYSTATIN 500000 UNITS: 100000 SUSPENSION ORAL at 18:11

## 2025-05-04 RX ADMIN — TACROLIMUS 1.5 MG: 1 CAPSULE ORAL at 09:08

## 2025-05-04 RX ADMIN — URSODIOL 500 MG: 250 TABLET, FILM COATED ORAL at 09:08

## 2025-05-04 RX ADMIN — CEFEPIME 2000 MG: 2 INJECTION, POWDER, FOR SOLUTION INTRAVENOUS at 06:44

## 2025-05-04 RX ADMIN — NYSTATIN 500000 UNITS: 100000 SUSPENSION ORAL at 13:25

## 2025-05-04 RX ADMIN — ENOXAPARIN SODIUM 40 MG: 100 INJECTION SUBCUTANEOUS at 18:11

## 2025-05-04 RX ADMIN — PANTOPRAZOLE SODIUM 40 MG: 40 TABLET, DELAYED RELEASE ORAL at 09:08

## 2025-05-04 ASSESSMENT — PAIN SCALES - GENERAL
PAINLEVEL_OUTOF10: 0

## 2025-05-05 VITALS
WEIGHT: 195.2 LBS | SYSTOLIC BLOOD PRESSURE: 144 MMHG | OXYGEN SATURATION: 100 % | BODY MASS INDEX: 29.58 KG/M2 | HEART RATE: 82 BPM | HEIGHT: 68 IN | RESPIRATION RATE: 14 BRPM | TEMPERATURE: 97.8 F | DIASTOLIC BLOOD PRESSURE: 78 MMHG

## 2025-05-05 LAB
ALBUMIN SERPL-MCNC: 3.4 G/DL (ref 3.4–5)
ALP SERPL-CCNC: 103 U/L (ref 40–129)
ALT SERPL-CCNC: 8 U/L (ref 10–40)
ANION GAP SERPL CALCULATED.3IONS-SCNC: 11 MMOL/L (ref 3–16)
APTT BLD: 33.1 SEC (ref 22.1–36.4)
AST SERPL-CCNC: 13 U/L (ref 15–37)
BASOPHILS # BLD: 0.1 K/UL (ref 0–0.2)
BASOPHILS NFR BLD: 1.1 %
BILIRUB DIRECT SERPL-MCNC: <0.1 MG/DL (ref 0–0.3)
BILIRUB INDIRECT SERPL-MCNC: 0.3 MG/DL (ref 0–1)
BILIRUB SERPL-MCNC: 0.4 MG/DL (ref 0–1)
BUN SERPL-MCNC: 13 MG/DL (ref 7–20)
CALCIUM SERPL-MCNC: 9.2 MG/DL (ref 8.3–10.6)
CHLORIDE SERPL-SCNC: 107 MMOL/L (ref 99–110)
CO2 SERPL-SCNC: 22 MMOL/L (ref 21–32)
CREAT SERPL-MCNC: 1.1 MG/DL (ref 0.8–1.3)
DEPRECATED RDW RBC AUTO: 19 % (ref 12.4–15.4)
EOSINOPHIL # BLD: 0.5 K/UL (ref 0–0.6)
EOSINOPHIL NFR BLD: 8.6 %
GFR SERPLBLD CREATININE-BSD FMLA CKD-EPI: 70 ML/MIN/{1.73_M2}
GLUCOSE SERPL-MCNC: 101 MG/DL (ref 70–99)
HCT VFR BLD AUTO: 22.6 % (ref 40.5–52.5)
HGB BLD-MCNC: 8 G/DL (ref 13.5–17.5)
INR PPP: 1.17 (ref 0.85–1.15)
LDH SERPL L TO P-CCNC: 198 U/L (ref 100–190)
LYMPHOCYTES # BLD: 0.3 K/UL (ref 1–5.1)
LYMPHOCYTES NFR BLD: 5.7 %
MAGNESIUM SERPL-MCNC: 1.64 MG/DL (ref 1.8–2.4)
MCH RBC QN AUTO: 31.3 PG (ref 26–34)
MCHC RBC AUTO-ENTMCNC: 35.5 G/DL (ref 31–36)
MCV RBC AUTO: 88.2 FL (ref 80–100)
MONOCYTES # BLD: 0.8 K/UL (ref 0–1.3)
MONOCYTES NFR BLD: 12.8 %
NEUTROPHILS # BLD: 4.3 K/UL (ref 1.7–7.7)
NEUTROPHILS NFR BLD: 71.8 %
PHOSPHATE SERPL-MCNC: 3.1 MG/DL (ref 2.5–4.9)
PLATELET # BLD AUTO: 116 K/UL (ref 135–450)
PMV BLD AUTO: 8.1 FL (ref 5–10.5)
POTASSIUM SERPL-SCNC: 3.3 MMOL/L (ref 3.5–5.1)
PROT SERPL-MCNC: 5.5 G/DL (ref 6.4–8.2)
PROTHROMBIN TIME: 15.1 SEC (ref 11.9–14.9)
RBC # BLD AUTO: 2.56 M/UL (ref 4.2–5.9)
SODIUM SERPL-SCNC: 140 MMOL/L (ref 136–145)
TACROLIMUS BLOOD: 8.1 NG/ML (ref 5–20)
URATE SERPL-MCNC: 4.1 MG/DL (ref 3.5–7.2)
WBC # BLD AUTO: 6.1 K/UL (ref 4–11)

## 2025-05-05 PROCEDURE — 83615 LACTATE (LD) (LDH) ENZYME: CPT

## 2025-05-05 PROCEDURE — 84550 ASSAY OF BLOOD/URIC ACID: CPT

## 2025-05-05 PROCEDURE — C1751 CATH, INF, PER/CENT/MIDLINE: HCPCS

## 2025-05-05 PROCEDURE — 85025 COMPLETE CBC W/AUTO DIFF WBC: CPT

## 2025-05-05 PROCEDURE — 36410 VNPNXR 3YR/> PHY/QHP DX/THER: CPT

## 2025-05-05 PROCEDURE — 80197 ASSAY OF TACROLIMUS: CPT

## 2025-05-05 PROCEDURE — 6370000000 HC RX 637 (ALT 250 FOR IP)

## 2025-05-05 PROCEDURE — 83735 ASSAY OF MAGNESIUM: CPT

## 2025-05-05 PROCEDURE — 84100 ASSAY OF PHOSPHORUS: CPT

## 2025-05-05 PROCEDURE — 6360000002 HC RX W HCPCS: Performed by: INTERNAL MEDICINE

## 2025-05-05 PROCEDURE — 6360000002 HC RX W HCPCS: Performed by: NURSE PRACTITIONER

## 2025-05-05 PROCEDURE — 80076 HEPATIC FUNCTION PANEL: CPT

## 2025-05-05 PROCEDURE — 36415 COLL VENOUS BLD VENIPUNCTURE: CPT

## 2025-05-05 PROCEDURE — 85610 PROTHROMBIN TIME: CPT

## 2025-05-05 PROCEDURE — 2580000003 HC RX 258

## 2025-05-05 PROCEDURE — 6370000000 HC RX 637 (ALT 250 FOR IP): Performed by: NURSE PRACTITIONER

## 2025-05-05 PROCEDURE — 80048 BASIC METABOLIC PNL TOTAL CA: CPT

## 2025-05-05 PROCEDURE — 2580000003 HC RX 258: Performed by: NURSE PRACTITIONER

## 2025-05-05 PROCEDURE — 6370000000 HC RX 637 (ALT 250 FOR IP): Performed by: INTERNAL MEDICINE

## 2025-05-05 PROCEDURE — 99232 SBSQ HOSP IP/OBS MODERATE 35: CPT | Performed by: INTERNAL MEDICINE

## 2025-05-05 PROCEDURE — 6360000002 HC RX W HCPCS

## 2025-05-05 PROCEDURE — 85730 THROMBOPLASTIN TIME PARTIAL: CPT

## 2025-05-05 PROCEDURE — 2500000003 HC RX 250 WO HCPCS

## 2025-05-05 RX ORDER — TACROLIMUS 0.5 MG/1
1.5 CAPSULE ORAL 2 TIMES DAILY
Qty: 60 CAPSULE | Refills: 3
Start: 2025-05-05

## 2025-05-05 RX ORDER — TRIAMCINOLONE ACETONIDE 1 MG/G
OINTMENT TOPICAL
Qty: 1 EACH | Refills: 0 | Status: SHIPPED | OUTPATIENT
Start: 2025-05-05 | End: 2025-05-12

## 2025-05-05 RX ORDER — LIDOCAINE HYDROCHLORIDE 10 MG/ML
5 INJECTION, SOLUTION EPIDURAL; INFILTRATION; INTRACAUDAL; PERINEURAL ONCE
Status: COMPLETED | OUTPATIENT
Start: 2025-05-05 | End: 2025-05-05

## 2025-05-05 RX ORDER — POTASSIUM CHLORIDE 1500 MG/1
40 TABLET, EXTENDED RELEASE ORAL ONCE
Status: COMPLETED | OUTPATIENT
Start: 2025-05-05 | End: 2025-05-05

## 2025-05-05 RX ORDER — LOPERAMIDE HYDROCHLORIDE 2 MG/1
2 CAPSULE ORAL 4 TIMES DAILY PRN
COMMUNITY
Start: 2025-05-05 | End: 2025-05-15

## 2025-05-05 RX ORDER — LEVOFLOXACIN 750 MG/1
750 TABLET, FILM COATED ORAL DAILY
Qty: 8 TABLET | Refills: 0 | Status: SHIPPED | OUTPATIENT
Start: 2025-05-05 | End: 2025-05-13

## 2025-05-05 RX ADMIN — SODIUM CHLORIDE 1000 MG: 9 INJECTION INTRAMUSCULAR; INTRAVENOUS; SUBCUTANEOUS at 11:52

## 2025-05-05 RX ADMIN — SODIUM CHLORIDE, PRESERVATIVE FREE 10 ML: 5 INJECTION INTRAVENOUS at 10:07

## 2025-05-05 RX ADMIN — CEFEPIME 2000 MG: 2 INJECTION, POWDER, FOR SOLUTION INTRAVENOUS at 06:45

## 2025-05-05 RX ADMIN — FLUCONAZOLE 400 MG: 200 TABLET ORAL at 10:06

## 2025-05-05 RX ADMIN — ALLOPURINOL 150 MG: 300 TABLET ORAL at 10:06

## 2025-05-05 RX ADMIN — POTASSIUM CHLORIDE 40 MEQ: 1500 TABLET, EXTENDED RELEASE ORAL at 06:44

## 2025-05-05 RX ADMIN — TACROLIMUS 1.5 MG: 1 CAPSULE ORAL at 10:29

## 2025-05-05 RX ADMIN — Medication 1500 MG: at 10:07

## 2025-05-05 RX ADMIN — SODIUM CHLORIDE 15 ML: 900 IRRIGANT IRRIGATION at 09:28

## 2025-05-05 RX ADMIN — LIDOCAINE HYDROCHLORIDE 5 ML: 10 INJECTION, SOLUTION EPIDURAL; INFILTRATION; INTRACAUDAL; PERINEURAL at 11:47

## 2025-05-05 RX ADMIN — NYSTATIN 500000 UNITS: 100000 SUSPENSION ORAL at 10:07

## 2025-05-05 RX ADMIN — VALACYCLOVIR HYDROCHLORIDE 500 MG: 500 TABLET, FILM COATED ORAL at 10:06

## 2025-05-05 RX ADMIN — URSODIOL 500 MG: 250 TABLET, FILM COATED ORAL at 10:05

## 2025-05-05 RX ADMIN — PANTOPRAZOLE SODIUM 40 MG: 40 TABLET, DELAYED RELEASE ORAL at 10:07

## 2025-05-05 ASSESSMENT — PAIN SCALES - GENERAL: PAINLEVEL_OUTOF10: 0

## 2025-05-05 NOTE — PLAN OF CARE
Problem: Safety - Adult  Goal: Free from fall injury  5/1/2025 0417 by Flor Skinner, RN  Outcome: Progressing     Problem: ABCDS Injury Assessment  Goal: Absence of physical injury  5/1/2025 0417 by Flor Skinner, RN  Outcome: Progressing     Problem: Nutrition Deficit:  Goal: Optimize nutritional status  5/1/2025 0417 by Flor Skinner, RN  Outcome: Progressing     
  Problem: Safety - Adult  Goal: Free from fall injury  5/1/2025 1753 by Zabrina Briseno RN  Outcome: Progressing  Flowsheets (Taken 5/1/2025 1753)  Free From Fall Injury: Instruct family/caregiver on patient safety     Problem: ABCDS Injury Assessment  Goal: Absence of physical injury  5/1/2025 1753 by Zabrina Briseno RN  Outcome: Progressing  Flowsheets (Taken 5/1/2025 1753)  Absence of Physical Injury: Implement safety measures based on patient assessment     Problem: Nutrition Deficit:  Goal: Optimize nutritional status  5/1/2025 1753 by Zabrina Briseno RN  Outcome: Progressing  Flowsheets (Taken 4/30/2025 1819)  Nutrient intake appropriate for improving, restoring, or maintaining nutritional needs:   Assess nutritional status and recommend course of action   Monitor oral intake, labs, and treatment plans   Recommend appropriate diets, oral nutritional supplements, and vitamin/mineral supplements     
  Problem: Safety - Adult  Goal: Free from fall injury  5/2/2025 0223 by Flor Skinner, RN  Outcome: Progressing     Problem: ABCDS Injury Assessment  Goal: Absence of physical injury  5/2/2025 0223 by Flor Skinner, RN  Outcome: Progressing     Problem: Nutrition Deficit:  Goal: Optimize nutritional status  5/2/2025 0223 by Flor Skinner, RN  Outcome: Progressing     
  Problem: Safety - Adult  Goal: Free from fall injury  5/3/2025 0833 by Jimbo Mendiola RN  Outcome: Progressing     Problem: ABCDS Injury Assessment  Goal: Absence of physical injury  5/3/2025 0833 by Jimbo Mendiola RN  Outcome: Progressing  Flowsheets (Taken 5/3/2025 0833)  Absence of Physical Injury: Implement safety measures based on patient assessment     Problem: Pain  Goal: Verbalizes/displays adequate comfort level or baseline comfort level  Outcome: Progressing  Flowsheets (Taken 5/3/2025 0833)  Verbalizes/displays adequate comfort level or baseline comfort level:   Encourage patient to monitor pain and request assistance   Assess pain using appropriate pain scale   Implement non-pharmacological measures as appropriate and evaluate response   Administer analgesics based on type and severity of pain and evaluate response     Problem: Hematologic - Adult  Goal: Maintains hematologic stability  Outcome: Progressing  Note: Patient's hemoglobin this AM:   Recent Labs     05/03/25 0429   HGB 7.8*     Patient's platelet count this AM:   Recent Labs     05/03/25 0429   PLT 96*    Thrombocytopenia Precautions in place.  Patient showing no signs or symptoms of active bleeding.  Transfusion not indicated at this time.  Patient verbalizes understanding of all instructions. Will continue to assess and implement POC. Call light within reach and hourly rounding in place.        
  Problem: Safety - Adult  Goal: Free from fall injury  5/5/2025 1227 by Selvin Taylor RN  Outcome: Completed  5/5/2025 0450 by Kirstie Smith RN  Outcome: Progressing     Problem: ABCDS Injury Assessment  Goal: Absence of physical injury  5/5/2025 1227 by Selvin Taylor RN  Outcome: Completed  5/5/2025 0450 by Kirstie Smith RN  Outcome: Progressing  Flowsheets (Taken 5/4/2025 2053)  Absence of Physical Injury: Implement safety measures based on patient assessment     Problem: Nutrition Deficit:  Goal: Optimize nutritional status  5/5/2025 1227 by Selvin Taylor RN  Outcome: Completed  5/5/2025 0450 by Kirstie Smith RN  Outcome: Progressing     Problem: Pain  Goal: Verbalizes/displays adequate comfort level or baseline comfort level  5/5/2025 1227 by Selvin Taylor RN  Outcome: Completed  5/5/2025 0450 by Kirstie Smith RN  Outcome: Progressing     Problem: Hematologic - Adult  Goal: Maintains hematologic stability  5/5/2025 1227 by Selvin Taylor RN  Outcome: Completed  5/5/2025 0450 by Kirstie Smith RN  Outcome: Progressing  Flowsheets (Taken 5/4/2025 2053)  Maintains hematologic stability:   Assess for signs and symptoms of bleeding or hemorrhage   Monitor labs for bleeding or clotting disorders   Administer blood products/factors as ordered     Problem: Gastrointestinal - Adult  Goal: Minimal or absence of nausea and vomiting  Outcome: Completed  Goal: Maintains or returns to baseline bowel function  Outcome: Completed  Goal: Maintains adequate nutritional intake  5/5/2025 1227 by Selvin Taylor RN  Outcome: Completed  5/5/2025 0450 by Kirstie Smith RN  Outcome: Progressing     Problem: Genitourinary - Adult  Goal: Absence of urinary retention  5/5/2025 1227 by Selvin Taylor RN  Outcome: Completed  5/5/2025 0450 by Kirstie Smith RN  Outcome: Progressing  Flowsheets (Taken 5/4/2025 2053)  Absence of urinary retention:   Assess patient’s ability to void and empty bladder   Monitor intake/output and perform 
  Problem: Safety - Adult  Goal: Free from fall injury  Outcome: Progressing     Problem: ABCDS Injury Assessment  Goal: Absence of physical injury  Outcome: Progressing     Problem: Nutrition Deficit:  Goal: Optimize nutritional status  Outcome: Progressing   Orthostatic vital signs obtained at start of shift - see flowsheet for details.  Pt does not meet criteria for orthostasis.  Pt is a Low fall risk. See Baker Fall Score and ABCDS Injury Risk assessments.     
  Problem: Safety - Adult  Goal: Free from fall injury  Outcome: Progressing     Problem: ABCDS Injury Assessment  Goal: Absence of physical injury  Outcome: Progressing  Flowsheets (Taken 4/29/2025 7180 by Alena Calvo RN)  Absence of Physical Injury: Implement safety measures based on patient assessment     
  Problem: Safety - Adult  Goal: Free from fall injury  Outcome: Progressing  Flowsheets (Taken 4/30/2025 1819)  Free From Fall Injury: Instruct family/caregiver on patient safety     Problem: ABCDS Injury Assessment  Goal: Absence of physical injury  Outcome: Progressing  Flowsheets (Taken 4/30/2025 1819)  Absence of Physical Injury: Implement safety measures based on patient assessment     Problem: Nutrition Deficit:  Goal: Optimize nutritional status  Outcome: Progressing  Flowsheets (Taken 4/30/2025 1819)  Nutrient intake appropriate for improving, restoring, or maintaining nutritional needs:   Assess nutritional status and recommend course of action   Monitor oral intake, labs, and treatment plans   Recommend appropriate diets, oral nutritional supplements, and vitamin/mineral supplements     
  Problem: Safety - Adult  Goal: Free from fall injury  Outcome: Progressing  Pt is up ad kishan ortho negative. Pt bed in lowest position and wheels locked. Call light and table within reach. Pt demonstrates how to use the call light. Pt doesn't use assistive devices to ambulate. Pt tolerates ambulation well. Pt denies any dizziness when standing and when ambulating in room.     Problem: ABCDS Injury Assessment  Goal: Absence of physical injury  Outcome: Progressing  Flowsheets (Taken 5/4/2025 2053)  Absence of Physical Injury: Implement safety measures based on patient assessment  Standard safety measures in place.    Problem: Nutrition Deficit:  Goal: Optimize nutritional status  Outcome: Progressing  Pt is encouraged to increase oral intake.    Problem: Pain  Goal: Verbalizes/displays adequate comfort level or baseline comfort level  Outcome: Progressing  Pt denies having any pain throughout shift.    Problem: Hematologic - Adult  Goal: Maintains hematologic stability  Outcome: Progressing  Flowsheets (Taken 5/4/2025 2053)  Maintains hematologic stability:   Assess for signs and symptoms of bleeding or hemorrhage   Monitor labs for bleeding or clotting disorders   Administer blood products/factors as ordered   Patient's hemoglobin this AM:   Recent Labs     05/05/25  0404   HGB 8.0*     Patient's platelet count this AM:   Recent Labs     05/05/25  0404   *    Thrombocytopenia Precautions in place.  Patient showing no signs or symptoms of active bleeding.  Transfusion not indicated at this time.  Patient verbalizes understanding of all instructions. Will continue to assess and implement POC. Call light within reach and hourly rounding in place.     Problem: Cardiovascular - Adult  Goal: Maintains optimal cardiac output and hemodynamic stability  Outcome: Progressing  Flowsheets (Taken 5/4/2025 2053)  Maintains optimal cardiac output and hemodynamic stability:   Monitor blood pressure and heart rate   Monitor 
mucous membranes remain intact  Outcome: Progressing

## 2025-05-05 NOTE — CARE COORDINATION
Case Management Assessment            Discharge Note                    Date / Time of Note: 5/5/2025 10:32 AM                  Discharge Note Completed by: DARSHAN Delgadillo   for [facility] ([facility])  [employer] Mobile: [phone]    Patient Name: Myles Meehan   YOB: 1950  Diagnosis: Sepsis (HCC) [A41.9]   Date / Time: 4/29/2025  5:01 PM    Current PCP: Magaly Womack MD  Clinic patient: No    Hospitalization in the last 30 days: Yes  Readmission Assessment  Number of Days since last admission?: 8-30 days  Previous Disposition: Home with Family  Who is being Interviewed: Patient  What was the patient's/caregiver's perception as to why they think they needed to return back to the hospital?: Other (Comment) (not feeling well at [facility] and was directly admitted)  Did you visit your Primary Care Physician after you left the hospital, before you returned this time?: Yes (pt has been going to [facility] for his provider)  Did you see a specialist, such as Cardiac, Pulmonary, Orthopedic Physician, etc. after you left the hospital?: Yes  Who advised the patient to return to the hospital?: Physician  Does the patient report anything that got in the way of taking their medications?: No  In our efforts to provide the best possible care to you and others like you, can you think of anything that we could have done to help you after you left the hospital the first time, so that you might not have needed to return so soon?: Other (Comment) (nothing else per pt)    Advance Directives:  Code Status: Full Code  [place] DNR form completed and on chart: Not Indicated    Financial:  Payor: [employer]VANTAGE / Plan: [employer] PREFERRED PPO / Product Type: *No Product type* /      Pharmacy:    [id number] - [place], [place] - [address] [phone] - F [phone]  [address]  [facility]

## 2025-05-05 NOTE — PROGRESS NOTES
Reason for admission:                 Suspicion for sepsis    Brief Summary :     Myles Meehan is being seen by nephrology for AMANDEEP.      Interval History and plan:      BP is well controlled  Creatinine is improving   Urine is suggestive for ATN     Improving ATN from sepsis   No evidence for TTP  AIN is less likely   He was given fluid boluses per sepsis protocol and will continue IVF today  Continue ABX        Assessment :      AMANDEEP in the setting of sepsis and immunosuppression   Concern for sepsis, pt was receiving tacrolimus at home post stem cell transplant, no contrast given, he has rash on his back which is erythematous not raised recently noticed and he is on protonix as well, will need to rule out AIN. Blood cultures (25): +enterobacter, pseudomonas   Tacro levels:   - 25: 13.60   - 25: 10.47  - 25: 19.73  -25: 8.0  -IV hydration   -hold Tacrolimus  -tacrolimus levels daily   -Urine eosinophils, Urine Na and Creatinine  -avoid nephrotoxic toxic drugs  -RFP daily  -if Cr continues to worsen will get Renal ultrasound   -2L bolus NS   -bladder scan  -continue cefepime, diflucan, acyclovir and atovaquone.      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   Hgb>7  -transfuse for the mentioned goal    Thrombocytopenia  -monitor for bleeding   -transfuse for plt<10k or 50k if bleeding        Alvarado Salguero MD             Past Medical History:   Diagnosis Date    Arthritis     left knee    Cancer (HCC)     AML    History of blood transfusion     Hx of blood clots     BLOOD CLOT IN ARM    Hyperlipidemia     Hypertension     Pre-diabetes     Sleep apnea     Wife confirms that was when he was heavier    Wears glasses     Wears hearing aid in both ears           Medication:     Current Facility-Administered Medications: fluconazole (DIFLUCAN) tablet 400 mg, 400 mg, Oral, Daily  valACYclovir (VALTREX) tablet 500 mg, 500 mg, Oral, BID  triamcinolone 
           Reason for admission:                 Suspicion for sepsis    Brief Summary :     Myles Meehan is being seen by nephrology for AMANDEEP.      Interval History and plan:      Sitting up at edge of bed  No SOB  Feeling well    Creatinine improved  Electrolytes stable  BP's stable  Great UOP       Assessment :     # AMANDEEP in the setting of sepsis and immunosuppression   Concern for sepsis, pt was receiving tacrolimus at home post stem cell transplant, no contrast given, he has rash on his back which is erythematous not raised recently noticed and he is on protonix as well, will need to rule out AIN. 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   - Transfuse PRN per [facility]    # Thrombocytopenia  - Per [place]       Sandrine Krueger PABeaC         Past Medical History:   Diagnosis Date    Arthritis     left knee    Cancer (HCC)     AML    History of blood transfusion     Hx of blood clots     BLOOD CLOT IN ARM    Hyperlipidemia     Hypertension     Pre-diabetes     Sleep apnea     Wife confirms that was when he was heavier    Wears glasses     Wears hearing aid in both ears           Medication:     Current Facility-Administered Medications: fluconazole (DIFLUCAN) tablet 400 mg, 400 mg, Oral, Daily  valACYclovir (VALTREX) tablet 500 mg, 500 mg, Oral, BID  triamcinolone (KENALOG) 0.1 % ointment, , Topical, BID  tacrolimus (proGRAF) capsule 1.5 mg, 1.5 mg, Oral, BID  0.9 % sodium chloride infusion, , IntraVENous, PRN  OLANZapine (ZYPREXA) tablet 5 mg, 5 mg, Oral, Nightly  [COMPLETED] loperamide (IMODIUM) capsule 4 mg, 4 mg, Oral, Once **FOLLOWED BY** loperamide (IMODIUM) capsule 2 mg, 2 mg, Oral, 4x Daily PRN  allopurinol 
       Ph: [phone], Fax: [phone]           [facility]               Reason for admission:                 Suspicion for sepsis    Brief Summary :     Myles Meehan is being seen by nephrology for AMANDEEP.      Interval History and plan:      BP is well controlled  Urine is 1050 ml   Creatinine is improving 2.2 > 1.8   Urine is suggestive for ATN     Improving ATN from sepsis and ? High tacrolimus level   No evidence for TTP  AIN is less likely   He was given fluid boluses per sepsis protocol and will continue IVF today  Continue ABX        Assessment :      AMANDEEP in the setting of sepsis and immunosuppression   Concern for sepsis, pt was receiving tacrolimus at home post stem cell transplant, no contrast given, he has rash on his back which is erythematous not raised recently noticed and he is on protonix as well, will need to rule out AIN. Blood cultures (25): +enterobacter, pseudomonas   Tacro levels:   - 25: 13.60   - 25: 10.47  - 25: 19.73  -25: 8.0  -IV hydration   -hold Tacrolimus  -tacrolimus levels daily   -Urine eosinophils, Urine Na and Creatinine  -avoid nephrotoxic toxic drugs  -RFP daily  -if Cr continues to worsen will get Renal ultrasound   -2L bolus NS   -bladder scan  -continue cefepime, diflucan, acyclovir and atovaquone.      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  
    [facility] Progress Note    2025     Myles Meehan    MRN: [id number]    : 1950    Referring MD: Tawana Holt,   [address]  [address]  [place] [address]      SUBJECTIVE:     He is doing well. Plan to place picc line tomorrow and DC on IV abx     ECOG PS:  (2) Ambulatory and capable of self care, unable to carry out work activity, up and about > 50% or waking hours    KPS: 80% Normal activity with effort; some signs or symptoms of disease    Isolation: None    Medications    Scheduled Meds:   cefepime  2,000 mg IntraVENous Q8H    fluconazole  400 mg Oral Daily    valACYclovir  500 mg Oral BID    triamcinolone   Topical BID    tacrolimus  1.5 mg Oral BID    OLANZapine  5 mg Oral Nightly    allopurinol  150 mg Oral Daily    atovaquone  1,500 mg Oral Daily    miconazole   Topical BID    nystatin  5 mL Oral 4x Daily    pantoprazole  40 mg Oral Daily    ursodiol  500 mg Oral BID    sodium chloride flush  5-40 mL IntraVENous 2 times per day    Saline Mouthwash  15 mL Swish & Spit 4x Daily AC & HS    enoxaparin  40 mg SubCUTAneous Daily     Continuous Infusions:   sodium chloride      sodium chloride      sodium chloride 5 mL/hr at 25 0518    potassium chloride       PRN Meds:.sodium chloride, [COMPLETED] loperamide **FOLLOWED BY** loperamide, biotene, ondansetron, prochlorperazine, sodium chloride, sodium chloride flush, sodium chloride, potassium chloride, magnesium sulfate, Saline Mouthwash, ALTEplase (CATHFLO) 2 mg in sterile water 2 mL injection, acetaminophen, oxyCODONE **OR** oxyCODONE    ROS:  As noted above, otherwise remainder of 10-point ROS negative    Physical Exam:     I&O:    Intake/Output Summary (Last 24 hours) at 2025 0836  Last data filed at 2025 0657  Gross per 24 hour   Intake 720.08 ml   Output 1850 ml   Net -1129.92 ml       Vital Signs:  /73   Pulse 74   Temp 97.9 °F (36.6 °C) (Oral)   Resp 18   Ht 1.727 m (5' 8\")   Wt 88.9 kg (196 lb)   SpO2 94%   BMI 
    [facility] Progress Note    5/3/2025     Myles Meehan    MRN: [id number]    : 1950    Referring MD: Tawana oHlt DO  [address]  [facility] [address]  [place] [address]      SUBJECTIVE:     - Enterobacter and pseudomonas bacteremia/sepsis- See sensitivities belowe  - AMANDEEP on admission, nephrology consulted--> trending down     ECOG PS:  (2) Ambulatory and capable of self care, unable to carry out work activity, up and about > 50% or waking hours    KPS: 80% Normal activity with effort; some signs or symptoms of disease    Isolation: None    Medications    Scheduled Meds:   fluconazole  400 mg Oral Daily    valACYclovir  500 mg Oral BID    triamcinolone   Topical BID    tacrolimus  1.5 mg Oral BID    OLANZapine  5 mg Oral Nightly    allopurinol  150 mg Oral Daily    atovaquone  1,500 mg Oral Daily    miconazole   Topical BID    nystatin  5 mL Oral 4x Daily    pantoprazole  40 mg Oral Daily    ursodiol  500 mg Oral BID    sodium chloride flush  5-40 mL IntraVENous 2 times per day    Saline Mouthwash  15 mL Swish & Spit 4x Daily AC & HS    enoxaparin  40 mg SubCUTAneous Daily    cefepime  2,000 mg IntraVENous Q12H     Continuous Infusions:   sodium chloride      sodium chloride      sodium chloride 5 mL/hr at 25 0518    potassium chloride       PRN Meds:.sodium chloride, [COMPLETED] loperamide **FOLLOWED BY** loperamide, biotene, ondansetron, prochlorperazine, sodium chloride, sodium chloride flush, sodium chloride, potassium chloride, magnesium sulfate, Saline Mouthwash, ALTEplase (CATHFLO) 2 mg in sterile water 2 mL injection, acetaminophen, oxyCODONE **OR** oxyCODONE    ROS:  As noted above, otherwise remainder of 10-point ROS negative    Physical Exam:     I&O:    Intake/Output Summary (Last 24 hours) at 5/3/2025 0933  Last data filed at 5/3/2025 0532  Gross per 24 hour   Intake 1488 ml   Output 2500 ml   Net -1012 ml       Vital Signs:  /63   Pulse 89   Temp 98.4 °F (36.9 °C) (Oral)   Resp 16  
    [facility] Progress Note    2025     Myles Meehan    MRN: [id number]    : 1950    Referring MD: Tawana Holt,   [address]  [facility] [address]  [place] [address]      SUBJECTIVE:     -Admitted yesterday with sepsis   -BCx positive for enterobacter cloacae, pseudomonas   -Send repeat BCx for clearance   -Remove central line and send tip for culture   -AMANDEEP on admission, nephrology consulted.     ECOG PS:  (2) Ambulatory and capable of self care, unable to carry out work activity, up and about > 50% or waking hours    KPS: 80% Normal activity with effort; some signs or symptoms of disease    Isolation: None    Medications    Scheduled Meds:   allopurinol  150 mg Oral Daily    atovaquone  1,500 mg Oral Daily    fluconazole  200 mg Oral Daily    miconazole   Topical BID    nystatin  5 mL Oral 4x Daily    pantoprazole  40 mg Oral Daily    ursodiol  500 mg Oral BID    valACYclovir  500 mg Oral BID    sodium chloride flush  5-40 mL IntraVENous 2 times per day    Saline Mouthwash  15 mL Swish & Spit 4x Daily AC & HS    enoxaparin  40 mg SubCUTAneous Daily    OLANZapine  2.5 mg Oral Nightly    cefepime  2,000 mg IntraVENous Q12H     Continuous Infusions:   sodium chloride      sodium chloride      sodium chloride 5 mL/hr at 25 0518    potassium chloride      sodium chloride 50 mL/hr at 25 1732     PRN Meds:.sodium chloride, biotene, ondansetron, prochlorperazine, sodium chloride, sodium chloride flush, sodium chloride, potassium chloride, magnesium sulfate, Saline Mouthwash, ALTEplase (CATHFLO) 2 mg in sterile water 2 mL injection, acetaminophen, oxyCODONE **OR** oxyCODONE    ROS:  As noted above, otherwise remainder of 10-point ROS negative    Physical Exam:     I&O:    Intake/Output Summary (Last 24 hours) at 2025 0847  Last data filed at 2025 0445  Gross per 24 hour   Intake --   Output 250 ml   Net -250 ml       Vital Signs:  BP (!) 105/50   Pulse 81   Temp 98.2 °F (36.8 °C) (Oral)   
    [facility] Progress Note    2025     Myles Meehan    MRN: [id number]    : 1950    Referring MD: Tawana Holt, DO  [address]  [address]  [place] [address]      SUBJECTIVE:     - Enterobacter and pseudomonas bacteremia/sepsis- awaiting sensitivities for home antibiotic plan  - WBC trending down- 5.4 this morning  - AMANDEEP on admission, nephrology consulted--> trending down creatinine 1.3 this morning    ECOG PS:  (2) Ambulatory and capable of self care, unable to carry out work activity, up and about > 50% or waking hours    KPS: 80% Normal activity with effort; some signs or symptoms of disease    Isolation: None    Medications    Scheduled Meds:   valACYclovir  500 mg Oral Daily    triamcinolone   Topical BID    tacrolimus  1.5 mg Oral BID    OLANZapine  5 mg Oral Nightly    allopurinol  150 mg Oral Daily    atovaquone  1,500 mg Oral Daily    fluconazole  200 mg Oral Daily    miconazole   Topical BID    nystatin  5 mL Oral 4x Daily    pantoprazole  40 mg Oral Daily    ursodiol  500 mg Oral BID    sodium chloride flush  5-40 mL IntraVENous 2 times per day    Saline Mouthwash  15 mL Swish & Spit 4x Daily AC & HS    enoxaparin  40 mg SubCUTAneous Daily    cefepime  2,000 mg IntraVENous Q12H     Continuous Infusions:   sodium chloride      sodium chloride      sodium chloride 5 mL/hr at 25 0518    potassium chloride       PRN Meds:.sodium chloride, [COMPLETED] loperamide **FOLLOWED BY** loperamide, biotene, ondansetron, prochlorperazine, sodium chloride, sodium chloride flush, sodium chloride, potassium chloride, magnesium sulfate, Saline Mouthwash, ALTEplase (CATHFLO) 2 mg in sterile water 2 mL injection, acetaminophen, oxyCODONE **OR** oxyCODONE    ROS:  As noted above, otherwise remainder of 10-point ROS negative    Physical Exam:     I&O:    Intake/Output Summary (Last 24 hours) at 2025 0959  Last data filed at 2025 0917  Gross per 24 hour   Intake 4413 ml   Output 4900 ml   Net -487 
  Comprehensive Nutrition Assessment    RECOMMENDATIONS:  PO Diet: Regular; low microbial  Nutrition Supplement: Has premier protein shakes from home usually, adding Ensure while inpatient  Nutrition Education: Education/Counseling not indicated     NUTRITION ASSESSMENT:   Nutritional summary & status: ALLO D+41. Admitted w/ sepsis and AMANDEEP. Difficult to assess wt trends throughout transplant and posttransplant as pt was volume overloaded during admit for transplant. Overall insignificant wt loss from transplant date to CBW. Unsure if weight loss since discharging from transplant is true wt vs fluid. If true weight, 12%/27# loss in 3 weeks. After speaking w/ pt, likely true wt loss. Pt consuming <50% 3 meals/day d/t apathy towards food and metallic taste. Encouraged adding a snack inbetween meals 1-2x/day and provided dysgeusia education. On 2.5 mg of Zyprexa, asked provider to increase to 5 mg if appropriate. Encouraged pt to eat for need/medicine vs pleasure. Reports dry heaving and 'vomiting' bile intermittently PTA, taking antiemetics. Will communicate w/ outpatient RD to have pt followed.     Admission // PMH: Sepsis // AML s/p Nikia/Flu MUD ALLO SCT, HLD, HTN    MALNUTRITION ASSESSMENT  Context of Malnutrition: Acute Illness (on chronic)   Malnutrition Status: At risk for malnutrition  Findings of the 6 clinical characteristics of malnutrition (Minimum of 2 out of 6 clinical characteristics is required to make the diagnosis of moderate or severe Protein Calorie Malnutrition based on AND/ASPEN Guidelines):  Energy Intake:  75% or less of estimated energy requirements for 7 or more days  Weight Loss:  Mild weight loss (wt vs fluid)     Body Fat Loss:  No body fat loss     Muscle Mass Loss:  No muscle mass loss        NUTRITION DIAGNOSIS   Inadequate oral intake related to decreased appetite as evidenced by poor intake prior to admission    Nutrition Monitoring and Evaluation:   Food/Nutrient Intake Outcomes:  
  Patient admitted from [facility] via wheelchair.     Patient and wife oriented to unit policies and procedures including: pain management practices, unit safety precautions, family rapid response, q4h vital signs and assessments, daily 4am lab draws, weekly chest x-rays,  daily chlorhexidine bathing, standing transfusion orders, and routine central line care.  Also discussed use of call light and how to get in touch with nursing staff.  Stressed the importance of calling out immediately for any changes in condition including but not limited to: pain, chills, fever, nausea, vomiting, diarrhea, chest pain, sob/kemp, assistance with toileting, bleeding, or any other symptoms that are out of the ordinary for the patient.  Patient verbalizes understanding of all instructions and will call for assistance as needed.   
4 Eyes Admission Assessment     I agree as the admission nurse that 2 RN's have performed a thorough Head to Toe Skin Assessment on the patient. ALL assessment sites listed below have been assessed on admission.       Areas assessed by both nurses: Yes  [x]   Head, Face, and Ears   [x]   Shoulders, Back, and Chest  [x]   Arms, Elbows, and Hands   [x]   Coccyx, Sacrum, and Ischium  [x]   Legs, Feet, and Heels        Does the Patient have Skin Breakdown?  Yes a wound was noted on the Admission Assessment and an LDA was Initiated documentation include the Renae-wound, Wound Assessment, Measurements, Dressing Treatment, Drainage, and Color\",         Tommy Prevention initiated:  NA   Wound Care Orders initiated:  NA      WOC nurse consulted for Pressure Injury (Stage 3,4, Unstageable, DTI, NWPT, and Complex wounds) or Tommy score 18 or lower:  NA      Nurse 1 eSignature: Electronically signed by Alena Calvo RN on 4/29/25 at 6:32 PM EDT    **SHARE this note so that the co-signing nurse is able to place an eSignature**    Nurse 2 eSignature: {Esignature:[id number]}    
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  Prograf 2.5 mg po bid to start 3/25 with first tacrolimus level on 3/29 and then MWF thereafter.   3/29  D+9 0.6 0.6 2.5 mg BID 6.8 Per chart review no changes    3/31  D+11 0.7 0.5 2.5 mg BID 8.0 Per Dr. Barbosa, no changes. Continue MWF levels.    4/1  D+13 0.7 0.5 2.5 mg BID 7.5 Per Dr. Barbosa, no changes. Continue MWF levels.    4/4/25; d+15 0.9 0.3 2.5 mg po bid 9.5 Discussed with Dr Barbosa-will continue current Prograf dosing with levels MWF.  (Noted: patient day +15 and beginning to engraft)   4/7  D+18 1.0 0.3 2.5 mg PO BID 8.8 Continue current dose. Continue MWF levels.   4/9  D+20 1.1 0.4 2.5 mg PO BID 6.8 Continue current dose. Continue MWF levels.           4/30  D+41 2.2 0.6 On Hold 8.0 Patient admitted with sepsis. He was taking tacro 2 mg PO BID at home (levels ranging from 8.82-13.6 over last 2 weeks), but last level on 4/28 was supratherapeutic at 19.83 at [facility]. Tacro currently on hold with daily levels. Although back in therapeutic range today, will continue to hold with persistent AMANDEEP per Dr Robin. Will continue daily levels.   5/1/25; d+42 1.8 0.5 On hold  6.8 On rounds today, Prograf 1.5 mg po bid was resumed today with improvement in renal function (dose given this morning) and will continue daily levels for now.   5/2  D+43 1.3 0.5 1.5 mg PO BID 10.1 
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  Prograf 2.5 mg po bid to start 3/25 with first tacrolimus level on 3/29 and then MWF thereafter.   3/29  D+9 0.6 0.6 2.5 mg BID 6.8 Per chart review no changes    3/31  D+11 0.7 0.5 2.5 mg BID 8.0 Per Dr. Barbosa, no changes. Continue MWF levels.    4/1  D+13 0.7 0.5 2.5 mg BID 7.5 Per Dr. Barbosa, no changes. Continue MWF levels.    4/4/25; d+15 0.9 0.3 2.5 mg po bid 9.5 Discussed with Dr Barbosa-will continue current Prograf dosing with levels MWF.  (Noted: patient day +15 and beginning to engraft)   4/7  D+18 1.0 0.3 2.5 mg PO BID 8.8 Continue current dose. Continue MWF levels.   4/9  D+20 1.1 0.4 2.5 mg PO BID 6.8 Continue current dose. Continue MWF levels.           4/30  D+41 2.2 0.6 On Hold 8.0 Patient admitted with sepsis. He was taking tacro 2 mg PO BID at home (levels ranging from 8.82-13.6 over last 2 weeks), but last level on 4/28 was supratherapeutic at 19.83 at [facility]. Tacro currently on hold with daily levels. Although back in therapeutic range today, will continue to hold with persistent AMANDEEP per Dr Robin. Will continue daily levels.   5/1/25; d+42 1.8 0.5 On hold  6.8 On rounds today, Prograf 1.5 mg po bid was resumed today with improvement in renal function (dose given this morning) and will continue daily levels for now.                   Tacrolimus Lvl (ng/mL) 
Patient in IR for CVC removal, received call from cath lab RN stating procedure was completed, this RN told other RN labels were in chart for tip culture and asked if they were sent he stated \"the line has already been disposed of\" and stated there was not an order for culture. [employer] BLAIR notified that tip cannot be cultured.   
Reviewed discharge instructions with patient and  spouse.  Reviewed discharge medications including dosing, schedule, indication, and adverse reactions.  Reviewed which medications were already taken today and next dosage due for each medication.      Reviewed signs and symptoms that prompt a call to the physician and appropriate phone numbers. Reviewed follow up appointments that have been made in [facility] and Outpatient Oncology.  Low microbial diet, activity restrictions, and increased risk of infection were reviewed. Outpatient pharmacy medications obtained prior to discharge.     Patient is being discharged with IV access d/t need for ongoing therapy:      Type:  Midline                        Date of placement:  05/05/2025   Plan:continue   Next dressing change due on: 05/12/2025  Cap changes due on: 05/12/2025  CVC care and maintenance was reviewed with patient and spouse.  Pt verbalizes understanding of line care and maintenance.      Patient verbalized understanding of all instructions and questions were answered to his. satisfaction.  Signed discharge instructions were given to the patient and a copy placed in the paper-lite chart.  Patient discharged to home per self with spouse.      Selvin Taylor RN  
[facility] - Clinical Pharmacy Note - Renal Dosing    Cefepime ordered for treatment of sepsis. Per [facility] Renal Dose Adjustment Policy, 2000 mg IV EI Q12h will be changed to 2000 mg IV EI Q8h.     Estimated Creatinine Clearance: Estimated Creatinine Clearance: 63 mL/min (based on SCr of 1.1 mg/dL).  Dialysis Status, AMANDEEP, CKD: AMANDEEP, resolving  BMI: Body mass index is 29.8 kg/m².    Rationale for Adjustment: Agent is renally eliminated.    Pharmacy will continue to monitor renal function, cultures and sensitivities (where available) and adjust dose as necessary.      Please call with any questions,  Zuleima Hickey, PharmD  PGY1 Pharmacy Resident  Wireless: [phone]  5/3/2025 2:26 PM      
  04/10/2025 1.2   04/09/2025 1.1     Total Bilirubin (mg/dL)   Date Value   04/30/2025 0.6   04/29/2025 0.9   04/10/2025 0.4   04/09/2025 0.4   04/08/2025 0.4        Please call with any questions,    Emili Morrell, PharmD  [facility] Clinical Pharmacist  Phone: [phone]  04/30/25 6:52 AM    
m (5' 8\")   Wt 93.6 kg (206 lb 6.4 oz)   SpO2 96%   BMI 31.38 kg/m²     Weight:    Wt Readings from Last 3 Encounters:   04/30/25 93.6 kg (206 lb 6.4 oz)   04/21/25 91.6 kg (202 lb)   04/10/25 99.6 kg (219 lb 9.3 oz)         General: Awake, alert and oriented.  HEENT: normocephalic, PERRL, no scleral erythema or icterus, Oral mucosa moist and intact, throat clear  NECK: supple  BACK: Straight   SKIN: warm dry and intact without lesions rashes or masses  CHEST: CTA bilaterally without use of accessory muscles  CV: Normal S1 S2, RRR, no MRG  ABD: NT ND normoactive BS  EXTREMITIES: without edema, denies calf tenderness  NEURO: CN II - XII grossly intact  CATHETER: PIV    Data    CBC:   Recent Labs     04/29/25 1849 04/30/25 0416 05/01/25 0349   WBC 19.8* 15.2* 8.2   HGB 7.3* 6.5* 7.5*   HCT 21.8* 18.8* 21.4*   MCV 91.2 91.2 89.4   PLT 96* 84* 87*     BMP/Mag:  Recent Labs     04/29/25 1849 04/30/25 0416 05/01/25 0349     136 137 136   K 4.3  4.3 4.8 4.8     105 108 109   CO2 21  21 20* 20*   PHOS 2.7 3.6 3.4   BUN 27*  26* 27* 24*   CREATININE 2.0*  2.1* 2.2* 1.8*   MG  --  1.58* 1.91     LIVP:   Recent Labs     04/29/25 1849 04/30/25 0416 05/01/25 0349   AST 14* 13*  --    ALT 7* 7*  --    BILIDIR 0.5* 0.3  --    BILITOT 0.9 0.6 0.5   ALKPHOS 129 114  --      Coags:   Recent Labs     04/29/25 1849 05/01/25 0349   PROTIME 17.2* 16.5*   INR 1.39* 1.31*   APTT 43.3* 50.2*     Uric Acid No results for input(s): \"LABURIC\" in the last 72 hours.    PROBLEM LIST:        Acute Myeloid Leukemia  HTN  Mild Sinus Disease  Jaw Infection   ETOH Abuse  Recurrent gout  GERD  Oral Candadiasis       TREATMENT:        AML  Dacogen and Venetoclax  - C1D1 9/23/24  - C2D1 11/4/24  - C3D1 12/9/24  - C4D1 1/13/25  Preparative Regimen: Melphalan and Fludarabine  Date of BMT: 3/20/2025  Source of stem cells: PBSC- Fresh infusion  Donor/Recipient Blood Type: A+/A+  Donor Sex: Male; Follow STR post-txp. Send 
  HCT 22.1* 22.6* 22.6*   PLT 96* 103* 116*     BMP:    Recent Labs     05/03/25  0429 05/04/25  0407 05/05/25  0404    139 140   K 4.1 3.8 3.3*    107 107   CO2 21 22 22   BUN 14 13 13   CREATININE 1.1 1.2 1.1   GLUCOSE 84 91 101*   MG 1.45* 1.33* 1.64*   PHOS 3.6 3.4 3.1     Lab Results   Component Value Date/Time    COLORU Yellow 04/30/2025 06:10 AM    NITRU Negative 04/30/2025 06:10 AM    GLUCOSEU Negative 04/30/2025 06:10 AM    KETUA TRACE 04/30/2025 06:10 AM    UROBILINOGEN 0.2 04/30/2025 06:10 AM    BILIRUBINUR Negative 04/30/2025 06:10 AM        ----------------------------------------------------------        Alvarado Salguero MD            
05/04/25  0407   WBC 5.4 4.5 5.5   HGB 7.7* 7.8* 7.9*   HCT 22.1* 22.1* 22.6*   PLT 92* 96* 103*     BMP:    Recent Labs     05/02/25 0427 05/03/25 0429 05/04/25 0407    138 139   K 4.2 4.1 3.8    106 107   CO2 21 21 22   BUN 15 14 13   CREATININE 1.3 1.1 1.2   GLUCOSE 80 84 91   MG 1.53* 1.45* 1.33*   PHOS 3.7 3.6 3.4     Lab Results   Component Value Date/Time    COLORU Yellow 04/30/2025 06:10 AM    NITRU Negative 04/30/2025 06:10 AM    GLUCOSEU Negative 04/30/2025 06:10 AM    KETUA TRACE 04/30/2025 06:10 AM    UROBILINOGEN 0.2 04/30/2025 06:10 AM    BILIRUBINUR Negative 04/30/2025 06:10 AM        ----------------------------------------------------------        Sandrine Krueger PA-C        I have seen the patient independently from the PA/NP .I discussed the care with the PA/NP . I personally reviewed the HPI, PH, FH, SH, ROS and medications. I repeated pertinent portions of the examination and reviewed the relevant imaging and laboratory data. I agree with the findings, assessment and plan as documented, with the following addendum:        Alvarado Salguero MD

## 2025-05-05 NOTE — DISCHARGE SUMMARY
[facility] Discharge Summary             Attending Physician: No att. providers found    Referring MD: Tawana Holt DO  [address]  [address]  [place] [address]    Name: Myles Meehan :  1950  MRN:  [id number]    Admission: 2025   Discharge: 2025      Date: 2025    Diagnosis on admit:   Sepsis        Medications:      Medication List        START taking these medications      ertapenem infusion  Commonly known as: INVanz  Infuse 1,000 mg intravenously every 24 hours for 7 days Compound per protocol.  Start taking on: May 6, 2025  Notes to patient: antibiotic     levoFLOXacin 750 MG tablet  Commonly known as: Levaquin  Take 1 tablet by mouth daily for 8 days  Notes to patient: Levofloxacin  (Levaquin)  USE--  Treat bacterial infection  SIDE EFFECTS--  Upset stomach, diarrhea     loperamide 2 MG capsule  Commonly known as: IMODIUM  Take 1 capsule by mouth 4 times daily as needed for Diarrhea     triamcinolone 0.1 % ointment  Commonly known as: KENALOG  Apply topically 2 times daily.  Stop once rash resolves            CHANGE how you take these medications      tacrolimus 0.5 MG capsule  Commonly known as: proGRAF  Take 3 capsules by mouth 2 times daily  What changed:   how much to take  when to take this            CONTINUE taking these medications      allopurinol 100 MG tablet  Commonly known as: ZYLOPRIM  Notes to patient: Prevents build up of uric acid in blood.      Side Effect: skin rash, GI upset     atovaquone 750 MG/5ML suspension  Commonly known as: MEPRON  Take 10 mLs by mouth daily  Notes to patient: Atovaquone (Mepron)  Use:  to prevent PCP pneumonia    Side effects:  headache, insomnia, skin rash, diarrhea, nausea, vomiting, weakness     biotene Liqd oral solution  Swish and spit 15 mLs 3 times daily as needed (dry mouth)     fluconazole 200 MG tablet  Commonly known as: DIFLUCAN  Take 2 tablets by mouth daily  Notes to patient: Fluconazole (Diflucan)  Use:  to prevent or

## 2025-05-05 NOTE — PROCEDURES
PROCEDURE NOTE  Date: 2025   Name: Myles Meehan  YOB: 1950    Procedures          POWER MIDLINE PROCEDURE NOTE  Chart reviewed for allergies, diagnosis, labs, known contraindications, reason for line placement and planned length of treatment.  Insertion procedure discussed with patient/family member.  Informed consent not required for Midline placement.  Three patient identifiers - Patient name,   and MRN -  completed &  confirmed verbally.   Hat, mask and eye shield donned.  Midline site scrubbed with Chloraprep  One-Step applicator  for 30 seconds x 1.   Hand Hygiene  performed with 3% Chlorhexidine surgical scrub x1 min prior to  Sterile gloves, sterile gown being donned.  Patient draped using maximal sterile barrier technique ( head to toe ).  Midline site scrubbed a 2nd time with Chloraprep One-Step applicator x 30 sec. Vein located by [employer] and site marked with sterile pen.  1% Lidocaine 5 ml injected intradermal pre-insertion for trimmed Midline.  Midline inserted.  Positive brisk blood return obtained.  Valve applied to lumen.  Midline flushed with 10 mls  0.9% Sterile Sodium Chloride. Midline flushes easily with no resistance.   Skin prep applied to site. Catheter secured with non-sutured locking device per hospital protocol.  Bio-patch/CHG impregnated sterile tegaderm dressing applied. Alcohol Swab Caps applied to valve.   Sterile field maintained during procedure. Positioning wire accounted for post procedure. Pt. Response to procedure, tolerated well. Appearance of site: Clean dry and intact without bleeding or edema. All edges of Tegaderm occlusive.   Site marked with date and initials of RN placing line.Top 2 side rails in up position, call button in reach, RN notified of all of the above.   A Power Midline 12 CM placed in the HUGO basilic vein. 0 cm showing from insertion site.

## 2025-05-05 NOTE — DISCHARGE INSTRUCTIONS
Caring for Your Midline  It was a pleasure helping you today.  Call Your Primary Infusion Nurse or Ordering Doctor with any questions. If there are any questions for the Vascular Access Team here at [facility] our number is [phone] leave a message and we will call you back as soon as possible.    We are available Monday through Friday 730 to 5pm.  Thank You    You are going home with a Midline. This small, soft tube has been placed in a vein in your arm. It is often used when treatment requires medications for short term. At home, you need to take care of your Midline to keep it working. And since a Midline line has such a high infection risk, you must take extra care washing your hands and preventing the spread of germs. This sheet will help you remember what to do to care for your Midline at home.    Understanding Your Role  . A nurse or other healthcare provider will teach you and your caregivers how to care for the Midline. Before leaving the hospital, make sure that you understand what to do at home, how long you may need the MIdline, and when to have a follow up visit.  . You will likely be told to flush the Midline with saline. You may also be told to change the catheter’s injection caps. Or, a nurse may do this for you during a follow-up visit. Only do these things if you are told to, following the instructions you were given.    Protecting the Midline  If the Midline gets damaged, it won’t work right and could raise you chance of infection. Call your healthcare team right away if any damage occurs. To protect your Midline at home:  . Prevent infection. Use good hand hygiene by following the guidelines on this sheet. Don’t touch the catheter or the dressing unless you need to. Always clean your hands before and after you come in contact with any part of the Midline. Your caregivers, family members, any visitors should use good hand hygiene also.  . Keep the Midline dry. The catheter and dressing must stay

## 2025-05-05 NOTE — PROGRESS NOTES
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   Routine follow-up for: 30 Day Clinic Visit  __________________________________________________________________________________________________    Patient Goals/Recommendations:   Nutrition:  Start daily ONS- high kcal/high pro (Ensure Complete, Boost High Protein, or similar product)  Will follow-up in 2 weeks r/t severe malnutrition status     Pt to return to comprehensive cancer clinic 2025, 2025, 2025, 2026.   ___________________________________________________________________________________________________         Patient Active Problem List    Diagnosis Date Noted    Sepsis (HCC) 2025    AML (acute myeloid leukemia) in remission (HCC) 2025    Acute myeloid leukemia (HCC) 2025    Acute leukemia of unspecified cell type not having achieved remission (HCC) 2024    Acute leukemia not having achieved remission (HCC) 2024    Abscess of left groin 2024    Transaminitis 2024    Pancytopenia (HCC) 2024    Acute appendicitis 2019    Quadriceps tendon rupture 2015    Left knee DJD 2015     Current Outpatient Medications   Medication Sig Dispense Refill    loperamide (IMODIUM) 2 MG capsule Take 1 capsule by mouth 4 times daily as needed for Diarrhea (Patient not taking: Reported on 2025)      triamcinolone (KENALOG) 0.1 % ointment Apply topically 2 times daily.  Stop once rash resolves 1 each 0    levoFLOXacin (LEVAQUIN) 750 MG tablet Take 1 tablet by mouth daily for 8 days 8 tablet 0    ertapenem (INVANZ) infusion

## 2025-05-07 ENCOUNTER — HOSPITAL ENCOUNTER (OUTPATIENT)
Dept: ONCOLOGY | Age: 75
Setting detail: INFUSION SERIES
Discharge: HOME | End: 2025-05-07

## 2025-05-07 VITALS
DIASTOLIC BLOOD PRESSURE: 74 MMHG | OXYGEN SATURATION: 97 % | BODY MASS INDEX: 29.24 KG/M2 | WEIGHT: 192.9 LBS | SYSTOLIC BLOOD PRESSURE: 148 MMHG | HEIGHT: 68 IN

## 2025-05-07 ASSESSMENT — PATIENT HEALTH QUESTIONNAIRE - PHQ9
1. LITTLE INTEREST OR PLEASURE IN DOING THINGS: MORE THAN HALF THE DAYS
SUM OF ALL RESPONSES TO PHQ QUESTIONS 1-9: 9
6. FEELING BAD ABOUT YOURSELF - OR THAT YOU ARE A FAILURE OR HAVE LET YOURSELF OR YOUR FAMILY DOWN: NOT AT ALL
4. FEELING TIRED OR HAVING LITTLE ENERGY: MORE THAN HALF THE DAYS
SUM OF ALL RESPONSES TO PHQ QUESTIONS 1-9: 9
SUM OF ALL RESPONSES TO PHQ QUESTIONS 1-9: 9
5. POOR APPETITE OR OVEREATING: NEARLY EVERY DAY
SUM OF ALL RESPONSES TO PHQ QUESTIONS 1-9: 9
9. THOUGHTS THAT YOU WOULD BE BETTER OFF DEAD, OR OF HURTING YOURSELF: NOT AT ALL
3. TROUBLE FALLING OR STAYING ASLEEP: NOT AT ALL
8. MOVING OR SPEAKING SO SLOWLY THAT OTHER PEOPLE COULD HAVE NOTICED. OR THE OPPOSITE, BEING SO FIGETY OR RESTLESS THAT YOU HAVE BEEN MOVING AROUND A LOT MORE THAN USUAL: NOT AT ALL
7. TROUBLE CONCENTRATING ON THINGS, SUCH AS READING THE NEWSPAPER OR WATCHING TELEVISION: NOT AT ALL
2. FEELING DOWN, DEPRESSED OR HOPELESS: MORE THAN HALF THE DAYS

## 2025-05-07 ASSESSMENT — ANXIETY QUESTIONNAIRES
2. NOT BEING ABLE TO STOP OR CONTROL WORRYING: 0-NOT AT ALL
1. FEELING NERVOUS, ANXIOUS, OR ON EDGE: NOT AT ALL
5. BEING SO RESTLESS THAT IT IS HARD TO SIT STILL: 0-NOT AT ALL
7. FEELING AFRAID AS IF SOMETHING AWFUL MIGHT HAPPEN: 0-NOT AT ALL
3. WORRYING TOO MUCH ABOUT DIFFERENT THINGS: 2-OVER HALF THE DAYS
GAD7 TOTAL SCORE: 2
4. TROUBLE RELAXING: 0-NOT AT ALL
6. BECOMING EASILY ANNOYED OR IRRITABLE: 0-NOT AT ALL

## 2025-05-13 DIAGNOSIS — Z94.84 HISTORY OF ALLOGENEIC STEM CELL TRANSPLANT (HCC): ICD-10-CM

## 2025-05-13 DIAGNOSIS — C92.01 ACUTE MYELOID LEUKEMIA IN REMISSION (HCC): Primary | ICD-10-CM

## 2025-05-29 LAB
Lab: NORMAL
REPORT: NORMAL
THIS TEST SENT TO: NORMAL

## 2025-06-12 NOTE — PROGRESS NOTES
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   Routine follow-up for: 60 Day Clinic Visit  __________________________________________________________________________________________________    Patient Goals/Recommendations:   Nutrition:  Increase protein intake by ~20g daily    Pt to return to comprehensive cancer clinic 2025, 2025, 2026.     ___________________________________________________________________________________________________         Patient Active Problem List    Diagnosis Date Noted    Sepsis (HCC) 2025    AML (acute myeloid leukemia) in remission (HCC) 2025    Acute myeloid leukemia (HCC) 2025    Acute leukemia of unspecified cell type not having achieved remission (HCC) 2024    Acute leukemia not having achieved remission (HCC) 2024    Abscess of left groin 2024    Transaminitis 2024    Pancytopenia (HCC) 2024    Acute appendicitis 2019    Quadriceps tendon rupture 2015    Left knee DJD 2015       BEHAVIORAL HEALTH   Patient was seen in shared clinic by clinical psychologist. Behavioral health concerns at this time include: None. Patient denied suicidal ideation and there were no immediate safety concerns.    Subjective: Patient reports that he is doing well. Caregiver states no stress related to care giving. Wife reports that patient has attempted to weed eat recently. He is eager to do things for himself. Discussed importance of following treatment recommendations.    Interventions today: Assessment of current needs, discussed importance of

## 2025-06-18 ENCOUNTER — HOSPITAL ENCOUNTER (OUTPATIENT)
Dept: ONCOLOGY | Age: 75
Setting detail: INFUSION SERIES
Discharge: HOME | End: 2025-06-18

## 2025-06-18 VITALS — HEIGHT: 68 IN | BODY MASS INDEX: 29.34 KG/M2

## 2025-06-18 ASSESSMENT — PATIENT HEALTH QUESTIONNAIRE - PHQ9
SUM OF ALL RESPONSES TO PHQ QUESTIONS 1-9: 1
SUM OF ALL RESPONSES TO PHQ QUESTIONS 1-9: 1
1. LITTLE INTEREST OR PLEASURE IN DOING THINGS: NOT AT ALL
SUM OF ALL RESPONSES TO PHQ QUESTIONS 1-9: 1
3. TROUBLE FALLING OR STAYING ASLEEP: NOT AT ALL
SUM OF ALL RESPONSES TO PHQ QUESTIONS 1-9: 1
7. TROUBLE CONCENTRATING ON THINGS, SUCH AS READING THE NEWSPAPER OR WATCHING TELEVISION: NOT AT ALL
2. FEELING DOWN, DEPRESSED OR HOPELESS: NOT AT ALL
9. THOUGHTS THAT YOU WOULD BE BETTER OFF DEAD, OR OF HURTING YOURSELF: NOT AT ALL
6. FEELING BAD ABOUT YOURSELF - OR THAT YOU ARE A FAILURE OR HAVE LET YOURSELF OR YOUR FAMILY DOWN: NOT AT ALL
4. FEELING TIRED OR HAVING LITTLE ENERGY: SEVERAL DAYS
5. POOR APPETITE OR OVEREATING: NOT AT ALL
8. MOVING OR SPEAKING SO SLOWLY THAT OTHER PEOPLE COULD HAVE NOTICED. OR THE OPPOSITE, BEING SO FIGETY OR RESTLESS THAT YOU HAVE BEEN MOVING AROUND A LOT MORE THAN USUAL: NOT AT ALL

## 2025-07-11 NOTE — PROGRESS NOTES
Survivorship Cancer Clinic Note   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   Routine follow-up for: 100 Day Clinic Visit  __________________________________________________________________________________________________    Patient Goals/Recommendations:   Pharmacy:  - Recommend that the patient stop taking supplemental potassium, last K lab = 5.2. Informed team of recommendation.  - Patient reports nausea/vomiting secondary to atovaquone solution. Informed him that there are alternatives, and to discuss with provider at [facility]. His platelets are recovered and G6PD is within normal limits, so Bactrim or dapsone could be considered.  - Recommend the following changes to G2 medication list: Tacro dose change to 0.5 mg every other day. Remove triamcinolone ointment as patient is no longer taking.  Nutrition:  -Consider start of bentyl for abd cramping/pain which is impacting PO intake      Pt to return to Survivorship Cancer Clinic Note on 2025, 2026  .   ___________________________________________________________________________________________________         Patient Active Problem List    Diagnosis Date Noted    Sepsis (HCC) 2025    AML (acute myeloid leukemia) in remission (HCC) 2025    Acute myeloid leukemia (HCC) 2025    Acute leukemia of unspecified cell type not having achieved remission (HCC) 2024    Acute leukemia not having achieved remission (HCC) 2024    Abscess of left groin 2024    Transaminitis 2024    Pancytopenia (HCC) 2024    Acute appendicitis 2019    Quadriceps tendon rupture 2015    Left

## 2025-07-16 ENCOUNTER — HOSPITAL ENCOUNTER (OUTPATIENT)
Dept: ONCOLOGY | Age: 75
Setting detail: INFUSION SERIES
Discharge: HOME | End: 2025-07-16

## 2025-07-16 RX ORDER — MAGNESIUM GLYCINATE 100 MG
100 CAPSULE ORAL 2 TIMES DAILY
COMMUNITY

## 2025-07-16 RX ORDER — GABAPENTIN 300 MG/1
300 CAPSULE ORAL NIGHTLY
COMMUNITY

## 2025-07-16 RX ORDER — XYLITOL/YERBA SANTA
1 AEROSOL, SPRAY WITH PUMP (ML) MUCOUS MEMBRANE PRN
COMMUNITY

## 2025-07-16 RX ORDER — LOPERAMIDE HYDROCHLORIDE 2 MG/1
2 CAPSULE ORAL 4 TIMES DAILY PRN
COMMUNITY

## 2025-07-16 RX ORDER — AMLODIPINE BESYLATE 10 MG/1
10 TABLET ORAL DAILY
COMMUNITY

## 2025-07-16 RX ORDER — PENICILLIN V POTASSIUM 500 MG/1
500 TABLET, FILM COATED ORAL 2 TIMES DAILY
COMMUNITY

## 2025-07-16 ASSESSMENT — PATIENT HEALTH QUESTIONNAIRE - PHQ9
5. POOR APPETITE OR OVEREATING: NOT AT ALL
SUM OF ALL RESPONSES TO PHQ QUESTIONS 1-9: 0
7. TROUBLE CONCENTRATING ON THINGS, SUCH AS READING THE NEWSPAPER OR WATCHING TELEVISION: NOT AT ALL
SUM OF ALL RESPONSES TO PHQ QUESTIONS 1-9: 0
1. LITTLE INTEREST OR PLEASURE IN DOING THINGS: NOT AT ALL
SUM OF ALL RESPONSES TO PHQ QUESTIONS 1-9: 0
3. TROUBLE FALLING OR STAYING ASLEEP: NOT AT ALL
2. FEELING DOWN, DEPRESSED OR HOPELESS: NOT AT ALL
8. MOVING OR SPEAKING SO SLOWLY THAT OTHER PEOPLE COULD HAVE NOTICED. OR THE OPPOSITE, BEING SO FIGETY OR RESTLESS THAT YOU HAVE BEEN MOVING AROUND A LOT MORE THAN USUAL: NOT AT ALL
9. THOUGHTS THAT YOU WOULD BE BETTER OFF DEAD, OR OF HURTING YOURSELF: NOT AT ALL
SUM OF ALL RESPONSES TO PHQ QUESTIONS 1-9: 0
4. FEELING TIRED OR HAVING LITTLE ENERGY: NOT AT ALL
6. FEELING BAD ABOUT YOURSELF - OR THAT YOU ARE A FAILURE OR HAVE LET YOURSELF OR YOUR FAMILY DOWN: NOT AT ALL

## 2025-07-18 VITALS
DIASTOLIC BLOOD PRESSURE: 65 MMHG | SYSTOLIC BLOOD PRESSURE: 123 MMHG | HEIGHT: 68 IN | HEART RATE: 83 BPM | BODY MASS INDEX: 29.33 KG/M2 | OXYGEN SATURATION: 98 %

## (undated) DEVICE — SURGICAL SET UP - SURE SET: Brand: MEDLINE INDUSTRIES, INC.

## (undated) DEVICE — CONNECTOR IV TB L28MM CLR VLV ACCS NDLLSS DISP MAXPLUS MP1000-C

## (undated) DEVICE — GAUZE,SPONGE,4"X4",16PLY,XRAY,STRL,LF: Brand: MEDLINE

## (undated) DEVICE — TURNOVER KIT RM INF CTRL TECH

## (undated) DEVICE — TRAY CATHETER 16FR F INCLUDE BARDX IC COMPLT CARE DRNGE BG

## (undated) DEVICE — ELECTROSURGICAL PENCIL ROCKER SWITCH NON COATED BLADE ELECTRODE 10 FT (3 M) CORD HOLSTER: Brand: MEGADYNE

## (undated) DEVICE — TROCAR: Brand: KII FIOS FIRST ENTRY

## (undated) DEVICE — HICKMAN 12.5 FR TRIPLE-LUMEN CV CATHETER, PEEL-APART INTRODUCER KIT WITH SURECUFF TISSUE INGROWTH CUFF: Brand: HICKMAN

## (undated) DEVICE — PUMP SUC IRR TBNG L10FT W/ HNDPC ASSEMB STRYKEFLOW 2

## (undated) DEVICE — RELOAD STPL L45MM H1.5-3.6MM REG TISS BLU GRIPPING SURF B

## (undated) DEVICE — SEALER/DIVIDER LAP SHFT L44CM JAW APER 11.4MM 315DEG ROT

## (undated) DEVICE — SUTURE PROL SZ 2-0 L36IN NONABSORBABLE BLU SH L26MM 1/2 CIR 8523H

## (undated) DEVICE — GOWN,SIRUS,POLYRNF,BRTHSLV,LG,30/CS: Brand: MEDLINE

## (undated) DEVICE — GLOVE SURG SZ 7 CRM LTX FREE POLYISOPRENE POLYMER BEAD ANTI

## (undated) DEVICE — SYRINGE MED 10ML TRNSLUC BRL PLUNG BLK MRK POLYPR CTRL

## (undated) DEVICE — TOWEL,OR,DSP,ST,BLUE,DLX,8/PK,10PK/CS: Brand: MEDLINE

## (undated) DEVICE — SUTURE MONOCRYL + SZ 4-0 L27IN ABSRB UD L19MM PS-2 3/8 CIR MCP426H

## (undated) DEVICE — NEPTUNE E-SEP SMOKE EVACUATION PENCIL, COATED, 70MM BLADE, PUSH BUTTON SWITCH: Brand: NEPTUNE E-SEP

## (undated) DEVICE — TRAP FLUID

## (undated) DEVICE — DEVICE CLSR TRCR PRT

## (undated) DEVICE — COVER LT HNDL BLU PLAS

## (undated) DEVICE — PRE OP PACK: Brand: MEDLINE INDUSTRIES, INC.

## (undated) DEVICE — CHLORAPREP 26ML ORANGE

## (undated) DEVICE — STAPLER INT L340MM 45MM STD 12 FIRING B FRM PWR + GRIPPING

## (undated) DEVICE — SUTURE MCRYL SZ 4-0 L27IN ABSRB UD L19MM PS-2 1/2 CIR PRIM Y426H

## (undated) DEVICE — GLOVE SURG SZ 7 L12IN FNGR THK87MIL WHT LTX FREE

## (undated) DEVICE — PLATE ES AD W 9FT CRD 2

## (undated) DEVICE — TROCAR ENDOSCP L100MM DIA12MM BLDELSS OBT RADLUC STBL SL

## (undated) DEVICE — INTENDED FOR TISSUE SEPARATION, AND OTHER PROCEDURES THAT REQUIRE A SHARP SURGICAL BLADE TO PUNCTURE OR CUT.: Brand: BARD-PARKER ® STAINLESS STEEL BLADES

## (undated) DEVICE — 40583 XL ADVANCED TRENDELENBURG POSITIONING KIT: Brand: 40583 XL ADVANCED TRENDELENBURG POSITIONING KIT

## (undated) DEVICE — SYRINGE MED 10ML SLIP TIP BLNT FILL AND LUERLOCK DISP

## (undated) DEVICE — GARMENT,MEDLINE,DVT,INT,CALF,MED, GEN2: Brand: MEDLINE

## (undated) DEVICE — STANDARD HYPODERMIC NEEDLE,POLYPROPYLENE HUB: Brand: MONOJECT

## (undated) DEVICE — TISSUE RETRIEVAL SYSTEM: Brand: INZII RETRIEVAL SYSTEM

## (undated) DEVICE — TOWEL,STOP FLAG GOLD N-W: Brand: MEDLINE

## (undated) DEVICE — SWABPACK™, 25-CT. BAG: Brand: SWABCAP™

## (undated) DEVICE — SUTURE VCRL SZ 0 L27IN ABSRB UD L26MM CT-2 1/2 CIR J270H

## (undated) DEVICE — LIQUIBAND RAPID ADHESIVE 36/CS 0.8ML: Brand: MEDLINE

## (undated) DEVICE — DRAPE,LAP,CHOLE,W/TROUGHS,STERILE: Brand: MEDLINE

## (undated) DEVICE — [HIGH FLOW INSUFFLATOR,  DO NOT USE IF PACKAGE IS DAMAGED,  KEEP DRY,  KEEP AWAY FROM SUNLIGHT,  PROTECT FROM HEAT AND RADIOACTIVE SOURCES.]: Brand: PNEUMOSURE

## (undated) DEVICE — SOLUTION ANTIFOG VIS SYS CLEARIFY LAPSCP

## (undated) DEVICE — SKIN AFFIX SURG ADHESIVE 72/CS 0.55ML: Brand: MEDLINE

## (undated) DEVICE — SOLUTION INJ LR VISIV 1000ML BG

## (undated) DEVICE — DRESSING FOAM DISK DIA1IN H 7MM HYDRPHLC CHG IMPREG IN SL

## (undated) DEVICE — PORT INSERTION: Brand: MEDLINE INDUSTRIES, INC.